# Patient Record
Sex: FEMALE | Race: WHITE | NOT HISPANIC OR LATINO | Employment: OTHER | URBAN - METROPOLITAN AREA
[De-identification: names, ages, dates, MRNs, and addresses within clinical notes are randomized per-mention and may not be internally consistent; named-entity substitution may affect disease eponyms.]

---

## 2017-01-23 ENCOUNTER — HOSPITAL ENCOUNTER (OUTPATIENT)
Dept: LAB | Facility: MEDICAL CENTER | Age: 77
End: 2017-01-23
Attending: FAMILY MEDICINE
Payer: MEDICARE

## 2017-01-23 DIAGNOSIS — E03.4 HYPOTHYROIDISM DUE TO ACQUIRED ATROPHY OF THYROID: ICD-10-CM

## 2017-01-23 DIAGNOSIS — E78.2 MIXED HYPERLIPIDEMIA: ICD-10-CM

## 2017-01-23 DIAGNOSIS — R79.89 LOW SERUM VITAMIN D: ICD-10-CM

## 2017-01-23 LAB
25(OH)D3 SERPL-MCNC: 33 NG/ML (ref 30–100)
ALBUMIN SERPL BCP-MCNC: 4.2 G/DL (ref 3.2–4.9)
ALBUMIN/GLOB SERPL: 1.2 G/DL
ALP SERPL-CCNC: 64 U/L (ref 30–99)
ALT SERPL-CCNC: 21 U/L (ref 2–50)
ANION GAP SERPL CALC-SCNC: 6 MMOL/L (ref 0–11.9)
AST SERPL-CCNC: 26 U/L (ref 12–45)
BILIRUB SERPL-MCNC: 0.7 MG/DL (ref 0.1–1.5)
BUN SERPL-MCNC: 20 MG/DL (ref 8–22)
CALCIUM SERPL-MCNC: 9.3 MG/DL (ref 8.5–10.5)
CHLORIDE SERPL-SCNC: 106 MMOL/L (ref 96–112)
CHOLEST SERPL-MCNC: 203 MG/DL (ref 100–199)
CO2 SERPL-SCNC: 29 MMOL/L (ref 20–33)
CREAT SERPL-MCNC: 1.12 MG/DL (ref 0.5–1.4)
GLOBULIN SER CALC-MCNC: 3.4 G/DL (ref 1.9–3.5)
GLUCOSE SERPL-MCNC: 98 MG/DL (ref 65–99)
HDLC SERPL-MCNC: 38 MG/DL
LDLC SERPL CALC-MCNC: 115 MG/DL
POTASSIUM SERPL-SCNC: 4.5 MMOL/L (ref 3.6–5.5)
PROT SERPL-MCNC: 7.6 G/DL (ref 6–8.2)
SODIUM SERPL-SCNC: 141 MMOL/L (ref 135–145)
T4 FREE SERPL-MCNC: 0.77 NG/DL (ref 0.53–1.43)
TRIGL SERPL-MCNC: 250 MG/DL (ref 0–149)
TSH SERPL DL<=0.005 MIU/L-ACNC: 3.38 UIU/ML (ref 0.3–3.7)

## 2017-01-23 PROCEDURE — 36415 COLL VENOUS BLD VENIPUNCTURE: CPT

## 2017-01-23 PROCEDURE — 84439 ASSAY OF FREE THYROXINE: CPT

## 2017-01-23 PROCEDURE — 80061 LIPID PANEL: CPT

## 2017-01-23 PROCEDURE — 80053 COMPREHEN METABOLIC PANEL: CPT

## 2017-01-23 PROCEDURE — 84443 ASSAY THYROID STIM HORMONE: CPT

## 2017-01-23 PROCEDURE — 82306 VITAMIN D 25 HYDROXY: CPT

## 2017-01-30 ENCOUNTER — OFFICE VISIT (OUTPATIENT)
Dept: MEDICAL GROUP | Facility: PHYSICIAN GROUP | Age: 77
End: 2017-01-30
Payer: MEDICARE

## 2017-01-30 VITALS
HEIGHT: 62 IN | OXYGEN SATURATION: 95 % | WEIGHT: 177 LBS | SYSTOLIC BLOOD PRESSURE: 146 MMHG | DIASTOLIC BLOOD PRESSURE: 52 MMHG | HEART RATE: 85 BPM | BODY MASS INDEX: 32.57 KG/M2 | TEMPERATURE: 96.8 F

## 2017-01-30 DIAGNOSIS — N18.30 CKD (CHRONIC KIDNEY DISEASE) STAGE 3, GFR 30-59 ML/MIN (HCC): ICD-10-CM

## 2017-01-30 DIAGNOSIS — E78.2 MIXED HYPERLIPIDEMIA: ICD-10-CM

## 2017-01-30 DIAGNOSIS — M15.9 PRIMARY OSTEOARTHRITIS INVOLVING MULTIPLE JOINTS: ICD-10-CM

## 2017-01-30 DIAGNOSIS — E66.9 OBESITY (BMI 30-39.9): ICD-10-CM

## 2017-01-30 PROCEDURE — G8432 DEP SCR NOT DOC, RNG: HCPCS | Performed by: FAMILY MEDICINE

## 2017-01-30 PROCEDURE — 4040F PNEUMOC VAC/ADMIN/RCVD: CPT | Performed by: FAMILY MEDICINE

## 2017-01-30 PROCEDURE — G8419 CALC BMI OUT NRM PARAM NOF/U: HCPCS | Performed by: FAMILY MEDICINE

## 2017-01-30 PROCEDURE — 1036F TOBACCO NON-USER: CPT | Performed by: FAMILY MEDICINE

## 2017-01-30 PROCEDURE — 99214 OFFICE O/P EST MOD 30 MIN: CPT | Performed by: FAMILY MEDICINE

## 2017-01-30 PROCEDURE — 1101F PT FALLS ASSESS-DOCD LE1/YR: CPT | Performed by: FAMILY MEDICINE

## 2017-01-30 PROCEDURE — G8484 FLU IMMUNIZE NO ADMIN: HCPCS | Performed by: FAMILY MEDICINE

## 2017-01-30 RX ORDER — CELECOXIB 100 MG/1
100 CAPSULE ORAL 2 TIMES DAILY
Qty: 180 CAP | Refills: 1 | Status: SHIPPED | OUTPATIENT
Start: 2017-01-30 | End: 2017-09-27 | Stop reason: SDUPTHER

## 2017-01-30 RX ORDER — SIMVASTATIN 20 MG
20 TABLET ORAL EVERY EVENING
Qty: 90 TAB | Refills: 3 | Status: SHIPPED | OUTPATIENT
Start: 2017-01-30 | End: 2017-10-31 | Stop reason: SDUPTHER

## 2017-01-30 ASSESSMENT — PATIENT HEALTH QUESTIONNAIRE - PHQ9: CLINICAL INTERPRETATION OF PHQ2 SCORE: 0

## 2017-01-30 NOTE — ASSESSMENT & PLAN NOTE
Ongoing issue. As stated above due to the cold weather patient is not getting any regular daily exercise and has not been able to eat as healthy due to the holidays. She reports that she is trying to get back on track and will be getting a new dog soon so she will be getting out for regular exercise.

## 2017-01-30 NOTE — ASSESSMENT & PLAN NOTE
Ongoing issue. Patient reports that the majority of her pain is localized in her hands bilaterally. She states that previously she had been taking diclofenac but had stopped that medication due to the decrease in her renal function. She states that she has been trying to take over-the-counter Tylenol arthritis but this has not been beneficial. She reports achiness in both of her hands; a mild amount of joint swelling without redness. She states that it makes it difficult to  small items due to the pain. She denies any numbness or tingling

## 2017-01-30 NOTE — PROGRESS NOTES
Subjective:   Emilie Gonzalez is a 76 y.o. female here today for CKD; elevated TG; obesity; joint pain    CKD (chronic kidney disease) stage 3, GFR 30-59 ml/min  Ongoing issue. Chart review shows that recent blood work reveals a decrease in the GFR down to 47. Previously it had been at 50. Currently patient is not on any nephrotoxic medications.    Hyperlipidemia  Ongoing issue. Patient reports 100% compliance with medication; she is currently on simvastatin 20 mg daily. Patient denies any issues with muscle cramps or weakness. Chart review shows that her total cholesterol is just above normal at 203 but her triglycerides are slightly elevated at 250. She reports that she has done fairly well at eating healthy but with the holidays has not been as consistent. She also has not been getting any regular exercise due to the cold weather.    Obesity (BMI 30-39.9)  Ongoing issue. As stated above due to the cold weather patient is not getting any regular daily exercise and has not been able to eat as healthy due to the holidays. She reports that she is trying to get back on track and will be getting a new dog soon so she will be getting out for regular exercise.    Osteoarthritis  Ongoing issue. Patient reports that the majority of her pain is localized in her hands bilaterally. She states that previously she had been taking diclofenac but had stopped that medication due to the decrease in her renal function. She states that she has been trying to take over-the-counter Tylenol arthritis but this has not been beneficial. She reports achiness in both of her hands; a mild amount of joint swelling without redness. She states that it makes it difficult to  small items due to the pain. She denies any numbness or tingling         Current medicines (including changes today)  Current Outpatient Prescriptions   Medication Sig Dispense Refill   • celecoxib (CELEBREX) 100 MG Cap Take 1 Cap by mouth 2 times a day. 180 Cap 1   •  "simvastatin (ZOCOR) 20 MG Tab Take 1 Tab by mouth every evening. 90 Tab 3   • Omega-3 Fatty Acids (OMEGA 3 PO) Take  by mouth.     • Multiple Vitamins-Minerals (MULTI COMPLETE PO) Take  by mouth.     • Calcium Carbonate-Vitamin D (CALCIUM + D PO) Take  by mouth.     • Bioflavonoid Products (VITAMIN C PLUS) 1000 MG TABS Take  by mouth.       No current facility-administered medications for this visit.     She  has a past medical history of Moderate aortic insufficiency (9/26/2014); Hyperlipidemia (9/26/2014); Osteoporosis (9/26/2014); Osteoarthritis (9/26/2014); History of shingles (9/26/2014); Prolapsed bladder (9/26/2014); Macular degeneration of right eye (9/26/2014); Hearing loss sensory, bilateral (3/10/2015); Plantar fasciitis, bilateral (3/10/2015); Edema (3/10/2015); Arthritis of foot, right (6/9/2015); Benign essential tremor (6/9/2015); Hypothyroidism due to acquired atrophy of thyroid (9/1/2015); and Parkinson's disease (CMS-HCC).    ROS   No chest pain, no shortness of breath, no abdominal pain  +joint pain     Objective:     Blood pressure 146/52, pulse 85, temperature 36 °C (96.8 °F), height 1.575 m (5' 2\"), weight 80.287 kg (177 lb), SpO2 95 %. Body mass index is 32.37 kg/(m^2).   Physical Exam:  Alert, oriented in no acute distress.  Eye contact is good, speech goal directed, affect calm  HEENT: conjunctiva non-injected, sclera non-icteric.  Pinna normal. Oral mucous membranes pink and moist with no lesions.  Neck No adenopathy or masses in the neck or supraclavicular regions.  Lungs: clear to auscultation bilaterally with good excursion.  CV: regular rate and rhythm.; mild systolic ejection murmur  Abdomen: soft, nontender, No CVAT  Ext: no edema, color normal, vascularity normal, temperature normal  MSK: noted trace swelling of the 3rd digit on the left hand and 2nd digit on the right hand; no erythema; no TTP; decreased  strength secondary to pain      Assessment and Plan:   The following " treatment plan was discussed     1. Mixed hyperlipidemia      Stable. Continue current medications and continue to encourage healthy lifestyle. Monitor. Medication refilled   2. Primary osteoarthritis involving multiple joints      Uncontrolled. Prescription for Celebrex 100 mg one tab by mouth twice a day. Monitor for tolerance and effect   3. CKD (chronic kidney disease) stage 3, GFR 30-59 ml/min      Stable. Continue to monitor closely.   4. Obesity (BMI 30-39.9)  Patient identified as having weight management issue.  Appropriate orders and counseling given.    Uncontrolled.cont to encourage healthy eating and exercise. Monitor       Followup: Return in about 9 months (around 10/30/2017) for lipids/arthritis, Short.

## 2017-01-30 NOTE — ASSESSMENT & PLAN NOTE
Ongoing issue. Patient reports 100% compliance with medication; she is currently on simvastatin 20 mg daily. Patient denies any issues with muscle cramps or weakness. Chart review shows that her total cholesterol is just above normal at 203 but her triglycerides are slightly elevated at 250. She reports that she has done fairly well at eating healthy but with the holidays has not been as consistent. She also has not been getting any regular exercise due to the cold weather.

## 2017-01-30 NOTE — ASSESSMENT & PLAN NOTE
Ongoing issue. Chart review shows that recent blood work reveals a decrease in the GFR down to 47. Previously it had been at 50. Currently patient is not on any nephrotoxic medications.

## 2017-01-30 NOTE — MR AVS SNAPSHOT
"        Emilie Gonzalez   2017 8:00 AM   Office Visit   MRN: [id number]    Department:  [facility]   Dept Phone:  [phone]    Description:  Female : 1940   Provider:  Ansley Baugh D.O.           Reason for Visit     Follow-Up           Allergies as of 2017     Allergen Noted Reactions    Codeine 2014       halucinations      You were diagnosed with     Mixed hyperlipidemia   [272.2.ICD-9-CM]       Primary osteoarthritis involving multiple joints   [1657705]       CKD (chronic kidney disease) stage 3, GFR 30-59 ml/min   [243786]       Obesity (BMI 30-39.9)   [808801]         Vital Signs     Blood Pressure Pulse Temperature Height Weight Body Mass Index    146/52 mmHg 85 36 °C (96.8 °F) 1.575 m (5' 2\") 80.287 kg (177 lb) 32.37 kg/m2    Oxygen Saturation Smoking Status                95% Never Smoker           Basic Information     Date Of Birth Sex Race Ethnicity Preferred Language    1940 Female White Non- English      Your appointments     2017  1:00 PM   Follow Up Visit with Magan Colvin M.D.   [facility] (--)    [address], [address]  [place] [address] [phone]           You will be receiving a confirmation call a few days before your appointment from our automated call confirmation system.            2017  2:45 PM   FOLLOW UP with Hans Olivera M.D.   [facility] (--)    [address], [facility] [address]  [place] [address] [phone]            Oct 30, 2017  9:20 AM   Established Patient with [facility]    [address] [facility] [address] [phone]           You will be receiving a confirmation call a few days before your appointment from our automated call confirmation system.              Problem List              ICD-10-CM Priority Class Noted - Resolved    Hyperlipidemia E78.5   2014 - Present   " Osteoarthritis M19.90   9/26/2014 - Present    History of shingles Z86.19   9/26/2014 - Present    Prolapsed bladder [id number]   9/26/2014 - Present    Macular degeneration of right eye H35.30   9/26/2014 - Present    Hearing loss sensory, bilateral H90.3   3/10/2015 - Present    Plantar fasciitis, bilateral M72.2   3/10/2015 - Present    Edema R60.9   3/10/2015 - Present    Arthritis of foot, right M19.90   6/9/2015 - Present    Benign essential tremor G25.0   6/9/2015 - Present    Hypothyroidism due to acquired atrophy of thyroid E03.4   9/1/2015 - Present    Parkinson disease (CMS-[facility]) G20   2/5/2016 - Present    Cognitive change R41.89   2/5/2016 - Present    Aortic regurgitation I35.1   3/14/2016 - Present    Diastolic dysfunction I51.9   3/31/2016 - Present    Bilateral hand pain M79.641, M79.642   7/25/2016 - Present    Low serum vitamin D R79.89   9/1/2016 - Present    CKD (chronic kidney disease) stage 3, GFR 30-59 ml/min N18.3   1/30/2017 - Present    Obesity (BMI 30-39.9) E66.9   1/30/2017 - Present      Health Maintenance        Date Due Completion Dates    IMM DTaP/Tdap/Td Vaccine (1 - Tdap) 10/8/1958 ---    PAP SMEAR 10/8/1960 ---    IMM ZOSTER VACCINE 10/8/1999 ---    IMM INFLUENZA (1) 9/1/2016 11/4/2015, 11/12/2014    IMM PNEUMOCOCCAL 65+ (ADULT) LOW/MEDIUM RISK SERIES (2 of 2 - PPSV23) 11/4/2016 11/4/2015    MAMMOGRAM 3/23/2017 3/23/2015, 3/20/2015, 4/10/2013 (Done)    Override on 4/10/2013: Done    BONE DENSITY 3/20/2020 3/20/2015, 4/16/2014 (Done)    Override on 4/16/2014: Done            Current Immunizations     13-VALENT PCV PREVNAR 11/4/2015    Influenza Vaccine Adult HD 11/4/2015, 11/12/2014      Below and/or attached are the medications your provider expects you to take. Review all of your home medications and newly ordered medications with your provider and/or pharmacist. Follow medication instructions as directed by your provider and/or pharmacist. Please keep your medication list with you  and share with your provider. Update the information when medications are discontinued, doses are changed, or new medications (including over-the-counter products) are added; and carry medication information at all times in the event of emergency situations     Allergies:  CODEINE - (reactions not documented)               Medications  Valid as of: January 30, 2017 -  8:16 AM    Generic Name Brand Name Tablet Size Instructions for use    Bioflavonoid Products (Tab) VITAMIN C PLUS 1000 MG Take  by mouth.        Calcium Citrate-Vitamin D   Take  by mouth.        Celecoxib (Cap) CELEBREX 100 MG Take 1 Cap by mouth 2 times a day.        Multiple Vitamins-Minerals   Take  by mouth.        Omega-3 Fatty Acids   Take  by mouth.        Simvastatin (Tab) ZOCOR 20 MG Take 1 Tab by mouth every evening.        .                 Medicines prescribed today were sent to:     [facility] [address] [place] [address] [place], [place] - [address] [address] [address]    [address] [place] [address]    Phone: [phone] Fax: [phone]    Open 24 Hours?: No      Medication refill instructions:       If your prescription bottle indicates you have medication refills left, it is not necessary to call your provider’s office. Please contact your pharmacy and they will refill your medication.    If your prescription bottle indicates you do not have any refills left, you may request refills at any time through one of the following ways: The online [employer] system (except Urgent Care), by calling your provider’s office, or by asking your pharmacy to contact your provider’s office with a refill request. Medication refills are processed only during regular business hours and may not be available until the next business day. Your provider may request additional information or to have a follow-up visit with you prior to refilling your medication.   *Please Note: Medication refills are assigned a new Rx number when refilled electronically.  Your pharmacy may indicate that no refills were authorized even though a new prescription for the same medication is available at the pharmacy. Please request the medicine by name with the pharmacy before contacting your provider for a refill.           MyChart Status: Patient Declined

## 2017-04-12 ENCOUNTER — OFFICE VISIT (OUTPATIENT)
Dept: CARDIOLOGY | Facility: MEDICAL CENTER | Age: 77
End: 2017-04-12
Payer: MEDICARE

## 2017-04-12 ENCOUNTER — OFFICE VISIT (OUTPATIENT)
Dept: NEUROLOGY | Facility: MEDICAL CENTER | Age: 77
End: 2017-04-12
Payer: MEDICARE

## 2017-04-12 VITALS
HEIGHT: 63 IN | SYSTOLIC BLOOD PRESSURE: 124 MMHG | DIASTOLIC BLOOD PRESSURE: 70 MMHG | WEIGHT: 173 LBS | HEART RATE: 70 BPM | BODY MASS INDEX: 30.65 KG/M2

## 2017-04-12 VITALS
HEIGHT: 62 IN | RESPIRATION RATE: 16 BRPM | DIASTOLIC BLOOD PRESSURE: 56 MMHG | WEIGHT: 173.4 LBS | SYSTOLIC BLOOD PRESSURE: 138 MMHG | OXYGEN SATURATION: 97 % | HEART RATE: 96 BPM | BODY MASS INDEX: 31.91 KG/M2 | TEMPERATURE: 98 F

## 2017-04-12 DIAGNOSIS — E78.5 DYSLIPIDEMIA: ICD-10-CM

## 2017-04-12 DIAGNOSIS — R60.9 EDEMA, UNSPECIFIED TYPE: ICD-10-CM

## 2017-04-12 DIAGNOSIS — I35.1 AORTIC VALVE INSUFFICIENCY, UNSPECIFIED ETIOLOGY: ICD-10-CM

## 2017-04-12 DIAGNOSIS — R94.31 ABNORMAL EKG: ICD-10-CM

## 2017-04-12 DIAGNOSIS — G20.A1 PARKINSON DISEASE: ICD-10-CM

## 2017-04-12 PROCEDURE — G8432 DEP SCR NOT DOC, RNG: HCPCS | Performed by: INTERNAL MEDICINE

## 2017-04-12 PROCEDURE — 4040F PNEUMOC VAC/ADMIN/RCVD: CPT | Performed by: PSYCHIATRY & NEUROLOGY

## 2017-04-12 PROCEDURE — 1101F PT FALLS ASSESS-DOCD LE1/YR: CPT | Performed by: PSYCHIATRY & NEUROLOGY

## 2017-04-12 PROCEDURE — G8419 CALC BMI OUT NRM PARAM NOF/U: HCPCS | Performed by: INTERNAL MEDICINE

## 2017-04-12 PROCEDURE — 1101F PT FALLS ASSESS-DOCD LE1/YR: CPT | Performed by: INTERNAL MEDICINE

## 2017-04-12 PROCEDURE — 99214 OFFICE O/P EST MOD 30 MIN: CPT | Performed by: PSYCHIATRY & NEUROLOGY

## 2017-04-12 PROCEDURE — 99214 OFFICE O/P EST MOD 30 MIN: CPT | Performed by: INTERNAL MEDICINE

## 2017-04-12 PROCEDURE — G8432 DEP SCR NOT DOC, RNG: HCPCS | Performed by: PSYCHIATRY & NEUROLOGY

## 2017-04-12 PROCEDURE — 4040F PNEUMOC VAC/ADMIN/RCVD: CPT | Performed by: INTERNAL MEDICINE

## 2017-04-12 PROCEDURE — 1036F TOBACCO NON-USER: CPT | Performed by: PSYCHIATRY & NEUROLOGY

## 2017-04-12 PROCEDURE — 1036F TOBACCO NON-USER: CPT | Performed by: INTERNAL MEDICINE

## 2017-04-12 PROCEDURE — G8419 CALC BMI OUT NRM PARAM NOF/U: HCPCS | Performed by: PSYCHIATRY & NEUROLOGY

## 2017-04-12 ASSESSMENT — ENCOUNTER SYMPTOMS
SHORTNESS OF BREATH: 0
MYALGIAS: 0
PALPITATIONS: 0
ORTHOPNEA: 0
DIZZINESS: 0

## 2017-04-12 NOTE — PATIENT INSTRUCTIONS
Resting tremor-- L>R ( patient is anxious and mental activity is needed to be able to observe this resting tremor)  Bradykinesia-- both hands  Rigitity-- both hands  Postural reflex  Recall 3/3    IMPRESSION:    1. Parkinson Disease Stage I- Stage II  2. Hx of hearing impairment and cognitive decline  3. Spells of shaking - minutes every 2 weeks or so    PLAN/RECOMMENDATIONS:    ________________________________________________________________________    Advise exercise muscle and brain  Explained to Emilie--- she has early parkinson disease  As time goes by, the tremor could progress  At this time, I would continue observation till Emilie would like to take medicine for better mobility  Please call us if interested in starting anti-parkinson medicine      I will see Emilie in 6 months    ________________________________________________________________________    Fish Oil -- Omega 3 [id number]# daily  Vit D-3 2000 unit daily  ________________________________________________________________________      ________________________________________________________________________

## 2017-04-12 NOTE — PROGRESS NOTES
"Subjective:   Emilie Gonzalez is a 76 y.o. female who presents today for followup aortic insufficiency, hyperlipidemia, abnormal EKG, edema.    Last seen on 10/10/2016.    Since 10/10/2016 appointment no cardiac symptoms.  No CHF symptoms.   No palpitations or chest pain.  Moved from an apartment to a [employer].  Able to work out in the garden without any symptoms or problems.    Past medical history  Diagnosed with stage II Parkinson's disease. Followed by Dr. Colvin, neurologist    On 9/1/2015 TSH was slightly elevated.  Levothyroxine started by PCP.  Of note, has always slept on 2 pillows for years.  Has always woken up 2-3 times a night.  No change in his sleep habits.    Previous appointment  The patient states that her children states that she gets out of breath while walking.  The patient has noted some mild exertional shortness of breath.  Concerned about recent worsening aortic insufficiency.  No angina pectoris.  No CHF symptoms.  Has had right lower extremity edema currently related to arthritis.  Takes anti-inflammatories.  Had been started on Aldactone/HCTZ by PCP but has not been taking it.  Also in 11/2014 diagnosed with \"asthma\". Had mild abnormal PFTs in PCPs office.  Prescribed a bronchodilator but has not used it.    Past medical history  The patient had previously lived in [place], [place].  5 years ago she was discovered to have a \"leaky heart valve.  The patient had been followed by Dr. Bird, cardiologist [place] for the past 4 years.  The patient is moved to [facility] and is establishing ongoing cardiology care.    The patient feels that she is \"healthy\".  She lives alone.  She runs all of her activity of daily living including shopping and necessary errands.  She has no exercise limitations.   She denies chest pain shortness of breath, palpitations or lightheadedness.    History of hyperlipidemia on simvastatin.  No history of hypertension, diabetes mellitus and she is not smoke " "cigarettes.    Family history  Father  of a heart attack at age 69.  Sister alive at age 90. Had a heart attack at age 75.    Social history.  .  Does not drink alcohol except on rare occasions with dinner.    Other medical problems.  2013 laparoscopic cholecystectomy Shanika Shaw.  Appendectomy age 13.  \"Chronic bronchitis\" but no problems times one year.      Past Medical History   Diagnosis Date   • Moderate aortic insufficiency 2014   • Hyperlipidemia 2014   • Osteoporosis 2014   • Osteoarthritis 2014   • History of shingles 2014   • Prolapsed bladder 2014   • Macular degeneration of right eye 2014   • Hearing loss sensory, bilateral 3/10/2015   • Plantar fasciitis, bilateral 3/10/2015   • Edema 3/10/2015   • Arthritis of foot, right 2015   • Benign essential tremor 2015   • Hypothyroidism due to acquired atrophy of thyroid 2015   • Parkinson's disease (CMS-[facility])      Past Surgical History   Procedure Laterality Date   • Cholecystectomy  2013   • Appendectomy       Family History   Problem Relation Age of Onset   • Hypertension Father    • Hypertension Sister    • Arthritis Mother      History   Smoking status   • Never Smoker    Smokeless tobacco   • Never Used     Allergies   Allergen Reactions   • Codeine      halucinations     Outpatient Encounter Prescriptions as of 2017   Medication Sig Dispense Refill   • celecoxib (CELEBREX) 100 MG Cap Take 1 Cap by mouth 2 times a day. 180 Cap 1   • simvastatin (ZOCOR) 20 MG Tab Take 1 Tab by mouth every evening. 90 Tab 3   • Omega-3 Fatty Acids (OMEGA 3 PO) Take  by mouth.     • Multiple Vitamins-Minerals (MULTI COMPLETE PO) Take  by mouth.     • Calcium Carbonate-Vitamin D (CALCIUM + D PO) Take  by mouth.     • Bioflavonoid Products (VITAMIN C PLUS) 1000 MG TABS Take  by mouth.       No facility-administered encounter medications on file as of 2017.     Review of Systems   Respiratory: Negative " "for shortness of breath.    Cardiovascular: Negative for chest pain, palpitations, orthopnea and leg swelling.   Musculoskeletal: Negative for myalgias.   Neurological: Negative for dizziness.        Objective:   /70 mmHg  Pulse 70  Ht 1.588 m (5' 2.52\")  Wt 78.472 kg (173 lb)  BMI 31.12 kg/m2    Physical Exam   Constitutional: She is oriented to person, place, and time. She appears well-nourished. No distress.   HENT:   Head: Normocephalic.   Neck: No JVD present. Carotid bruit is not present.   Normal jugular venous pressure.   Cardiovascular: Normal rate, regular rhythm, S1 normal and S2 normal.  Exam reveals no gallop and no friction rub.    Murmur heard.   Systolic murmur is present with a grade of 2/6   Pulses:       Carotid pulses are 2+ on the right side, and 2+ on the left side.       Radial pulses are 2+ on the right side, and 2+ on the left side.        Femoral pulses are 1+ on the right side, and 1+ on the left side.       Posterior tibial pulses are 1+ on the right side, and 1+ on the left side.   No femoral bruits.   Pulmonary/Chest: Effort normal and breath sounds normal. She has no wheezes. She has no rhonchi. She has no rales.   Abdominal: Soft. Bowel sounds are normal. She exhibits no abdominal bruit, no pulsatile midline mass and no mass. There is no hepatosplenomegaly. There is no tenderness.   Protuberant.   Musculoskeletal: She exhibits no edema.   Neurological: She is alert and oriented to person, place, and time. Gait normal.   Skin: Skin is warm and dry. No cyanosis. Nails show no clubbing.   Psychiatric: She has a normal mood and affect. Her behavior is normal.    06/07/2010 ECHOCARDIOGRAM  EF 60%.  Mild to moderate aortic regurgitation.    05/05/2011 ECHOCARDIOGRAM  EF 55-60%.  Moderate aortic insufficiency.    05/08/2012 ECHOCARDIOGRAM  EF 70%.  Moderate aortic insufficiency.  Pulmonary pressure 26 mmHg.    03/25/2015 ECHOCARDIOGRAM  Normal left ventricular size, thickness, " systolic function, and   diastolic function.  Left ventricular ejection fraction is 65% to 70%.  Aortic sclerosis without stenosis.  Moderately severe aortic insufficiency.  Right heart pressures are consistent with mild pulmonary hypertension.    10/24/2016 ECHOCARDIOGRAM  Normal left ventricular size, wall thickness, and systolic function.  Left ventricular ejection fraction is visually estimated to be 60%.  Severe eccentric aortic insufficiency.  Mildly thickened mitral valve leaflets with normal leaflet excursion.  Unable to estimate pulmonary artery pressure due to an inadequate   tricuspid regurgitant jet.    03/19/2015 EKG: Normal sinus rhythm, rate 73. Nonspecific ST-T wave abnormalities. No prior tracing for comparison. Reviewed by myself.    Assessment:     1. Aortic valve insufficiency, unspecified etiology  ECHOCARDIOGRAM-COMP W/ CONT   2. Dyslipidemia     3. Edema, unspecified type     4. Abnormal EKG         Medical Decision Making:  Today's Assessment / Status / Plan:      Aortic regurgitation. Severe. Asymptomatic.    Abnormal EKG.      Hyperlipidemia. On simvastatin.    Hypothyroidism. Diagnosed 9/1/2015. On supplements. Per PCP.    Recommendations  Continue current therapy.  Echocardiogram.  Follow-up 4 months.

## 2017-04-12 NOTE — MR AVS SNAPSHOT
"        Emilie Gonzalez   2017 3:00 PM   Office Visit   MRN: [id number]    Department:  [facility]   Dept Phone:  [phone]    Description:  Female : 1940   Provider:  Hans Olivera M.D.           Reason for Visit     Follow-Up           Allergies as of 2017     Allergen Noted Reactions    Codeine 2014       halucinations      You were diagnosed with     Aortic valve insufficiency, unspecified etiology   [8862753]       Dyslipidemia   [036430]       Edema, unspecified type   [1109522]       Abnormal EKG   [392707]         Vital Signs     Blood Pressure Pulse Height Weight Body Mass Index Smoking Status    124/70 mmHg 70 1.588 m (5' 2.52\") 78.472 kg (173 lb) 31.12 kg/m2 Never Smoker       Basic Information     Date Of Birth Sex Race Ethnicity Preferred Language    1940 Female White Non- English      Your appointments     Oct 11, 2017  1:00 PM   Follow Up Visit with Magan Colvin M.D.   [facility] (--)    [address], [address]  [facility] [id number] [phone]           You will be receiving a confirmation call a few days before your appointment from our automated call confirmation system.            Oct 30, 2017  9:20 AM   Established Patient with Ansley Baugh D.O.   [facility]    [address] [address] [id number] [phone]           You will be receiving a confirmation call a few days before your appointment from our automated call confirmation system.              Problem List              ICD-10-CM Priority Class Noted - Resolved    Osteoarthritis M19.90   2014 - Present    History of shingles Z86.19   2014 - Present    Prolapsed bladder [id number]   2014 - Present    Macular degeneration of right eye H35.30   2014 - Present    Hearing loss sensory, bilateral H90.3   3/10/2015 - Present    Plantar fasciitis, bilateral M72.2   3/10/2015 - Present    Edema R60.9   3/10/2015 - Present   " Arthritis of foot, right M19.90   6/9/2015 - Present    Benign essential tremor G25.0   6/9/2015 - Present    Hypothyroidism due to acquired atrophy of thyroid E03.4   9/1/2015 - Present    Parkinson disease (CMS-HCC) G20   2/5/2016 - Present    Cognitive change R41.89   2/5/2016 - Present    Aortic regurgitation I35.1   3/14/2016 - Present    Diastolic dysfunction I51.9   3/31/2016 - Present    Bilateral hand pain M79.641, M79.642   7/25/2016 - Present    Low serum vitamin D R79.89   9/1/2016 - Present    CKD (chronic kidney disease) stage 3, GFR 30-59 ml/min N18.3   1/30/2017 - Present    Obesity (BMI 30-39.9) E66.9   1/30/2017 - Present    Dyslipidemia E78.5   4/12/2017 - Present    Abnormal EKG R94.31   4/12/2017 - Present      Health Maintenance        Date Due Completion Dates    IMM DTaP/Tdap/Td Vaccine (1 - Tdap) 10/8/1958 ---    PAP SMEAR 10/8/1960 ---    IMM ZOSTER VACCINE 10/8/1999 ---    IMM PNEUMOCOCCAL 65+ (ADULT) LOW/MEDIUM RISK SERIES (2 of 2 - PPSV23) 11/4/2016 11/4/2015    MAMMOGRAM 3/23/2017 3/23/2015, 3/20/2015, 4/10/2013 (Done)    Override on 4/10/2013: Done    BONE DENSITY 3/20/2020 3/20/2015, 4/16/2014 (Done)    Override on 4/16/2014: Done            Current Immunizations     13-VALENT PCV PREVNAR 11/4/2015    Influenza Vaccine Adult HD 11/4/2015, 11/12/2014      Below and/or attached are the medications your provider expects you to take. Review all of your home medications and newly ordered medications with your provider and/or pharmacist. Follow medication instructions as directed by your provider and/or pharmacist. Please keep your medication list with you and share with your provider. Update the information when medications are discontinued, doses are changed, or new medications (including over-the-counter products) are added; and carry medication information at all times in the event of emergency situations     Allergies:  CODEINE - (reactions not documented)               Medications   Valid as of: April 12, 2017 -  3:04 PM    Generic Name Brand Name Tablet Size Instructions for use    Bioflavonoid Products (Tab) VITAMIN C PLUS 1000 MG Take  by mouth.        Calcium Citrate-Vitamin D   Take  by mouth.        Celecoxib (Cap) CELEBREX 100 MG Take 1 Cap by mouth 2 times a day.        Multiple Vitamins-Minerals   Take  by mouth.        Omega-3 Fatty Acids   Take  by mouth.        Simvastatin (Tab) ZOCOR 20 MG Take 1 Tab by mouth every evening.        .                 Medicines prescribed today were sent to:     [facility] [address] [place] [address] [place], [place] - [email or web address] [facility] [address]    [address] [place] [address]    Phone: [phone] Fax: [phone]    Open 24 Hours?: No      Medication refill instructions:       If your prescription bottle indicates you have medication refills left, it is not necessary to call your provider’s office. Please contact your pharmacy and they will refill your medication.    If your prescription bottle indicates you do not have any refills left, you may request refills at any time through one of the following ways: The online [employer] system (except Urgent Care), by calling your provider’s office, or by asking your pharmacy to contact your provider’s office with a refill request. Medication refills are processed only during regular business hours and may not be available until the next business day. Your provider may request additional information or to have a follow-up visit with you prior to refilling your medication.   *Please Note: Medication refills are assigned a new Rx number when refilled electronically. Your pharmacy may indicate that no refills were authorized even though a new prescription for the same medication is available at the pharmacy. Please request the medicine by name with the pharmacy before contacting your provider for a refill.        Your To Do List     Future Labs/Procedures Complete By Expires    ECHOCARDIOGRAM-COMP W/  CONT  As directed 4/13/2018         MyChart Status: Patient Declined

## 2017-04-12 NOTE — MR AVS SNAPSHOT
"Emilie Gonzalez   2017 1:00 PM   Office Visit   MRN: [id number]    Department:  [facility]   Dept Phone:  [phone]    Description:  Female : 1940   Provider:  Magan Colvin M.D.           Reason for Visit     Follow-Up Parkinson's      Allergies as of 2017     Allergen Noted Reactions    Codeine 2014       halucinations      You were diagnosed with     Parkinson disease (CMS-HCC)   [766357]         Vital Signs     Blood Pressure Pulse Temperature Respirations Height Weight    138/56 mmHg 96 36.7 °C (98 °F) 16 1.575 m (5' 2.01\") 78.654 kg (173 lb 6.4 oz)    Body Mass Index Oxygen Saturation Smoking Status             31.71 kg/m2 97% Never Smoker          Basic Information     Date Of Birth Sex Race Ethnicity Preferred Language    1940 Female White Non- English      Your appointments     2017  3:00 PM   FOLLOW UP with Hans Olivera M.D.   [facility] (--)    [address], [address]  [facility] [id number] [phone]            Oct 30, 2017  9:20 AM   [facility]    [address] [facility] [id number] [phone]           You will be receiving a confirmation call a few days before your appointment from our automated call confirmation system.              Problem List              ICD-10-CM Priority Class Noted - Resolved    Hyperlipidemia E78.5   2014 - Present    Osteoarthritis M19.90   2014 - Present    History of shingles Z86.19   2014 - Present    Prolapsed bladder [id number]   2014 - Present    Macular degeneration of right eye H35.30   2014 - Present    Hearing loss sensory, bilateral H90.3   3/10/2015 - Present    Plantar fasciitis, bilateral M72.2   3/10/2015 - Present    Edema R60.9   3/10/2015 - Present    Arthritis of foot, right M19.90   2015 - Present    Benign essential tremor G25.0   " 6/9/2015 - Present    Hypothyroidism due to acquired atrophy of thyroid E03.4   9/1/2015 - Present    Parkinson disease (CMS-[facility]) G20   2/5/2016 - Present    Cognitive change R41.89   2/5/2016 - Present    Aortic regurgitation I35.1   3/14/2016 - Present    Diastolic dysfunction I51.9   3/31/2016 - Present    Bilateral hand pain M79.641, M79.642   7/25/2016 - Present    Low serum vitamin D R79.89   9/1/2016 - Present    CKD (chronic kidney disease) stage 3, GFR 30-59 ml/min N18.3   1/30/2017 - Present    Obesity (BMI 30-39.9) E66.9   1/30/2017 - Present      Health Maintenance        Date Due Completion Dates    IMM DTaP/Tdap/Td Vaccine (1 - Tdap) 10/8/1958 ---    PAP SMEAR 10/8/1960 ---    IMM ZOSTER VACCINE 10/8/1999 ---    IMM PNEUMOCOCCAL 65+ (ADULT) LOW/MEDIUM RISK SERIES (2 of 2 - PPSV23) 11/4/2016 11/4/2015    MAMMOGRAM 3/23/2017 3/23/2015, 3/20/2015, 4/10/2013 (Done)    Override on 4/10/2013: Done    BONE DENSITY 3/20/2020 3/20/2015, 4/16/2014 (Done)    Override on 4/16/2014: Done            Current Immunizations     13-VALENT PCV PREVNAR 11/4/2015    Influenza Vaccine Adult HD 11/4/2015, 11/12/2014      Below and/or attached are the medications your provider expects you to take. Review all of your home medications and newly ordered medications with your provider and/or pharmacist. Follow medication instructions as directed by your provider and/or pharmacist. Please keep your medication list with you and share with your provider. Update the information when medications are discontinued, doses are changed, or new medications (including over-the-counter products) are added; and carry medication information at all times in the event of emergency situations     Allergies:  CODEINE - (reactions not documented)               Medications  Valid as of: April 12, 2017 -  1:35 PM    Generic Name Brand Name Tablet Size Instructions for use    Bioflavonoid Products (Tab) VITAMIN C PLUS 1000 MG Take  by mouth.         Calcium Citrate-Vitamin D   Take  by mouth.        Celecoxib (Cap) CELEBREX 100 MG Take 1 Cap by mouth 2 times a day.        Multiple Vitamins-Minerals   Take  by mouth.        Omega-3 Fatty Acids   Take  by mouth.        Simvastatin (Tab) ZOCOR 20 MG Take 1 Tab by mouth every evening.        .                 Medicines prescribed today were sent to:     [facility] [address] [place] [address] [place], [place] - [address] [facility] [address] [place] [address]    Phone: [phone] Fax: [phone]    Open 24 Hours?: No      Medication refill instructions:       If your prescription bottle indicates you have medication refills left, it is not necessary to call your provider’s office. Please contact your pharmacy and they will refill your medication.    If your prescription bottle indicates you do not have any refills left, you may request refills at any time through one of the following ways: The online [employer] system (except Urgent Care), by calling your provider’s office, or by asking your pharmacy to contact your provider’s office with a refill request. Medication refills are processed only during regular business hours and may not be available until the next business day. Your provider may request additional information or to have a follow-up visit with you prior to refilling your medication.   *Please Note: Medication refills are assigned a new Rx number when refilled electronically. Your pharmacy may indicate that no refills were authorized even though a new prescription for the same medication is available at the pharmacy. Please request the medicine by name with the pharmacy before contacting your provider for a refill.        Instructions        Resting tremor-- L>R ( patient is anxious and mental activity is needed to be able to observe this resting tremor)  Bradykinesia-- both hands  Rigitity-- both hands  Postural reflex  Recall 3/3    IMPRESSION:    1. Parkinson Disease Stage I-  Stage II  2. Hx of hearing impairment and cognitive decline  3. Spells of shaking - minutes every 2 weeks or so    PLAN/RECOMMENDATIONS:    ________________________________________________________________________    Advise exercise muscle and brain  Explained to Emilie--- she has early parkinson disease  As time goes by, the tremor could progress  At this time, I would continue observation till Emilie would like to take medicine for better mobility  Please call us if interested in starting anti-parkinson medicine      I will see Emilie in 6 months    ________________________________________________________________________    Fish Oil -- Omega 3 [id number]# daily  Vit D-3 2000 unit daily  ________________________________________________________________________      ________________________________________________________________________              MyChart Status: Patient Declined

## 2017-04-12 NOTE — PROGRESS NOTES
NEUROLOGY NOTE    Referring Physician  Sherry Davidson      CHIEF COMPLAINT:  Tremor resting -- for years, worsened last year   Chief Complaint   Patient presents with   • Follow-Up     Parkinson's       PRESENT ILLNESS:   Tremor resting? -- for years, worsened in the last year    One in a while, she would develop shaking lasting for 5 minutes   One of her older sister had bad tremor and she was told by her kids that she is shaking    That is the reason that she came here for diagnosis    The left hand tremor would only become shaking when she is not paying attention    PAST MEDICAL HISTORY:  Past Medical History   Diagnosis Date   • Moderate aortic insufficiency 9/26/2014   • Hyperlipidemia 9/26/2014   • Osteoporosis 9/26/2014   • Osteoarthritis 9/26/2014   • History of shingles 9/26/2014   • Prolapsed bladder 9/26/2014   • Macular degeneration of right eye 9/26/2014   • Hearing loss sensory, bilateral 3/10/2015   • Plantar fasciitis, bilateral 3/10/2015   • Edema 3/10/2015   • Arthritis of foot, right 6/9/2015   • Benign essential tremor 6/9/2015   • Hypothyroidism due to acquired atrophy of thyroid 9/1/2015   • Parkinson's disease (CMS-[facility])        PAST SURGICAL HISTORY:  Past Surgical History   Procedure Laterality Date   • Cholecystectomy  9/2013   • Appendectomy  1956       FAMILY HISTORY:  One older system-- has tremor  Family History   Problem Relation Age of Onset   • Hypertension Father    • Hypertension Sister    • Arthritis Mother        SOCIAL HISTORY:  Social History     Social History   • Marital Status:      Spouse Name: N/A   • Number of Children: N/A   • Years of Education: N/A     Occupational History   • Not on file.     Social History Main Topics   • Smoking status: Never Smoker    • Smokeless tobacco: Never Used   • Alcohol Use: 0.0 oz/week     0 Standard drinks or equivalent per week      Comment: rare   • Drug Use: No   • Sexual Activity:     Partners: Male     Other Topics Concern  "  • Not on file     Social History Narrative     ALLERGIES:  Allergies   Allergen Reactions   • Codeine      halucinations     TOBHX  History   Smoking status   • Never Smoker    Smokeless tobacco   • Never Used     ALCHX  History   Alcohol Use   • 0.0 oz/week   • 0 Standard drinks or equivalent per week     Comment: rare     DRUGHX  History   Drug Use No           MEDICATIONS:  Current Outpatient Prescriptions   Medication   • celecoxib (CELEBREX) 100 MG Cap   • simvastatin (ZOCOR) 20 MG Tab   • Omega-3 Fatty Acids (OMEGA 3 PO)   • Multiple Vitamins-Minerals (MULTI COMPLETE PO)   • Calcium Carbonate-Vitamin D (CALCIUM + D PO)   • Bioflavonoid Products (VITAMIN C PLUS) 1000 MG TABS     No current facility-administered medications for this visit.       REVIEW OF SYSTEM:    Constitutional: Denies fevers, Denies weight changes   Eyes: Denies changes in vision, no eye pain   Ears/Nose/Throat/Mouth: Denies nasal congestion or sore throat   Cardiovascular: Denies chest pain or palpitations   Respiratory: Denies SOB.   Gastrointestinal/Hepatic: Denies abdominal pain, nausea, vomiting, diarrhea, constipation or GI bleeding   Genitourinary: Denies bladder dysfunction, dysuria or frequency   Musculoskeletal/Rheum: Denies joint pain and swelling   Skin/Breast: Denies rash, denies breast lumps or discharge   Neurological: wild dreams, tremor  Psychiatric: anxiety, insomnia  Endocrine: denies hx of diabetes or thyroid dysfunction   Heme/Oncology/Lymph Nodes: Denies enlarged lymph nodes, denies brusing or known bleeding disorder   Allergic/Immunologic: Denies hx of allergies         PHYSICAL AND NEUROLOGICAL EXMAINATIONS:  VITAL SIGNS: /56 mmHg  Pulse 96  Temp(Src) 36.7 °C (98 °F)  Resp 16  Ht 1.575 m (5' 2.01\")  Wt 78.654 kg (173 lb 6.4 oz)  BMI 31.71 kg/m2  SpO2 97%  CURRENT WEIGHT:   BMI: Body mass index is 31.71 kg/(m^2).  PREVIOUS WEIGHTS:  Wt Readings from Last 25 Encounters:   04/12/17 78.654 kg (173 lb 6.4 " oz)   01/30/17 80.287 kg (177 lb)   10/12/16 80.105 kg (176 lb 9.6 oz)   10/10/16 79.833 kg (176 lb)   09/01/16 80.74 kg (178 lb)   08/05/16 79.833 kg (176 lb)   07/25/16 81.194 kg (179 lb)   03/14/16 82.101 kg (181 lb)   02/05/16 80.74 kg (178 lb)   11/04/15 80.74 kg (178 lb)   09/14/15 78.472 kg (173 lb)   09/09/15 81.647 kg (180 lb)   06/09/15 78.926 kg (174 lb)   05/20/15 78.472 kg (173 lb)   03/19/15 78.472 kg (173 lb)   03/10/15 79.833 kg (176 lb)   11/12/14 79.833 kg (176 lb)   09/26/14 78.019 kg (172 lb)       General appearance of patient: WDWN(+) NAD(+)    EYES  o Fundus : Papilledem(-) Exudates(-) Hemorrhage(-)  Nervous System  Orientation to time, place and person(+)  Memory normal(+) recall 1/3  Language: aphasia(-)  Knowledge: past(+) Current(+)  Attention(+)  Cranial Nerves  • Nerve 2: intact  • Nerve 3,4,6: intact  • Nerve 5 : intact  • Nerve 7: intact  • Nerve 8: hearing impairment  • Nerve 9 & 10: intact  • Nerve 11: intact  • Nerve 12: intact  Muscle Power and muscle tone: symmetric, normal in upper and lower  Sensory System: Pin sensation intact(+)  Reflexes: symmetric throughout  Cerebellar Function FNP normal   Gait : Steady(+)   Heart and Vascular  Peripheral Vasucular system : Edema (-) Swelling(-)  RHB, Breathing sound clear  abdomen bowel sound normoactive  Extremities freely moveable  Joints no contracture       Resting tremor-- L>R ( patient is anxious and mental activity is needed to be able to observe this resting tremor)  Bradykinesia-- both hands  Rigitity-- both hands  Postural reflex  Recall 3/3    IMPRESSION:    1. Parkinson Disease Stage I- Stage II  2. Hx of hearing impairment and cognitive decline  3. Spells of shaking - minutes every 2 weeks or so    PLAN/RECOMMENDATIONS:    ________________________________________________________________________    Advise exercise muscle and brain  Explained to Emilie--- she has early parkinson disease  As time goes by, the tremor could  progress  At this time, I would continue observation till Emilie would like to take medicine for better mobility  Please call us if interested in starting anti-parkinson medicine      I will see Emilie in 6 months    ________________________________________________________________________    Fish Oil -- Omega 3 [id number]# daily  Vit D-3 2000 unit daily  ________________________________________________________________________      ________________________________________________________________________

## 2017-05-08 ENCOUNTER — HOSPITAL ENCOUNTER (OUTPATIENT)
Dept: CARDIOLOGY | Facility: MEDICAL CENTER | Age: 77
End: 2017-05-08
Attending: INTERNAL MEDICINE
Payer: MEDICARE

## 2017-05-08 DIAGNOSIS — I35.1 AORTIC VALVE REGURGITATION: ICD-10-CM

## 2017-05-08 PROCEDURE — 93306 TTE W/DOPPLER COMPLETE: CPT

## 2017-05-08 PROCEDURE — 93306 TTE W/DOPPLER COMPLETE: CPT | Mod: 26 | Performed by: INTERNAL MEDICINE

## 2017-05-09 LAB
LV EJECT FRACT  99904: 65
LV EJECT FRACT MOD 2C 99903: 55.81
LV EJECT FRACT MOD 4C 99902: 61.81
LV EJECT FRACT MOD BP 99901: 58.38

## 2017-05-10 ENCOUNTER — TELEPHONE (OUTPATIENT)
Dept: CARDIOLOGY | Facility: MEDICAL CENTER | Age: 77
End: 2017-05-10

## 2017-05-10 NOTE — TELEPHONE ENCOUNTER
MALLORY/Nola   Patient calling for echo results. She can be reached at [phone]            L/m for pt informing of results as dictated by MALLORY. Pt to call back if any questions.     Pt calling to see if SW is wanted to go forward with heart surgery. To SW to review and advise based on Echo results. Thank you.

## 2017-05-11 NOTE — TELEPHONE ENCOUNTER
Tell the patient that the echocardiogram shows that the aortic valve is mildly narrowed and moderately leaks and that her heart muscle is functioning normally. No surgery.  Keep follow-up appointment

## 2017-07-11 RX ORDER — SIMVASTATIN 20 MG
TABLET ORAL
Qty: 90 TAB | Refills: 2 | Status: SHIPPED | OUTPATIENT
Start: 2017-07-11 | End: 2017-10-18

## 2017-07-11 NOTE — TELEPHONE ENCOUNTER
Requested Prescriptions     Signed Prescriptions Disp Refills   • simvastatin (ZOCOR) 20 MG Tab 90 Tab 2     Sig: TAKE 1 TAB BY MOUTH EVERY EVENING.     Authorizing Provider: CAR DENNY     Ordering User: HILDA TOSCANO A.P.R.N.

## 2017-09-27 RX ORDER — CELECOXIB 100 MG/1
CAPSULE ORAL
Qty: 180 CAP | Refills: 3 | Status: SHIPPED | OUTPATIENT
Start: 2017-09-27 | End: 2017-10-31

## 2017-09-29 ENCOUNTER — PATIENT OUTREACH (OUTPATIENT)
Dept: HEALTH INFORMATION MANAGEMENT | Facility: OTHER | Age: 77
End: 2017-09-29

## 2017-09-29 ENCOUNTER — TELEPHONE (OUTPATIENT)
Dept: HEALTH INFORMATION MANAGEMENT | Facility: OTHER | Age: 77
End: 2017-09-29

## 2017-09-29 NOTE — PROGRESS NOTES
Attempt #:1    WebIZ Checked & Epic Updated: Yes  · WebIZ Recommendations: FLU, PNEUMOVAX (PPSV23), TDAP and ZOSTAVAX (Shingles)  · Is patient due for Tdap? YES. Patient was not notified of copay.  · Is patient due for Shingles? YES. Patient was not notified of copay.  [facility] Verified: yes  Verify PCP: yes    Communication Preference Obtained: yes     Review Care Team: yes    Annual Wellness Visit Scheduling  1. Scheduling Status:Scheduled        Care Gap Scheduling (Attempt to Schedule EACH Overdue Care Gap!)     Health Maintenance Due   Topic Date Due   • IMM DTaP/Tdap/Td Vaccine (1 - Tdap) 10/08/1958   • IMM ZOSTER VACCINE  10/08/1999   • IMM PNEUMOCOCCAL 65+ (ADULT) LOW/MEDIUM RISK SERIES (2 of 2 - PPSV23) 11/04/2016   • MAMMOGRAM  Pt will discuss with pcp   • IMM INFLUENZA (1) 09/01/2017        Scheduled patient for Annual Wellness Visit and Immunizations: FLU, PNEUMOVAX (PPSV23), TDAP and ZOSTAVAX (Shingles)       [facility] [employer] Activation: declined  [facility] Satish: no  Virtual Visits: no  Opt In to Text Messages: no

## 2017-10-11 ENCOUNTER — APPOINTMENT (OUTPATIENT)
Dept: NEUROLOGY | Facility: MEDICAL CENTER | Age: 77
End: 2017-10-11
Payer: MEDICARE

## 2017-10-16 ENCOUNTER — OFFICE VISIT (OUTPATIENT)
Dept: CARDIOLOGY | Facility: MEDICAL CENTER | Age: 77
End: 2017-10-16
Payer: MEDICARE

## 2017-10-16 VITALS
HEART RATE: 68 BPM | HEIGHT: 62 IN | DIASTOLIC BLOOD PRESSURE: 60 MMHG | BODY MASS INDEX: 32.02 KG/M2 | SYSTOLIC BLOOD PRESSURE: 138 MMHG | RESPIRATION RATE: 14 BRPM | WEIGHT: 174 LBS | OXYGEN SATURATION: 95 %

## 2017-10-16 DIAGNOSIS — I35.1 AORTIC VALVE INSUFFICIENCY, ETIOLOGY OF CARDIAC VALVE DISEASE UNSPECIFIED: ICD-10-CM

## 2017-10-16 DIAGNOSIS — E78.5 DYSLIPIDEMIA: ICD-10-CM

## 2017-10-16 DIAGNOSIS — R94.31 ABNORMAL EKG: ICD-10-CM

## 2017-10-16 DIAGNOSIS — R60.9 EDEMA, UNSPECIFIED TYPE: ICD-10-CM

## 2017-10-16 PROCEDURE — 99214 OFFICE O/P EST MOD 30 MIN: CPT | Performed by: INTERNAL MEDICINE

## 2017-10-16 RX ORDER — VIT C/E/ZN/COPPR/LUTEIN/ZEAXAN 60 MG-6 MG
1 CAPSULE ORAL DAILY
COMMUNITY

## 2017-10-16 ASSESSMENT — ENCOUNTER SYMPTOMS
SHORTNESS OF BREATH: 0
ORTHOPNEA: 0
DIZZINESS: 0
PALPITATIONS: 0
MYALGIAS: 0

## 2017-10-16 NOTE — PROGRESS NOTES
"Subjective:   Emilei Gonzalez is a 77 y.o. female who presents today for followup aortic insufficiency, hyperlipidemia, abnormal EKG, edema.    Last seen on 4/12/2017.    Since 4/12/2017 appointment the patient's had no cardiac symptoms.  She had a recent upper respiratory infection with a nonproductive cough after going to her grandson's wedding in [place], [place]    Since 10/10/2016 appointment no cardiac symptoms.  No CHF symptoms.   No palpitations or chest pain.  Moved from an apartment to a [employer].  Able to work out in the garden without any symptoms or problems.    Past medical history  Diagnosed with stage II Parkinson's disease. Followed by Dr. Colvin, neurologist    On 9/1/2015 TSH was slightly elevated.  Levothyroxine started by PCP.  Of note, has always slept on 2 pillows for years.  Has always woken up 2-3 times a night.  No change in his sleep habits.    Previous appointment  The patient states that her children states that she gets out of breath while walking.  The patient has noted some mild exertional shortness of breath.  Concerned about recent worsening aortic insufficiency.  No angina pectoris.  No CHF symptoms.  Has had right lower extremity edema currently related to arthritis.  Takes anti-inflammatories.  Had been started on Aldactone/HCTZ by PCP but has not been taking it.  Also in 11/2014 diagnosed with \"asthma\". Had mild abnormal PFTs in PCPs office.  Prescribed a bronchodilator but has not used it.    Past medical history  The patient had previously lived in [place], [place].  5 years ago she was discovered to have a \"leaky heart valve.  The patient had been followed by Dr. Bird, cardiologist [place] for the past 4 years.  The patient is moved to [facility] and is establishing ongoing cardiology care.    The patient feels that she is \"healthy\".  She lives alone.  She runs all of her activity of daily living including shopping and necessary errands.  She has no exercise " "limitations.   She denies chest pain shortness of breath, palpitations or lightheadedness.    History of hyperlipidemia on simvastatin.  No history of hypertension, diabetes mellitus and she is not smoke cigarettes.    Family history  Father  of a heart attack at age 69.  Sister alive at age 90. Had a heart attack at age 75.    Social history.  .  Does not drink alcohol except on rare occasions with dinner.    Other medical problems.  2013 laparoscopic cholecystectomy Shanika Shaw.  Appendectomy age 13.  \"Chronic bronchitis\" but no problems times one year.      Past Medical History:   Diagnosis Date   • Arthritis of foot, right 2015   • Benign essential tremor 2015   • Edema 3/10/2015   • Hearing loss sensory, bilateral 3/10/2015   • History of shingles 2014   • Hyperlipidemia 2014   • Hypothyroidism due to acquired atrophy of thyroid 2015   • Macular degeneration of right eye 2014   • Moderate aortic insufficiency 2014   • Osteoarthritis 2014   • Osteoporosis 2014   • Parkinson's disease (CMS-HCC)    • Plantar fasciitis, bilateral 3/10/2015   • Prolapsed bladder 2014     Past Surgical History:   Procedure Laterality Date   • APPENDECTOMY     • CHOLECYSTECTOMY  2013     Family History   Problem Relation Age of Onset   • Hypertension Father    • Hypertension Sister    • Arthritis Mother      History   Smoking Status   • Never Smoker   Smokeless Tobacco   • Never Used     Allergies   Allergen Reactions   • Codeine      halucinations     Outpatient Encounter Prescriptions as of 10/16/2017   Medication Sig Dispense Refill   • Multiple Vitamins-Minerals (OCUVITE-LUTEIN) Cap Take 1 Each by mouth every day.     • celecoxib (CELEBREX) 100 MG Cap TAKE 1 CAPSULE BY MOUTH TWICE DAILY 180 Cap 3   • simvastatin (ZOCOR) 20 MG Tab Take 1 Tab by mouth every evening. 90 Tab 3   • Omega-3 Fatty Acids (OMEGA 3 PO) Take 1 Each by mouth every day.     • Multiple " "Vitamins-Minerals (MULTI COMPLETE PO) Take 1 Tab by mouth every day.     • Calcium Carbonate-Vitamin D (CALCIUM + D PO) Take 1 Tab by mouth every day.     • Bioflavonoid Products (VITAMIN C PLUS) 1000 MG TABS Take 1 Tab by mouth every day.     • simvastatin (ZOCOR) 20 MG Tab TAKE 1 TAB BY MOUTH EVERY EVENING. (Patient not taking: Reported on 10/16/2017) 90 Tab 2     No facility-administered encounter medications on file as of 10/16/2017.      Review of Systems   Respiratory: Negative for shortness of breath.    Cardiovascular: Negative for chest pain, palpitations, orthopnea and leg swelling.   Musculoskeletal: Negative for myalgias.   Neurological: Negative for dizziness.        Objective:   /60   Pulse 68   Resp 14   Ht 1.575 m (5' 2\")   Wt 78.9 kg (174 lb)   SpO2 95%   BMI 31.83 kg/m²     Physical Exam   Constitutional: She is oriented to person, place, and time. She appears well-nourished. No distress.   HENT:   Head: Normocephalic.   Neck: No JVD present. Carotid bruit is not present.   Normal jugular venous pressure.   Cardiovascular: Normal rate, regular rhythm, S1 normal and S2 normal.  Exam reveals no gallop and no friction rub.    Murmur heard.   Systolic murmur is present with a grade of 2/6   Pulses:       Carotid pulses are 2+ on the right side, and 2+ on the left side.       Radial pulses are 2+ on the right side, and 2+ on the left side.        Femoral pulses are 1+ on the right side, and 1+ on the left side.       Posterior tibial pulses are 1+ on the right side, and 1+ on the left side.   No femoral bruits.   Pulmonary/Chest: Effort normal and breath sounds normal. She has no wheezes. She has no rhonchi. She has no rales.   Abdominal: Soft. Bowel sounds are normal. She exhibits no abdominal bruit, no pulsatile midline mass and no mass. There is no hepatosplenomegaly. There is no tenderness.   Protuberant.   Musculoskeletal: She exhibits no edema.   Neurological: She is alert and oriented " to person, place, and time. Gait normal.   Skin: Skin is warm and dry. No cyanosis. Nails show no clubbing.   Psychiatric: She has a normal mood and affect. Her behavior is normal.    06/07/2010 ECHOCARDIOGRAM  EF 60%.  Mild to moderate aortic regurgitation.    05/05/2011 ECHOCARDIOGRAM  EF 55-60%.  Moderate aortic insufficiency.    05/08/2012 ECHOCARDIOGRAM  EF 70%.  Moderate aortic insufficiency.  Pulmonary pressure 26 mmHg.    03/25/2015 ECHOCARDIOGRAM  Normal left ventricular size, thickness, systolic function, and   diastolic function.  Left ventricular ejection fraction is 65% to 70%.  Aortic sclerosis without stenosis.  Moderately severe aortic insufficiency.  Right heart pressures are consistent with mild pulmonary hypertension.    10/24/2016 ECHOCARDIOGRAM  Normal left ventricular size, wall thickness, and systolic function.  Left ventricular ejection fraction is visually estimated to be 60%.  Severe eccentric aortic insufficiency.  Mildly thickened mitral valve leaflets with normal leaflet excursion.  Unable to estimate pulmonary artery pressure due to an inadequate   tricuspid regurgitant jet.    05/08/2017 ECHOCARDIOGRAM  Normal left ventricular systolic function.  Left ventricular ejection fraction is visually estimated to be 65%.  Mild concentric left ventricular hypertrophy.  Mild aortic stenosis.  Moderate aortic insufficiency.  Mild mitral regurgitation.  Unable to estimate pulmonary artery pressure due to an inadequate   tricuspid regurgitant jet.    03/19/2015 EKG: Normal sinus rhythm, rate 73. Nonspecific ST-T wave abnormalities. No prior tracing for comparison. Reviewed by myself.    Assessment:     1. Aortic valve insufficiency, etiology of cardiac valve disease unspecified     2. Abnormal EKG     3. Dyslipidemia     4. Edema, unspecified type         Medical Decision Making:  Today's Assessment / Status / Plan:      Aortic regurgitation.Moderate.    Abnormal EKG.      Hyperlipidemia. On  simvastatin.    Edema.    Hypothyroidism. Diagnosed 9/1/2015. On supplements. Per PCP.    Recommendations  Continue current therapy.  Follow-up 6 months with an echocardiogram at that time.

## 2017-10-18 ENCOUNTER — OFFICE VISIT (OUTPATIENT)
Dept: MEDICAL GROUP | Facility: PHYSICIAN GROUP | Age: 77
End: 2017-10-18
Payer: MEDICARE

## 2017-10-18 VITALS
DIASTOLIC BLOOD PRESSURE: 48 MMHG | TEMPERATURE: 97.9 F | HEART RATE: 70 BPM | BODY MASS INDEX: 31.28 KG/M2 | WEIGHT: 170 LBS | OXYGEN SATURATION: 95 % | SYSTOLIC BLOOD PRESSURE: 118 MMHG | HEIGHT: 62 IN

## 2017-10-18 DIAGNOSIS — G20.A1 PARKINSON DISEASE: ICD-10-CM

## 2017-10-18 DIAGNOSIS — M79.641 BILATERAL HAND PAIN: ICD-10-CM

## 2017-10-18 DIAGNOSIS — M15.9 PRIMARY OSTEOARTHRITIS INVOLVING MULTIPLE JOINTS: ICD-10-CM

## 2017-10-18 DIAGNOSIS — N18.30 CKD (CHRONIC KIDNEY DISEASE) STAGE 3, GFR 30-59 ML/MIN (HCC): ICD-10-CM

## 2017-10-18 DIAGNOSIS — I51.89 DIASTOLIC DYSFUNCTION: ICD-10-CM

## 2017-10-18 DIAGNOSIS — E66.9 OBESITY (BMI 30-39.9): ICD-10-CM

## 2017-10-18 DIAGNOSIS — R79.89 LOW SERUM VITAMIN D: ICD-10-CM

## 2017-10-18 DIAGNOSIS — G25.0 BENIGN ESSENTIAL TREMOR: ICD-10-CM

## 2017-10-18 DIAGNOSIS — Z23 NEED FOR VACCINATION: ICD-10-CM

## 2017-10-18 DIAGNOSIS — I35.1 AORTIC VALVE INSUFFICIENCY, ETIOLOGY OF CARDIAC VALVE DISEASE UNSPECIFIED: ICD-10-CM

## 2017-10-18 DIAGNOSIS — Z00.00 MEDICARE ANNUAL WELLNESS VISIT, SUBSEQUENT: ICD-10-CM

## 2017-10-18 DIAGNOSIS — N81.10 PROLAPSE OF FEMALE BLADDER, ACQUIRED: ICD-10-CM

## 2017-10-18 DIAGNOSIS — E78.5 DYSLIPIDEMIA: ICD-10-CM

## 2017-10-18 DIAGNOSIS — H90.3 HEARING LOSS SENSORY, BILATERAL: ICD-10-CM

## 2017-10-18 DIAGNOSIS — E03.4 HYPOTHYROIDISM DUE TO ACQUIRED ATROPHY OF THYROID: ICD-10-CM

## 2017-10-18 DIAGNOSIS — M79.642 BILATERAL HAND PAIN: ICD-10-CM

## 2017-10-18 DIAGNOSIS — M19.071 ARTHRITIS OF FOOT, RIGHT: ICD-10-CM

## 2017-10-18 DIAGNOSIS — H35.30 MACULAR DEGENERATION OF RIGHT EYE: ICD-10-CM

## 2017-10-18 PROBLEM — R94.31 ABNORMAL EKG: Status: RESOLVED | Noted: 2017-04-12 | Resolved: 2017-10-18

## 2017-10-18 PROCEDURE — 90732 PPSV23 VACC 2 YRS+ SUBQ/IM: CPT | Performed by: FAMILY MEDICINE

## 2017-10-18 PROCEDURE — G0008 ADMIN INFLUENZA VIRUS VAC: HCPCS | Performed by: FAMILY MEDICINE

## 2017-10-18 PROCEDURE — G0009 ADMIN PNEUMOCOCCAL VACCINE: HCPCS | Performed by: FAMILY MEDICINE

## 2017-10-18 PROCEDURE — G0439 PPPS, SUBSEQ VISIT: HCPCS | Mod: 25 | Performed by: FAMILY MEDICINE

## 2017-10-18 PROCEDURE — 90662 IIV NO PRSV INCREASED AG IM: CPT | Performed by: FAMILY MEDICINE

## 2017-10-18 ASSESSMENT — PATIENT HEALTH QUESTIONNAIRE - PHQ9: CLINICAL INTERPRETATION OF PHQ2 SCORE: 0

## 2017-10-18 ASSESSMENT — PAIN SCALES - GENERAL: PAINLEVEL: NO PAIN

## 2017-10-18 NOTE — PROGRESS NOTES
Chief Complaint   Patient presents with   • Annual Wellness Visit         HPI:  Emilie Gonzalez is a 77 y.o. here for Medicare Annual Wellness Visit     Patient Active Problem List    Diagnosis Date Noted   • Dyslipidemia 04/12/2017   • CKD (chronic kidney disease) stage 3, GFR 30-59 ml/min 01/30/2017   • Obesity (BMI 30-39.9) 01/30/2017   • Low serum vitamin D 09/01/2016   • Bilateral hand pain 07/25/2016   • Diastolic dysfunction 03/31/2016   • Aortic regurgitation 03/14/2016   • Parkinson disease (CMS-[facility]) 02/05/2016   • Hypothyroidism due to acquired atrophy of thyroid 09/01/2015   • Arthritis of foot, right 06/09/2015   • Benign essential tremor 06/09/2015   • Hearing loss sensory, bilateral 03/10/2015   • Osteoarthritis 09/26/2014   • Prolapse of female bladder, acquired 09/26/2014   • Macular degeneration of right eye 09/26/2014       Current Outpatient Prescriptions   Medication Sig Dispense Refill   • Multiple Vitamins-Minerals (OCUVITE-LUTEIN) Cap Take 1 Each by mouth every day.     • celecoxib (CELEBREX) 100 MG Cap TAKE 1 CAPSULE BY MOUTH TWICE DAILY 180 Cap 3   • simvastatin (ZOCOR) 20 MG Tab Take 1 Tab by mouth every evening. 90 Tab 3   • Omega-3 Fatty Acids (OMEGA 3 PO) Take 1 Each by mouth every day.     • Multiple Vitamins-Minerals (MULTI COMPLETE PO) Take 1 Tab by mouth every day.     • Calcium Carbonate-Vitamin D (CALCIUM + D PO) Take 1 Tab by mouth every day.     • Bioflavonoid Products (VITAMIN C PLUS) 1000 MG TABS Take 1 Tab by mouth every day.       No current facility-administered medications for this visit.             Current supplements as per medication list.       Allergies: Codeine    Current social contact/activities: [employer] play, Family     She  reports that she has never smoked. She has never used smokeless tobacco. She reports that she drinks alcohol. She reports that she does not use drugs.  Counseling given: Not Answered        DPA/Advanced Directive:  Patient does not have an  Advanced Directive.  A packet and workshop information was given on Advanced Directives.      ROS:    Gait: Uses no assistive device   Ostomy: no   Other tubes: no   Amputations: no   Chronic oxygen use: no   Last eye exam:2 month   : Denies incontinence.       Screening:    Depression Screening    Little interest or pleasure in doing things?  0 - not at all  Feeling down, depressed , or hopeless? 0 - not at all  Patient Health Questionnaire Score: 0     If depressive symptoms identified deferred to follow up visit unless specifically addressed in assessment and plan.    Interpretation of PHQ-9 Total Score   Score Severity   1-4 No Depression   5-9 Mild Depression   10-14 Moderate Depression   15-19 Moderately Severe Depression   20-27 Severe Depression    Screening for Cognitive Impairment    Three Minute Recall (apple, watch, ashkan)  3/3    Draw clock face with all 12 numbers set to the hand to show 10 minutes past 11 o'clock  1    Cognitive concerns identified deferred for follow up unless specifically addressed in assessment and plan.    Fall Risk Assessment    Has the patient had two or more falls in the last year or any fall with injury in the last year?  No    Safety Assessment    Throw rugs on floor.  Yes  Handrails on all stairs.  Yes  Good lighting in all hallways.  Yes  Difficulty hearing.  Yes  Patient counseled about all safety risks that were identified.    Functional Assessment ADLs    Are there any barriers preventing you from cooking for yourself or meeting nutritional needs?  No.    Are there any barriers preventing you from driving safely or obtaining transportation?  No.    Are there any barriers preventing you from using a telephone or calling for help?  No.    Are there any barriers preventing you from shopping?  No.    Are there any barriers preventing you from taking care of your own finances?  No.    Are there any barriers preventing you from managing your medications?  No.    Are  "currently engaging any exercise or physical activity?  Yes.       Health Maintenance Summary                Annual Wellness Visit Overdue 1940     IMM DTaP/Tdap/Td Vaccine Overdue 10/8/1958     IMM ZOSTER VACCINE Overdue 10/8/1999     IMM PNEUMOCOCCAL 65+ (ADULT) LOW/MEDIUM RISK SERIES Overdue 11/4/2016      Done 11/4/2015 Imm Admin: Pneumococcal Conjugate Vaccine (Prevnar/PCV-13)    IMM INFLUENZA Overdue 9/1/2017      Done 11/4/2015 Imm Admin: Influenza Vaccine Adult HD     Patient has more history with this topic...    BONE DENSITY Next Due 3/20/2020      Done 3/20/2015 DS-BONE DENSITY STUDY (DEXA)     Patient has more history with this topic...          Patient Care Team:  Ansley Baugh D.O. as PCP - General (Family Medicine)  Magan Colvin M.D. (Neurology)  Hans Olivera M.D. as Consulting Physician (Cardiology)      Social History   Substance Use Topics   • Smoking status: Never Smoker   • Smokeless tobacco: Never Used   • Alcohol use 0.0 oz/week      Comment: rare     Family History   Problem Relation Age of Onset   • Hypertension Father    • Hypertension Sister    • Arthritis Mother      She  has a past medical history of Arthritis of foot, right (6/9/2015); Benign essential tremor (6/9/2015); Edema (3/10/2015); Hearing loss sensory, bilateral (3/10/2015); History of shingles (9/26/2014); Hyperlipidemia (9/26/2014); Hypothyroidism due to acquired atrophy of thyroid (9/1/2015); Macular degeneration of right eye (9/26/2014); Moderate aortic insufficiency (9/26/2014); Osteoarthritis (9/26/2014); Osteoporosis (9/26/2014); Parkinson's disease (CMS-HCC); Plantar fasciitis, bilateral (3/10/2015); and Prolapsed bladder (9/26/2014).   Past Surgical History:   Procedure Laterality Date   • CHOLECYSTECTOMY  9/2013   • APPENDECTOMY  1956   • CATARACT PHACO WITH IOL Bilateral        Exam:     Blood pressure 118/48, pulse 70, temperature 36.6 °C (97.9 °F), height 1.575 m (5' 2\"), weight 77.1 kg (170 lb), " SpO2 95 %. Body mass index is 31.09 kg/m².    Hearing excellent.    Dentition good  Alert, oriented in no acute distress.  Eye contact is good, speech goal directed, affect calm      Assessment and Plan. The following treatment and monitoring plan is recommended:    1. Medicare annual wellness visit, subsequent      Chart reviewed and updated with the patient   2. Need for vaccination  INFLUENZA VACCINE, HIGH DOSE (65+ ONLY)    Pneumococal Polysaccharide Vaccine 23-Valent =>3yo SQ/IM    Age appropriate immunization (pneumonia and flu) provided; patient tolerated procedure well.   3. Aortic valve insufficiency, etiology of cardiac valve disease unspecified      Stable. Monitor   4. Arthritis of foot, right      Stable. Monitor   5. Benign essential tremor      Stable. Monitor   6. Bilateral hand pain      Stable. Monitor   7. CKD (chronic kidney disease) stage 3, GFR 30-59 ml/min      Stable. Monitor   8. Diastolic dysfunction      Stable. Monitor   9. Dyslipidemia  COMP METABOLIC PANEL    LIPID PROFILE    Stable. Monitor   10. Hearing loss sensory, bilateral      Stable. Monitor   11. Hypothyroidism due to acquired atrophy of thyroid  TSH    FREE THYROXINE    Stable. Monitor   12. Low serum vitamin D  VITAMIN D,25 HYDROXY    Stable. Monitor   13. Macular degeneration of right eye      Stable. Monitor   14. Obesity (BMI 30-39.9)      Stable. Monitor   15. Primary osteoarthritis involving multiple joints      Stable. Monitor   16. Parkinson disease (CMS-[facility])      Stable. Monitor   17. Prolapse of female bladder, acquired      Stable. Monitor         Services suggested: No services needed at this time  Health Care Screening: Age-appropriate preventive services Medicare covers discussed today and ordered if indicated.  Referrals offered: Community-based lifestyle interventions to reduce health risks and promote self-management and wellness, fall prevention, nutrition, physical activity, tobacco-use cessation, weight  loss, and mental health services as per orders if indicated.    Discussion today about general wellness and lifestyle habits:    · Prevent falls and reduce trip hazards; Cautioned about securing or removing rugs.  · Have a working fire alarm and carbon monoxide detector;   · Engage in regular physical activity and social activities       Follow-up: Return if symptoms worsen or fail to improve.

## 2017-10-28 ENCOUNTER — HOSPITAL ENCOUNTER (OUTPATIENT)
Dept: LAB | Facility: MEDICAL CENTER | Age: 77
End: 2017-10-28
Attending: FAMILY MEDICINE
Payer: MEDICARE

## 2017-10-28 DIAGNOSIS — R79.89 LOW SERUM VITAMIN D: ICD-10-CM

## 2017-10-28 DIAGNOSIS — E03.4 HYPOTHYROIDISM DUE TO ACQUIRED ATROPHY OF THYROID: ICD-10-CM

## 2017-10-28 DIAGNOSIS — E78.5 DYSLIPIDEMIA: ICD-10-CM

## 2017-10-28 LAB
25(OH)D3 SERPL-MCNC: 41 NG/ML (ref 30–100)
ALBUMIN SERPL BCP-MCNC: 4 G/DL (ref 3.2–4.9)
ALBUMIN/GLOB SERPL: 1.1 G/DL
ALP SERPL-CCNC: 55 U/L (ref 30–99)
ALT SERPL-CCNC: 17 U/L (ref 2–50)
ANION GAP SERPL CALC-SCNC: 6 MMOL/L (ref 0–11.9)
AST SERPL-CCNC: 25 U/L (ref 12–45)
BILIRUB SERPL-MCNC: 0.9 MG/DL (ref 0.1–1.5)
BUN SERPL-MCNC: 16 MG/DL (ref 8–22)
CALCIUM SERPL-MCNC: 9.5 MG/DL (ref 8.5–10.5)
CHLORIDE SERPL-SCNC: 103 MMOL/L (ref 96–112)
CHOLEST SERPL-MCNC: 160 MG/DL (ref 100–199)
CO2 SERPL-SCNC: 29 MMOL/L (ref 20–33)
CREAT SERPL-MCNC: 1 MG/DL (ref 0.5–1.4)
GFR SERPL CREATININE-BSD FRML MDRD: 54 ML/MIN/1.73 M 2
GLOBULIN SER CALC-MCNC: 3.5 G/DL (ref 1.9–3.5)
GLUCOSE SERPL-MCNC: 98 MG/DL (ref 65–99)
HDLC SERPL-MCNC: 42 MG/DL
LDLC SERPL CALC-MCNC: 90 MG/DL
POTASSIUM SERPL-SCNC: 4.4 MMOL/L (ref 3.6–5.5)
PROT SERPL-MCNC: 7.5 G/DL (ref 6–8.2)
SODIUM SERPL-SCNC: 138 MMOL/L (ref 135–145)
T4 FREE SERPL-MCNC: 0.84 NG/DL (ref 0.53–1.43)
TRIGL SERPL-MCNC: 139 MG/DL (ref 0–149)
TSH SERPL DL<=0.005 MIU/L-ACNC: 2.27 UIU/ML (ref 0.3–3.7)

## 2017-10-28 PROCEDURE — 84439 ASSAY OF FREE THYROXINE: CPT

## 2017-10-28 PROCEDURE — 84443 ASSAY THYROID STIM HORMONE: CPT

## 2017-10-28 PROCEDURE — 36415 COLL VENOUS BLD VENIPUNCTURE: CPT

## 2017-10-28 PROCEDURE — 80061 LIPID PANEL: CPT

## 2017-10-28 PROCEDURE — 82306 VITAMIN D 25 HYDROXY: CPT

## 2017-10-28 PROCEDURE — 80053 COMPREHEN METABOLIC PANEL: CPT

## 2017-10-31 ENCOUNTER — OFFICE VISIT (OUTPATIENT)
Dept: MEDICAL GROUP | Facility: PHYSICIAN GROUP | Age: 77
End: 2017-10-31
Payer: MEDICARE

## 2017-10-31 VITALS
HEIGHT: 62 IN | TEMPERATURE: 98 F | BODY MASS INDEX: 31.28 KG/M2 | HEART RATE: 78 BPM | DIASTOLIC BLOOD PRESSURE: 64 MMHG | SYSTOLIC BLOOD PRESSURE: 118 MMHG | OXYGEN SATURATION: 95 % | WEIGHT: 170 LBS

## 2017-10-31 DIAGNOSIS — N18.30 CKD (CHRONIC KIDNEY DISEASE) STAGE 3, GFR 30-59 ML/MIN (HCC): ICD-10-CM

## 2017-10-31 DIAGNOSIS — R79.89 LOW SERUM VITAMIN D: ICD-10-CM

## 2017-10-31 DIAGNOSIS — M15.9 PRIMARY OSTEOARTHRITIS INVOLVING MULTIPLE JOINTS: ICD-10-CM

## 2017-10-31 DIAGNOSIS — E03.4 HYPOTHYROIDISM DUE TO ACQUIRED ATROPHY OF THYROID: ICD-10-CM

## 2017-10-31 DIAGNOSIS — E78.5 DYSLIPIDEMIA: ICD-10-CM

## 2017-10-31 PROCEDURE — 99214 OFFICE O/P EST MOD 30 MIN: CPT | Performed by: FAMILY MEDICINE

## 2017-10-31 RX ORDER — SIMVASTATIN 20 MG
20 TABLET ORAL EVERY EVENING
Qty: 90 TAB | Refills: 3 | Status: SHIPPED | OUTPATIENT
Start: 2017-10-31 | End: 2018-05-01

## 2017-10-31 RX ORDER — CELECOXIB 100 MG/1
CAPSULE ORAL
Qty: 180 CAP | Refills: 3 | Status: SHIPPED | OUTPATIENT
Start: 2017-10-31 | End: 2018-03-23 | Stop reason: SDUPTHER

## 2017-10-31 NOTE — ASSESSMENT & PLAN NOTE
Ongoing issue. Patient continues to not have any issues with temperature intolerance, skin changes, hair changes. Recent lab work completed within the last week shows a normal TSH and T4. She currently does not require any medication for this problem.

## 2017-10-31 NOTE — ASSESSMENT & PLAN NOTE
Ongoing issue. Last completed within the last 2 weeks shows an improvement of her GFR from 47 up to 50 for now.

## 2017-10-31 NOTE — ASSESSMENT & PLAN NOTE
Ongoing issue. Patient continues to take her supplemental vitamin D. Recent lab work shows that her vitamin D level has improved to 41 now.

## 2017-10-31 NOTE — ASSESSMENT & PLAN NOTE
Ongoing issue. Patient reports she continues to use Celebrex 100 mg twice daily for joint pain. She states that during the winter she has more joint pain and usual but reports that the medication helps to stabilize her. She reports that it is achy pain; comes and goes.

## 2017-10-31 NOTE — PROGRESS NOTES
Subjective:   Emilie Gonzalez is a 77 y.o. female here today forArthritis, low vitamin D, elevated cholesterol, lab review    Osteoarthritis  Ongoing issue. Patient reports she continues to use Celebrex 100 mg twice daily for joint pain. She states that during the winter she has more joint pain and usual but reports that the medication helps to stabilize her. She reports that it is achy pain; comes and goes.    Low serum vitamin D  Ongoing issue. Patient continues to take her supplemental vitamin D. Recent lab work shows that her vitamin D level has improved to 41 now.    Hypothyroidism due to acquired atrophy of thyroid  Ongoing issue. Patient continues to not have any issues with temperature intolerance, skin changes, hair changes. Recent lab work completed within the last week shows a normal TSH and T4. She currently does not require any medication for this problem.    Dyslipidemia  Ongoing issue. Patient reports 100% compliance with simvastatin 20 mg daily; she denies any muscle cramps or weakness. Chart review of labs completed within the last 2 weeks shows a normal lipid panel.    CKD (chronic kidney disease) stage 3, GFR 30-59 ml/min  Ongoing issue. Last completed within the last 2 weeks shows an improvement of her GFR from 47 up to 50 for now.         Current medicines (including changes today)  Current Outpatient Prescriptions   Medication Sig Dispense Refill   • celecoxib (CELEBREX) 100 MG Cap TAKE 1 CAPSULE BY MOUTH TWICE DAILY 180 Cap 3   • simvastatin (ZOCOR) 20 MG Tab Take 1 Tab by mouth every evening. 90 Tab 3   • Multiple Vitamins-Minerals (OCUVITE-LUTEIN) Cap Take 1 Each by mouth every day.     • Omega-3 Fatty Acids (OMEGA 3 PO) Take 1 Each by mouth every day.     • Multiple Vitamins-Minerals (MULTI COMPLETE PO) Take 1 Tab by mouth every day.     • Calcium Carbonate-Vitamin D (CALCIUM + D PO) Take 1 Tab by mouth every day.     • Bioflavonoid Products (VITAMIN C PLUS) 1000 MG TABS Take 1 Tab by  "mouth every day.       No current facility-administered medications for this visit.      She  has a past medical history of Arthritis of foot, right (6/9/2015); Benign essential tremor (6/9/2015); Edema (3/10/2015); Hearing loss sensory, bilateral (3/10/2015); History of shingles (9/26/2014); Hyperlipidemia (9/26/2014); Hypothyroidism due to acquired atrophy of thyroid (9/1/2015); Macular degeneration of right eye (9/26/2014); Moderate aortic insufficiency (9/26/2014); Osteoarthritis (9/26/2014); Osteoporosis (9/26/2014); Parkinson's disease (CMS-HCC); Plantar fasciitis, bilateral (3/10/2015); and Prolapsed bladder (9/26/2014).    ROS   No chest pain, no shortness of breath, no abdominal pain       Objective:     Blood pressure 118/64, pulse 78, temperature 36.7 °C (98 °F), height 1.575 m (5' 2\"), weight 77.1 kg (170 lb), SpO2 95 %. Body mass index is 31.09 kg/m².   Physical Exam:  Alert, oriented in no acute distress.  Eye contact is good, speech goal directed, affect calm  HEENT: conjunctiva non-injected, sclera non-icteric.  Pinna normal. Oral mucous membranes pink and moist with no lesions.  Neck No adenopathy or masses in the neck or supraclavicular regions.  Lungs: clear to auscultation bilaterally with good excursion.  CV: regular rate and rhythm.  Abdomen: soft, nontender, No CVAT  Ext: no edema, color normal, vascularity normal, temperature normal        Assessment and Plan:   The following treatment plan was discussed     1. Low serum vitamin D      Improved. Continue current supplementation; monitor   2. Hypothyroidism due to acquired atrophy of thyroid      Stable. Monitor   3. CKD (chronic kidney disease) stage 3, GFR 30-59 ml/min      Improved. Monitor   4. Dyslipidemia      Improved. Continue current medications; protection renewed ;monitor   5. Primary osteoarthritis involving multiple joints      Stable. Continue Celebrex 100 mg twice a day; perception provided; monitor       Followup: Return in " about 6 months (around 4/30/2018) for medication review, Short.

## 2017-10-31 NOTE — ASSESSMENT & PLAN NOTE
Ongoing issue. Patient reports 100% compliance with simvastatin 20 mg daily; she denies any muscle cramps or weakness. Chart review of labs completed within the last 2 weeks shows a normal lipid panel.

## 2017-11-07 ENCOUNTER — HOSPITAL ENCOUNTER (OUTPATIENT)
Dept: RADIOLOGY | Facility: REHABILITATION | Age: 77
End: 2017-11-07
Attending: PHYSICAL MEDICINE & REHABILITATION
Payer: MEDICARE

## 2017-11-07 ENCOUNTER — HOSPITAL ENCOUNTER (OUTPATIENT)
Dept: PAIN MANAGEMENT | Facility: REHABILITATION | Age: 77
End: 2017-11-07
Attending: PHYSICAL MEDICINE & REHABILITATION
Payer: MEDICARE

## 2017-11-07 VITALS
OXYGEN SATURATION: 97 % | RESPIRATION RATE: 17 BRPM | DIASTOLIC BLOOD PRESSURE: 41 MMHG | BODY MASS INDEX: 30 KG/M2 | WEIGHT: 169.31 LBS | TEMPERATURE: 97.8 F | HEART RATE: 59 BPM | SYSTOLIC BLOOD PRESSURE: 143 MMHG | HEIGHT: 63 IN

## 2017-11-07 PROCEDURE — 64483 NJX AA&/STRD TFRM EPI L/S 1: CPT

## 2017-11-07 PROCEDURE — 700101 HCHG RX REV CODE 250

## 2017-11-07 PROCEDURE — 700117 HCHG RX CONTRAST REV CODE 255

## 2017-11-07 PROCEDURE — 700111 HCHG RX REV CODE 636 W/ 250 OVERRIDE (IP)

## 2017-11-07 RX ORDER — METHYLPREDNISOLONE ACETATE 80 MG/ML
INJECTION, SUSPENSION INTRA-ARTICULAR; INTRALESIONAL; INTRAMUSCULAR; SOFT TISSUE
Status: COMPLETED
Start: 2017-11-07 | End: 2017-11-07

## 2017-11-07 RX ORDER — METHYLPREDNISOLONE ACETATE 40 MG/ML
INJECTION, SUSPENSION INTRA-ARTICULAR; INTRALESIONAL; INTRAMUSCULAR; SOFT TISSUE
Status: COMPLETED
Start: 2017-11-07 | End: 2017-11-07

## 2017-11-07 RX ORDER — LIDOCAINE HYDROCHLORIDE 10 MG/ML
INJECTION, SOLUTION EPIDURAL; INFILTRATION; INTRACAUDAL; PERINEURAL
Status: COMPLETED
Start: 2017-11-07 | End: 2017-11-07

## 2017-11-07 RX ORDER — LIDOCAINE HYDROCHLORIDE 20 MG/ML
INJECTION, SOLUTION EPIDURAL; INFILTRATION; INTRACAUDAL; PERINEURAL
Status: COMPLETED
Start: 2017-11-07 | End: 2017-11-07

## 2017-11-07 RX ADMIN — IOHEXOL 3 ML: 240 INJECTION, SOLUTION INTRATHECAL; INTRAVASCULAR; INTRAVENOUS; ORAL at 13:00

## 2017-11-07 RX ADMIN — LIDOCAINE HYDROCHLORIDE 3 ML: 20 INJECTION, SOLUTION EPIDURAL; INFILTRATION; INTRACAUDAL; PERINEURAL at 13:00

## 2017-11-07 RX ADMIN — METHYLPREDNISOLONE ACETATE 40 MG: 40 INJECTION, SUSPENSION INTRA-ARTICULAR; INTRALESIONAL; INTRAMUSCULAR; SOFT TISSUE at 13:15

## 2017-11-07 ASSESSMENT — PAIN SCALES - GENERAL
PAINLEVEL_OUTOF10: 4
PAINLEVEL_OUTOF10: 2

## 2017-11-07 NOTE — PROGRESS NOTES
Medication reconciliation reviewed with patient denied taking any blood thinners  and any anti- inflammatories medication. . Discharge instruction given to patient and verbalized understanding. Patient  has ride home ( [place] daughter - in-law /  ). Dr. Melgar notified.

## 2017-11-07 NOTE — PROCEDURES
DATE OF SERVICE:  11/07/2017    PREOPERATIVE DIAGNOSIS:  Lumbar degenerative disk disease and radiculopathy.    POSTOPERATIVE DIAGNOSIS:  Lumbar degenerative disk disease and radiculopathy.    OPERATIONS PERFORMED:  1.  Right L5-S1 transforaminal epidural steroid injection.  2.  Epidurogram.  3.  Interpretation of epidurogram.    SURGEON:  Benedicto Melgar Jr., MD    ANESTHESIA:  None.    ESTIMATED BLOOD LOSS:  None.    IV FLUIDS:  None.    COMPLICATIONS:  None.    Dear Dr. Mccall,    Thank you for the referral of this patient.    SUBJECTIVE:  The patient reports constant, achy low back pain and intermittent   pain that radiates down into her right lower extremity.    OBJECTIVE:  VITAL SIGNS:  Pulse is 72, respirations 16, and blood pressure 134/86.  GENERAL:  The patient is alert and oriented x3, in no acute distress.  CARDIOVASCULAR:  Exam shows no ankle edema bilaterally.  SKIN:  On skin evaluation, she has normal temperature and good capillary   refill.  MUSCULOSKELETAL:  On musculoskeletal exam, she has tenderness to palpation   over her lumbar paraspinal muscles.    INDICATIONS:  This is a 77-year-old female patient with chronic low back pain   and right lower extremity radicular pain who is here today for a right L5-S1   transforaminal epidural steroid injection.  She was given and explained risks,   benefits, and alternatives to the procedure including bleeding, infection,   nerve damage, increased pain, paralysis or weakness that may be permanent,   coma, stroke and even death.  The patient understood all risks and consented   to the procedure today.  She has been n.p.o. and has a ride home.    PROCEDURE IN DETAIL:  After discussion of the risks and benefits, the patient   consented to the procedure and was taken to the fluoroscopy suite.  She was   placed in the prone position on the fluoroscopy table and monitors were   applied.  The lumbosacral area was cleaned and prepped in the usual sterile   fashion with  "chlorhexidine and a sterile drape.  Using AP fluoroscopy, the L5   vertebral level was brought into view and optimized and a right oblique,   \"Scottie dog\" view was obtained.  The skin overlying the target at the neck of   the Scottie dog was anesthetized with 1% lidocaine using a 27-gauge needle.    Next, using a 5-inch, 22-gauge spinal needle, it was directed to the neck of   the Scottie dog without difficulty or problems.  Multiple fluoroscopic views   were obtained to confirm needle placement.  Contrast dye was then instilled.    After negative aspiration of blood or CSF, a mixture containing 40 mg of   Depo-Medrol and 2 mL of 2% lidocaine was injected in a fractionated fashion   without any persistent paresthesia or pain.  The needle was flushed and   withdrawn.  The patient's back was cleaned and a Band-Aid was placed over the   injection site.  She was taken to postop area where she was monitored for   30-40 minutes without complications and with stable vital signs.  She was   given appropriate discharge instructions and discharged home with a   responsible adult.    EPIDUROGRAM:  Contrast showed an excellent neurogram of the right L5 nerve   root with both anterior and posterior spread into the epidural space.  No   filling defects were seen.  There was no evidence of vascular pattern.       ____________________________________     MD JEREMY Fuentes Jr. / YULIANA    DD:  11/07/2017 13:28:13  DT:  11/07/2017 15:30:11    D#:  [id number]  Job#:  [id number]  "

## 2018-01-31 ENCOUNTER — PATIENT OUTREACH (OUTPATIENT)
Dept: HEALTH INFORMATION MANAGEMENT | Facility: OTHER | Age: 78
End: 2018-01-31

## 2018-01-31 NOTE — PROGRESS NOTES
1. Attempt #: 1    2. HealthConnect Verified: yes    3. Verify PCP: yes    4. Care Team Updated:       •   DME Company (gait device, O2, CPAP, etc.): YES       •   Other Specialists (eye doctor, derm, GYN, cardiology, endo, etc): YES    5.  Reviewed/Updated the following with patient:       •   Communication Preference Obtained? YES       •   Preferred Pharmacy? YES       •   Preferred Lab? YES       •   Family History (document living status of immediate family members and if + hx of cancer, diabetes, hypertension, hyperlipidemia, heart attack, stroke) YES. Was Abstract Encounter opened and chart updated? YES    6. AppMyDay Activation: declined    7. AppMyDay Satish: no    8. Annual Wellness Visit Scheduling  Scheduling Status:Scheduled      9. Care Gap Scheduling (Attempt to Schedule EACH Overdue Care Gap!)     Health Maintenance Due   Topic Date Due   • IMM DTaP/Tdap/Td Vaccine (1 - Tdap) 10/08/1959   • IMM ZOSTER VACCINE  10/08/1999        Scheduled patient for Annual Wellness Visit    10. Patient was advised: “This is a free wellness visit. The provider will screen for medical conditions to help you stay healthy. If you have other concerns to address you may be asked to discuss these at a separate visit or there may be an additional fee.”     11. Patient was informed to arrive 15 min prior to their scheduled appointment and bring in their medication bottles.

## 2018-03-20 RX ORDER — SIMVASTATIN 20 MG
TABLET ORAL
Qty: 90 TAB | Refills: 2 | Status: SHIPPED | OUTPATIENT
Start: 2018-03-20 | End: 2018-10-17 | Stop reason: SDUPTHER

## 2018-03-22 ENCOUNTER — TELEPHONE (OUTPATIENT)
Dept: MEDICAL GROUP | Facility: PHYSICIAN GROUP | Age: 78
End: 2018-03-22

## 2018-03-22 NOTE — TELEPHONE ENCOUNTER
Future Appointments       Provider Department Center    3/23/2018 3:00 PM Ansley Baugh D.O.; [facility]  [facility]    4/3/2018 9:40 AM Magan Colvin M.D. [facility] Neurology     4/16/2018 10:40 AM Hans Olivera M.D. [facility]     5/1/2018 1:00 PM Ansley Baugh D.O. [facility] ANNUAL WELLNESS VISIT PRE-VISIT PLANNING WITH OUTREACH    1.  Immunizations were updated in Epic using WebIZ?:Yes       •  WebIZ Recommendations: TDAP and ZOSTAVAX (Shingles)       •  Is patient due for Tdap? YES. Patient was notified of copay/out of pocket cost.       •  Is patient due for Shingles?YES. Patient was notified of copay/out of pocket cost.    2.  MDX printed for Provider? YES     NO CARE PATHS  NO OPEN ORDERS    Health Maintenance Due   Topic Date Due   • IMM DTaP/Tdap/Td Vaccine (1 - Tdap) 10/08/1959   • IMM ZOSTER VACCINE  10/08/1999

## 2018-03-23 ENCOUNTER — OFFICE VISIT (OUTPATIENT)
Dept: MEDICAL GROUP | Facility: PHYSICIAN GROUP | Age: 78
End: 2018-03-23
Payer: MEDICARE

## 2018-03-23 VITALS
HEART RATE: 68 BPM | DIASTOLIC BLOOD PRESSURE: 60 MMHG | WEIGHT: 172 LBS | BODY MASS INDEX: 31.65 KG/M2 | TEMPERATURE: 97.4 F | SYSTOLIC BLOOD PRESSURE: 116 MMHG | OXYGEN SATURATION: 97 % | HEIGHT: 62 IN

## 2018-03-23 DIAGNOSIS — I51.89 DIASTOLIC DYSFUNCTION: ICD-10-CM

## 2018-03-23 DIAGNOSIS — H35.30 MACULAR DEGENERATION OF RIGHT EYE: ICD-10-CM

## 2018-03-23 DIAGNOSIS — M79.641 BILATERAL HAND PAIN: ICD-10-CM

## 2018-03-23 DIAGNOSIS — E66.9 OBESITY (BMI 30-39.9): ICD-10-CM

## 2018-03-23 DIAGNOSIS — E78.5 DYSLIPIDEMIA: ICD-10-CM

## 2018-03-23 DIAGNOSIS — M19.071 ARTHRITIS OF FOOT, RIGHT: ICD-10-CM

## 2018-03-23 DIAGNOSIS — H90.3 HEARING LOSS SENSORY, BILATERAL: ICD-10-CM

## 2018-03-23 DIAGNOSIS — N18.30 CKD (CHRONIC KIDNEY DISEASE) STAGE 3, GFR 30-59 ML/MIN (HCC): ICD-10-CM

## 2018-03-23 DIAGNOSIS — E03.4 HYPOTHYROIDISM DUE TO ACQUIRED ATROPHY OF THYROID: ICD-10-CM

## 2018-03-23 DIAGNOSIS — R79.89 LOW SERUM VITAMIN D: ICD-10-CM

## 2018-03-23 DIAGNOSIS — G25.0 BENIGN ESSENTIAL TREMOR: ICD-10-CM

## 2018-03-23 DIAGNOSIS — M15.9 PRIMARY OSTEOARTHRITIS INVOLVING MULTIPLE JOINTS: ICD-10-CM

## 2018-03-23 DIAGNOSIS — Z00.00 MEDICARE ANNUAL WELLNESS VISIT, SUBSEQUENT: ICD-10-CM

## 2018-03-23 DIAGNOSIS — G20.A1 PARKINSON DISEASE: ICD-10-CM

## 2018-03-23 DIAGNOSIS — N81.10 PROLAPSE OF FEMALE BLADDER, ACQUIRED: ICD-10-CM

## 2018-03-23 DIAGNOSIS — M79.642 BILATERAL HAND PAIN: ICD-10-CM

## 2018-03-23 DIAGNOSIS — I35.1 AORTIC VALVE INSUFFICIENCY, ETIOLOGY OF CARDIAC VALVE DISEASE UNSPECIFIED: ICD-10-CM

## 2018-03-23 PROCEDURE — G0439 PPPS, SUBSEQ VISIT: HCPCS | Performed by: FAMILY MEDICINE

## 2018-03-23 RX ORDER — CELECOXIB 100 MG/1
CAPSULE ORAL
Qty: 180 CAP | Refills: 3 | Status: SHIPPED | OUTPATIENT
Start: 2018-03-23 | End: 2019-02-21 | Stop reason: SDUPTHER

## 2018-03-23 ASSESSMENT — ACTIVITIES OF DAILY LIVING (ADL): BATHING_REQUIRES_ASSISTANCE: 0

## 2018-03-23 ASSESSMENT — PATIENT HEALTH QUESTIONNAIRE - PHQ9: CLINICAL INTERPRETATION OF PHQ2 SCORE: 0

## 2018-03-23 NOTE — PROGRESS NOTES
Chief Complaint   Patient presents with   • Annual Exam         HPI:  Emilie is a 77 y.o. here for Medicare Annual Wellness Visit        Patient Active Problem List    Diagnosis Date Noted   • Dyslipidemia 04/12/2017   • CKD (chronic kidney disease) stage 3, GFR 30-59 ml/min 01/30/2017   • Obesity (BMI 30-39.9) 01/30/2017   • Low serum vitamin D 09/01/2016   • Bilateral hand pain 07/25/2016   • Diastolic dysfunction 03/31/2016   • Aortic regurgitation 03/14/2016   • Parkinson disease (CMS-[facility]) 02/05/2016   • Hypothyroidism due to acquired atrophy of thyroid 09/01/2015   • Arthritis of foot, right 06/09/2015   • Benign essential tremor 06/09/2015   • Hearing loss sensory, bilateral 03/10/2015   • Osteoarthritis 09/26/2014   • Prolapse of female bladder, acquired 09/26/2014   • Macular degeneration of right eye 09/26/2014       Current Outpatient Prescriptions   Medication Sig Dispense Refill   • celecoxib (CELEBREX) 100 MG Cap TAKE 1 CAPSULE BY MOUTH TWICE DAILY 180 Cap 3   • simvastatin (ZOCOR) 20 MG Tab TAKE 1 TABLET BY MOUTH EVERY EVENING 90 Tab 2   • Calcium Carbonate-Vit D-Min (CALCIUM 1200 PO) Take  by mouth.     • simvastatin (ZOCOR) 20 MG Tab Take 1 Tab by mouth every evening. 90 Tab 3   • Multiple Vitamins-Minerals (OCUVITE-LUTEIN) Cap Take 1 Each by mouth every day.     • Omega-3 Fatty Acids (OMEGA 3 PO) Take 1 Each by mouth every day.     • Multiple Vitamins-Minerals (MULTI COMPLETE PO) Take 1 Tab by mouth every day.     • Calcium Carbonate-Vitamin D (CALCIUM + D PO) Take 1 Tab by mouth every day.     • Bioflavonoid Products (VITAMIN C PLUS) 1000 MG TABS Take 1 Tab by mouth every day.       No current facility-administered medications for this visit.         Patient is taking medications as noted in medication list.  Current supplements as per medication list.     Allergies: Codeine    Current social contact/activities: Domin[employer] with the girls    Patient's perception of their health: good    Is patient  current with immunizations? No need Tdap/ pt declined    She  reports that she has never smoked. She has never used smokeless tobacco. She reports that she drinks alcohol. She reports that she does not use drugs.  Counseling given: Not Answered        DPA/Advanced directive: Patient has Advanced Directive, but it is not on file. Instructed to bring in a copy to scan into their chart.    ROS:    Gait: Uses no assistive device   Ostomy: no   Other tubes: no   Amputations: no   Chronic oxygen use no   Last eye exam 2 months    Wears hearing aids: yes   : Denies any urinary leakage during the last 6 months      Screening:        Depression Screening    Little interest or pleasure in doing things?  0 - not at all  Feeling down, depressed, or hopeless? 0 - not at all  Patient Health Questionnaire Score: 0    If depressive symptoms identified deferred to follow up visit unless specifically addressed in assessment and plan.    Interpretation of PHQ-9 Total Score   Score Severity   1-4 No Depression   5-9 Mild Depression   10-14 Moderate Depression   15-19 Moderately Severe Depression   20-27 Severe Depression    Screening for Cognitive Impairment    Three Minute Recall (apple, watch, ashkan)  3 /3    Draw clock face with all 12 numbers set to the hand to show 10 minutes past 11 o'clock  1    If cognitive concerns identified, deferred for follow up unless specifically addressed in assessment and plan.    Fall Risk Assessment    Has the patient had two or more falls in the last year or any fall with injury in the last year?  No  If fall risk identified, deferred for follow up unless specifically addressed in assessment and plan.    Safety Assessment    Throw rugs on floor.  No  Handrails on all stairs.  Yes  Good lighting in all hallways.  Yes  Difficulty hearing.  Yes  Patient counseled about all safety risks that were identified.    Functional Assessment ADLs    Are there any barriers preventing you from cooking for  yourself or meeting nutritional needs?  No.    Are there any barriers preventing you from driving safely or obtaining transportation?  No.    Are there any barriers preventing you from using a telephone or calling for help?  No.    Are there any barriers preventing you from shopping?  No.    Are there any barriers preventing you from taking care of your own finances?  No.    Are there any barriers preventing you from managing your medications?  No.    Are there any barriers preventing you from showering, bathing or dressing yourself?  No.    Are you currently engaging any exercise or physical activity?  Yes.       Health Maintenance Summary                IMM DTaP/Tdap/Td Vaccine Overdue 10/8/1958     IMM ZOSTER VACCINE Overdue 10/8/1999     Annual Wellness Visit Next Due 10/19/2018      Done 10/18/2017 Visit Dx: Medicare annual wellness visit, subsequent    BONE DENSITY Next Due 3/20/2020      Done 3/20/2015 DS-BONE DENSITY STUDY (DEXA)     Patient has more history with this topic...          Patient Care Team:  Ansley Baugh D.O. as PCP - General (Family Medicine)  Magan Colvin M.D. (Neurology)  Hans Olivera M.D. as Consulting Physician (Cardiology)    Social History   Substance Use Topics   • Smoking status: Never Smoker   • Smokeless tobacco: Never Used   • Alcohol use 0.0 oz/week      Comment: rare     Family History   Problem Relation Age of Onset   • Hypertension Father    • Hypertension Sister    • Arthritis Mother      She  has a past medical history of Arthritis of foot, right (6/9/2015); Benign essential tremor (6/9/2015); Edema (3/10/2015); Hearing loss sensory, bilateral (3/10/2015); History of shingles (9/26/2014); Hyperlipidemia (9/26/2014); Hypothyroidism due to acquired atrophy of thyroid (9/1/2015); Macular degeneration of right eye (9/26/2014); Moderate aortic insufficiency (9/26/2014); Osteoarthritis (9/26/2014); Osteoporosis (9/26/2014); Parkinson's disease (CMS-HCC); Plantar  "fasciitis, bilateral (3/10/2015); and Prolapsed bladder (9/26/2014).   Past Surgical History:   Procedure Laterality Date   • CHOLECYSTECTOMY  9/2013   • APPENDECTOMY  1956   • CATARACT PHACO WITH IOL Bilateral            Exam:     Blood pressure 116/60, pulse 68, temperature 36.3 °C (97.4 °F), height 1.575 m (5' 2\"), weight 78 kg (172 lb), SpO2 97 %. Body mass index is 31.46 kg/m².    Hearing good.    Dentition good  Alert, oriented in no acute distress.  Eye contact is good, speech goal directed, affect calm      Assessment and Plan. The following treatment and monitoring plan is recommended:    1. Medicare annual wellness visit, subsequent      Chart reviewed and updated with the patient   2. Primary osteoarthritis involving multiple joints      Stable. Continue current medications; refill provided; monitor   3. Prolapse of female bladder, acquired      Stable. Monitor   4. Macular degeneration of right eye      Stable. Monitor   5. Hearing loss sensory, bilateral      Stable. Monitor   6. Arthritis of foot, right      Stable. Monitor   7. Benign essential tremor      Stable. Monitor   8. Hypothyroidism due to acquired atrophy of thyroid      Stable. Monitor   9. Parkinson disease (CMS-[facility])      Stable. Monitor   10. Aortic valve insufficiency, etiology of cardiac valve disease unspecified      Stable. Monitor   11. Diastolic dysfunction      Stable. Monitor   12. Bilateral hand pain      Stable. Monitor   13. Low serum vitamin D      Stable. Monitor   14. CKD (chronic kidney disease) stage 3, GFR 30-59 ml/min      Stable. Monitor   15. Obesity (BMI 30-39.9)      Stable. Monitor   16. Dyslipidemia  COMP METABOLIC PANEL    LIPID PROFILE    Stable. Continue current medication for now; recheck labs prior to next appointment         Services suggested: No services needed at this time  Health Care Screening: Age-appropriate preventive services recommended by USPTF and ACIP covered by Medicare were discussed today. " Services ordered if indicated and agreed upon by the patient.  Referrals offered: PT/OT/Nutrition counseling/Behavioral Health/Smoking cessation as per orders if indicated.    Discussion today about general wellness and lifestyle habits:    · Prevent falls and reduce trip hazards; Cautioned about securing or removing rugs.  · Have a working fire alarm and carbon monoxide detector;   · Engage in regular physical activity and social activities       Follow-up: Return if symptoms worsen or fail to improve.

## 2018-04-03 ENCOUNTER — OFFICE VISIT (OUTPATIENT)
Dept: NEUROLOGY | Facility: MEDICAL CENTER | Age: 78
End: 2018-04-03
Payer: MEDICARE

## 2018-04-03 VITALS
TEMPERATURE: 98.5 F | HEART RATE: 70 BPM | SYSTOLIC BLOOD PRESSURE: 108 MMHG | WEIGHT: 171.52 LBS | RESPIRATION RATE: 16 BRPM | DIASTOLIC BLOOD PRESSURE: 58 MMHG | BODY MASS INDEX: 31.56 KG/M2 | HEIGHT: 62 IN | OXYGEN SATURATION: 94 %

## 2018-04-03 DIAGNOSIS — G20.A1 PARKINSON DISEASE: ICD-10-CM

## 2018-04-03 PROCEDURE — 99214 OFFICE O/P EST MOD 30 MIN: CPT | Performed by: PSYCHIATRY & NEUROLOGY

## 2018-04-03 NOTE — PATIENT INSTRUCTIONS
Resting tremor-- L>R ( patient is anxious and mental activity is needed to be able to observe this resting tremor)  Bradykinesia-- both hands  Rigitity-- both hands  Postural reflex  Recall 3/3    IMPRESSION:    1. Probable Parkinson Disease Stage I- Stage II  2. Hx of hearing impairment and cognitive decline  3. Spells of shaking - minutes every 2 weeks or so    PLAN/RECOMMENDATIONS:    ________________________________________________________________________    Advise exercise muscle and brain  Explained to Emilie--- she has early parkinson disease  As time goes by, the tremor could progress  At this time, I would continue observation till Emilie would like to take medicine for better mobility  Please call us if interested in starting anti-parkinson medicine      I will see Emilie in 6 months    ________________________________________________________________________    Fish Oil -- Omega 3 [id number]# daily  Vit D-3 2000 unit daily  ________________________________________________________________________

## 2018-04-03 NOTE — PROGRESS NOTES
NEUROLOGY NOTE    Referring Physician  Sherry Davidson      CHIEF COMPLAINT:  Tremor resting -- for years, worsened since 2016  Chief Complaint   Patient presents with   • Follow-Up     Parkinson's       PRESENT ILLNESS:     Got a flu 2017, and missed one follow up  Tremor resting? -- for years, worsened in the last year    One in a while, she would develop shaking lasting for 5 minutes   One of her older sister had bad tremor and she was told by her kids that she is shaking    That is the reason that she came here for diagnosis    The left hand tremor would only become shaking when she is not paying attention    PAST MEDICAL HISTORY:  Past Medical History:   Diagnosis Date   • Arthritis of foot, right 6/9/2015   • Benign essential tremor 6/9/2015   • Edema 3/10/2015   • Hearing loss sensory, bilateral 3/10/2015   • History of shingles 9/26/2014   • Hyperlipidemia 9/26/2014   • Hypothyroidism due to acquired atrophy of thyroid 9/1/2015   • Macular degeneration of right eye 9/26/2014   • Moderate aortic insufficiency 9/26/2014   • Osteoarthritis 9/26/2014   • Osteoporosis 9/26/2014   • Parkinson's disease (CMS-[facility])    • Plantar fasciitis, bilateral 3/10/2015   • Prolapsed bladder 9/26/2014       PAST SURGICAL HISTORY:  Past Surgical History:   Procedure Laterality Date   • APPENDECTOMY  1956   • CATARACT PHACO WITH IOL Bilateral    • CHOLECYSTECTOMY  9/2013       FAMILY HISTORY:  One older system-- has tremor  Family History   Problem Relation Age of Onset   • Hypertension Father    • Hypertension Sister    • Arthritis Mother        SOCIAL HISTORY:  Social History     Social History   • Marital status:      Spouse name: N/A   • Number of children: N/A   • Years of education: N/A     Occupational History   • Not on file.     Social History Main Topics   • Smoking status: Never Smoker   • Smokeless tobacco: Never Used   • Alcohol use 0.0 oz/week      Comment: rare   • Drug use: No   • Sexual activity: Not  "Currently     Partners: Male     Other Topics Concern   • Not on file     Social History Narrative   • No narrative on file     ALLERGIES:  Allergies   Allergen Reactions   • Codeine      halucinations     TOBHX  History   Smoking Status   • Never Smoker   Smokeless Tobacco   • Never Used     ALCHX  History   Alcohol Use   • 0.0 oz/week     Comment: rare     DRUGHX  History   Drug Use No           MEDICATIONS:  Current Outpatient Prescriptions   Medication   • vitamin D (CHOLECALCIFEROL) 1000 UNIT Tab   • celecoxib (CELEBREX) 100 MG Cap   • Calcium Carbonate-Vit D-Min (CALCIUM 1200 PO)   • simvastatin (ZOCOR) 20 MG Tab   • Multiple Vitamins-Minerals (OCUVITE-LUTEIN) Cap   • Omega-3 Fatty Acids (OMEGA 3 PO)   • Multiple Vitamins-Minerals (MULTI COMPLETE PO)   • Bioflavonoid Products (VITAMIN C PLUS) 1000 MG TABS   • simvastatin (ZOCOR) 20 MG Tab   • Calcium Carbonate-Vitamin D (CALCIUM + D PO)     No current facility-administered medications for this visit.        REVIEW OF SYSTEM:    Constitutional: Denies fevers, Denies weight changes   Eyes: Denies changes in vision, no eye pain   Ears/Nose/Throat/Mouth: Denies nasal congestion or sore throat   Cardiovascular: Denies chest pain or palpitations   Respiratory: Denies SOB.   Gastrointestinal/Hepatic: Denies abdominal pain, nausea, vomiting, diarrhea, constipation or GI bleeding   Genitourinary: Denies bladder dysfunction, dysuria or frequency   Musculoskeletal/Rheum: Denies joint pain and swelling   Skin/Breast: Denies rash, denies breast lumps or discharge   Neurological: wild dreams, tremor  Psychiatric: anxiety, insomnia  Endocrine: denies hx of diabetes or thyroid dysfunction   Heme/Oncology/Lymph Nodes: Denies enlarged lymph nodes, denies brusing or known bleeding disorder   Allergic/Immunologic: Denies hx of allergies         PHYSICAL AND NEUROLOGICAL EXMAINATIONS:  VITAL SIGNS: /58   Pulse 70   Temp 36.9 °C (98.5 °F)   Resp 16   Ht 1.575 m (5' 2\")   " Wt 77.8 kg (171 lb 8.3 oz)   SpO2 94%   BMI 31.37 kg/m²   CURRENT WEIGHT:   BMI: Body mass index is 31.37 kg/m².  PREVIOUS WEIGHTS:  Wt Readings from Last 25 Encounters:   04/03/18 77.8 kg (171 lb 8.3 oz)   03/23/18 78 kg (172 lb)   11/07/17 76.8 kg (169 lb 5 oz)   10/31/17 77.1 kg (170 lb)   10/18/17 77.1 kg (170 lb)   10/16/17 78.9 kg (174 lb)   04/12/17 78.5 kg (173 lb)   04/12/17 78.7 kg (173 lb 6.4 oz)   01/30/17 80.3 kg (177 lb)   10/12/16 80.1 kg (176 lb 9.6 oz)   10/10/16 79.8 kg (176 lb)   09/01/16 80.7 kg (178 lb)   08/05/16 79.8 kg (176 lb)   07/25/16 81.2 kg (179 lb)   03/14/16 82.1 kg (181 lb)   02/05/16 80.7 kg (178 lb)   11/04/15 80.7 kg (178 lb)   09/14/15 78.5 kg (173 lb)   09/09/15 81.6 kg (180 lb)   03/19/15 78.5 kg (173 lb)   03/10/15 79.8 kg (176 lb)   11/12/14 79.8 kg (176 lb)   09/26/14 78 kg (172 lb)       General appearance of patient: WDWN(+) NAD(+)    EYES  o Fundus : Papilledem(-) Exudates(-) Hemorrhage(-)  Nervous System  Orientation to time, place and person(+)  Memory normal(+) recall 1/3  Language: aphasia(-)  Knowledge: past(+) Current(+)  Attention(+)  Cranial Nerves  • Nerve 2: intact  • Nerve 3,4,6: intact  • Nerve 5 : intact  • Nerve 7: intact  • Nerve 8: hearing impairment  • Nerve 9 & 10: intact  • Nerve 11: intact  • Nerve 12: intact  Muscle Power and muscle tone: symmetric, normal in upper and lower  Sensory System: Pin sensation intact(+)  Reflexes: symmetric throughout  Cerebellar Function FNP normal   Gait : Steady(+)   Heart and Vascular  Peripheral Vasucular system : Edema (-) Swelling(-)  RHB, Breathing sound clear  abdomen bowel sound normoactive  Extremities freely moveable  Joints no contracture       Resting tremor-- L>R ( patient is anxious and mental activity is needed to be able to observe this resting tremor)  Bradykinesia-- both hands  Rigitity-- both hands  Postural reflex  Recall 3/3    IMPRESSION:    1. Probable Parkinson Disease Stage I- Stage II  2. Hx  of hearing impairment and cognitive decline  3. Spells of shaking - minutes every 2 weeks or so    PLAN/RECOMMENDATIONS:    ________________________________________________________________________    Advise exercise muscle and brain  Explained to Emilie--- she has early parkinson disease  As time goes by, the tremor could progress  At this time, I would continue observation till Emilie would like to take medicine for better mobility  Please call us if interested in starting anti-parkinson medicine      I will see Emilie in 6 months    ________________________________________________________________________    Fish Oil -- Omega 3 [id number]# daily  Vit D-3 2000 unit daily  ________________________________________________________________________      ________________________________________________________________________

## 2018-04-16 ENCOUNTER — OFFICE VISIT (OUTPATIENT)
Dept: CARDIOLOGY | Facility: MEDICAL CENTER | Age: 78
End: 2018-04-16
Payer: MEDICARE

## 2018-04-16 VITALS
SYSTOLIC BLOOD PRESSURE: 138 MMHG | WEIGHT: 172 LBS | HEIGHT: 62 IN | DIASTOLIC BLOOD PRESSURE: 72 MMHG | OXYGEN SATURATION: 92 % | BODY MASS INDEX: 31.65 KG/M2 | HEART RATE: 73 BPM

## 2018-04-16 DIAGNOSIS — R60.0 BILATERAL LEG EDEMA: ICD-10-CM

## 2018-04-16 DIAGNOSIS — E78.5 DYSLIPIDEMIA: ICD-10-CM

## 2018-04-16 DIAGNOSIS — R94.31 ABNORMAL EKG: ICD-10-CM

## 2018-04-16 DIAGNOSIS — I35.1 AORTIC VALVE INSUFFICIENCY, ETIOLOGY OF CARDIAC VALVE DISEASE UNSPECIFIED: ICD-10-CM

## 2018-04-16 PROCEDURE — 99214 OFFICE O/P EST MOD 30 MIN: CPT | Performed by: INTERNAL MEDICINE

## 2018-04-16 ASSESSMENT — ENCOUNTER SYMPTOMS
LOSS OF CONSCIOUSNESS: 0
WHEEZING: 0
DIZZINESS: 0
COUGH: 0
MYALGIAS: 0
PALPITATIONS: 0

## 2018-04-16 NOTE — PROGRESS NOTES
"Chief Complaint   Patient presents with   • Aortic regurgitation        Subjective:   Emilie Gonzalez is a 77 y.o. female who presents today for followup aortic insufficiency, hyperlipidemia, abnormal EKG, edema.     Last seen on 10/16/2017.    Since 10/16/2017 the patient has had no cardiac symptoms.  3 times a week she is walking twice [place] walking faster the 2nd time around after which she feels good.  No heart failure symptoms.  Under stress with her daughter being emergently transferred from [place] to [facility] last night for intractable seizures.     Since 4/12/2017 appointment the patient's had no cardiac symptoms.  She had a recent upper respiratory infection with a nonproductive cough after going to her grandson's wedding in [place], [place]     Since 10/10/2016 appointment no cardiac symptoms.  No CHF symptoms.   No palpitations or chest pain.  Moved [place] an apartment [place] [employer].  Able to work out in the garden without any symptoms or problems.     Past medical history  Diagnosed with stage II Parkinson's disease. Followed by Dr. Colvin, neurologist     On 9/1/2015 TSH was slightly elevated.  Levothyroxine started by PCP.  Of note, has always slept on 2 pillows for years.  Has always woken up 2-3 times a night.  No change in his sleep habits.     Previous appointment  The patient states that her children states that she gets out of breath while walking.  The patient has noted some mild exertional shortness of breath.  Concerned about recent worsening aortic insufficiency.  No angina pectoris.  No CHF symptoms.  Has had right lower extremity edema currently related to arthritis.  Takes anti-inflammatories.  Had been started on Aldactone/HCTZ by PCP but has not been taking it.  Also in 11/2014 diagnosed with \"asthma\". Had mild abnormal PFTs in PCPs office.  Prescribed a bronchodilator but has not used it.     Past medical history  The patient had previously lived in [facility] [place].  5 years ago she was discovered to have a \"leaky heart valve.  The patient had been followed by Dr. Bird, cardiologist [place] for the past 4 years.  The patient is moved to [facility] and is establishing ongoing cardiology care.     The patient feels that she is \"healthy\".  She lives alone.  She runs all of her activity of daily living including shopping and necessary errands.  She has no exercise limitations.   She denies chest pain shortness of breath, palpitations or lightheadedness.     History of hyperlipidemia on simvastatin.  No history of hypertension, diabetes mellitus and she is not smoke cigarettes.     Family history  Father  of a heart attack at age 69.  Sister alive at age 90. Had a heart attack at age 75.     Social history.  .  Does not drink alcohol except on rare occasions with dinner.     Other medical problems.  2013 laparoscopic cholecystectomy Shanika Shaw.  Appendectomy age 13.  \"Chronic bronchitis\" but no problems times one year  Past Medical History:   Diagnosis Date   • Arthritis of foot, right 2015   • Benign essential tremor 2015   • Edema 3/10/2015   • Hearing loss sensory, bilateral 3/10/2015   • History of shingles 2014   • Hyperlipidemia 2014   • Hypothyroidism due to acquired atrophy of thyroid 2015   • Macular degeneration of right eye 2014   • Moderate aortic insufficiency 2014   • Osteoarthritis 2014   • Osteoporosis 2014   • Parkinson's disease (CMS-[facility])    • Plantar fasciitis, bilateral 3/10/2015   • Prolapsed bladder 2014     Past Surgical History:   Procedure Laterality Date   • CHOLECYSTECTOMY  2013   • APPENDECTOMY     • CATARACT PHACO WITH IOL Bilateral      Family History   Problem Relation Age of Onset   • Hypertension Father    • Hypertension Sister    • Arthritis Mother      Social History     Social History   • Marital status:      Spouse name: N/A   • Number of children: N/A   • Years " "of education: N/A     Occupational History   • Not on file.     Social History Main Topics   • Smoking status: Never Smoker   • Smokeless tobacco: Never Used   • Alcohol use 0.0 oz/week      Comment: rare   • Drug use: No   • Sexual activity: Not Currently     Partners: Male     Other Topics Concern   • Not on file     Social History Narrative   • No narrative on file     Allergies   Allergen Reactions   • Codeine      halucinations     Outpatient Encounter Prescriptions as of 4/16/2018   Medication Sig Dispense Refill   • vitamin D (CHOLECALCIFEROL) 1000 UNIT Tab Take 1,000 Units by mouth every day.     • celecoxib (CELEBREX) 100 MG Cap TAKE 1 CAPSULE BY MOUTH TWICE DAILY 180 Cap 3   • Calcium Carbonate-Vit D-Min (CALCIUM 1200 PO) Take  by mouth.     • simvastatin (ZOCOR) 20 MG Tab Take 1 Tab by mouth every evening. 90 Tab 3   • Multiple Vitamins-Minerals (OCUVITE-LUTEIN) Cap Take 1 Each by mouth every day.     • Omega-3 Fatty Acids (OMEGA 3 PO) Take 1 Each by mouth every day.     • Multiple Vitamins-Minerals (MULTI COMPLETE PO) Take 1 Tab by mouth every day.     • Bioflavonoid Products (VITAMIN C PLUS) 1000 MG TABS Take 1 Tab by mouth every day.     • simvastatin (ZOCOR) 20 MG Tab TAKE 1 TABLET BY MOUTH EVERY EVENING 90 Tab 2   • Calcium Carbonate-Vitamin D (CALCIUM + D PO) Take 1 Tab by mouth every day.       No facility-administered encounter medications on file as of 4/16/2018.      Review of Systems   Respiratory: Negative for cough and wheezing.    Cardiovascular: Negative for chest pain and palpitations.   Musculoskeletal: Negative for myalgias.   Neurological: Negative for dizziness and loss of consciousness.        Objective:   /72   Pulse 73   Ht 1.575 m (5' 2\")   Wt 78 kg (172 lb)   SpO2 92%   BMI 31.46 kg/m²     Physical Exam   Constitutional: She is oriented to person, place, and time. She appears well-developed and well-nourished. No distress.   Neck: No JVD present.   Cardiovascular: " Normal rate, regular rhythm and intact distal pulses.  Exam reveals no gallop and no friction rub.    Murmur heard.   Diastolic murmur is present with a grade of 2/6   Pulmonary/Chest: Effort normal and breath sounds normal. No respiratory distress. She has no wheezes. She has no rales.   Musculoskeletal: She exhibits edema.   Trace edema   Neurological: She is alert and oriented to person, place, and time.   Skin: Skin is warm and dry.   Psychiatric: She has a normal mood and affect. Her behavior is normal.     06/07/2010 ECHOCARDIOGRAM  EF 60%.  Mild to moderate aortic regurgitation.     05/05/2011 ECHOCARDIOGRAM  EF 55-60%.  Moderate aortic insufficiency.     05/08/2012 ECHOCARDIOGRAM  EF 70%.  Moderate aortic insufficiency.  Pulmonary pressure 26 mmHg.     03/25/2015 ECHOCARDIOGRAM  Normal left ventricular size, thickness, systolic function, and   diastolic function.  Left ventricular ejection fraction is 65% to 70%.  Aortic sclerosis without stenosis.  Moderately severe aortic insufficiency.  Right heart pressures are consistent with mild pulmonary hypertension.     10/24/2016 ECHOCARDIOGRAM  Normal left ventricular size, wall thickness, and systolic function.  Left ventricular ejection fraction is visually estimated to be 60%.  Severe eccentric aortic insufficiency.  Mildly thickened mitral valve leaflets with normal leaflet excursion.  Unable to estimate pulmonary artery pressure due to an inadequate   tricuspid regurgitant jet.     05/08/2017 ECHOCARDIOGRAM  Normal left ventricular systolic function.  Left ventricular ejection fraction is visually estimated to be 65%.  Mild concentric left ventricular hypertrophy.  Mild aortic stenosis.  Moderate aortic insufficiency.  Mild mitral regurgitation.  Unable to estimate pulmonary artery pressure due to an inadequate   tricuspid regurgitant jet.     03/19/2015 EKG: Normal sinus rhythm, rate 73. Nonspecific ST-T wave abnormalities. No prior tracing for comparison.  Reviewed by myself.    Assessment:     1. Aortic valve insufficiency, etiology of cardiac valve disease unspecified     2. Dyslipidemia     3. Abnormal EKG     4. Bilateral leg edema       Medical Decision Making:  Today's Assessment / Status / Plan:      Aortic regurgitation. Moderate. Asymptomatic.     Abnormal EKG.       Hyperlipidemia. On simvastatin.     Edema. Minimal.     Hypothyroidism. Diagnosed 9/1/2015. On supplements. Per PCP.     Recommendations and Discussion  1. The patient's current cardiac condition is stable.  2. Follow-up echocardiogram.  3. Follow-up 4 months. L

## 2018-04-24 ENCOUNTER — HOSPITAL ENCOUNTER (OUTPATIENT)
Dept: LAB | Facility: MEDICAL CENTER | Age: 78
End: 2018-04-24
Attending: FAMILY MEDICINE
Payer: MEDICARE

## 2018-04-24 DIAGNOSIS — E78.5 DYSLIPIDEMIA: ICD-10-CM

## 2018-04-24 LAB
ALBUMIN SERPL BCP-MCNC: 4.2 G/DL (ref 3.2–4.9)
ALBUMIN/GLOB SERPL: 1.3 G/DL
ALP SERPL-CCNC: 46 U/L (ref 30–99)
ALT SERPL-CCNC: 16 U/L (ref 2–50)
ANION GAP SERPL CALC-SCNC: 6 MMOL/L (ref 0–11.9)
AST SERPL-CCNC: 25 U/L (ref 12–45)
BILIRUB SERPL-MCNC: 0.8 MG/DL (ref 0.1–1.5)
BUN SERPL-MCNC: 18 MG/DL (ref 8–22)
CALCIUM SERPL-MCNC: 9.5 MG/DL (ref 8.5–10.5)
CHLORIDE SERPL-SCNC: 102 MMOL/L (ref 96–112)
CHOLEST SERPL-MCNC: 166 MG/DL (ref 100–199)
CO2 SERPL-SCNC: 31 MMOL/L (ref 20–33)
CREAT SERPL-MCNC: 1.08 MG/DL (ref 0.5–1.4)
GLOBULIN SER CALC-MCNC: 3.2 G/DL (ref 1.9–3.5)
GLUCOSE SERPL-MCNC: 100 MG/DL (ref 65–99)
HDLC SERPL-MCNC: 45 MG/DL
LDLC SERPL CALC-MCNC: 83 MG/DL
POTASSIUM SERPL-SCNC: 4.1 MMOL/L (ref 3.6–5.5)
PROT SERPL-MCNC: 7.4 G/DL (ref 6–8.2)
SODIUM SERPL-SCNC: 139 MMOL/L (ref 135–145)
TRIGL SERPL-MCNC: 188 MG/DL (ref 0–149)

## 2018-04-24 PROCEDURE — 80053 COMPREHEN METABOLIC PANEL: CPT

## 2018-04-24 PROCEDURE — 80061 LIPID PANEL: CPT

## 2018-04-24 PROCEDURE — 36415 COLL VENOUS BLD VENIPUNCTURE: CPT

## 2018-05-01 ENCOUNTER — OFFICE VISIT (OUTPATIENT)
Dept: MEDICAL GROUP | Facility: PHYSICIAN GROUP | Age: 78
End: 2018-05-01
Payer: MEDICARE

## 2018-05-01 VITALS
WEIGHT: 173 LBS | SYSTOLIC BLOOD PRESSURE: 126 MMHG | TEMPERATURE: 97.7 F | OXYGEN SATURATION: 98 % | DIASTOLIC BLOOD PRESSURE: 62 MMHG | HEART RATE: 64 BPM | RESPIRATION RATE: 16 BRPM | BODY MASS INDEX: 31.83 KG/M2 | HEIGHT: 62 IN

## 2018-05-01 DIAGNOSIS — N18.30 CKD (CHRONIC KIDNEY DISEASE) STAGE 3, GFR 30-59 ML/MIN (HCC): ICD-10-CM

## 2018-05-01 DIAGNOSIS — R79.89 LOW SERUM VITAMIN D: ICD-10-CM

## 2018-05-01 DIAGNOSIS — L85.3 DRY SKIN: ICD-10-CM

## 2018-05-01 DIAGNOSIS — M15.9 PRIMARY OSTEOARTHRITIS INVOLVING MULTIPLE JOINTS: ICD-10-CM

## 2018-05-01 PROCEDURE — 99214 OFFICE O/P EST MOD 30 MIN: CPT | Performed by: FAMILY MEDICINE

## 2018-05-01 NOTE — ASSESSMENT & PLAN NOTE
New problem. Patient is reporting patches of dry skin over her face. She states that she has tried a couple things over-the-counter but nothing is beneficial. She states that these are dry patches, they have not opened, blistered, not itchy

## 2018-05-01 NOTE — PROGRESS NOTES
Subjective:   Emilie Gonzalez is a 77 y.o. female here today for arthritis, low vitamin D, chronic kidney disease, dry skin    Osteoarthritis  Ongoing issue; patient currently takes Celebrex 100 mg daily to help with pain management; she states that this does help to control her pain and denies that osteoarthritis is preventing her from completing any activities of daily living.    Low serum vitamin D  Ongoing issue; patient continues to take supplemental vitamin D 2000 international units twice daily; recent labs again reviewed in detail with the patient today which shows that her vitamin D level has improved to 41    CKD (chronic kidney disease) stage 3, GFR 30-59 ml/min  Ongoing issue; recent labs obtained and reviewed in detail with the patient today which shows that her GFR is at 49. This is within an acceptable range for her age.    Dry skin  New problem. Patient is reporting patches of dry skin over her face. She states that she has tried a couple things over-the-counter but nothing is beneficial. She states that these are dry patches, they have not opened, blistered, not itchy         Current medicines (including changes today)  Current Outpatient Prescriptions   Medication Sig Dispense Refill   • vitamin D (CHOLECALCIFEROL) 1000 UNIT Tab Take 1,000 Units by mouth every day.     • celecoxib (CELEBREX) 100 MG Cap TAKE 1 CAPSULE BY MOUTH TWICE DAILY 180 Cap 3   • simvastatin (ZOCOR) 20 MG Tab TAKE 1 TABLET BY MOUTH EVERY EVENING 90 Tab 2   • Calcium Carbonate-Vit D-Min (CALCIUM 1200 PO) Take  by mouth.     • Multiple Vitamins-Minerals (OCUVITE-LUTEIN) Cap Take 1 Each by mouth every day.     • Omega-3 Fatty Acids (OMEGA 3 PO) Take 1 Each by mouth every day.     • Multiple Vitamins-Minerals (MULTI COMPLETE PO) Take 1 Tab by mouth every day.     • Calcium Carbonate-Vitamin D (CALCIUM + D PO) Take 1 Tab by mouth every day.     • Bioflavonoid Products (VITAMIN C PLUS) 1000 MG TABS Take 1 Tab by mouth every day.   "     No current facility-administered medications for this visit.      She  has a past medical history of Arthritis of foot, right (6/9/2015); Benign essential tremor (6/9/2015); Edema (3/10/2015); Hearing loss sensory, bilateral (3/10/2015); History of shingles (9/26/2014); Hyperlipidemia (9/26/2014); Hypothyroidism due to acquired atrophy of thyroid (9/1/2015); Macular degeneration of right eye (9/26/2014); Moderate aortic insufficiency (9/26/2014); Osteoarthritis (9/26/2014); Osteoporosis (9/26/2014); Parkinson's disease (HCC); Plantar fasciitis, bilateral (3/10/2015); and Prolapsed bladder (9/26/2014).    ROS   No chest pain, no shortness of breath, no abdominal pain  + Dry skin patches on the face     Objective:     Blood pressure 126/62, pulse 64, temperature 36.5 °C (97.7 °F), resp. rate 16, height 1.575 m (5' 2\"), weight 78.5 kg (173 lb), SpO2 98 %. Body mass index is 31.64 kg/m².   Physical Exam:  Alert, oriented in no acute distress.  Eye contact is good, speech goal directed, affect calm  HEENT: conjunctiva non-injected, sclera non-icteric.  Pinna normal.Oral mucous membranes pink and moist with no lesions.  Neck No adenopathy or masses in the neck or supraclavicular regions.  Lungs: clear to auscultation bilaterally with good excursion.  CV: regular rate and rhythm. Mild systolic ejection murmurs  Abdomen: soft, nontender, No CVAT  Ext: no edema, color normal, vascularity normal, temperature normal  Scan: Face-small dry patches on each cheek approximately 2 x 2 centimeters in diameter, not erythematous, no swelling, no pain, scaling noted      Assessment and Plan:   The following treatment plan was discussed     1. Dry skin      Uncontrolled; recommend warm water compress and Vaseline or cocoa butter; monitor   2. CKD (chronic kidney disease) stage 3, GFR 30-59 ml/min      Stable. Continue to monitor closely   3. Low serum vitamin D      Improved. Continue current supplementation; monitor   4. Primary " osteoarthritis involving multiple joints      Stable. Continue current medications; monitor       Followup: Return in about 6 months (around 11/1/2018) for medication review, Short.

## 2018-05-01 NOTE — ASSESSMENT & PLAN NOTE
Ongoing issue; recent labs obtained and reviewed in detail with the patient today which shows that her GFR is at 49. This is within an acceptable range for her age.

## 2018-05-01 NOTE — ASSESSMENT & PLAN NOTE
Ongoing issue; patient continues to take supplemental vitamin D 2000 international units twice daily; recent labs again reviewed in detail with the patient today which shows that her vitamin D level has improved to 41

## 2018-05-01 NOTE — ASSESSMENT & PLAN NOTE
Ongoing issue; patient currently takes Celebrex 100 mg daily to help with pain management; she states that this does help to control her pain and denies that osteoarthritis is preventing her from completing any activities of daily living.

## 2018-05-30 ENCOUNTER — HOSPITAL ENCOUNTER (OUTPATIENT)
Dept: RADIOLOGY | Facility: MEDICAL CENTER | Age: 78
End: 2018-05-30
Attending: PHYSICIAN ASSISTANT
Payer: MEDICARE

## 2018-05-30 ENCOUNTER — OFFICE VISIT (OUTPATIENT)
Dept: URGENT CARE | Facility: PHYSICIAN GROUP | Age: 78
End: 2018-05-30
Payer: MEDICARE

## 2018-05-30 VITALS
TEMPERATURE: 97.7 F | BODY MASS INDEX: 28.35 KG/M2 | OXYGEN SATURATION: 98 % | DIASTOLIC BLOOD PRESSURE: 64 MMHG | SYSTOLIC BLOOD PRESSURE: 122 MMHG | RESPIRATION RATE: 15 BRPM | WEIGHT: 160 LBS | HEIGHT: 63 IN | HEART RATE: 71 BPM

## 2018-05-30 DIAGNOSIS — M79.671 RIGHT FOOT PAIN: ICD-10-CM

## 2018-05-30 DIAGNOSIS — S92.354A CLOSED NONDISPLACED FRACTURE OF FIFTH METATARSAL BONE OF RIGHT FOOT, INITIAL ENCOUNTER: ICD-10-CM

## 2018-05-30 PROCEDURE — 99214 OFFICE O/P EST MOD 30 MIN: CPT | Performed by: PHYSICIAN ASSISTANT

## 2018-05-30 PROCEDURE — 73630 X-RAY EXAM OF FOOT: CPT | Mod: RT

## 2018-05-30 ASSESSMENT — ENCOUNTER SYMPTOMS
FEVER: 0
TINGLING: 0
SENSORY CHANGE: 0
CHILLS: 0
FOCAL WEAKNESS: 0
VOMITING: 0
SHORTNESS OF BREATH: 0
NAUSEA: 0

## 2018-05-30 ASSESSMENT — PAIN SCALES - GENERAL: PAINLEVEL: 8=MODERATE-SEVERE PAIN

## 2018-05-30 NOTE — PROGRESS NOTES
Subjective:     Emilie Gonzalez is a 77 y.o. female who presents for Foot Pain (Stepped on a raw hide dog bone today. right foot)         Patient describes injury this morning around 8 AM. She states she was going downstairs carrying a suitcase and wearing thin flip-flops. She did not see a dog bone on the floor and stepped on it with the outer aspect of her right foot. She complains of pain to the bony prominence on the outer aspect of right foot. She denies any history of broken bones. She denies history of pain or problems to right foot. She does mention past medical history of plantar fasciitis affecting left foot. She is tried no treatments for this thus far.      Past Medical History:   Diagnosis Date   • Arthritis of foot, right 6/9/2015   • Benign essential tremor 6/9/2015   • Edema 3/10/2015   • Hearing loss sensory, bilateral 3/10/2015   • History of shingles 9/26/2014   • Hyperlipidemia 9/26/2014   • Hypothyroidism due to acquired atrophy of thyroid 9/1/2015   • Macular degeneration of right eye 9/26/2014   • Moderate aortic insufficiency 9/26/2014   • Osteoarthritis 9/26/2014   • Osteoporosis 9/26/2014   • Parkinson's disease (HCC)    • Plantar fasciitis, bilateral 3/10/2015   • Prolapsed bladder 9/26/2014     Past Surgical History:   Procedure Laterality Date   • CHOLECYSTECTOMY  9/2013   • APPENDECTOMY  1956   • CATARACT PHACO WITH IOL Bilateral      Social History     Social History   • Marital status:      Spouse name: N/A   • Number of children: N/A   • Years of education: N/A     Occupational History   • Not on file.     Social History Main Topics   • Smoking status: Never Smoker   • Smokeless tobacco: Never Used   • Alcohol use 0.0 oz/week      Comment: rare   • Drug use: No   • Sexual activity: Not Currently     Partners: Male     Other Topics Concern   • Not on file     Social History Narrative   • No narrative on file      Family History   Problem Relation Age of Onset   •  "Hypertension Father    • Hypertension Sister    • Arthritis Mother     Review of Systems   Constitutional: Negative for chills and fever.   Respiratory: Negative for shortness of breath.    Gastrointestinal: Negative for nausea and vomiting.   Musculoskeletal: Positive for joint pain (pos for right foot pain).   Skin: Negative for rash.        Denies breakage in skin to foot   Neurological: Negative for tingling, sensory change and focal weakness.     Allergies   Allergen Reactions   • Codeine      halucinations      Objective:   /64   Pulse 71   Temp 36.5 °C (97.7 °F)   Resp 15   Ht 1.588 m (5' 2.5\")   Wt 72.6 kg (160 lb)   SpO2 98%   BMI 28.80 kg/m²   Physical Exam   Constitutional: She is oriented to person, place, and time. She appears well-developed and well-nourished. No distress.   HENT:   Head: Normocephalic and atraumatic.   Right Ear: External ear normal.   Left Ear: External ear normal.   Nose: Nose normal.   Eyes: Conjunctivae and lids are normal. Right eye exhibits no discharge. Left eye exhibits no discharge. No scleral icterus.   Neck: Neck supple.   Pulmonary/Chest: Effort normal. No respiratory distress.   Musculoskeletal: Normal range of motion.        Feet:    ttp over 5th MT, no effusion, erythema or ecchymosis, skin intact throughout. Full active range of motion. Ankle. Normal sensation to light touch in all distributions, +2 dorsalis pedis pulse palpated. Normal strength with foot plantar and dorsiflexion, antalgic gait   Neurological: She is alert and oriented to person, place, and time. She is not disoriented.   Skin: Skin is warm and dry. She is not diaphoretic. No erythema. No pallor.   Psychiatric: Her speech is normal and behavior is normal.   Nursing note and vitals reviewed.  dx foot - Impression       1.  There is a nondisplaced transverse fracture at the base of the right 5th metatarsal.   Reading Provider Reading Date   Jess Juan M.D. May 30, 2018   Signing " Provider Signing Date Signing Time   Jess Juan M.D. May 30, 2018  2:28 PM         Assessment/Plan:   Assessment    1. Closed nondisplaced fracture of fifth metatarsal bone of right foot, initial encounter  - DX-FOOT-COMPLETE 3+ RIGHT; Future  - REFERRAL TO SPORTS MEDICINE  Recommend conservative care, rest, ice, elevation  Return to clinic with lack of resolution or progression of symptoms.  OTC tylenol, ice, elevation f/u w/ sports med    Differential diagnosis, natural history, supportive care, and indications for immediate follow-up discussed.

## 2018-06-07 ENCOUNTER — OFFICE VISIT (OUTPATIENT)
Dept: MEDICAL GROUP | Facility: CLINIC | Age: 78
End: 2018-06-07
Payer: MEDICARE

## 2018-06-07 VITALS
WEIGHT: 160 LBS | HEART RATE: 84 BPM | HEIGHT: 63 IN | OXYGEN SATURATION: 96 % | SYSTOLIC BLOOD PRESSURE: 122 MMHG | DIASTOLIC BLOOD PRESSURE: 68 MMHG | TEMPERATURE: 98.1 F | RESPIRATION RATE: 18 BRPM | BODY MASS INDEX: 28.35 KG/M2

## 2018-06-07 DIAGNOSIS — S99.192A FRACTURE OF BASE OF FIFTH METATARSAL BONE OF LEFT FOOT AT METAPHYSEAL-DIAPHYSEAL JUNCTION, CLOSED, INITIAL ENCOUNTER: ICD-10-CM

## 2018-06-07 PROCEDURE — 99024 POSTOP FOLLOW-UP VISIT: CPT | Performed by: FAMILY MEDICINE

## 2018-06-07 ASSESSMENT — ENCOUNTER SYMPTOMS
SHORTNESS OF BREATH: 0
NAUSEA: 0
CHILLS: 0
HEADACHES: 0
VOMITING: 0
DIZZINESS: 0
FEVER: 0

## 2018-06-07 NOTE — PROGRESS NOTES
Subjective:      Emilie Gonzalez is a 77 y.o. female who presents with Foot Problem (Referral from UC/ R foot injury )      Referred by Ranjit Flores PA-C for evaluation of RIGHT foot pain    HPI   RIGHT foot pain  Date of injury Wednesday, May 30, 2018  Mechanism of injury, stepped on a large dog bone sustaining an inversion injury to the RIGHT foot  No pop or snap at the time of injury  Sudden sharp pain at the RIGHT foot  Pain is focalized to the lateral foot  No radiation  POSITIVE swelling which has come down some  Seen at urgent care, had x-rays and placed in a cam walker boot    Review of Systems   Constitutional: Negative for chills and fever.   Respiratory: Negative for shortness of breath.    Cardiovascular: Negative for chest pain.   Gastrointestinal: Negative for nausea and vomiting.   Neurological: Negative for dizziness and headaches.     PMH:  has a past medical history of Arthritis of foot, right (6/9/2015); Benign essential tremor (6/9/2015); Edema (3/10/2015); Hearing loss sensory, bilateral (3/10/2015); History of shingles (9/26/2014); Hyperlipidemia (9/26/2014); Macular degeneration of right eye (9/26/2014); Moderate aortic insufficiency (9/26/2014); Osteoarthritis (9/26/2014); Osteoporosis (9/26/2014); Parkinson's disease (HCC); Plantar fasciitis, bilateral (3/10/2015); and Prolapsed bladder (9/26/2014).  MEDS:   Current Outpatient Prescriptions:   •  vitamin D (CHOLECALCIFEROL) 1000 UNIT Tab, Take 1,000 Units by mouth every day., Disp: , Rfl:   •  celecoxib (CELEBREX) 100 MG Cap, TAKE 1 CAPSULE BY MOUTH TWICE DAILY, Disp: 180 Cap, Rfl: 3  •  simvastatin (ZOCOR) 20 MG Tab, TAKE 1 TABLET BY MOUTH EVERY EVENING, Disp: 90 Tab, Rfl: 2  •  Calcium Carbonate-Vit D-Min (CALCIUM 1200 PO), Take  by mouth., Disp: , Rfl:   •  Multiple Vitamins-Minerals (OCUVITE-LUTEIN) Cap, Take 1 Each by mouth every day., Disp: , Rfl:   •  Omega-3 Fatty Acids (OMEGA 3 PO), Take 1 Each by mouth every day., Disp: ,  "Rfl:   •  Multiple Vitamins-Minerals (MULTI COMPLETE PO), Take 1 Tab by mouth every day., Disp: , Rfl:   •  Calcium Carbonate-Vitamin D (CALCIUM + D PO), Take 1 Tab by mouth every day., Disp: , Rfl:   •  Bioflavonoid Products (VITAMIN C PLUS) 1000 MG TABS, Take 1 Tab by mouth every day., Disp: , Rfl:   ALLERGIES:   Allergies   Allergen Reactions   • Codeine      halucinations     SURGHX:   Past Surgical History:   Procedure Laterality Date   • CHOLECYSTECTOMY  9/2013   • APPENDECTOMY  1956   • CATARACT PHACO WITH IOL Bilateral      SOCHX:  reports that she has never smoked. She has never used smokeless tobacco. She reports that she drinks alcohol. She reports that she does not use drugs.  FH: Family history was reviewed, no pertinent findings to report       Objective:     /68   Pulse 84   Temp 36.7 °C (98.1 °F)   Resp 18   Ht 1.588 m (5' 2.5\")   Wt 72.6 kg (160 lb)   SpO2 96%   BMI 28.80 kg/m²       Physical Exam      RIGHT ANKLE:  There is NO swelling noted at the ankle  Range of motion intact with dorsiflexion and plantarflexion, inversion and eversion  There is NO tenderness of the ATFL, CF or PTF ligament  There is NO tenderness of the lateral malleolus or medial malleolus  Anterior drawer testing is NEGATIVE  Talar tilt testing is NEGATIVE  The foot and ankle is otherwise neurovascularly intact    RIGHT FOOT:  There is POSITIVE swelling noted at the foot  There is POSITIVE tenderness at the base of the fifth metatarsal, without tenderness of the cuboid, or tarsal navicular  There is NO pain with metatarsal squeeze test    LEFT ANKLE:  There is NO swelling noted at the ankle  Range of motion intact with dorsiflexion and plantarflexion, inversion and eversion  There is NO tenderness of the ATFL, CF or PTF ligament  There is NO tenderness of the lateral malleolus or medial malleolus  Anterior drawer testing is NEGATIVE  Talar tilt testing is NEGATIVE  The foot and ankle is otherwise neurovascularly " intact    LEFT FOOT:  There is NO swelling noted at the foot  There is NO tenderness at the base of the fifth metatarsal, cuboid, or tarsal navicular  There is NO pain with metatarsal squeeze test    NEUTRAL stance  Able to ambulate with ANTALGIC gait       Assessment/Plan:     1. Fracture of base of fifth metatarsal bone of left foot at metaphyseal-diaphyseal junction, closed, initial encounter       Zone 1-2 (non-displaced)    Continue fracture stabilization and cam walker boot  The plan is to continue cam walker boot for a total of 6 weeks (removal on or after July 12, 2018)  She has already been in the boot for one week    No Follow-up on file.              5/30/2018 2:13 PM    HISTORY/REASON FOR EXAM:  Right lateral foot pain after stepping on a dog bone this morning.      TECHNIQUE/EXAM DESCRIPTION AND NUMBER OF VIEWS:  3 views of the RIGHT foot.    COMPARISON:  None    FINDINGS:    There is no focal soft tissue swelling.    There is a transverse lucency at the base of the right 5th metatarsal consistent with a nondisplaced fracture.    The alignment is maintained.    There is mild to moderate degenerative change throughout the IP joints. There is moderate degenerative change with mild hallux valgus deformity of the right 1st MTP joint.   Impression       1.  There is a nondisplaced transverse fracture at the base of the right 5th metatarsal.     Thank you Ranjit Flores PA-C for allowing me to participate in caring for your patient.

## 2018-06-14 ENCOUNTER — OFFICE VISIT (OUTPATIENT)
Dept: MEDICAL GROUP | Facility: CLINIC | Age: 78
End: 2018-06-14
Payer: MEDICARE

## 2018-06-14 VITALS
TEMPERATURE: 98.1 F | DIASTOLIC BLOOD PRESSURE: 72 MMHG | WEIGHT: 160 LBS | RESPIRATION RATE: 16 BRPM | OXYGEN SATURATION: 96 % | BODY MASS INDEX: 28.35 KG/M2 | HEIGHT: 63 IN | HEART RATE: 76 BPM | SYSTOLIC BLOOD PRESSURE: 124 MMHG

## 2018-06-14 DIAGNOSIS — S99.192A FRACTURE OF BASE OF FIFTH METATARSAL BONE OF LEFT FOOT AT METAPHYSEAL-DIAPHYSEAL JUNCTION, CLOSED, INITIAL ENCOUNTER: ICD-10-CM

## 2018-06-14 PROCEDURE — 99024 POSTOP FOLLOW-UP VISIT: CPT | Performed by: FAMILY MEDICINE

## 2018-06-14 ASSESSMENT — ENCOUNTER SYMPTOMS
NAUSEA: 0
VOMITING: 0
SHORTNESS OF BREATH: 0
HEADACHES: 0
CHILLS: 0
DIZZINESS: 0
FEVER: 0

## 2018-06-14 NOTE — PROGRESS NOTES
Subjective:      Emilie Gonzalez is a 77 y.o. female who presents with Foot Problem (F/V L foot injury )      Referred by Ranjit Flores PA-C for evaluation of RIGHT foot pain    Foot Problem   Pertinent negatives include no chest pain, chills, fever, headaches, nausea or vomiting.      RIGHT foot pain  Date of injury Wednesday, May 30, 2018  Mechanism of injury, stepped on a large dog bone sustaining an inversion injury to the RIGHT foot  No pop or snap at the time of injury  Sudden sharp pain at the RIGHT foot  Pain is focalized to the lateral foot  No radiation  POSITIVE swelling which has come down some  Seen at urgent care, had x-rays and placed in a cam walker boot    Review of Systems   Constitutional: Negative for chills and fever.   Respiratory: Negative for shortness of breath.    Cardiovascular: Negative for chest pain.   Gastrointestinal: Negative for nausea and vomiting.   Neurological: Negative for dizziness and headaches.     PMH:  has a past medical history of Arthritis of foot, right (6/9/2015); Benign essential tremor (6/9/2015); Edema (3/10/2015); Hearing loss sensory, bilateral (3/10/2015); History of shingles (9/26/2014); Hyperlipidemia (9/26/2014); Macular degeneration of right eye (9/26/2014); Moderate aortic insufficiency (9/26/2014); Osteoarthritis (9/26/2014); Osteoporosis (9/26/2014); Parkinson's disease (HCC); Plantar fasciitis, bilateral (3/10/2015); and Prolapsed bladder (9/26/2014).  MEDS:   Current Outpatient Prescriptions:   •  vitamin D (CHOLECALCIFEROL) 1000 UNIT Tab, Take 1,000 Units by mouth every day., Disp: , Rfl:   •  celecoxib (CELEBREX) 100 MG Cap, TAKE 1 CAPSULE BY MOUTH TWICE DAILY, Disp: 180 Cap, Rfl: 3  •  simvastatin (ZOCOR) 20 MG Tab, TAKE 1 TABLET BY MOUTH EVERY EVENING, Disp: 90 Tab, Rfl: 2  •  Calcium Carbonate-Vit D-Min (CALCIUM 1200 PO), Take  by mouth., Disp: , Rfl:   •  Multiple Vitamins-Minerals (OCUVITE-LUTEIN) Cap, Take 1 Each by mouth every day., Disp:  ", Rfl:   •  Omega-3 Fatty Acids (OMEGA 3 PO), Take 1 Each by mouth every day., Disp: , Rfl:   •  Multiple Vitamins-Minerals (MULTI COMPLETE PO), Take 1 Tab by mouth every day., Disp: , Rfl:   •  Calcium Carbonate-Vitamin D (CALCIUM + D PO), Take 1 Tab by mouth every day., Disp: , Rfl:   •  Bioflavonoid Products (VITAMIN C PLUS) 1000 MG TABS, Take 1 Tab by mouth every day., Disp: , Rfl:   ALLERGIES:   Allergies   Allergen Reactions   • Codeine      halucinations     SURGHX:   Past Surgical History:   Procedure Laterality Date   • CHOLECYSTECTOMY  9/2013   • APPENDECTOMY  1956   • CATARACT PHACO WITH IOL Bilateral      SOCHX:  reports that she has never smoked. She has never used smokeless tobacco. She reports that she drinks alcohol. She reports that she does not use drugs.  FH: Family history was reviewed, no pertinent findings to report       Objective:     /72   Pulse 76   Temp 36.7 °C (98.1 °F)   Resp 16   Ht 1.588 m (5' 2.5\")   Wt 72.6 kg (160 lb)   SpO2 96%   BMI 28.80 kg/m²      Physical Exam    RIGHT FOOT:  There is MILD to MODERATE swelling noted at the foot  There is POSITIVE tenderness at the base of the fifth metatarsal    NEUTRAL stance  Able to ambulate with NORMAL gait in cam walker       Assessment/Plan:     1. Fracture of base of fifth metatarsal bone of left foot at metaphyseal-diaphyseal junction, closed, initial encounter        Some fracture tenderness  Zone 1-2 (non-displaced)  Patient has advised to be more compliant with boot    Continue fracture stabilization and cam walker boot  The plan is to continue cam walker boot for a total of 6 weeks (removal on or after July 12, 2018)  She has already been in the boot for one week    Return in about 1 week (around 6/21/2018).  to check  On fracture tenderness            5/30/2018 2:13 PM    HISTORY/REASON FOR EXAM:  Right lateral foot pain after stepping on a dog bone this morning.      TECHNIQUE/EXAM DESCRIPTION AND NUMBER OF VIEWS:  3 " views of the RIGHT foot.    COMPARISON:  None    FINDINGS:    There is no focal soft tissue swelling.    There is a transverse lucency at the base of the right 5th metatarsal consistent with a nondisplaced fracture.    The alignment is maintained.    There is mild to moderate degenerative change throughout the IP joints. There is moderate degenerative change with mild hallux valgus deformity of the right 1st MTP joint.   Impression       1.  There is a nondisplaced transverse fracture at the base of the right 5th metatarsal.     Thank you Ranjit Flores PA-C for allowing me to participate in caring for your patient.

## 2018-06-21 ENCOUNTER — OFFICE VISIT (OUTPATIENT)
Dept: MEDICAL GROUP | Facility: CLINIC | Age: 78
End: 2018-06-21
Payer: MEDICARE

## 2018-06-21 VITALS
RESPIRATION RATE: 16 BRPM | WEIGHT: 160 LBS | BODY MASS INDEX: 28.35 KG/M2 | SYSTOLIC BLOOD PRESSURE: 118 MMHG | DIASTOLIC BLOOD PRESSURE: 76 MMHG | OXYGEN SATURATION: 96 % | HEART RATE: 76 BPM | HEIGHT: 63 IN | TEMPERATURE: 97.8 F

## 2018-06-21 DIAGNOSIS — S99.192D: ICD-10-CM

## 2018-06-21 PROCEDURE — 99024 POSTOP FOLLOW-UP VISIT: CPT | Performed by: FAMILY MEDICINE

## 2018-06-21 ASSESSMENT — ENCOUNTER SYMPTOMS
DIZZINESS: 0
VOMITING: 0
CHILLS: 0
HEADACHES: 0
NAUSEA: 0
FEVER: 0
SHORTNESS OF BREATH: 0

## 2018-06-21 NOTE — PROGRESS NOTES
Subjective:      Emilie Gonzalez is a 77 y.o. female who presents with Foot Problem (F/V L foot injury )      Referred by Ranjit Flores PA-C for evaluation of RIGHT foot pain    Foot Problem   Pertinent negatives include no chest pain, chills, fever, headaches, nausea or vomiting.      RIGHT footBase of the fifth metatarsal fracture  Date of injury Wednesday, May 30, 2018  Mechanism of injury, stepped on a large dog bone sustaining an inversion injury to the RIGHT foot  Minimal to no pain in the Cam Walker boot    Review of Systems   Constitutional: Negative for chills and fever.   Respiratory: Negative for shortness of breath.    Cardiovascular: Negative for chest pain.   Gastrointestinal: Negative for nausea and vomiting.   Neurological: Negative for dizziness and headaches.     PMH:  has a past medical history of Arthritis of foot, right (6/9/2015); Benign essential tremor (6/9/2015); Edema (3/10/2015); Hearing loss sensory, bilateral (3/10/2015); History of shingles (9/26/2014); Hyperlipidemia (9/26/2014); Macular degeneration of right eye (9/26/2014); Moderate aortic insufficiency (9/26/2014); Osteoarthritis (9/26/2014); Osteoporosis (9/26/2014); Parkinson's disease (HCC); Plantar fasciitis, bilateral (3/10/2015); and Prolapsed bladder (9/26/2014).  MEDS:   Current Outpatient Prescriptions:   •  vitamin D (CHOLECALCIFEROL) 1000 UNIT Tab, Take 1,000 Units by mouth every day., Disp: , Rfl:   •  celecoxib (CELEBREX) 100 MG Cap, TAKE 1 CAPSULE BY MOUTH TWICE DAILY, Disp: 180 Cap, Rfl: 3  •  simvastatin (ZOCOR) 20 MG Tab, TAKE 1 TABLET BY MOUTH EVERY EVENING, Disp: 90 Tab, Rfl: 2  •  Calcium Carbonate-Vit D-Min (CALCIUM 1200 PO), Take  by mouth., Disp: , Rfl:   •  Multiple Vitamins-Minerals (OCUVITE-LUTEIN) Cap, Take 1 Each by mouth every day., Disp: , Rfl:   •  Omega-3 Fatty Acids (OMEGA 3 PO), Take 1 Each by mouth every day., Disp: , Rfl:   •  Multiple Vitamins-Minerals (MULTI COMPLETE PO), Take 1 Tab by  "mouth every day., Disp: , Rfl:   •  Calcium Carbonate-Vitamin D (CALCIUM + D PO), Take 1 Tab by mouth every day., Disp: , Rfl:   •  Bioflavonoid Products (VITAMIN C PLUS) 1000 MG TABS, Take 1 Tab by mouth every day., Disp: , Rfl:   ALLERGIES:   Allergies   Allergen Reactions   • Codeine      halucinations     SURGHX:   Past Surgical History:   Procedure Laterality Date   • CHOLECYSTECTOMY  9/2013   • APPENDECTOMY  1956   • CATARACT PHACO WITH IOL Bilateral      SOCHX:  reports that she has never smoked. She has never used smokeless tobacco. She reports that she drinks alcohol. She reports that she does not use drugs.  FH: Family history was reviewed, no pertinent findings to report       Objective:     /76   Pulse 76   Temp 36.6 °C (97.8 °F)   Resp 16   Ht 1.588 m (5' 2.5\")   Wt 72.6 kg (160 lb)   SpO2 96%   BMI 28.80 kg/m²      Physical Exam    RIGHT FOOT:  There is MINIMAL swelling noted at the foot  There is MINIMAL tenderness at the base of the fifth metatarsal    NEUTRAL stance  Able to ambulate with NORMAL gait in cam walker       Assessment/Plan:     1. Fracture of base of fifth metatarsal bone of left foot at metaphyseal-diaphyseal junction with routine healing, subsequent encounter        MINIMAL fracture tenderness  Zone 1-2 (non-displaced)  IMPROVED    Continue fracture stabilization and cam walker boot  The plan is to continue cam walker boot for a total of 6 weeks (removal on or after July 12, 2018)  She has already been in the boot for one week    Return in about 2 weeks (around 7/5/2018).  to check  On fracture tenderness            5/30/2018 2:13 PM    HISTORY/REASON FOR EXAM:  Right lateral foot pain after stepping on a dog bone this morning.      TECHNIQUE/EXAM DESCRIPTION AND NUMBER OF VIEWS:  3 views of the RIGHT foot.    COMPARISON:  None    FINDINGS:    There is no focal soft tissue swelling.    There is a transverse lucency at the base of the right 5th metatarsal consistent with a " nondisplaced fracture.    The alignment is maintained.    There is mild to moderate degenerative change throughout the IP joints. There is moderate degenerative change with mild hallux valgus deformity of the right 1st MTP joint.   Impression       1.  There is a nondisplaced transverse fracture at the base of the right 5th metatarsal.     Thank you Ranjit Flores PA-C for allowing me to participate in caring for your patient.

## 2018-07-05 ENCOUNTER — OFFICE VISIT (OUTPATIENT)
Dept: MEDICAL GROUP | Facility: CLINIC | Age: 78
End: 2018-07-05
Payer: MEDICARE

## 2018-07-05 VITALS
OXYGEN SATURATION: 95 % | TEMPERATURE: 98.3 F | DIASTOLIC BLOOD PRESSURE: 80 MMHG | SYSTOLIC BLOOD PRESSURE: 122 MMHG | RESPIRATION RATE: 16 BRPM | BODY MASS INDEX: 28.35 KG/M2 | HEART RATE: 72 BPM | WEIGHT: 160 LBS | HEIGHT: 63 IN

## 2018-07-05 DIAGNOSIS — S99.192D: ICD-10-CM

## 2018-07-05 PROCEDURE — 99024 POSTOP FOLLOW-UP VISIT: CPT | Performed by: FAMILY MEDICINE

## 2018-07-05 NOTE — PROGRESS NOTES
"Subjective:   Emilie Gonzalez is a 77 y.o. female who presents for follow-up RIGHT fifth metatarsal fracture     RIGHT footBase of the fifth metatarsal fracture  Date of injury Wednesday, May 30, 2018  Mechanism of injury, stepped on a large dog bone sustaining an inversion injury to the RIGHT foot  Minimal to no pain in the Cam Walker boot     Objective:    /80   Pulse 72   Temp 36.8 °C (98.3 °F)   Resp 16   Ht 1.588 m (5' 2.5\")   Wt 72.6 kg (160 lb)   SpO2 95%   BMI 28.80 kg/m²     Physical Exam    RIGHT FOOT:  There is NO swelling noted at the foot  There is NO tenderness at the base of the fifth metatarsal    NEUTRAL stance  Able to ambulate with NORMAL gait in cam walker       1. Fracture of base of fifth metatarsal bone of left foot at metaphyseal-diaphyseal junction with routine healing, subsequent encounter          NO fracture tenderness  Zone 1-2 (non-displaced)  IMPROVED    Continue fracture stabilization and cam walker boot  continue cam walker boot for a total of 6 weeks (removal on or after July 12, 2018)    Return in about 2 weeks to verify that she has transitioned out of the boot okay, she will have been out of the boot for approximately 1 week at that point                5/30/2018 2:13 PM    HISTORY/REASON FOR EXAM:  Right lateral foot pain after stepping on a dog bone this morning.      TECHNIQUE/EXAM DESCRIPTION AND NUMBER OF VIEWS:  3 views of the RIGHT foot.    COMPARISON:  None    FINDINGS:    There is no focal soft tissue swelling.    There is a transverse lucency at the base of the right 5th metatarsal consistent with a nondisplaced fracture.    The alignment is maintained.    There is mild to moderate degenerative change throughout the IP joints. There is moderate degenerative change with mild hallux valgus deformity of the right 1st MTP joint.    Impression       1.  There is a nondisplaced transverse fracture at the base of the right 5th metatarsal.    Thank you Ranjit " SHYANNE Flores PA-C for allowing me to participate in caring for your patient.

## 2018-07-19 ENCOUNTER — OFFICE VISIT (OUTPATIENT)
Dept: MEDICAL GROUP | Facility: CLINIC | Age: 78
End: 2018-07-19

## 2018-07-19 VITALS
BODY MASS INDEX: 28.35 KG/M2 | WEIGHT: 160 LBS | HEIGHT: 63 IN | DIASTOLIC BLOOD PRESSURE: 78 MMHG | RESPIRATION RATE: 16 BRPM | HEART RATE: 76 BPM | OXYGEN SATURATION: 96 % | TEMPERATURE: 97.8 F | SYSTOLIC BLOOD PRESSURE: 122 MMHG

## 2018-07-19 DIAGNOSIS — S99.192D: ICD-10-CM

## 2018-07-19 NOTE — PROGRESS NOTES
"Subjective:   Emilie Gonzalez is a 77 y.o. female who presents for follow-up RIGHT fifth metatarsal fracture     RIGHT footBase of the fifth metatarsal fracture  Date of injury Wednesday, May 30, 2018  Mechanism of injury, stepped on a large dog bone sustaining an inversion injury to the RIGHT foot  No pain now out of Cam Walker boot     Objective:    /78   Pulse 76   Temp 36.6 °C (97.8 °F)   Resp 16   Ht 1.588 m (5' 2.5\")   Wt 72.6 kg (160 lb)   SpO2 96%   BMI 28.80 kg/m²     Physical Exam    RIGHT FOOT:  There is NO swelling noted at the foot  There is NO tenderness at the base of the fifth metatarsal    NEUTRAL stance  Able to ambulate with NORMAL gait in cam walker       1. Fracture of base of fifth metatarsal bone of left foot at metaphyseal-diaphyseal junction with routine healing, subsequent encounter          NO fracture tenderness  Zone 1-2 (non-displaced)  IMPROVED    Out of cam walker boot since (removal  on or after July 12, 2018)  She was in boot for 6 weeks    f/u prn                 5/30/2018 2:13 PM    HISTORY/REASON FOR EXAM:  Right lateral foot pain after stepping on a dog bone this morning.      TECHNIQUE/EXAM DESCRIPTION AND NUMBER OF VIEWS:  3 views of the RIGHT foot.    COMPARISON:  None    FINDINGS:    There is no focal soft tissue swelling.    There is a transverse lucency at the base of the right 5th metatarsal consistent with a nondisplaced fracture.    The alignment is maintained.    There is mild to moderate degenerative change throughout the IP joints. There is moderate degenerative change with mild hallux valgus deformity of the right 1st MTP joint.    Impression     1.  There is a nondisplaced transverse fracture at the base of the right 5th metatarsal.    Thank you Ranjit Flores PA-C for allowing me to participate in caring for your patient.  "

## 2018-08-13 ENCOUNTER — OFFICE VISIT (OUTPATIENT)
Dept: CARDIOLOGY | Facility: MEDICAL CENTER | Age: 78
End: 2018-08-13
Payer: MEDICARE

## 2018-08-13 VITALS
DIASTOLIC BLOOD PRESSURE: 66 MMHG | HEIGHT: 63 IN | BODY MASS INDEX: 30.83 KG/M2 | OXYGEN SATURATION: 95 % | SYSTOLIC BLOOD PRESSURE: 138 MMHG | WEIGHT: 174 LBS | HEART RATE: 66 BPM | RESPIRATION RATE: 14 BRPM

## 2018-08-13 DIAGNOSIS — R60.0 BILATERAL LEG EDEMA: ICD-10-CM

## 2018-08-13 DIAGNOSIS — E78.5 DYSLIPIDEMIA: ICD-10-CM

## 2018-08-13 DIAGNOSIS — R94.31 ABNORMAL EKG: ICD-10-CM

## 2018-08-13 DIAGNOSIS — I35.1 AORTIC VALVE INSUFFICIENCY, ETIOLOGY OF CARDIAC VALVE DISEASE UNSPECIFIED: ICD-10-CM

## 2018-08-13 PROCEDURE — 99214 OFFICE O/P EST MOD 30 MIN: CPT | Performed by: INTERNAL MEDICINE

## 2018-08-13 ASSESSMENT — ENCOUNTER SYMPTOMS
DIZZINESS: 0
PALPITATIONS: 0
SHORTNESS OF BREATH: 0
COUGH: 0
LOSS OF CONSCIOUSNESS: 0
MYALGIAS: 0

## 2018-08-13 NOTE — PROGRESS NOTES
Chief Complaint   Patient presents with   • Follow-Up     Aortic Valve Insufficiency, unspecified etiology.       Subjective:   Emilie Gonzalez is a 77 y.o. female who presents today for followup aortic insufficiency, hyperlipidemia, abnormal EKG, edema.     Last seen on 04/16/2018.    Since 4/16/2018 patient said no cardiac symptoms.  Compliant with medications.  Recently returned having driven back by herself from [place] which he does frequently to visit family.  Recently fractured right foot treated conservatively, resolved.    Since 10/16/2017 the patient has had no cardiac symptoms.  3 times a week she is walking twice around [place] walking faster the 2nd time around after which she feels good.  No heart failure symptoms.  Under stress with her daughter being emergently transferred from [place] to [facility] last night for intractable seizures.     Since 4/12/2017 appointment the patient's had no cardiac symptoms.  She had a recent upper respiratory infection with a nonproductive cough after going to her grandson's wedding in [place], [place]     Since 10/10/2016 appointment no cardiac symptoms.  No CHF symptoms.   No palpitations or chest pain.  Moved from an apartment to a [employer].  Able to work out in the garden without any symptoms or problems.     Past medical history  Diagnosed with stage II Parkinson's disease. Followed by Dr. Colvin, neurologist     On 9/1/2015 TSH was slightly elevated.  Levothyroxine started by PCP.  Of note, has always slept on 2 pillows for years.  Has always woken up 2-3 times a night.  No change in his sleep habits.     Previous appointment  The patient states that her children states that she gets out of breath while walking.  The patient has noted some mild exertional shortness of breath.  Concerned about recent worsening aortic insufficiency.  No angina pectoris.  No CHF symptoms.  Has had right lower extremity edema currently related to arthritis.  Takes  "anti-inflammatories.  Had been started on Aldactone/HCTZ by PCP but has not been taking it.  Also in 2014 diagnosed with \"asthma\". Had mild abnormal PFTs in PCPs office.  Prescribed a bronchodilator but has not used it.     Past medical history  The patient had previously lived in [place], [place].  5 years ago she was discovered to have a \"leaky heart valve.  The patient had been followed by Dr. Bird, cardiologist [place] for the past 4 years.  The patient is moved to [facility] and is establishing ongoing cardiology care.     The patient feels that she is \"healthy\".  She lives alone.  She runs all of her activity of daily living including shopping and necessary errands.  She has no exercise limitations.   She denies chest pain shortness of breath, palpitations or lightheadedness.     History of hyperlipidemia on simvastatin.  No history of hypertension, diabetes mellitus and she is not smoke cigarettes.     Family history  Father  of a heart attack at age 69.  Sister alive at age 90. Had a heart attack at age 75.     Social history.  .  Does not drink alcohol except on rare occasions with dinner.     Other medical problems.  2013 laparoscopic cholecystectomy [place], [place].  Appendectomy age 13.  \"Chronic bronchitis\" but no problems times one year    Past Medical History:   Diagnosis Date   • Arthritis of foot, right 2015   • Benign essential tremor 2015   • Edema 3/10/2015   • Hearing loss sensory, bilateral 3/10/2015   • History of shingles 2014   • Hyperlipidemia 2014   • Macular degeneration of right eye 2014   • Moderate aortic insufficiency 2014   • Osteoarthritis 2014   • Osteoporosis 2014   • Parkinson's disease (HCC)    • Plantar fasciitis, bilateral 3/10/2015   • Prolapsed bladder 2014     Past Surgical History:   Procedure Laterality Date   • CHOLECYSTECTOMY  2013   • APPENDECTOMY     • CATARACT PHACO WITH IOL Bilateral      Family History " "  Problem Relation Age of Onset   • Hypertension Father    • Hypertension Sister    • Arthritis Mother      Social History     Social History   • Marital status:      Spouse name: N/A   • Number of children: N/A   • Years of education: N/A     Occupational History   • Not on file.     Social History Main Topics   • Smoking status: Never Smoker   • Smokeless tobacco: Never Used   • Alcohol use 0.0 oz/week      Comment: rare   • Drug use: No   • Sexual activity: Not Currently     Partners: Male     Other Topics Concern   • Not on file     Social History Narrative   • No narrative on file     Allergies   Allergen Reactions   • Codeine      halucinations     Outpatient Encounter Prescriptions as of 8/13/2018   Medication Sig Dispense Refill   • vitamin D (CHOLECALCIFEROL) 1000 UNIT Tab Take 1,000 Units by mouth every day.     • celecoxib (CELEBREX) 100 MG Cap TAKE 1 CAPSULE BY MOUTH TWICE DAILY 180 Cap 3   • Calcium Carbonate-Vit D-Min (CALCIUM 1200 PO) Take 1 Each by mouth every day at 6 PM.     • Multiple Vitamins-Minerals (OCUVITE-LUTEIN) Cap Take 1 Each by mouth every day.     • Omega-3 Fatty Acids (OMEGA 3 PO) Take 1 Each by mouth every day.     • Multiple Vitamins-Minerals (MULTI COMPLETE PO) Take 1 Tab by mouth every day.     • Bioflavonoid Products (VITAMIN C PLUS) 1000 MG TABS Take 1 Tab by mouth every day.     • simvastatin (ZOCOR) 20 MG Tab TAKE 1 TABLET BY MOUTH EVERY EVENING 90 Tab 2   • Calcium Carbonate-Vitamin D (CALCIUM + D PO) Take 1 Tab by mouth every day.       No facility-administered encounter medications on file as of 8/13/2018.      Review of Systems   Respiratory: Negative for cough and shortness of breath.    Cardiovascular: Negative for chest pain and palpitations.   Musculoskeletal: Negative for myalgias.   Neurological: Negative for dizziness and loss of consciousness.        Objective:   /66   Pulse 66   Resp 14   Ht 1.588 m (5' 2.5\")   Wt 78.9 kg (174 lb)   SpO2 95%   " BMI 31.32 kg/m²     Physical Exam   Constitutional: She is oriented to person, place, and time. She appears well-developed and well-nourished.   Neck: No JVD present.   Cardiovascular: Normal rate, regular rhythm and intact distal pulses.    Murmur heard.  Pulmonary/Chest: Effort normal and breath sounds normal. No respiratory distress. She has no wheezes. She has no rales.   Musculoskeletal: She exhibits edema.   Minimal.  Right foot worse than left related to recent fractured foot   Neurological: She is alert and oriented to person, place, and time.   Skin: Skin is warm and dry.   Psychiatric: She has a normal mood and affect. Her behavior is normal.        06/07/2010 ECHOCARDIOGRAM  EF 60%.  Mild to moderate aortic regurgitation.     05/05/2011 ECHOCARDIOGRAM  EF 55-60%.  Moderate aortic insufficiency.     05/08/2012 ECHOCARDIOGRAM  EF 70%.  Moderate aortic insufficiency.  Pulmonary pressure 26 mmHg.     03/25/2015 ECHOCARDIOGRAM  Normal left ventricular size, thickness, systolic function, and   diastolic function.  Left ventricular ejection fraction is 65% to 70%.  Aortic sclerosis without stenosis.  Moderately severe aortic insufficiency.  Right heart pressures are consistent with mild pulmonary hypertension.     10/24/2016 ECHOCARDIOGRAM  Normal left ventricular size, wall thickness, and systolic function.  Left ventricular ejection fraction is visually estimated to be 60%.  Severe eccentric aortic insufficiency.  Mildly thickened mitral valve leaflets with normal leaflet excursion.  Unable to estimate pulmonary artery pressure due to an inadequate   tricuspid regurgitant jet.     05/08/2017 ECHOCARDIOGRAM  Normal left ventricular systolic function.  Left ventricular ejection fraction is visually estimated to be 65%.  Mild concentric left ventricular hypertrophy.  Mild aortic stenosis.  Moderate aortic insufficiency.  Mild mitral regurgitation.  Unable to estimate pulmonary artery pressure due to an inadequate    tricuspid regurgitant jet.     03/19/2015 EKG: Normal sinus rhythm, rate 73. Nonspecific ST-T wave abnormalities. No prior tracing for comparison. Reviewed by myself.    Assessment:     1. Aortic valve insufficiency, etiology of cardiac valve disease unspecified  ECHOCARDIOGRAM COMP W/O CONT   2. Dyslipidemia     3. Abnormal EKG     4. Bilateral leg edema         Medical Decision Making:  Today's Assessment / Status / Plan:   Aortic regurgitation. Moderate. Asymptomatic.     Abnormal EKG.       Hyperlipidemia. On simvastatin.     Edema. Minimal.     Hypothyroidism. Diagnosed 9/1/2015. On supplements. Per PCP.     Recommendations and Discussion  1. The patient's current cardiac condition is stable.  2. Follow-up echocardiogram.  3. Follow-up 6 months.

## 2018-09-04 ENCOUNTER — HOSPITAL ENCOUNTER (OUTPATIENT)
Dept: CARDIOLOGY | Facility: MEDICAL CENTER | Age: 78
End: 2018-09-04
Attending: INTERNAL MEDICINE
Payer: MEDICARE

## 2018-09-04 DIAGNOSIS — I35.1 AORTIC VALVE INSUFFICIENCY, ETIOLOGY OF CARDIAC VALVE DISEASE UNSPECIFIED: ICD-10-CM

## 2018-09-04 LAB
LV EJECT FRACT  99904: 65
LV EJECT FRACT MOD 2C 99903: 65.91
LV EJECT FRACT MOD 4C 99902: 63.54
LV EJECT FRACT MOD BP 99901: 64.09

## 2018-09-04 PROCEDURE — 93306 TTE W/DOPPLER COMPLETE: CPT

## 2018-09-04 PROCEDURE — 93306 TTE W/DOPPLER COMPLETE: CPT | Mod: 26 | Performed by: INTERNAL MEDICINE

## 2018-09-05 ENCOUNTER — TELEPHONE (OUTPATIENT)
Dept: CARDIOLOGY | Facility: MEDICAL CENTER | Age: 78
End: 2018-09-05

## 2018-09-05 NOTE — TELEPHONE ENCOUNTER
Echo Results:  Per SW Amount of Aortic Regurgitation is unchanged, moderate.  Heart Function Looks good.   Attempted to contact patient, no answer. LVM for patient to call back.

## 2018-10-03 ENCOUNTER — OFFICE VISIT (OUTPATIENT)
Dept: NEUROLOGY | Facility: MEDICAL CENTER | Age: 78
End: 2018-10-03
Payer: MEDICARE

## 2018-10-03 VITALS
DIASTOLIC BLOOD PRESSURE: 60 MMHG | WEIGHT: 172.3 LBS | OXYGEN SATURATION: 98 % | HEIGHT: 63 IN | TEMPERATURE: 97.7 F | SYSTOLIC BLOOD PRESSURE: 132 MMHG | HEART RATE: 62 BPM | BODY MASS INDEX: 30.53 KG/M2 | RESPIRATION RATE: 16 BRPM

## 2018-10-03 DIAGNOSIS — G20.A1 PARKINSON DISEASE: ICD-10-CM

## 2018-10-03 PROCEDURE — 99214 OFFICE O/P EST MOD 30 MIN: CPT | Performed by: PSYCHIATRY & NEUROLOGY

## 2018-10-03 NOTE — PROGRESS NOTES
NEUROLOGY NOTE    Referring Physician  Sherry Davidson      CHIEF COMPLAINT:  Tremor resting -- for years, worsened since 2016  Chief Complaint   Patient presents with   • Follow-Up     Parkinson's       PRESENT ILLNESS:     Got a flu 2017, and missed one follow up  Tremor resting? -- for years, worsened in the last year    One in a while, she would develop shaking lasting for 5 minutes   One of her older sister had bad tremor and she was told by her kids that she is shaking    That is the reason that she came here for diagnosis    The left hand tremor would only become shaking when she is not paying attention    PAST MEDICAL HISTORY:  Past Medical History:   Diagnosis Date   • Arthritis of foot, right 6/9/2015   • Benign essential tremor 6/9/2015   • Edema 3/10/2015   • Hearing loss sensory, bilateral 3/10/2015   • History of shingles 9/26/2014   • Hyperlipidemia 9/26/2014   • Macular degeneration of right eye 9/26/2014   • Moderate aortic insufficiency 9/26/2014   • Osteoarthritis 9/26/2014   • Osteoporosis 9/26/2014   • Parkinson's disease (HCC)    • Plantar fasciitis, bilateral 3/10/2015   • Prolapsed bladder 9/26/2014       PAST SURGICAL HISTORY:  Past Surgical History:   Procedure Laterality Date   • CHOLECYSTECTOMY  9/2013   • APPENDECTOMY  1956   • CATARACT PHACO WITH IOL Bilateral        FAMILY HISTORY:  One older system-- has tremor  Family History   Problem Relation Age of Onset   • Hypertension Father    • Hypertension Sister    • Arthritis Mother        SOCIAL HISTORY:  Social History     Social History   • Marital status:      Spouse name: N/A   • Number of children: N/A   • Years of education: N/A     Occupational History   • Not on file.     Social History Main Topics   • Smoking status: Never Smoker   • Smokeless tobacco: Never Used   • Alcohol use 0.0 oz/week      Comment: rare   • Drug use: No   • Sexual activity: Not Currently     Partners: Male     Other Topics Concern   • Not on file  "    Social History Narrative   • No narrative on file     ALLERGIES:  Allergies   Allergen Reactions   • Codeine      halucinations     TOBHX  History   Smoking Status   • Never Smoker   Smokeless Tobacco   • Never Used     ALCHX  History   Alcohol Use   • 0.0 oz/week     Comment: rare     DRUGHX  History   Drug Use No           MEDICATIONS:  Current Outpatient Prescriptions   Medication   • vitamin D (CHOLECALCIFEROL) 1000 UNIT Tab   • celecoxib (CELEBREX) 100 MG Cap   • simvastatin (ZOCOR) 20 MG Tab   • Calcium Carbonate-Vit D-Min (CALCIUM 1200 PO)   • Multiple Vitamins-Minerals (OCUVITE-LUTEIN) Cap   • Omega-3 Fatty Acids (OMEGA 3 PO)   • Multiple Vitamins-Minerals (MULTI COMPLETE PO)   • Bioflavonoid Products (VITAMIN C PLUS) 1000 MG TABS   • Calcium Carbonate-Vitamin D (CALCIUM + D PO)     No current facility-administered medications for this visit.        REVIEW OF SYSTEM:    Constitutional: Denies fevers, Denies weight changes   Eyes: Denies changes in vision, no eye pain   Ears/Nose/Throat/Mouth: Denies nasal congestion or sore throat   Cardiovascular: Denies chest pain or palpitations   Respiratory: Denies SOB.   Gastrointestinal/Hepatic: Denies abdominal pain, nausea, vomiting, diarrhea, constipation or GI bleeding   Genitourinary: Denies bladder dysfunction, dysuria or frequency   Musculoskeletal/Rheum: Denies joint pain and swelling   Skin/Breast: Denies rash, denies breast lumps or discharge   Neurological: wild dreams, tremor  Psychiatric: anxiety, insomnia  Endocrine: denies hx of diabetes or thyroid dysfunction   Heme/Oncology/Lymph Nodes: Denies enlarged lymph nodes, denies brusing or known bleeding disorder   Allergic/Immunologic: Denies hx of allergies         PHYSICAL AND NEUROLOGICAL EXMAINATIONS:  VITAL SIGNS: /60 (BP Location: Right arm, Patient Position: Sitting, BP Cuff Size: Adult)   Pulse 62   Temp 36.5 °C (97.7 °F)   Resp 16   Ht 1.588 m (5' 2.5\")   Wt 78.2 kg (172 lb 4.8 oz)  "  SpO2 98%   BMI 31.01 kg/m²   CURRENT WEIGHT:   BMI: Body mass index is 31.01 kg/m².  PREVIOUS WEIGHTS:  Wt Readings from Last 25 Encounters:   10/03/18 78.2 kg (172 lb 4.8 oz)   08/13/18 78.9 kg (174 lb)   07/19/18 72.6 kg (160 lb)   07/05/18 72.6 kg (160 lb)   06/21/18 72.6 kg (160 lb)   06/14/18 72.6 kg (160 lb)   06/07/18 72.6 kg (160 lb)   05/30/18 72.6 kg (160 lb)   05/01/18 78.5 kg (173 lb)   04/16/18 78 kg (172 lb)   04/03/18 77.8 kg (171 lb 8.3 oz)   03/23/18 78 kg (172 lb)   11/07/17 76.8 kg (169 lb 5 oz)   10/31/17 77.1 kg (170 lb)   10/18/17 77.1 kg (170 lb)   10/16/17 78.9 kg (174 lb)   04/12/17 78.5 kg (173 lb)   04/12/17 78.7 kg (173 lb 6.4 oz)   01/30/17 80.3 kg (177 lb)   10/12/16 80.1 kg (176 lb 9.6 oz)   10/10/16 79.8 kg (176 lb)   09/01/16 80.7 kg (178 lb)   08/05/16 79.8 kg (176 lb)   07/25/16 81.2 kg (179 lb)   03/14/16 82.1 kg (181 lb)       General appearance of patient: WDWN(+) NAD(+)    EYES  o Fundus : Papilledem(-) Exudates(-) Hemorrhage(-)  Nervous System  Orientation to time, place and person(+)  Memory normal(-)  Language: aphasia(-)  Knowledge: past(+) Current(+)  Attention(+)  Cranial Nerves  • Nerve 2: intact  • Nerve 3,4,6: intact  • Nerve 5 : intact  • Nerve 7: intact  • Nerve 8: hearing impairment  • Nerve 9 & 10: intact  • Nerve 11: intact  • Nerve 12: intact  Muscle Power and muscle tone: symmetric, normal in upper and lower  Sensory System: Pin sensation intact(+)  Reflexes: symmetric throughout  Cerebellar Function FNP normal   Gait : Steady(+)   Heart and Vascular  Peripheral Vasucular system : Edema (-) Swelling(-)  RHB, Breathing sound clear  abdomen bowel sound normoactive  Extremities freely moveable  Joints no contracture     Seeing eye doctor- macular degeneration        Resting tremor-- L>R ( patient is anxious and mental activity is needed to be able to observe this resting tremor)  Bradykinesia-- both hands  Rigitity-- both hands  Postural reflex  Recall  3/3    IMPRESSION:    1. Probable Parkinson Disease Stage I- Stage II  2. Hx of hearing impairment and cognitive decline  3. Spells of shaking - in remission  4. One trip over the dog- 2018- crack the foot bone    PLAN/RECOMMENDATIONS:    ________________________________________________________________________    Advise exercise muscle and brain  Explained to Emilie--- she has early parkinson disease--- could some essential tremor also? Possible, but the patient's tremor is more resting and more consistent with parkinson tremor  As time goes by, the tremor could progress  At this time, I would continue observation till Emilie would like to take medicine for better mobility  Please call us if interested in starting anti-parkinson medicine      I will see Emilie in 6 months    ________________________________________________________________________    Fish Oil -- Omega 3 [id number]# daily  Vit D-3 4000 unit daily  ________________________________________________________________________      ________________________________________________________________________

## 2018-10-03 NOTE — PATIENT INSTRUCTIONS
IMPRESSION:    1. Probable Parkinson Disease Stage I- Stage II  2. Hx of hearing impairment and cognitive decline  3. Spells of shaking - in remission  4. One trip over the dog- 2018- crack the foot bone    PLAN/RECOMMENDATIONS:    ________________________________________________________________________    Advise exercise muscle and brain  Explained to Emilie--- she has early parkinson disease--- could some essential tremor also? Possible, but the patient's tremor is more resting and more consistent with parkinson tremor  As time goes by, the tremor could progress  At this time, I would continue observation till Emilie would like to take medicine for better mobility  Please call us if interested in starting anti-parkinson medicine      I will see Emilie in 6 months    ________________________________________________________________________    Fish Oil -- Omega 3 [id number]# daily  Vit D-3 4000 unit daily  ________________________________________________________________________

## 2018-10-11 ENCOUNTER — PATIENT OUTREACH (OUTPATIENT)
Dept: HEALTH INFORMATION MANAGEMENT | Facility: OTHER | Age: 78
End: 2018-10-11

## 2018-10-11 NOTE — PROGRESS NOTES
Outcome: Left Message    Please transfer to Patient Outreach Team at [phone] when patient returns call.    WebIZ Checked & Epic Updated:  yes  Influenza w/preserv.   Tdap   Zoster William (Shingrix)     [facility] Verified: yes  Effective Date: 1/1/2018     Attempt # 1

## 2018-10-17 ENCOUNTER — OFFICE VISIT (OUTPATIENT)
Dept: MEDICAL GROUP | Facility: PHYSICIAN GROUP | Age: 78
End: 2018-10-17
Payer: MEDICARE

## 2018-10-17 VITALS
SYSTOLIC BLOOD PRESSURE: 108 MMHG | BODY MASS INDEX: 30.83 KG/M2 | TEMPERATURE: 98 F | DIASTOLIC BLOOD PRESSURE: 64 MMHG | HEART RATE: 74 BPM | WEIGHT: 174 LBS | OXYGEN SATURATION: 94 % | HEIGHT: 63 IN

## 2018-10-17 DIAGNOSIS — R05.9 COUGH: ICD-10-CM

## 2018-10-17 DIAGNOSIS — E78.5 DYSLIPIDEMIA: ICD-10-CM

## 2018-10-17 DIAGNOSIS — H35.30 MACULAR DEGENERATION OF RIGHT EYE, UNSPECIFIED TYPE: ICD-10-CM

## 2018-10-17 PROBLEM — E66.9 OBESITY (BMI 30-39.9): Status: RESOLVED | Noted: 2017-01-30 | Resolved: 2018-10-17

## 2018-10-17 PROCEDURE — 99214 OFFICE O/P EST MOD 30 MIN: CPT | Performed by: FAMILY MEDICINE

## 2018-10-17 RX ORDER — SIMVASTATIN 20 MG
TABLET ORAL
Qty: 90 TAB | Refills: 2 | Status: SHIPPED | OUTPATIENT
Start: 2018-10-17 | End: 2019-03-25

## 2018-10-17 NOTE — ASSESSMENT & PLAN NOTE
Ongoing issues; patient takes simvastatin 20 mg daily; denies any muscle cramps or weakness.  Review of chart shows that her cholesterol has been normal with a mild elevation in her triglycerides.  Patient eats healthy and still gets regular exercise.

## 2018-10-17 NOTE — ASSESSMENT & PLAN NOTE
This problem is new to me.  Patient reports that she has a dry irritating cough that occurs off and on throughout the day.  She states that it comes and goes; has no association with time of day or activity.  She denies that it is productive; denies any other associated symptoms.    We have reviewed the most common causes including asthma, reflux, allergies.

## 2018-10-17 NOTE — ASSESSMENT & PLAN NOTE
Ongoing issues; patient continues follow with ophthalmology; she currently denies any significant decrease in her vision.

## 2018-10-17 NOTE — Clinical Note
Pt will est with you next month - she is trying OTC tums for cough; if no better could try allergy medication

## 2018-10-17 NOTE — PROGRESS NOTES
Subjective:   Emilie Gonzalez is a 78 y.o. female here today for macular degeneration, elevated lipids, cough    Macular degeneration of right eye  Ongoing issues; patient continues follow with ophthalmology; she currently denies any significant decrease in her vision.    Dyslipidemia  Ongoing issues; patient takes simvastatin 20 mg daily; denies any muscle cramps or weakness.  Review of chart shows that her cholesterol has been normal with a mild elevation in her triglycerides.  Patient eats healthy and still gets regular exercise.    Cough  This problem is new to me.  Patient reports that she has a dry irritating cough that occurs off and on throughout the day.  She states that it comes and goes; has no association with time of day or activity.  She denies that it is productive; denies any other associated symptoms.    We have reviewed the most common causes including asthma, reflux, allergies.         Current medicines (including changes today)  Current Outpatient Prescriptions   Medication Sig Dispense Refill   • simvastatin (ZOCOR) 20 MG Tab TAKE 1 TABLET BY MOUTH EVERY EVENING 90 Tab 2   • vitamin D (CHOLECALCIFEROL) 1000 UNIT Tab Take 1,000 Units by mouth every day.     • celecoxib (CELEBREX) 100 MG Cap TAKE 1 CAPSULE BY MOUTH TWICE DAILY 180 Cap 3   • Calcium Carbonate-Vit D-Min (CALCIUM 1200 PO) Take 1 Each by mouth every day at 6 PM.     • Multiple Vitamins-Minerals (OCUVITE-LUTEIN) Cap Take 1 Each by mouth every day.     • Omega-3 Fatty Acids (OMEGA 3 PO) Take 1 Each by mouth every day.     • Multiple Vitamins-Minerals (MULTI COMPLETE PO) Take 1 Tab by mouth every day.     • Calcium Carbonate-Vitamin D (CALCIUM + D PO) Take 1 Tab by mouth every day.     • Bioflavonoid Products (VITAMIN C PLUS) 1000 MG TABS Take 1 Tab by mouth every day.       No current facility-administered medications for this visit.      She  has a past medical history of Arthritis of foot, right (6/9/2015); Benign essential tremor  "(6/9/2015); Edema (3/10/2015); Hearing loss sensory, bilateral (3/10/2015); History of shingles (9/26/2014); Hyperlipidemia (9/26/2014); Macular degeneration of right eye (9/26/2014); Moderate aortic insufficiency (9/26/2014); Osteoarthritis (9/26/2014); Osteoporosis (9/26/2014); Parkinson's disease (HCC); Plantar fasciitis, bilateral (3/10/2015); and Prolapsed bladder (9/26/2014).    ROS   No chest pain, no shortness of breath, no abdominal pain  + Dry cough     Objective:     Blood pressure 108/64, pulse 74, temperature 36.7 °C (98 °F), temperature source Temporal, height 1.588 m (5' 2.5\"), weight 78.9 kg (174 lb), SpO2 94 %, not currently breastfeeding. Body mass index is 31.32 kg/m².   Physical Exam:  Alert, oriented in no acute distress.  Eye contact is good, speech goal directed, affect calm  HEENT: conjunctiva non-injected, sclera non-icteric.  Pinna normal. Oral mucous membranes pink and moist with no lesions.  Neck No adenopathy or masses in the neck or supraclavicular regions.  Lungs: clear to auscultation bilaterally with good excursion.  CV: regular rate and rhythm.  Mild systolic ejection murmur  Abdomen: soft, nontender, No CVAT  Ext: no edema, color normal, vascularity normal, temperature normal        Assessment and Plan:   The following treatment plan was discussed     1. Cough      Uncontrolled; unknown origin.  Unlikely asthma; have patient do trial over-the-counter Tums; if no improvement consider allergy medicine   2. Dyslipidemia      Stable.  Continue current medications; refill provided; monitor   3. Macular degeneration of right eye, unspecified type      Stable.  Continue follow-up with ophthalmology; monitor       Followup: Return if symptoms worsen or fail to improve.            "

## 2018-10-30 NOTE — PROGRESS NOTES
Outcome: Left Message    Please transfer to Patient Outreach Team at [phone] when patient returns call.    Attempt # 2

## 2018-11-07 NOTE — PROGRESS NOTES
Outcome: Left Message    Please transfer to Patient Outreach Team at [phone] when patient returns call.    Attempt # 3

## 2018-11-10 NOTE — PROGRESS NOTES
Outcome: Left Message    Please transfer to Patient Outreach Team at [phone] when patient returns call.    Attempt # 4

## 2018-11-20 ENCOUNTER — OFFICE VISIT (OUTPATIENT)
Dept: MEDICAL GROUP | Facility: PHYSICIAN GROUP | Age: 78
End: 2018-11-20
Payer: MEDICARE

## 2018-11-20 VITALS
RESPIRATION RATE: 12 BRPM | SYSTOLIC BLOOD PRESSURE: 132 MMHG | WEIGHT: 171 LBS | TEMPERATURE: 97.5 F | BODY MASS INDEX: 30.3 KG/M2 | DIASTOLIC BLOOD PRESSURE: 60 MMHG | HEIGHT: 63 IN | HEART RATE: 66 BPM | OXYGEN SATURATION: 98 %

## 2018-11-20 DIAGNOSIS — I35.1 AORTIC VALVE INSUFFICIENCY, ETIOLOGY OF CARDIAC VALVE DISEASE UNSPECIFIED: ICD-10-CM

## 2018-11-20 DIAGNOSIS — E78.5 DYSLIPIDEMIA: ICD-10-CM

## 2018-11-20 DIAGNOSIS — G20.A1 PARKINSON DISEASE: ICD-10-CM

## 2018-11-20 DIAGNOSIS — N18.30 CKD (CHRONIC KIDNEY DISEASE) STAGE 3, GFR 30-59 ML/MIN (HCC): ICD-10-CM

## 2018-11-20 DIAGNOSIS — Z23 NEED FOR VACCINATION: ICD-10-CM

## 2018-11-20 PROCEDURE — G0008 ADMIN INFLUENZA VIRUS VAC: HCPCS | Performed by: FAMILY MEDICINE

## 2018-11-20 PROCEDURE — 90662 IIV NO PRSV INCREASED AG IM: CPT | Performed by: FAMILY MEDICINE

## 2018-11-20 PROCEDURE — 99214 OFFICE O/P EST MOD 30 MIN: CPT | Mod: 25 | Performed by: FAMILY MEDICINE

## 2018-11-20 NOTE — PROGRESS NOTES
CC:  Diagnoses of Parkinson disease ([facility]), CKD (chronic kidney disease) stage 3, GFR 30-59 ml/min ([facility]), Need for vaccination, Dyslipidemia, and Aortic valve insufficiency, etiology of cardiac valve disease unspecified were pertinent to this visit.    HISTORY OF THE PRESENT ILLNESS: Patient is a 78 y.o. female. This pleasant patient is here today to establish care.  She was previously followed by Dr. Baugh    Health Maintenance: She will receive her flu vaccine today.      Parkinson disease ([facility])  This is a chronic problem which was diagnosed 2 years ago due to a tremor.  She is currently not on any medications and does follow-up with neurology, Dr Colvin, every 6 months.  She recently saw him on October 3.  Denies any tremors today.    Dyslipidemia  This is a chronic medical problem for which she is been taking Zocor 20 mg daily.  Her most recent cholesterol panel from April 2018 showed cholesterol 166, triglycerides 188, HDL 45, and LDL 83.  She does complain of some mild lower extremity edema but denies any lower extremity cramping.     CKD (chronic kidney disease) stage 3, GFR 30-59 ml/min  This is a chronic diagnosis for her.  She denies any hematuria or flank pain.  Her most recent labs from April 2018 do show a GFR of 49.     Aortic regurgitation  This is a chronic medical problem for which she is currently followed by cardiology, Dr. Olivera.  She most recently saw him on August 13 and does mention that she had an echocardiogram done recently.  She denies any chest pain or shortness of breath.     Allergies: Codeine    Current Outpatient Prescriptions Ordered in [facility]   Medication Sig Dispense Refill   • simvastatin (ZOCOR) 20 MG Tab TAKE 1 TABLET BY MOUTH EVERY EVENING 90 Tab 2   • vitamin D (CHOLECALCIFEROL) 1000 UNIT Tab Take 1,000 Units by mouth every day.     • celecoxib (CELEBREX) 100 MG Cap TAKE 1 CAPSULE BY MOUTH TWICE DAILY 180 Cap 3   • Calcium Carbonate-Vit D-Min (CALCIUM 1200 PO) Take 1 Each  by mouth every day at 6 PM.     • Multiple Vitamins-Minerals (OCUVITE-LUTEIN) Cap Take 1 Each by mouth every day.     • Omega-3 Fatty Acids (OMEGA 3 PO) Take 1 Each by mouth every day.     • Multiple Vitamins-Minerals (MULTI COMPLETE PO) Take 1 Tab by mouth every day.     • Bioflavonoid Products (VITAMIN C PLUS) 1000 MG TABS Take 1 Tab by mouth every day.     • Calcium Carbonate-Vitamin D (CALCIUM + D PO) Take 1 Tab by mouth every day.       No current [facility]-ordered facility-administered medications on file.        Past Medical History:   Diagnosis Date   • Arthritis of foot, right 6/9/2015   • Benign essential tremor 6/9/2015   • Edema 3/10/2015   • Hearing loss sensory, bilateral 3/10/2015   • History of shingles 9/26/2014   • Hyperlipidemia 9/26/2014   • Macular degeneration of right eye 9/26/2014   • Moderate aortic insufficiency 9/26/2014   • Osteoarthritis 9/26/2014   • Osteoporosis 9/26/2014   • Parkinson's disease (HCC)    • Plantar fasciitis, bilateral 3/10/2015   • Prolapsed bladder 9/26/2014       Past Surgical History:   Procedure Laterality Date   • CHOLECYSTECTOMY  9/2013   • APPENDECTOMY  1956   • CATARACT PHACO WITH IOL Bilateral        Social History   Substance Use Topics   • Smoking status: Never Smoker   • Smokeless tobacco: Never Used   • Alcohol use 0.0 oz/week      Comment: rare       Social History     Social History Narrative   • No narrative on file       Family History   Problem Relation Age of Onset   • Hypertension Father    • Hypertension Sister    • Arthritis Mother        ROS:     - Constitutional: Negative for fever, chills, and fatigue   - Eyes:  Negative blurry vision or eye pain    - ENT: Negative for ear pain, rhinorrhea, sinus congestion, sore throat    - Respiratory Negative for cough or shortness of breath    - Cardiovascular: Negative for chest pain, palpitations    - Gastrointestinal: Negative for heartburn, nausea, vomiting, abdominal pain,     - Genitourinary: Negative for  "dysuria    - Musculoskeletal: Negative for myalgias    - Skin: Negative for rash, itching    - Neurological: Negative for headaches, dizziness    - Endo:  Negative for increased thirst or polyuria    - Heme/Lymph: Does not bruise/bleed easily.     - Psychiatric/Behavioral: Negative for depression and anxiety   .      Exam: Blood pressure 132/60, pulse 66, temperature 36.4 °C (97.5 °F), resp. rate 12, height 1.588 m (5' 2.5\"), weight 77.6 kg (171 lb), SpO2 98 %, not currently breastfeeding. Body mass index is 30.78 kg/m².    General:  Normal appearing. No distress.  Eyes:  Eyes conjunctiva clear lids without ptosis, pupils equal and reactive to light accommodation,   ENMT:  Bilateral hearing aids ears normal shape and contour, canals are clear bilaterally, tympanic membranes are benign, nasal mucosa benign, oropharynx is without erythema, edema or exudates.   Neck:  Supple without JVD or bruit. Thyroid is not enlarged.  Pulmonary:  Clear to ausculation.  Normal effort. No rales, ronchi, or wheezing.  Cardiovascular: murmur, trace bilateral Lower extremity edema Regular rate and rhythm  Abdomen:  Soft, nontender, nondistended. Normal bowel sounds.  Neurologic:  No tremors  Lymph:  No cervical, supraclavicular  lymph nodes are palpable  Skin:  Warm and dry.  No obvious lesions.  Musculoskeletal:  Normal gait. No extremity cyanosis, clubbing, or edema.  Psych:   Normal mood and affect. Alert and oriented x3. Judgment and insight is normal.    Please note that this dictation was created using voice recognition software. I have made every reasonable attempt to correct obvious errors, but I expect that there are errors of grammar and possibly content that I did not discover before finalizing the note.      Assessment/Plan  1. Parkinson disease (HCC)  This is a chronic medical problem which is followed by neurology.  Currently stable and on no medications.    2. CKD (chronic kidney disease) stage 3, GFR 30-59 ml/min " (HCC)  This is a chronic medical problem which we will continue to monitor for progression.  - COMP METABOLIC PANEL; Future    3. Need for vaccination  She will receive her flu vaccine today  - INFLUENZA VACCINE, HIGH DOSE (65+ ONLY)    4. Dyslipidemia  This is a chronic medical problem which is well controlled with Zocor 20 mg.  We will check a fasting lipid profile  - Lipid Profile; Future  - TSH WITH REFLEX TO FT4; Future    5. Aortic valve insufficiency, etiology of cardiac valve disease unspecified  This is a chronic medical problem which is managed by cardiology.        Return in about 6 months (around 5/20/2019).

## 2018-11-20 NOTE — ASSESSMENT & PLAN NOTE
This is a chronic medical problem for which she is been taking Zocor 20 mg daily.  Her most recent cholesterol panel from April 2018 showed cholesterol 166, triglycerides 188, HDL 45, and LDL 83.  She does complain of some mild lower extremity edema but denies any lower extremity cramping.

## 2018-11-20 NOTE — ASSESSMENT & PLAN NOTE
This is a chronic medical problem for which she is currently followed by cardiology, Dr. Olivera.  She most recently saw him on August 13 and does mention that she had an echocardiogram done recently.  She denies any chest pain or shortness of breath.

## 2018-11-20 NOTE — ASSESSMENT & PLAN NOTE
This is a chronic problem which was diagnosed 2 years ago due to a tremor.  She is currently not on any medications and does follow-up with Dr Vinicius zamora, every 6 months.  She recently saw him on October 3.  Denies any tremors today.

## 2018-11-20 NOTE — ASSESSMENT & PLAN NOTE
This is a chronic diagnosis for her.  She denies any hematuria or flank pain.  Her most recent labs from April 2018 do show a GFR of 49.

## 2018-12-07 ENCOUNTER — HOSPITAL ENCOUNTER (OUTPATIENT)
Dept: LAB | Facility: MEDICAL CENTER | Age: 78
End: 2018-12-07
Attending: FAMILY MEDICINE
Payer: MEDICARE

## 2018-12-07 DIAGNOSIS — E78.5 DYSLIPIDEMIA: ICD-10-CM

## 2018-12-07 DIAGNOSIS — N18.30 CKD (CHRONIC KIDNEY DISEASE) STAGE 3, GFR 30-59 ML/MIN (HCC): ICD-10-CM

## 2018-12-07 LAB
ALBUMIN SERPL BCP-MCNC: 4.2 G/DL (ref 3.2–4.9)
ALBUMIN/GLOB SERPL: 1.4 G/DL
ALP SERPL-CCNC: 62 U/L (ref 30–99)
ALT SERPL-CCNC: 21 U/L (ref 2–50)
ANION GAP SERPL CALC-SCNC: 5 MMOL/L (ref 0–11.9)
AST SERPL-CCNC: 27 U/L (ref 12–45)
BILIRUB SERPL-MCNC: 1 MG/DL (ref 0.1–1.5)
BUN SERPL-MCNC: 17 MG/DL (ref 8–22)
CALCIUM SERPL-MCNC: 9.6 MG/DL (ref 8.5–10.5)
CHLORIDE SERPL-SCNC: 104 MMOL/L (ref 96–112)
CHOLEST SERPL-MCNC: 169 MG/DL (ref 100–199)
CO2 SERPL-SCNC: 31 MMOL/L (ref 20–33)
CREAT SERPL-MCNC: 1.08 MG/DL (ref 0.5–1.4)
GLOBULIN SER CALC-MCNC: 3 G/DL (ref 1.9–3.5)
GLUCOSE SERPL-MCNC: 97 MG/DL (ref 65–99)
HDLC SERPL-MCNC: 40 MG/DL
LDLC SERPL CALC-MCNC: 89 MG/DL
POTASSIUM SERPL-SCNC: 4.4 MMOL/L (ref 3.6–5.5)
PROT SERPL-MCNC: 7.2 G/DL (ref 6–8.2)
SODIUM SERPL-SCNC: 140 MMOL/L (ref 135–145)
TRIGL SERPL-MCNC: 202 MG/DL (ref 0–149)
TSH SERPL DL<=0.005 MIU/L-ACNC: 5.18 UIU/ML (ref 0.38–5.33)

## 2018-12-07 PROCEDURE — 36415 COLL VENOUS BLD VENIPUNCTURE: CPT

## 2018-12-07 PROCEDURE — 84443 ASSAY THYROID STIM HORMONE: CPT

## 2018-12-07 PROCEDURE — 80061 LIPID PANEL: CPT

## 2018-12-07 PROCEDURE — 80053 COMPREHEN METABOLIC PANEL: CPT

## 2019-02-21 ENCOUNTER — OFFICE VISIT (OUTPATIENT)
Dept: MEDICAL GROUP | Facility: PHYSICIAN GROUP | Age: 79
End: 2019-02-21
Payer: MEDICARE

## 2019-02-21 VITALS
RESPIRATION RATE: 12 BRPM | OXYGEN SATURATION: 97 % | WEIGHT: 170 LBS | BODY MASS INDEX: 30.12 KG/M2 | TEMPERATURE: 97.2 F | SYSTOLIC BLOOD PRESSURE: 126 MMHG | DIASTOLIC BLOOD PRESSURE: 56 MMHG | HEIGHT: 63 IN | HEART RATE: 70 BPM

## 2019-02-21 DIAGNOSIS — E78.5 DYSLIPIDEMIA: ICD-10-CM

## 2019-02-21 DIAGNOSIS — N18.30 CKD (CHRONIC KIDNEY DISEASE) STAGE 3, GFR 30-59 ML/MIN (HCC): ICD-10-CM

## 2019-02-21 DIAGNOSIS — M15.9 PRIMARY OSTEOARTHRITIS INVOLVING MULTIPLE JOINTS: ICD-10-CM

## 2019-02-21 DIAGNOSIS — L98.9 SKIN LESION OF CHEEK: ICD-10-CM

## 2019-02-21 DIAGNOSIS — G20.A1 PARKINSON DISEASE: ICD-10-CM

## 2019-02-21 PROCEDURE — 99214 OFFICE O/P EST MOD 30 MIN: CPT | Performed by: FAMILY MEDICINE

## 2019-02-21 PROCEDURE — 8041 PR SCP AHA: Performed by: FAMILY MEDICINE

## 2019-02-21 RX ORDER — CELECOXIB 100 MG/1
CAPSULE ORAL
Qty: 180 CAP | Refills: 3 | Status: SHIPPED | OUTPATIENT
Start: 2019-02-21 | End: 2019-04-15

## 2019-02-22 NOTE — PROGRESS NOTES
cc: Follow-up of Parkinson and chronic  kidney disease.    Subjective:     Emilie Gonzalez is a 78 y.o. female presenting for a 3-month follow-up and to discuss labs.  She continues to take care of her elderly 87-year-old sister.  She is currently recovering from an upper respiratory illness.      Annual Health Assessment Questions:    1.  Are you currently engaging in any exercise or physical activity? Yes    2.  How would you describe your mood or emotional well-being today? good    3.  Have you had any falls in the last year? No    4.  Have you noticed any problems with your balance or had difficulty walking? No    5.  In the last six months have you experienced any leakage of urine? No    6. DPA/Advanced Directive: Patient has Advanced Directive on file.       CKD (chronic kidney disease) stage 3, GFR 30-59 ml/min  This is a chronic medical condition which is currently stable.    She denies any dysuria or hematuria.  She had her labs checked in December 2018 and GFR was stable at 49.      Osteoarthritis  This is a chronic medical condition which is improved with Celebrex 100 mg twice a day.    She has been on this medication for the last 2 years and was started on it by her previous primary care, Dr. Baugh.    The arthritis in her hands is improved.      Dyslipidemia  This is a chronic medical problem for which she continues to take Zocor 20mg daily.  Her most recent fasting lipid profile from December 2018 was total cholesterol 169, triglycerides 202, HDL 40, and LDL 89.  She does have a follow-up with cardiology, Dr. Hodge in 2 weeks.    Skin lesion of cheek  This is a chronic medical problem which she has noticed a lesion on her left cheek for the last year.  It is scaly without any bleeding, tenderness or itching.  She has tried Vaseline without any improvement.      Parkinson disease ([facility])  This is a chronic medical problem for which she is currently not on any medications.    She continues to  follow-up with neurology, Dr. Colvin with the next follow-up appointment in April.  Denies any tremor.     Review of systems:    Patient Active Problem List    Diagnosis Date Noted   • Skin lesion of cheek 02/21/2019   • Cough 10/17/2018   • Dry skin 05/01/2018   • Bilateral leg edema 04/16/2018   • Dyslipidemia 04/12/2017   • Abnormal EKG 04/12/2017   • CKD (chronic kidney disease) stage 3, GFR 30-59 ml/min ([facility]) 01/30/2017   • Low serum vitamin D 09/01/2016   • Diastolic dysfunction 03/31/2016   • Aortic regurgitation 03/14/2016   • Parkinson disease ([facility]) 02/05/2016   • Hypothyroidism due to acquired atrophy of thyroid 09/01/2015   • Arthritis of foot, right 06/09/2015   • Benign essential tremor 06/09/2015   • Hearing loss sensory, bilateral 03/10/2015   • Osteoarthritis 09/26/2014   • Prolapse of female bladder, acquired 09/26/2014   • Macular degeneration of right eye 09/26/2014     Past Medical History:   Diagnosis Date   • Arthritis of foot, right 6/9/2015   • Benign essential tremor 6/9/2015   • Edema 3/10/2015   • Hearing loss sensory, bilateral 3/10/2015   • History of shingles 9/26/2014   • Hyperlipidemia 9/26/2014   • Macular degeneration of right eye 9/26/2014   • Moderate aortic insufficiency 9/26/2014   • Osteoarthritis 9/26/2014   • Osteoporosis 9/26/2014   • Parkinson's disease ([facility])    • Plantar fasciitis, bilateral 3/10/2015   • Prolapsed bladder 9/26/2014     Current Outpatient Prescriptions on File Prior to Visit   Medication Sig Dispense Refill   • simvastatin (ZOCOR) 20 MG Tab TAKE 1 TABLET BY MOUTH EVERY EVENING 90 Tab 2   • vitamin D (CHOLECALCIFEROL) 1000 UNIT Tab Take 1,000 Units by mouth every day.     • Calcium Carbonate-Vit D-Min (CALCIUM 1200 PO) Take 1 Each by mouth every day at 6 PM.     • Multiple Vitamins-Minerals (OCUVITE-LUTEIN) Cap Take 1 Each by mouth every day.     • Omega-3 Fatty Acids (OMEGA 3 PO) Take 1 Each by mouth every day.     • Multiple Vitamins-Minerals (MULTI  COMPLETE PO) Take 1 Tab by mouth every day.     • Calcium Carbonate-Vitamin D (CALCIUM + D PO) Take 1 Tab by mouth every day.     • Bioflavonoid Products (VITAMIN C PLUS) 1000 MG TABS Take 1 Tab by mouth every day.       No current facility-administered medications on file prior to visit.      Allergies   Allergen Reactions   • Codeine      halucinations     Family History   Problem Relation Age of Onset   • Hypertension Father    • Hypertension Sister    • Arthritis Mother      Social History   Substance Use Topics   • Smoking status: Never Smoker   • Smokeless tobacco: Never Used   • Alcohol use 0.0 oz/week      Comment: rare       ROS  Constitutional :  No fevers, chills, fatigue.  Eye :  No eye pain, no redness, no  dry eyes  ENT: congestion no sore throat, no tinnitus  Respiratory : No chronic cough, No shortness of breath,  Cardiovascular : No chest pain, No palpitations  Gastrointestinal : No abdominal pain, No nausea, vomiting, diarrhea, or constipation  Genitourinary:  no dysuria, no hematuria  Musculoskeletal/Extremities : no muscle weakness, no joint swelling,  Hematologic/Lymphatic :  No easy bruising, No night sweats, No swollen nodes  Skin/Integumentary :scaly erythematous lesion to her left cheek.   Neurologic : No headaches,  No tremors,  Psychiatric : No depression, No anxiety      Current Outpatient Prescriptions:   •  celecoxib (CELEBREX) 100 MG Cap, TAKE 1 CAPSULE BY MOUTH TWICE DAILY, Disp: 180 Cap, Rfl: 3  •  simvastatin (ZOCOR) 20 MG Tab, TAKE 1 TABLET BY MOUTH EVERY EVENING, Disp: 90 Tab, Rfl: 2  •  vitamin D (CHOLECALCIFEROL) 1000 UNIT Tab, Take 1,000 Units by mouth every day., Disp: , Rfl:   •  Calcium Carbonate-Vit D-Min (CALCIUM 1200 PO), Take 1 Each by mouth every day at 6 PM., Disp: , Rfl:   •  Multiple Vitamins-Minerals (OCUVITE-LUTEIN) Cap, Take 1 Each by mouth every day., Disp: , Rfl:   •  Omega-3 Fatty Acids (OMEGA 3 PO), Take 1 Each by mouth every day., Disp: , Rfl:   •  Multiple  "Vitamins-Minerals (MULTI COMPLETE PO), Take 1 Tab by mouth every day., Disp: , Rfl:   •  Calcium Carbonate-Vitamin D (CALCIUM + D PO), Take 1 Tab by mouth every day., Disp: , Rfl:   •  Bioflavonoid Products (VITAMIN C PLUS) 1000 MG TABS, Take 1 Tab by mouth every day., Disp: , Rfl:     Allergies, past medical history, past surgical history, family history, social history reviewed and updated    Objective:     Vitals: /56   Pulse 70   Temp 36.2 °C (97.2 °F)   Resp 12   Ht 1.588 m (5' 2.5\")   Wt 77.1 kg (170 lb)   SpO2 97%   BMI 30.60 kg/m²   General:  Alert, pleasant, NAD  Eyes:  normal inspection of conjunctivae and lids, PERRLA, no icterus  ENMT:  External ears and nose are normal.  Dentures.   Neck  supple,  No thyromegaly or masses palpated,  No cervical or supraclavicular lymphadenopathy.  Heart:  Regular rate and rhythm,  No LE edema  Respiratory:  Normal respiratory effort, Clear to auscultation bilaterally.  Abdomen:   soft, Non-distended, non tender,   Skin:  Warm, dry, no rashes, no jaundice  Musculoskeletal:  Normal gait, Normal digits and nails.  Neurological: No tremors,   Psych:   Affect/mood is normal, judgement is good, memory is intact, grooming is appropriate.    Assessment/Plan:     1. CKD (chronic kidney disease) stage 3, GFR 30-59 ml/min ([facility])  Chronic medical condition which is currently stable.  We will continue to monitor labs.    2. Primary osteoarthritis involving multiple joints  Chronic medical condition for which she continues to take Celebrex.  - celecoxib (CELEBREX) 100 MG Cap; TAKE 1 CAPSULE BY MOUTH TWICE DAILY  Dispense: 180 Cap; Refill: 3    3. Dyslipidemia  Chronic medical condition which is stable.  We will continue to monitor her triglycerides and continue with Zocor for now.    4. Skin lesion of cheek  Chronic lesion with no improvement.    - REFERRAL TO DERMATOLOGY    5. Parkinson disease ([facility])  Chronic medical condition.  Stable.  Currently on no " medications.    He will follow-up with me in 6 weeks for an annual wellness visit.  We will recheck thyroid function test at that time.      Return in about 6 weeks (around 4/4/2019) for AWV.

## 2019-03-06 ENCOUNTER — OFFICE VISIT (OUTPATIENT)
Dept: CARDIOLOGY | Facility: MEDICAL CENTER | Age: 79
End: 2019-03-06
Payer: MEDICARE

## 2019-03-06 VITALS
OXYGEN SATURATION: 93 % | HEIGHT: 62 IN | BODY MASS INDEX: 31.83 KG/M2 | HEART RATE: 80 BPM | DIASTOLIC BLOOD PRESSURE: 68 MMHG | SYSTOLIC BLOOD PRESSURE: 122 MMHG | WEIGHT: 173 LBS

## 2019-03-06 DIAGNOSIS — I35.1 AORTIC VALVE INSUFFICIENCY, ETIOLOGY OF CARDIAC VALVE DISEASE UNSPECIFIED: ICD-10-CM

## 2019-03-06 DIAGNOSIS — E78.5 DYSLIPIDEMIA: ICD-10-CM

## 2019-03-06 DIAGNOSIS — I35.0 AORTIC VALVE STENOSIS, ETIOLOGY OF CARDIAC VALVE DISEASE UNSPECIFIED: ICD-10-CM

## 2019-03-06 PROCEDURE — 99214 OFFICE O/P EST MOD 30 MIN: CPT | Performed by: INTERNAL MEDICINE

## 2019-03-06 ASSESSMENT — ENCOUNTER SYMPTOMS
LOSS OF CONSCIOUSNESS: 0
PALPITATIONS: 0
MYALGIAS: 0
DIZZINESS: 0
SHORTNESS OF BREATH: 0
COUGH: 0

## 2019-03-06 NOTE — LETTER
[facility]   [address] , [address]  [place] [address]  Phone: [phone]  Fax: [phone]              Emilie Gonzalez  1940    Encounter Date: 3/6/2019    Hans Olivera M.D.          PROGRESS NOTE:  Chief Complaint   Patient presents with   • Follow-Up     Aortic Valve insufficiency       Subjective:   Emilie Gonzalez is a 78 y.o. female who presents today for followup aortic stenosis, aortic insufficiency, hyperlipidemia, abnormal EKG, edema.     Last seen on 8/13/2018.    Since 8/13/2018 appointment the patient has had no cardiac symptoms.  No palpitations or chest pain.  Started on low-dose Celebrex 1 year ago with resolution of her arthritis symptoms.    Since 4/16/2018 patient said no cardiac symptoms.  Compliant with medications.  Recently returned having driven back by herself from [place] which he does frequently to visit family.  Recently fractured right foot treated conservatively, resolved.    Since 10/16/2017 the patient has had no cardiac symptoms.  3 times a week she is walking twice around [place] walking faster the 2nd time around after which she feels good.  No heart failure symptoms.  Under stress with her daughter being emergently transferred from [place] to [facility] last night for intractable seizures.     Since 4/12/2017 appointment the patient's had no cardiac symptoms.  She had a recent upper respiratory infection with a nonproductive cough after going to her grandson's wedding in [place], [place]     Since 10/10/2016 appointment no cardiac symptoms.  No CHF symptoms.   No palpitations or chest pain.  Moved from an apartment to a [employer] park.  Able to work out in the garden without any symptoms or problems.     Past medical history  Diagnosed with stage II Parkinson's disease. Followed by Dr. Colvin, neurologist     On 9/1/2015 TSH was slightly elevated.  Levothyroxine started by PCP.  Of note, has always  "slept on 2 pillows for years.  Has always woken up 2-3 times a night.  No change in his sleep habits.     Previous appointment  The patient states that her children states that she gets out of breath while walking.  The patient has noted some mild exertional shortness of breath.  Concerned about recent worsening aortic insufficiency.  No angina pectoris.  No CHF symptoms.  Has had right lower extremity edema currently related to arthritis.  Takes anti-inflammatories.  Had been started on Aldactone/HCTZ by PCP but has not been taking it.  Also in 2014 diagnosed with \"asthma\". Had mild abnormal PFTs in PCPs office.  Prescribed a bronchodilator but has not used it.     Past medical history  The patient had previously lived in [place], [place].  5 years ago she was discovered to have a \"leaky heart valve.  The patient had been followed by Dr. Bird, cardiologist [place] for the past 4 years.  The patient is moved to [facility] and is establishing ongoing cardiology care.     The patient feels that she is \"healthy\".  She lives alone.  She runs all of her activity of daily living including shopping and necessary errands.  She has no exercise limitations.   She denies chest pain shortness of breath, palpitations or lightheadedness.     History of hyperlipidemia on simvastatin.  No history of hypertension, diabetes mellitus and she is not smoke cigarettes.     Family history  Father  of a heart attack at age 69.  Sister alive at age 90. Had a heart attack at age 75.     Social history.  .  Does not drink alcohol except on rare occasions with dinner.     Other medical problems.  2013 laparoscopic cholecystectomy [place], [place].  Appendectomy age 13.  \"Chronic bronchitis\" but no problems times one year    Past Medical History:   Diagnosis Date   • Arthritis of foot, right 2015   • Benign essential tremor 2015   • Edema 3/10/2015   • Hearing loss sensory, bilateral 3/10/2015   • History of shingles 2014 "   • Hyperlipidemia 9/26/2014   • Macular degeneration of right eye 9/26/2014   • Moderate aortic insufficiency 9/26/2014   • Osteoarthritis 9/26/2014   • Osteoporosis 9/26/2014   • Parkinson's disease (HCC)    • Plantar fasciitis, bilateral 3/10/2015   • Prolapsed bladder 9/26/2014     Past Surgical History:   Procedure Laterality Date   • CHOLECYSTECTOMY  9/2013   • APPENDECTOMY  1956   • CATARACT PHACO WITH IOL Bilateral      Family History   Problem Relation Age of Onset   • Hypertension Father    • Hypertension Sister    • Arthritis Mother      Social History     Social History   • Marital status:      Spouse name: N/A   • Number of children: N/A   • Years of education: N/A     Occupational History   • Not on file.     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     • Calcium Carbonate-Vit D-Min (CALCIUM 1200 PO) Take 1 Each by mouth every day at 6 PM.     • Multiple Vitamins-Minerals (OCUVITE-LUTEIN) Cap Take 1 Each by mouth every day.     • Omega-3 Fatty Acids (OMEGA 3 PO) Take 1 Each by mouth every day.     • Multiple Vitamins-Minerals (MULTI COMPLETE PO) Take 1 Tab by mouth every day.     • Bioflavonoid Products (VITAMIN C PLUS) 1000 MG TABS Take 1 Tab by mouth every day.     • Calcium Carbonate-Vitamin D (CALCIUM + D  "PO) Take 1 Tab by mouth every day.       No facility-administered encounter medications on file as of 3/6/2019.      Review of Systems   Respiratory: Negative for cough and shortness of breath.    Cardiovascular: Negative for chest pain and palpitations.   Musculoskeletal: Positive for joint pain. Negative for myalgias.   Neurological: Negative for dizziness and loss of consciousness.        Objective:   /68 (BP Location: Left arm, Patient Position: Sitting, BP Cuff Size: Adult)   Pulse 80   Ht 1.58 m (5' 2.2\")   Wt 78.5 kg (173 lb)   SpO2 93%   BMI 31.44 kg/m²      Physical Exam   Constitutional: She is oriented to person, place, and time. She appears well-developed and well-nourished.   Neck: No JVD present.   Cardiovascular: Normal rate, regular rhythm and intact distal pulses.    Murmur heard.  Pulmonary/Chest: Effort normal and breath sounds normal. No respiratory distress. She has no wheezes. She has no rales.   Musculoskeletal: She exhibits no edema.       Neurological: She is alert and oriented to person, place, and time.   Skin: Skin is warm and dry.   Psychiatric: She has a normal mood and affect. Her behavior is normal.        06/07/2010 ECHOCARDIOGRAM  EF 60%.  Mild to moderate aortic regurgitation.     05/05/2011 ECHOCARDIOGRAM  EF 55-60%.  Moderate aortic insufficiency.     05/08/2012 ECHOCARDIOGRAM  EF 70%.  Moderate aortic insufficiency.  Pulmonary pressure 26 mmHg.     03/25/2015 ECHOCARDIOGRAM  Normal left ventricular size, thickness, systolic function, and   diastolic function.  Left ventricular ejection fraction is 65% to 70%.  Aortic sclerosis without stenosis.  Moderately severe aortic insufficiency.  Right heart pressures are consistent with mild pulmonary hypertension.     10/24/2016 ECHOCARDIOGRAM  Normal left ventricular size, wall thickness, and systolic function.  Left ventricular ejection fraction is visually estimated to be 60%.  Severe eccentric aortic insufficiency.  Mildly " thickened mitral valve leaflets with normal leaflet excursion.  Unable to estimate pulmonary artery pressure due to an inadequate   tricuspid regurgitant jet.     05/08/2017 ECHOCARDIOGRAM  Normal left ventricular systolic function.  Left ventricular ejection fraction is visually estimated to be 65%.  Mild concentric left ventricular hypertrophy.  Mild aortic stenosis.  Moderate aortic insufficiency.  Mild mitral regurgitation.  Unable to estimate pulmonary artery pressure due to an inadequate   tricuspid regurgitant jet.     9/4/2018 ECHOCARDIOGRAM  Normal left ventricular size, wall thickness, and systolic function.  Left ventricular ejection fraction is visually estimated to be 65%.    Mild aortic stenosis.  Moderate aortic insufficiency.  Right heart pressures are normal.    03/19/2015 EKG: Normal sinus rhythm, rate 73. Nonspecific ST-T wave abnormalities. No prior tracing for comparison. Reviewed by myself.    Assessment:     1. Aortic valve stenosis, etiology of cardiac valve disease unspecified     2. Aortic valve insufficiency, etiology of cardiac valve disease unspecified     3. Dyslipidemia         Medical Decision Making:  Today's Assessment / Status / Plan:     Assessment  Aortic stenosis, mild    Aortic regurgitation. Moderate. Asymptomatic.     Abnormal EKG.       Hyperlipidemia. On simvastatin.     Edema. Minimal.     Hypothyroidism. Diagnosed 9/1/2015. On supplements. Per PCP.     Osteoarthritis on Celebrex.    Recommendations and Discussion  1.  Continue current cardiac therapy.  2.  Reviewed issue regarding Celebrex which I think poses a low risk for coronary events in this patient.  3.  Follow-up 6 months.      Summer Higuera M.D.  [address] [facility] [address]  VIA In Basket

## 2019-03-06 NOTE — PROGRESS NOTES
Chief Complaint   Patient presents with   • Follow-Up     Aortic Valve insufficiency       Subjective:   Emilie Gonzalez is a 78 y.o. female who presents today for followup aortic stenosis, aortic insufficiency, hyperlipidemia, abnormal EKG, edema.     Last seen on 8/13/2018.    Since 8/13/2018 appointment the patient has had no cardiac symptoms.  No palpitations or chest pain.  Started on low-dose Celebrex 1 year ago with resolution of her arthritis symptoms.    Since 4/16/2018 patient said no cardiac symptoms.  Compliant with medications.  Recently returned having driven back by herself from [place] which he does frequently to visit family.  Recently fractured right foot treated conservatively, resolved.    Since 10/16/2017 the patient has had no cardiac symptoms.  3 times a week [place] the 2nd time around after which she feels good.  No heart failure symptoms.  Under stress with her daughter being emergently transferred from [place] to [facility] last night for intractable seizures.     Since 4/12/2017 appointment the patient's had no cardiac symptoms.  She had a recent upper respiratory infection with a nonproductive cough after going to her grandson's wedding in [place], [place]     Since 10/10/2016 appointment no cardiac symptoms.  No CHF symptoms.   No palpitations or chest pain.  Moved from an apartment to a [employer].  Able to work out in the garden without any symptoms or problems.     Past medical history  Diagnosed with stage II Parkinson's disease. Followed by Dr. Colvin, neurologist     On 9/1/2015 TSH was slightly elevated.  Levothyroxine started by PCP.  Of note, has always slept on 2 pillows for years.  Has always woken up 2-3 times a night.  No change in his sleep habits.     Previous appointment  The patient states that her children states that she gets out of breath while walking.  The patient has noted some mild exertional shortness of  "breath.  Concerned about recent worsening aortic insufficiency.  No angina pectoris.  No CHF symptoms.  Has had right lower extremity edema currently related to arthritis.  Takes anti-inflammatories.  Had been started on Aldactone/HCTZ by PCP but has not been taking it.  Also in 2014 diagnosed with \"asthma\". Had mild abnormal PFTs in PCPs office.  Prescribed a bronchodilator but has not used it.     Past medical history  The patient had previously lived in [place], [place].  5 years ago she was discovered to have a \"leaky heart valve.  The patient had been followed by Dr. Bird, cardiologist [place] for the past 4 years.  The patient is moved to [facility] and is establishing ongoing cardiology care.     The patient feels that she is \"healthy\".  She lives alone.  She runs all of her activity of daily living including shopping and necessary errands.  She has no exercise limitations.   She denies chest pain shortness of breath, palpitations or lightheadedness.     History of hyperlipidemia on simvastatin.  No history of hypertension, diabetes mellitus and she is not smoke cigarettes.     Family history  Father  of a heart attack at age 69.  Sister alive at age 90. Had a heart attack at age 75.     Social history.  .  Does not drink alcohol except on rare occasions with dinner.     Other medical problems.  2013 laparoscopic cholecystectomy [place], [place].  Appendectomy age 13.  \"Chronic bronchitis\" but no problems times one year    Past Medical History:   Diagnosis Date   • Arthritis of foot, right 2015   • Benign essential tremor 2015   • Edema 3/10/2015   • Hearing loss sensory, bilateral 3/10/2015   • History of shingles 2014   • Hyperlipidemia 2014   • Macular degeneration of right eye 2014   • Moderate aortic insufficiency 2014   • Osteoarthritis 2014   • Osteoporosis 2014   • Parkinson's disease (HCC)    • Plantar fasciitis, bilateral 3/10/2015   • Prolapsed " bladder 9/26/2014     Past Surgical History:   Procedure Laterality Date   • CHOLECYSTECTOMY  9/2013   • APPENDECTOMY  1956   • CATARACT PHACO WITH IOL Bilateral      Family History   Problem Relation Age of Onset   • Hypertension Father    • Hypertension Sister    • Arthritis Mother      Social History     Social History   • Marital status:      Spouse name: N/A   • Number of children: N/A   • Years of education: N/A     Occupational History   • Not on file.     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     • Calcium Carbonate-Vit D-Min (CALCIUM 1200 PO) Take 1 Each by mouth every day at 6 PM.     • Multiple Vitamins-Minerals (OCUVITE-LUTEIN) Cap Take 1 Each by mouth every day.     • Omega-3 Fatty Acids (OMEGA 3 PO) Take 1 Each by mouth every day.     • Multiple Vitamins-Minerals (MULTI COMPLETE PO) Take 1 Tab by mouth every day.     • Bioflavonoid Products (VITAMIN C PLUS) 1000 MG TABS Take 1 Tab by mouth every day.     • Calcium Carbonate-Vitamin D (CALCIUM + D PO) Take 1 Tab by mouth every day.       No facility-administered encounter medications on file as of 3/6/2019.      Review of Systems   Respiratory: Negative for cough and shortness of breath.    Cardiovascular: Negative for chest pain and palpitations.  "  Musculoskeletal: Positive for joint pain. Negative for myalgias.   Neurological: Negative for dizziness and loss of consciousness.        Objective:   /68 (BP Location: Left arm, Patient Position: Sitting, BP Cuff Size: Adult)   Pulse 80   Ht 1.58 m (5' 2.2\")   Wt 78.5 kg (173 lb)   SpO2 93%   BMI 31.44 kg/m²     Physical Exam   Constitutional: She is oriented to person, place, and time. She appears well-developed and well-nourished.   Neck: No JVD present.   Cardiovascular: Normal rate, regular rhythm and intact distal pulses.    Murmur heard.  Pulmonary/Chest: Effort normal and breath sounds normal. No respiratory distress. She has no wheezes. She has no rales.   Musculoskeletal: She exhibits no edema.       Neurological: She is alert and oriented to person, place, and time.   Skin: Skin is warm and dry.   Psychiatric: She has a normal mood and affect. Her behavior is normal.        06/07/2010 ECHOCARDIOGRAM  EF 60%.  Mild to moderate aortic regurgitation.     05/05/2011 ECHOCARDIOGRAM  EF 55-60%.  Moderate aortic insufficiency.     05/08/2012 ECHOCARDIOGRAM  EF 70%.  Moderate aortic insufficiency.  Pulmonary pressure 26 mmHg.     03/25/2015 ECHOCARDIOGRAM  Normal left ventricular size, thickness, systolic function, and   diastolic function.  Left ventricular ejection fraction is 65% to 70%.  Aortic sclerosis without stenosis.  Moderately severe aortic insufficiency.  Right heart pressures are consistent with mild pulmonary hypertension.     10/24/2016 ECHOCARDIOGRAM  Normal left ventricular size, wall thickness, and systolic function.  Left ventricular ejection fraction is visually estimated to be 60%.  Severe eccentric aortic insufficiency.  Mildly thickened mitral valve leaflets with normal leaflet excursion.  Unable to estimate pulmonary artery pressure due to an inadequate   tricuspid regurgitant jet.     05/08/2017 ECHOCARDIOGRAM  Normal left ventricular systolic function.  Left ventricular " ejection fraction is visually estimated to be 65%.  Mild concentric left ventricular hypertrophy.  Mild aortic stenosis.  Moderate aortic insufficiency.  Mild mitral regurgitation.  Unable to estimate pulmonary artery pressure due to an inadequate   tricuspid regurgitant jet.     9/4/2018 ECHOCARDIOGRAM  Normal left ventricular size, wall thickness, and systolic function.  Left ventricular ejection fraction is visually estimated to be 65%.    Mild aortic stenosis.  Moderate aortic insufficiency.  Right heart pressures are normal.    03/19/2015 EKG: Normal sinus rhythm, rate 73. Nonspecific ST-T wave abnormalities. No prior tracing for comparison. Reviewed by myself.    Assessment:     1. Aortic valve stenosis, etiology of cardiac valve disease unspecified     2. Aortic valve insufficiency, etiology of cardiac valve disease unspecified     3. Dyslipidemia         Medical Decision Making:  Today's Assessment / Status / Plan:     Assessment  Aortic stenosis, mild    Aortic regurgitation. Moderate. Asymptomatic.     Abnormal EKG.       Hyperlipidemia. On simvastatin.     Edema. Minimal.     Hypothyroidism. Diagnosed 9/1/2015. On supplements. Per PCP.     Osteoarthritis on Celebrex.    Recommendations and Discussion  1.  Continue current cardiac therapy.  2.  Reviewed issue regarding Celebrex which I think poses a low risk for coronary events in this patient.  3.  Follow-up 6 months.

## 2019-03-25 RX ORDER — SIMVASTATIN 20 MG
TABLET ORAL
Qty: 90 TAB | Refills: 1 | Status: SHIPPED | OUTPATIENT
Start: 2019-03-25 | End: 2020-02-27

## 2019-03-25 NOTE — TELEPHONE ENCOUNTER
Requested Prescriptions     Pending Prescriptions Disp Refills   • simvastatin (ZOCOR) 20 MG Tab [Pharmacy Med Name: SIMVASTATIN 20MG TABLETS] 90 Tab 1     Sig: TAKE 1 TABLET BY MOUTH EVERY EVENING   Summer Higuera M.D.

## 2019-04-03 ENCOUNTER — APPOINTMENT (RX ONLY)
Dept: URBAN - METROPOLITAN AREA CLINIC 4 | Facility: CLINIC | Age: 79
Setting detail: DERMATOLOGY
End: 2019-04-03

## 2019-04-03 ENCOUNTER — OFFICE VISIT (OUTPATIENT)
Dept: NEUROLOGY | Facility: MEDICAL CENTER | Age: 79
End: 2019-04-03
Payer: MEDICARE

## 2019-04-03 VITALS
SYSTOLIC BLOOD PRESSURE: 104 MMHG | OXYGEN SATURATION: 95 % | HEART RATE: 65 BPM | WEIGHT: 174 LBS | BODY MASS INDEX: 32.02 KG/M2 | HEIGHT: 62 IN | DIASTOLIC BLOOD PRESSURE: 56 MMHG | TEMPERATURE: 98.1 F

## 2019-04-03 DIAGNOSIS — L81.4 OTHER MELANIN HYPERPIGMENTATION: ICD-10-CM

## 2019-04-03 DIAGNOSIS — L57.0 ACTINIC KERATOSIS: ICD-10-CM

## 2019-04-03 DIAGNOSIS — L82.1 OTHER SEBORRHEIC KERATOSIS: ICD-10-CM

## 2019-04-03 DIAGNOSIS — G20.A1 PARKINSON DISEASE: ICD-10-CM

## 2019-04-03 DIAGNOSIS — L57.8 OTHER SKIN CHANGES DUE TO CHRONIC EXPOSURE TO NONIONIZING RADIATION: ICD-10-CM

## 2019-04-03 DIAGNOSIS — Z71.89 OTHER SPECIFIED COUNSELING: ICD-10-CM

## 2019-04-03 DIAGNOSIS — D22 MELANOCYTIC NEVI: ICD-10-CM

## 2019-04-03 DIAGNOSIS — L20.89 OTHER ATOPIC DERMATITIS: ICD-10-CM

## 2019-04-03 DIAGNOSIS — D18.0 HEMANGIOMA: ICD-10-CM

## 2019-04-03 DIAGNOSIS — G25.0 BENIGN ESSENTIAL TREMOR: ICD-10-CM

## 2019-04-03 PROBLEM — D18.01 HEMANGIOMA OF SKIN AND SUBCUTANEOUS TISSUE: Status: ACTIVE | Noted: 2019-04-03

## 2019-04-03 PROBLEM — L20.84 INTRINSIC (ALLERGIC) ECZEMA: Status: ACTIVE | Noted: 2019-04-03

## 2019-04-03 PROBLEM — M12.9 ARTHROPATHY, UNSPECIFIED: Status: ACTIVE | Noted: 2019-04-03

## 2019-04-03 PROBLEM — D22.5 MELANOCYTIC NEVI OF TRUNK: Status: ACTIVE | Noted: 2019-04-03

## 2019-04-03 PROBLEM — E78.5 HYPERLIPIDEMIA, UNSPECIFIED: Status: ACTIVE | Noted: 2019-04-03

## 2019-04-03 PROCEDURE — 17000 DESTRUCT PREMALG LESION: CPT

## 2019-04-03 PROCEDURE — ? PRESCRIPTION

## 2019-04-03 PROCEDURE — ? COUNSELING

## 2019-04-03 PROCEDURE — 17003 DESTRUCT PREMALG LES 2-14: CPT

## 2019-04-03 PROCEDURE — 99214 OFFICE O/P EST MOD 30 MIN: CPT | Performed by: PSYCHIATRY & NEUROLOGY

## 2019-04-03 PROCEDURE — ? LIQUID NITROGEN

## 2019-04-03 PROCEDURE — 99203 OFFICE O/P NEW LOW 30 MIN: CPT | Mod: 25

## 2019-04-03 PROCEDURE — ? MEDICATION COUNSELING

## 2019-04-03 PROCEDURE — ? ADDITIONAL NOTES

## 2019-04-03 RX ORDER — TRIAMCINOLONE ACETONIDE 0.1 %
OINTMENT (GRAM) TOPICAL
Qty: 1 | Refills: 1 | Status: ERX | COMMUNITY
Start: 2019-04-03

## 2019-04-03 RX ADMIN — Medication 1: at 00:00

## 2019-04-03 ASSESSMENT — LOCATION DETAILED DESCRIPTION DERM
LOCATION DETAILED: RIGHT CENTRAL MALAR CHEEK
LOCATION DETAILED: RIGHT MEDIAL MALAR CHEEK
LOCATION DETAILED: RIGHT SUPERIOR MEDIAL MIDBACK
LOCATION DETAILED: INFERIOR LUMBAR SPINE
LOCATION DETAILED: RIGHT PROXIMAL DORSAL FOREARM
LOCATION DETAILED: STERNAL NOTCH
LOCATION DETAILED: RIGHT CENTRAL EYEBROW
LOCATION DETAILED: LEFT CENTRAL ZYGOMA
LOCATION DETAILED: INFERIOR THORACIC SPINE
LOCATION DETAILED: LEFT INFERIOR CENTRAL MALAR CHEEK
LOCATION DETAILED: LEFT CENTRAL MALAR CHEEK
LOCATION DETAILED: LEFT DISTAL DORSAL FOREARM
LOCATION DETAILED: LEFT SUPERIOR MEDIAL BUCCAL CHEEK
LOCATION DETAILED: RIGHT INFERIOR CENTRAL MALAR CHEEK
LOCATION DETAILED: LEFT SUPERIOR LATERAL MALAR CHEEK
LOCATION DETAILED: LEFT SUPERIOR MEDIAL LOWER BACK
LOCATION DETAILED: RIGHT INFERIOR MEDIAL MALAR CHEEK
LOCATION DETAILED: RIGHT INFERIOR LATERAL NECK
LOCATION DETAILED: RIGHT SUPERIOR MEDIAL BUCCAL CHEEK
LOCATION DETAILED: LEFT PROXIMAL DORSAL FOREARM
LOCATION DETAILED: LEFT LATERAL MALAR CHEEK

## 2019-04-03 ASSESSMENT — LOCATION ZONE DERM
LOCATION ZONE: TRUNK
LOCATION ZONE: ARM
LOCATION ZONE: FACE
LOCATION ZONE: NECK

## 2019-04-03 ASSESSMENT — LOCATION SIMPLE DESCRIPTION DERM
LOCATION SIMPLE: LEFT LOWER BACK
LOCATION SIMPLE: RIGHT EYEBROW
LOCATION SIMPLE: LEFT CHEEK
LOCATION SIMPLE: UPPER BACK
LOCATION SIMPLE: RIGHT CHEEK
LOCATION SIMPLE: RIGHT ANTERIOR NECK
LOCATION SIMPLE: RIGHT LOWER BACK
LOCATION SIMPLE: LEFT FOREARM
LOCATION SIMPLE: RIGHT FOREARM
LOCATION SIMPLE: LEFT ZYGOMA
LOCATION SIMPLE: LOWER BACK
LOCATION SIMPLE: CHEST

## 2019-04-03 ASSESSMENT — PATIENT HEALTH QUESTIONNAIRE - PHQ9: CLINICAL INTERPRETATION OF PHQ2 SCORE: 0

## 2019-04-03 NOTE — PROCEDURE: MEDICATION COUNSELING
Nsaids Counseling: NSAID Counseling: I discussed with the patient that NSAIDs should be taken with food. Prolonged use of NSAIDs can result in the development of stomach ulcers.  Patient advised to stop taking NSAIDs if abdominal pain occurs.  The patient verbalized understanding of the proper use and possible adverse effects of NSAIDs.  All of the patient's questions and concerns were addressed.
Minocycline Pregnancy And Lactation Text: This medication is Pregnancy Category D and not consider safe during pregnancy. It is also excreted in breast milk.
Minoxidil Pregnancy And Lactation Text: This medication has not been assigned a Pregnancy Risk Category but animal studies failed to show danger with the topical medication. It is unknown if the medication is excreted in breast milk.
Cimzia Counseling:  I discussed with the patient the risks of Cimzia including but not limited to immunosuppression, allergic reactions and infections.  The patient understands that monitoring is required including a PPD at baseline and must alert us or the primary physician if symptoms of infection or other concerning signs are noted.
Cimetidine Counseling:  I discussed with the patient the risks of Cimetidine including but not limited to gynecomastia, headache, diarrhea, nausea, drowsiness, arrhythmias, pancreatitis, skin rashes, psychosis, bone marrow suppression and kidney toxicity.
Prednisone Pregnancy And Lactation Text: This medication is Pregnancy Category C and it isn't know if it is safe during pregnancy. This medication is excreted in breast milk.
Erivedge Counseling- I discussed with the patient the risks of Erivedge including but not limited to nausea, vomiting, diarrhea, constipation, weight loss, changes in the sense of taste, decreased appetite, muscle spasms, and hair loss.  The patient verbalized understanding of the proper use and possible adverse effects of Erivedge.  All of the patient's questions and concerns were addressed.
Rituxan Counseling:  I discussed with the patient the risks of Rituxan infusions. Side effects can include infusion reactions, severe drug rashes including mucocutaneous reactions, reactivation of latent hepatitis and other infections and rarely progressive multifocal leukoencephalopathy.  All of the patient's questions and concerns were addressed.
Birth Control Pills Pregnancy And Lactation Text: This medication should be avoided if pregnant and for the first 30 days post-partum.
Fluconazole Pregnancy And Lactation Text: This medication is Pregnancy Category C and it isn't know if it is safe during pregnancy. It is also excreted in breast milk.
Include Pregnancy/Lactation Warning?: No
Cellcept Counseling:  I discussed with the patient the risks of mycophenolate mofetil including but not limited to infection/immunosuppression, GI upset, hypokalemia, hypercholesterolemia, bone marrow suppression, lymphoproliferative disorders, malignancy, GI ulceration/bleed/perforation, colitis, interstitial lung disease, kidney failure, progressive multifocal leukoencephalopathy, and birth defects.  The patient understands that monitoring is required including a baseline creatinine and regular CBC testing. In addition, patient must alert us immediately if symptoms of infection or other concerning signs are noted.
Drysol Pregnancy And Lactation Text: This medication is considered safe during pregnancy and breast feeding.
Niacinamide Pregnancy And Lactation Text: These medications are considered safe during pregnancy.
Zyclara Pregnancy And Lactation Text: This medication is Pregnancy Category C. It is unknown if this medication is excreted in breast milk.
Taltz Pregnancy And Lactation Text: The risk during pregnancy and breastfeeding is uncertain with this medication.
Tazorac Counseling:  Patient advised that medication is irritating and drying.  Patient may need to apply sparingly and wash off after an hour before eventually leaving it on overnight.  The patient verbalized understanding of the proper use and possible adverse effects of tazorac.  All of the patient's questions and concerns were addressed.
Cephalexin Pregnancy And Lactation Text: This medication is Pregnancy Category B and considered safe during pregnancy.  It is also excreted in breast milk but can be used safely for shorter doses.
Gabapentin Counseling: I discussed with the patient the risks of gabapentin including but not limited to dizziness, somnolence, fatigue and ataxia.
Clindamycin Pregnancy And Lactation Text: This medication can be used in pregnancy if certain situations. Clindamycin is also present in breast milk.
Acitretin Counseling:  I discussed with the patient the risks of acitretin including but not limited to hair loss, dry lips/skin/eyes, liver damage, hyperlipidemia, depression/suicidal ideation, photosensitivity.  Serious rare side effects can include but are not limited to pancreatitis, pseudotumor cerebri, bony changes, clot formation/stroke/heart attack.  Patient understands that alcohol is contraindicated since it can result in liver toxicity and significantly prolong the elimination of the drug by many years.
Nsaids Pregnancy And Lactation Text: These medications are considered safe up to 30 weeks gestation. It is excreted in breast milk.
Picato Counseling:  I discussed with the patient the risks of Picato including but not limited to erythema, scaling, itching, weeping, crusting, and pain.
Arava Counseling:  Patient counseled regarding adverse effects of Arava including but not limited to nausea, vomiting, abnormalities in liver function tests. Patients may develop mouth sores, rash, diarrhea, and abnormalities in blood counts. The patient understands that monitoring is required including LFTs and blood counts.  There is a rare possibility of scarring of the liver and lung problems that can occur when taking methotrexate. Persistent nausea, loss of appetite, pale stools, dark urine, cough, and shortness of breath should be reported immediately. Patient advised to discontinue Arava treatment and consult with a physician prior to attempting conception. The patient will have to undergo a treatment to eliminate Arava from the body prior to conception.
Cellcept Pregnancy And Lactation Text: This medication is Pregnancy Category D and isn't considered safe during pregnancy. It is unknown if this medication is excreted in breast milk.
Elidel Counseling: Patient may experience a mild burning sensation during topical application. Elidel is not approved in children less than 2 years of age. There have been case reports of hematologic and skin malignancies in patients using topical calcineurin inhibitors although causality is questionable.
Quinolones Counseling:  I discussed with the patient the risks of fluoroquinolones including but not limited to GI upset, allergic reaction, drug rash, diarrhea, dizziness, photosensitivity, yeast infections, liver function test abnormalities, tendonitis/tendon rupture.
Spironolactone Counseling: Patient advised regarding risks of diarrhea, abdominal pain, hyperkalemia, birth defects (for female patients), liver toxicity and renal toxicity. The patient may need blood work to monitor liver and kidney function and potassium levels while on therapy. The patient verbalized understanding of the proper use and possible adverse effects of spironolactone.  All of the patient's questions and concerns were addressed.
Cimetidine Pregnancy And Lactation Text: This medication is Pregnancy Category B and is considered safe during pregnancy. It is also excreted in breast milk and breast feeding isn't recommended.
Tazorac Pregnancy And Lactation Text: This medication is not safe during pregnancy. It is unknown if this medication is excreted in breast milk.
Cimzia Pregnancy And Lactation Text: This medication crosses the placenta but can be considered safe in certain situations. Cimzia may be excreted in breast milk.
Clindamycin Counseling: I counseled the patient regarding use of clindamycin as an antibiotic for prophylactic and/or therapeutic purposes. Clindamycin is active against numerous classes of bacteria, including skin bacteria. Side effects may include nausea, diarrhea, gastrointestinal upset, rash, hives, yeast infections, and in rare cases, colitis.
Tremfya Counseling: I discussed with the patient the risks of guselkumab including but not limited to immunosuppression, serious infections, worsening of inflammatory bowel disease and drug reactions.  The patient understands that monitoring is required including a PPD at baseline and must alert us or the primary physician if symptoms of infection or other concerning signs are noted.
Griseofulvin Counseling:  I discussed with the patient the risks of griseofulvin including but not limited to photosensitivity, cytopenia, liver damage, nausea/vomiting and severe allergy.  The patient understands that this medication is best absorbed when taken with a fatty meal (e.g., ice cream or french fries).
Erivedge Pregnancy And Lactation Text: This medication is Pregnancy Category X and is absolutely contraindicated during pregnancy. It is unknown if it is excreted in breast milk.
Rituxan Pregnancy And Lactation Text: This medication is Pregnancy Category C and it isn't know if it is safe during pregnancy. It is unknown if this medication is excreted in breast milk but similar antibodies are known to be excreted.
Acitretin Pregnancy And Lactation Text: This medication is Pregnancy Category X and should not be given to women who are pregnant or may become pregnant in the future. This medication is excreted in breast milk.
SSKI Counseling:  I discussed with the patient the risks of SSKI including but not limited to thyroid abnormalities, metallic taste, GI upset, fever, headache, acne, arthralgias, paraesthesias, lymphadenopathy, easy bleeding, arrhythmias, and allergic reaction.
Itraconazole Counseling:  I discussed with the patient the risks of itraconazole including but not limited to liver damage, nausea/vomiting, neuropathy, and severe allergy.  The patient understands that this medication is best absorbed when taken with acidic beverages such as non-diet cola or ginger ale.  The patient understands that monitoring is required including baseline LFTs and repeat LFTs at intervals.  The patient understands that they are to contact us or the primary physician if concerning signs are noted.
Cyclophosphamide Pregnancy And Lactation Text: This medication is Pregnancy Category D and it isn't considered safe during pregnancy. This medication is excreted in breast milk.
Eucrisa Counseling: Patient may experience a mild burning sensation during topical application. Eucrisa is not approved in children less than 2 years of age.
Odomzo Counseling- I discussed with the patient the risks of Odomzo including but not limited to nausea, vomiting, diarrhea, constipation, weight loss, changes in the sense of taste, decreased appetite, muscle spasms, and hair loss.  The patient verbalized understanding of the proper use and possible adverse effects of Odomzo.  All of the patient's questions and concerns were addressed.
Xeljanz Counseling: I discussed with the patient the risks of Xeljanz therapy including increased risk of infection, liver issues, headache, diarrhea, or cold symptoms. Live vaccines should be avoided. They were instructed to call if they have any problems.
Doxycycline Counseling:  Patient counseled regarding possible photosensitivity and increased risk for sunburn.  Patient instructed to avoid sunlight, if possible.  When exposed to sunlight, patients should wear protective clothing, sunglasses, and sunscreen.  The patient was instructed to call the office immediately if the following severe adverse effects occur:  hearing changes, easy bruising/bleeding, severe headache, or vision changes.  The patient verbalized understanding of the proper use and possible adverse effects of doxycycline.  All of the patient's questions and concerns were addressed.
Doxepin Counseling:  Patient advised that the medication is sedating and not to drive a car after taking this medication. Patient informed of potential adverse effects including but not limited to dry mouth, urinary retention, and blurry vision.  The patient verbalized understanding of the proper use and possible adverse effects of doxepin.  All of the patient's questions and concerns were addressed.
Cosentyx Counseling:  I discussed with the patient the risks of Cosentyx including but not limited to worsening of Crohn's disease, immunosuppression, allergic reactions and infections.  The patient understands that monitoring is required including a PPD at baseline and must alert us or the primary physician if symptoms of infection or other concerning signs are noted.
Benzoyl Peroxide Counseling: Patient counseled that medicine may cause skin irritation and bleach clothing.  In the event of skin irritation, the patient was advised to reduce the amount of the drug applied or use it less frequently.   The patient verbalized understanding of the proper use and possible adverse effects of benzoyl peroxide.  All of the patient's questions and concerns were addressed.
Siliq Counseling:  I discussed with the patient the risks of Siliq including but not limited to new or worsening depression, suicidal thoughts and behavior, immunosuppression, malignancy, posterior leukoencephalopathy syndrome, and serious infections.  The patient understands that monitoring is required including a PPD at baseline and must alert us or the primary physician if symptoms of infection or other concerning signs are noted. There is also a special program designed to monitor depression which is required with Siliq.
Griseofulvin Pregnancy And Lactation Text: This medication is Pregnancy Category X and is known to cause serious birth defects. It is unknown if this medication is excreted in breast milk but breast feeding should be avoided.
Topical Clindamycin Counseling: Patient counseled that this medication may cause skin irritation or allergic reactions.  In the event of skin irritation, the patient was advised to reduce the amount of the drug applied or use it less frequently.   The patient verbalized understanding of the proper use and possible adverse effects of clindamycin.  All of the patient's questions and concerns were addressed.
Cyclophosphamide Counseling:  I discussed with the patient the risks of cyclophosphamide including but not limited to hair loss, hormonal abnormalities, decreased fertility, abdominal pain, diarrhea, nausea and vomiting, bone marrow suppression and infection. The patient understands that monitoring is required while taking this medication.
Spironolactone Pregnancy And Lactation Text: This medication can cause feminization of the male fetus and should be avoided during pregnancy. The active metabolite is also found in breast milk.
Rifampin Pregnancy And Lactation Text: This medication is Pregnancy Category C and it isn't know if it is safe during pregnancy. It is also excreted in breast milk and should not be used if you are breast feeding.
Cyclosporine Counseling:  I discussed with the patient the risks of cyclosporine including but not limited to hypertension, gingival hyperplasia,myelosuppression, immunosuppression, liver damage, kidney damage, neurotoxicity, lymphoma, and serious infections. The patient understands that monitoring is required including baseline blood pressure, CBC, CMP, lipid panel and uric acid, and then 1-2 times monthly CMP and blood pressure.
Sski Pregnancy And Lactation Text: This medication is Pregnancy Category D and isn't considered safe during pregnancy. It is excreted in breast milk.
Carac Counseling:  I discussed with the patient the risks of Carac including but not limited to erythema, scaling, itching, weeping, crusting, and pain.
Topical Sulfur Applications Counseling: Topical Sulfur Counseling: Patient counseled that this medication may cause skin irritation or allergic reactions.  In the event of skin irritation, the patient was advised to reduce the amount of the drug applied or use it less frequently.   The patient verbalized understanding of the proper use and possible adverse effects of topical sulfur application.  All of the patient's questions and concerns were addressed.
Hydroxyzine Counseling: Patient advised that the medication is sedating and not to drive a car after taking this medication.  Patient informed of potential adverse effects including but not limited to dry mouth, urinary retention, and blurry vision.  The patient verbalized understanding of the proper use and possible adverse effects of hydroxyzine.  All of the patient's questions and concerns were addressed.
Dupixent Counseling: I discussed with the patient the risks of dupilumab including but not limited to eye infection and irritation, cold sores, injection site reactions, worsening of asthma, allergic reactions and increased risk of parasitic infection.  Live vaccines should be avoided while taking dupilumab. Dupilumab will also interact with certain medications such as warfarin and cyclosporine. The patient understands that monitoring is required and they must alert us or the primary physician if symptoms of infection or other concerning signs are noted.
Doxycycline Pregnancy And Lactation Text: This medication is Pregnancy Category D and not consider safe during pregnancy. It is also excreted in breast milk but is considered safe for shorter treatment courses.
Xelpaulz Pregnancy And Lactation Text: This medication is Pregnancy Category D and is not considered safe during pregnancy.  The risk during breast feeding is also uncertain.
Bexarotene Counseling:  I discussed with the patient the risks of bexarotene including but not limited to hair loss, dry lips/skin/eyes, liver abnormalities, hyperlipidemia, pancreatitis, depression/suicidal ideation, photosensitivity, drug rash/allergic reactions, hypothyroidism, anemia, leukopenia, infection, cataracts, and teratogenicity.  Patient understands that they will need regular blood tests to check lipid profile, liver function tests, white blood cell count, thyroid function tests and pregnancy test if applicable.
Clofazimine Counseling:  I discussed with the patient the risks of clofazimine including but not limited to skin and eye pigmentation, liver damage, nausea/vomiting, gastrointestinal bleeding and allergy.
Rifampin Counseling: I discussed with the patient the risks of rifampin including but not limited to liver damage, kidney damage, red-orange body fluids, nausea/vomiting and severe allergy.
Benzoyl Peroxide Pregnancy And Lactation Text: This medication is Pregnancy Category C. It is unknown if benzoyl peroxide is excreted in breast milk.
Topical Clindamycin Pregnancy And Lactation Text: This medication is Pregnancy Category B and is considered safe during pregnancy. It is unknown if it is excreted in breast milk.
Cosentyx Pregnancy And Lactation Text: This medication is Pregnancy Category B and is considered safe during pregnancy. It is unknown if this medication is excreted in breast milk.
Gabapentin Pregnancy And Lactation Text: This medication is Pregnancy Category C and isn't considered safe during pregnancy. It is excreted in breast milk.
Protopic Counseling: Patient may experience a mild burning sensation during topical application. Protopic is not approved in children less than 2 years of age. There have been case reports of hematologic and skin malignancies in patients using topical calcineurin inhibitors although causality is questionable.
Doxepin Pregnancy And Lactation Text: This medication is Pregnancy Category C and it isn't known if it is safe during pregnancy. It is also excreted in breast milk and breast feeding isn't recommended.
Hydroxyzine Pregnancy And Lactation Text: This medication is not safe during pregnancy and should not be taken. It is also excreted in breast milk and breast feeding isn't recommended.
Dupixent Pregnancy And Lactation Text: This medication likely crosses the placenta but the risk for the fetus is uncertain. This medication is excreted in breast milk.
Carac Pregnancy And Lactation Text: This medication is Pregnancy Category X and contraindicated in pregnancy and in women who may become pregnant. It is unknown if this medication is excreted in breast milk.
Azithromycin Pregnancy And Lactation Text: This medication is considered safe during pregnancy and is also secreted in breast milk.
Solaraze Counseling:  I discussed with the patient the risks of Solaraze including but not limited to erythema, scaling, itching, weeping, crusting, and pain.
Glycopyrrolate Pregnancy And Lactation Text: This medication is Pregnancy Category B and is considered safe during pregnancy. It is unknown if it is excreted breast milk.
Hydroquinone Counseling:  Patient advised that medication may result in skin irritation, lightening (hypopigmentation), dryness, and burning.  In the event of skin irritation, the patient was advised to reduce the amount of the drug applied or use it less frequently.  Rarely, spots that are treated with hydroquinone can become darker (pseudoochronosis).  Should this occur, patient instructed to stop medication and call the office. The patient verbalized understanding of the proper use and possible adverse effects of hydroquinone.  All of the patient's questions and concerns were addressed.
Ketoconazole Counseling:   Patient counseled regarding improving absorption with orange juice.  Adverse effects include but are not limited to breast enlargement, headache, diarrhea, nausea, upset stomach, liver function test abnormalities, taste disturbance, and stomach pain.  There is a rare possibility of liver failure that can occur when taking ketoconazole. The patient understands that monitoring of LFTs may be required, especially at baseline. The patient verbalized understanding of the proper use and possible adverse effects of ketoconazole.  All of the patient's questions and concerns were addressed.
Topical Sulfur Applications Pregnancy And Lactation Text: This medication is Pregnancy Category C and has an unknown safety profile during pregnancy. It is unknown if this topical medication is excreted in breast milk.
Thalidomide Counseling: I discussed with the patient the risks of thalidomide including but not limited to birth defects, anxiety, weakness, chest pain, dizziness, cough and severe allergy.
Xolair Counseling:  Patient informed of potential adverse effects including but not limited to fever, muscle aches, rash and allergic reactions.  The patient verbalized understanding of the proper use and possible adverse effects of Xolair.  All of the patient's questions and concerns were addressed.
Erythromycin Counseling:  I discussed with the patient the risks of erythromycin including but not limited to GI upset, allergic reaction, drug rash, diarrhea, increase in liver enzymes, and yeast infections.
Bexarotene Pregnancy And Lactation Text: This medication is Pregnancy Category X and should not be given to women who are pregnant or may become pregnant. This medication should not be used if you are breast feeding.
Otezla Counseling: The side effects of Otezla were discussed with the patient, including but not limited to worsening or new depression, weight loss, diarrhea, nausea, upper respiratory tract infection, and headache. Patient instructed to call the office should any adverse effect occur.  The patient verbalized understanding of the proper use and possible adverse effects of Otezla.  All the patient's questions and concerns were addressed.
Ivermectin Counseling:  Patient instructed to take medication on an empty stomach with a full glass of water.  Patient informed of potential adverse effects including but not limited to nausea, diarrhea, dizziness, itching, and swelling of the extremities or lymph nodes.  The patient verbalized understanding of the proper use and possible adverse effects of ivermectin.  All of the patient's questions and concerns were addressed.
Simponi Counseling:  I discussed with the patient the risks of golimumab including but not limited to myelosuppression, immunosuppression, autoimmune hepatitis, demyelinating diseases, lymphoma, and serious infections.  The patient understands that monitoring is required including a PPD at baseline and must alert us or the primary physician if symptoms of infection or other concerning signs are noted.
Protopic Pregnancy And Lactation Text: This medication is Pregnancy Category C. It is unknown if this medication is excreted in breast milk when applied topically.
Azithromycin Counseling:  I discussed with the patient the risks of azithromycin including but not limited to GI upset, allergic reaction, drug rash, diarrhea, and yeast infections.
Glycopyrrolate Counseling:  I discussed with the patient the risks of glycopyrrolate including but not limited to skin rash, drowsiness, dry mouth, difficulty urinating, and blurred vision.
Bactrim Counseling:  I discussed with the patient the risks of sulfa antibiotics including but not limited to GI upset, allergic reaction, drug rash, diarrhea, dizziness, photosensitivity, and yeast infections.  Rarely, more serious reactions can occur including but not limited to aplastic anemia, agranulocytosis, methemoglobinemia, blood dyscrasias, liver or kidney failure, lung infiltrates or desquamative/blistering drug rashes.
Methotrexate Counseling:  Patient counseled regarding adverse effects of methotrexate including but not limited to nausea, vomiting, abnormalities in liver function tests. Patients may develop mouth sores, rash, diarrhea, and abnormalities in blood counts. The patient understands that monitoring is required including LFT's and blood counts.  There is a rare possibility of scarring of the liver and lung problems that can occur when taking methotrexate. Persistent nausea, loss of appetite, pale stools, dark urine, cough, and shortness of breath should be reported immediately. Patient advised to discontinue methotrexate treatment at least three months before attempting to become pregnant.  I discussed the need for folate supplements while taking methotrexate.  These supplements can decrease side effects during methotrexate treatment. The patient verbalized understanding of the proper use and possible adverse effects of methotrexate.  All of the patient's questions and concerns were addressed.
Oxybutynin Counseling:  I discussed with the patient the risks of oxybutynin including but not limited to skin rash, drowsiness, dry mouth, difficulty urinating, and blurred vision.
5-Fu Counseling: 5-Fluorouracil Counseling:  I discussed with the patient the risks of 5-fluorouracil including but not limited to erythema, scaling, itching, weeping, crusting, and pain.
Hydroxychloroquine Counseling:  I discussed with the patient that a baseline ophthalmologic exam is needed at the start of therapy and every year thereafter while on therapy. A CBC may also be warranted for monitoring.  The side effects of this medication were discussed with the patient, including but not limited to agranulocytosis, aplastic anemia, seizures, rashes, retinopathy, and liver toxicity. Patient instructed to call the office should any adverse effect occur.  The patient verbalized understanding of the proper use and possible adverse effects of Plaquenil.  All the patient's questions and concerns were addressed.
Wartpeel Counseling:  I discussed with the patient the risks of Wartpeel including but not limited to erythema, scaling, itching, weeping, crusting, and pain.
Enbrel Counseling:  I discussed with the patient the risks of etanercept including but not limited to myelosuppression, immunosuppression, autoimmune hepatitis, demyelinating diseases, lymphoma, and infections.  The patient understands that monitoring is required including a PPD at baseline and must alert us or the primary physician if symptoms of infection or other concerning signs are noted.
Stelara Counseling:  I discussed with the patient the risks of ustekinumab including but not limited to immunosuppression, malignancy, posterior leukoencephalopathy syndrome, and serious infections.  The patient understands that monitoring is required including a PPD at baseline and must alert us or the primary physician if symptoms of infection or other concerning signs are noted.
Solaraze Pregnancy And Lactation Text: This medication is Pregnancy Category B and is considered safe. There is some data to suggest avoiding during the third trimester. It is unknown if this medication is excreted in breast milk.
Ivermectin Pregnancy And Lactation Text: This medication is Pregnancy Category C and it isn't known if it is safe during pregnancy. It is also excreted in breast milk.
Isotretinoin Counseling: Patient should get monthly blood tests, not donate blood, not drive at night if vision affected, not share medication, and not undergo elective surgery for 6 months after tx completed. Side effects reviewed, pt to contact office should one occur.
Ketoconazole Pregnancy And Lactation Text: This medication is Pregnancy Category C and it isn't know if it is safe during pregnancy. It is also excreted in breast milk and breast feeding isn't recommended.
Ilumya Counseling: I discussed with the patient the risks of tildrakizumab including but not limited to immunosuppression, malignancy, posterior leukoencephalopathy syndrome, and serious infections.  The patient understands that monitoring is required including a PPD at baseline and must alert us or the primary physician if symptoms of infection or other concerning signs are noted.
Otezla Pregnancy And Lactation Text: This medication is Pregnancy Category C and it isn't known if it is safe during pregnancy. It is unknown if it is excreted in breast milk.
Tetracycline Counseling: Patient counseled regarding possible photosensitivity and increased risk for sunburn.  Patient instructed to avoid sunlight, if possible.  When exposed to sunlight, patients should wear protective clothing, sunglasses, and sunscreen.  The patient was instructed to call the office immediately if the following severe adverse effects occur:  hearing changes, easy bruising/bleeding, severe headache, or vision changes.  The patient verbalized understanding of the proper use and possible adverse effects of tetracycline.  All of the patient's questions and concerns were addressed. Patient understands to avoid pregnancy while on therapy due to potential birth defects.
Colchicine Counseling:  Patient counseled regarding adverse effects including but not limited to stomach upset (nausea, vomiting, stomach pain, or diarrhea).  Patient instructed to limit alcohol consumption while taking this medication.  Colchicine may reduce blood counts especially with prolonged use.  The patient understands that monitoring of kidney function and blood counts may be required, especially at baseline. The patient verbalized understanding of the proper use and possible adverse effects of colchicine.  All of the patient's questions and concerns were addressed.
Erythromycin Pregnancy And Lactation Text: This medication is Pregnancy Category B and is considered safe during pregnancy. It is also excreted in breast milk.
Xolair Pregnancy And Lactation Text: This medication is Pregnancy Category B and is considered safe during pregnancy. This medication is excreted in breast milk.
Azathioprine Counseling:  I discussed with the patient the risks of azathioprine including but not limited to myelosuppression, immunosuppression, hepatotoxicity, lymphoma, and infections.  The patient understands that monitoring is required including baseline LFTs, Creatinine, possible TPMP genotyping and weekly CBCs for the first month and then every 2 weeks thereafter.  The patient verbalized understanding of the proper use and possible adverse effects of azathioprine.  All of the patient's questions and concerns were addressed.
Metronidazole Pregnancy And Lactation Text: This medication is Pregnancy Category B and considered safe during pregnancy.  It is also excreted in breast milk.
High Dose Vitamin A Counseling: Side effects reviewed, pt to contact office should one occur.
Valtrex Counseling: I discussed with the patient the risks of valacyclovir including but not limited to kidney damage, nausea, vomiting and severe allergy.  The patient understands that if the infection seems to be worsening or is not improving, they are to call.
Topical Retinoid counseling:  Patient advised to apply a pea-sized amount only at bedtime and wait 30 minutes after washing their face before applying.  If too drying, patient may add a non-comedogenic moisturizer. The patient verbalized understanding of the proper use and possible adverse effects of retinoids.  All of the patient's questions and concerns were addressed.
Bactrim Pregnancy And Lactation Text: This medication is Pregnancy Category D and is known to cause fetal risk.  It is also excreted in breast milk.
Hydroxychloroquine Pregnancy And Lactation Text: This medication has been shown to cause fetal harm but it isn't assigned a Pregnancy Risk Category. There are small amounts excreted in breast milk.
Dapsone Counseling: I discussed with the patient the risks of dapsone including but not limited to hemolytic anemia, agranulocytosis, rashes, methemoglobinemia, kidney failure, peripheral neuropathy, headaches, GI upset, and liver toxicity.  Patients who start dapsone require monitoring including baseline LFTs and weekly CBCs for the first month, then every month thereafter.  The patient verbalized understanding of the proper use and possible adverse effects of dapsone.  All of the patient's questions and concerns were addressed.
Infliximab Counseling:  I discussed with the patient the risks of infliximab including but not limited to myelosuppression, immunosuppression, autoimmune hepatitis, demyelinating diseases, lymphoma, and serious infections.  The patient understands that monitoring is required including a PPD at baseline and must alert us or the primary physician if symptoms of infection or other concerning signs are noted.
Methotrexate Pregnancy And Lactation Text: This medication is Pregnancy Category X and is known to cause fetal harm. This medication is excreted in breast milk.
Detail Level: Zone
Imiquimod Counseling:  I discussed with the patient the risks of imiquimod including but not limited to erythema, scaling, itching, weeping, crusting, and pain.  Patient understands that the inflammatory response to imiquimod is variable from person to person and was educated regarded proper titration schedule.  If flu-like symptoms develop, patient knows to discontinue the medication and contact us.
Albendazole Counseling:  I discussed with the patient the risks of albendazole including but not limited to cytopenia, kidney damage, nausea/vomiting and severe allergy.  The patient understands that this medication is being used in an off-label manner.
Metronidazole Counseling:  I discussed with the patient the risks of metronidazole including but not limited to seizures, nausea/vomiting, a metallic taste in the mouth, nausea/vomiting and severe allergy.
Terbinafine Counseling: Patient counseling regarding adverse effects of terbinafine including but not limited to headache, diarrhea, rash, upset stomach, liver function test abnormalities, itching, taste/smell disturbance, nausea, abdominal pain, and flatulence.  There is a rare possibility of liver failure that can occur when taking terbinafine.  The patient understands that a baseline LFT and kidney function test may be required. The patient verbalized understanding of the proper use and possible adverse effects of terbinafine.  All of the patient's questions and concerns were addressed.
Isotretinoin Pregnancy And Lactation Text: This medication is Pregnancy Category X and is considered extremely dangerous during pregnancy. It is unknown if it is excreted in breast milk.
High Dose Vitamin A Pregnancy And Lactation Text: High dose vitamin A therapy is contraindicated during pregnancy and breast feeding.
Minoxidil Counseling: Minoxidil is a topical medication which can increase blood flow where it is applied. It is uncertain how this medication increases hair growth. Side effects are uncommon and include stinging and allergic reactions.
Fluconazole Counseling:  Patient counseled regarding adverse effects of fluconazole including but not limited to headache, diarrhea, nausea, upset stomach, liver function test abnormalities, taste disturbance, and stomach pain.  There is a rare possibility of liver failure that can occur when taking fluconazole.  The patient understands that monitoring of LFTs and kidney function test may be required, especially at baseline. The patient verbalized understanding of the proper use and possible adverse effects of fluconazole.  All of the patient's questions and concerns were addressed.
Prednisone Counseling:  I discussed with the patient the risks of prolonged use of prednisone including but not limited to weight gain, insomnia, osteoporosis, mood changes, diabetes, susceptibility to infection, glaucoma and high blood pressure.  In cases where prednisone use is prolonged, patients should be monitored with blood pressure checks, serum glucose levels and an eye exam.  Additionally, the patient may need to be placed on GI prophylaxis, PCP prophylaxis, and calcium and vitamin D supplementation and/or a bisphosphonate.  The patient verbalized understanding of the proper use and the possible adverse effects of prednisone.  All of the patient's questions and concerns were addressed.
Minocycline Counseling: Patient advised regarding possible photosensitivity and discoloration of the teeth, skin, lips, tongue and gums.  Patient instructed to avoid sunlight, if possible.  When exposed to sunlight, patients should wear protective clothing, sunglasses, and sunscreen.  The patient was instructed to call the office immediately if the following severe adverse effects occur:  hearing changes, easy bruising/bleeding, severe headache, or vision changes.  The patient verbalized understanding of the proper use and possible adverse effects of minocycline.  All of the patient's questions and concerns were addressed.
Drysol Counseling:  I discussed with the patient the risks of drysol/aluminum chloride including but not limited to skin rash, itching, irritation, burning.
Birth Control Pills Counseling: Birth Control Pill Counseling: I discussed with the patient the potential side effects of OCPs including but not limited to increased risk of stroke, heart attack, thrombophlebitis, deep venous thrombosis, hepatic adenomas, breast changes, GI upset, headaches, and depression.  The patient verbalized understanding of the proper use and possible adverse effects of OCPs. All of the patient's questions and concerns were addressed.
Humira Counseling:  I discussed with the patient the risks of adalimumab including but not limited to myelosuppression, immunosuppression, autoimmune hepatitis, demyelinating diseases, lymphoma, and serious infections.  The patient understands that monitoring is required including a PPD at baseline and must alert us or the primary physician if symptoms of infection or other concerning signs are noted.
Valtrex Pregnancy And Lactation Text: this medication is Pregnancy Category B and is considered safe during pregnancy. This medication is not directly found in breast milk but it's metabolite acyclovir is present.
Dapsone Pregnancy And Lactation Text: This medication is Pregnancy Category C and is not considered safe during pregnancy or breast feeding.
Cephalexin Counseling: I counseled the patient regarding use of cephalexin as an antibiotic for prophylactic and/or therapeutic purposes. Cephalexin (commonly prescribed under brand name Keflex) is a cephalosporin antibiotic which is active against numerous classes of bacteria, including most skin bacteria. Side effects may include nausea, diarrhea, gastrointestinal upset, rash, hives, yeast infections, and in rare cases, hepatitis, kidney disease, seizures, fever, confusion, neurologic symptoms, and others. Patients with severe allergies to penicillin medications are cautioned that there is about a 10% incidence of cross-reactivity with cephalosporins. When possible, patients with penicillin allergies should use alternatives to cephalosporins for antibiotic therapy.
Taltz Counseling: I discussed with the patient the risks of ixekizumab including but not limited to immunosuppression, serious infections, worsening of inflammatory bowel disease and drug reactions.  The patient understands that monitoring is required including a PPD at baseline and must alert us or the primary physician if symptoms of infection or other concerning signs are noted.
Niacinamide Counseling: I recommended taking niacin or niacinamide, also know as vitamin B3, twice daily. Recent evidence suggests that taking vitamin B3 (500 mg twice daily) can reduce the risk of actinic keratoses and non-melanoma skin cancers. Side effects of vitamin B3 include flushing and headache.
Zyclara Counseling:  I discussed with the patient the risks of imiquimod including but not limited to erythema, scaling, itching, weeping, crusting, and pain.  Patient understands that the inflammatory response to imiquimod is variable from person to person and was educated regarded proper titration schedule.  If flu-like symptoms develop, patient knows to discontinue the medication and contact us.

## 2019-04-03 NOTE — PATIENT INSTRUCTIONS
Recall 4/4    IMPRESSION:    1. 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 Hx of hearing impairment and cognitive decline  3. Spells of shaking - in remission  4. One trip over the dog- 2018- crack the foot bone    PLAN/RECOMMENDATIONS:    ________________________________________________________________________    Advise exercise muscle and brain  Explained to Emilie--- she might have early parkinson disease--- but also could have essential tremor also? but the patient's tremor is more resting ( resting > kinetic)        I will see Emilie in 12 months    ________________________________________________________________________    Fish Oil -- Omega 3 [id number]# daily  Vit D-3 4000 unit daily  ________________________________________________________________________      ________________________________________________________________________

## 2019-04-03 NOTE — PROCEDURE: ADDITIONAL NOTES
Detail Level: Simple
Additional Notes: Will send Rx for Triamcinolone Ointment today. \\nDiscussed Treatment and risks in detail with patient. \\nRecommend gentle cleansers and moisturizers daily, like Cetaphil or CeraVe. Moisturize within 3 minutes of showering.
Additional Notes: Includes spot of concern mentioned on intake on cheeks

## 2019-04-03 NOTE — PROGRESS NOTES
NEUROLOGY NOTE    Referring Physician  Sherry Davidson      CHIEF COMPLAINT:  Tremor resting -- for years, worsened since 2016  Chief Complaint   Patient presents with   • Follow-Up     Parkinson's disease       PRESENT ILLNESS:     Remained stable    Got a flu 2017, and missed one follow up  Tremor resting? -- for years, worsened in the last year    One in a while, she would develop shaking lasting for 5 minutes   One of her older sister had bad tremor and she was told by her kids that she is shaking    That is the reason that she came here for diagnosis    The left hand tremor would only become shaking when she is not paying attention    PAST MEDICAL HISTORY:  Past Medical History:   Diagnosis Date   • Arthritis of foot, right 6/9/2015   • Benign essential tremor 6/9/2015   • Edema 3/10/2015   • Hearing loss sensory, bilateral 3/10/2015   • History of shingles 9/26/2014   • Hyperlipidemia 9/26/2014   • Macular degeneration of right eye 9/26/2014   • Moderate aortic insufficiency 9/26/2014   • Osteoarthritis 9/26/2014   • Osteoporosis 9/26/2014   • Parkinson's disease (HCC)    • Plantar fasciitis, bilateral 3/10/2015   • Prolapsed bladder 9/26/2014       PAST SURGICAL HISTORY:  Past Surgical History:   Procedure Laterality Date   • CHOLECYSTECTOMY  9/2013   • APPENDECTOMY  1956   • CATARACT PHACO WITH IOL Bilateral        FAMILY HISTORY:  One older system-- has tremor  Family History   Problem Relation Age of Onset   • Hypertension Father    • Hypertension Sister    • Arthritis Mother        SOCIAL HISTORY:  Social History     Social History   • Marital status:      Spouse name: N/A   • Number of children: N/A   • Years of education: N/A     Occupational History   • Not on file.     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     Other Topics Concern   • Not on file     Social History Narrative   • No narrative on file     ALLERGIES:  Allergies   Allergen Reactions   • Codeine      halucinations     TOBHX  History   Smoking Status   • Never Smoker   Smokeless Tobacco   • Never Used     ALCHX  History   Alcohol Use   • 0.0 oz/week     Comment: rare     DRUGHX  History   Drug Use No           MEDICATIONS:  Current Outpatient Prescriptions   Medication   • simvastatin (ZOCOR) 20 MG Tab   • celecoxib (CELEBREX) 100 MG Cap   • vitamin D (CHOLECALCIFEROL) 1000 UNIT Tab   • Calcium Carbonate-Vit D-Min (CALCIUM 1200 PO)   • Multiple Vitamins-Minerals (OCUVITE-LUTEIN) Cap   • Omega-3 Fatty Acids (OMEGA 3 PO)   • Multiple Vitamins-Minerals (MULTI COMPLETE PO)   • Bioflavonoid Products (VITAMIN C PLUS) 1000 MG TABS   • Calcium Carbonate-Vitamin D (CALCIUM + D PO)     No current facility-administered medications for this visit.        REVIEW OF SYSTEM:    Constitutional: Denies fevers, Denies weight changes   Eyes: Denies changes in vision, no eye pain   Ears/Nose/Throat/Mouth: Denies nasal congestion or sore throat   Cardiovascular: Denies chest pain or palpitations   Respiratory: Denies SOB.   Gastrointestinal/Hepatic: Denies abdominal pain, nausea, vomiting, diarrhea, constipation or GI bleeding   Genitourinary: Denies bladder dysfunction, dysuria or frequency   Musculoskeletal/Rheum: Denies joint pain and swelling   Skin/Breast: Denies rash, denies breast lumps or discharge   Neurological: wild dreams, tremor  Psychiatric: anxiety, insomnia  Endocrine: denies hx of diabetes or thyroid dysfunction   Heme/Oncology/Lymph Nodes: Denies enlarged lymph nodes, denies brusing or known bleeding disorder   Allergic/Immunologic: Denies hx of allergies         PHYSICAL AND NEUROLOGICAL EXMAINATIONS:  VITAL SIGNS: /56   Pulse 65   Temp 36.7 °C (98.1 °F) (Temporal)   Ht 1.58 m (5' 2.2\")   Wt 78.9 kg (174 lb)   SpO2 95%   BMI " 31.62 kg/m²   CURRENT WEIGHT:   BMI: Body mass index is 31.62 kg/m².  PREVIOUS WEIGHTS:  Wt Readings from Last 25 Encounters:   04/03/19 78.9 kg (174 lb)   03/06/19 78.5 kg (173 lb)   02/21/19 77.1 kg (170 lb)   11/20/18 77.6 kg (171 lb)   10/17/18 78.9 kg (174 lb)   10/03/18 78.2 kg (172 lb 4.8 oz)   08/13/18 78.9 kg (174 lb)   07/19/18 72.6 kg (160 lb)   07/05/18 72.6 kg (160 lb)   06/21/18 72.6 kg (160 lb)   06/14/18 72.6 kg (160 lb)   06/07/18 72.6 kg (160 lb)   05/30/18 72.6 kg (160 lb)   05/01/18 78.5 kg (173 lb)   04/16/18 78 kg (172 lb)   04/03/18 77.8 kg (171 lb 8.3 oz)   03/23/18 78 kg (172 lb)   11/07/17 76.8 kg (169 lb 5 oz)   10/31/17 77.1 kg (170 lb)   10/18/17 77.1 kg (170 lb)   10/16/17 78.9 kg (174 lb)   04/12/17 78.5 kg (173 lb)   04/12/17 78.7 kg (173 lb 6.4 oz)   01/30/17 80.3 kg (177 lb)   10/12/16 80.1 kg (176 lb 9.6 oz)       General appearance of patient: WDWN(+) NAD(+)    EYES  o Fundus : Papilledem(-) Exudates(-) Hemorrhage(-)  Nervous System  Orientation to time, place and person(+)  Memory normal(-)  Language: aphasia(-)  Knowledge: past(+) Current(+)  Attention(+)  Cranial Nerves  • Nerve 2: intact  • Nerve 3,4,6: intact  • Nerve 5 : intact  • Nerve 7: intact  • Nerve 8: hearing impairment  • Nerve 9 & 10: intact  • Nerve 11: intact  • Nerve 12: intact  Muscle Power and muscle tone: symmetric, normal in upper and lower  Sensory System: Pin sensation intact(+)  Reflexes: symmetric throughout  Cerebellar Function FNP normal   Gait : Steady(+)   Heart and Vascular  Peripheral Vasucular system : Edema (-) Swelling(-)  RHB, Breathing sound clear  abdomen bowel sound normoactive  Extremities freely moveable  Joints no contracture     Seeing eye doctor- macular degeneration        Resting tremor-- L>R ( patient is anxious and mental activity is needed to be able to observe this resting tremor)  Bradykinesia-- both hands  Rigitity-- both hands  Postural reflex  Recall 4/4    IMPRESSION:    1.  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 Hx of hearing impairment and cognitive decline  3. Spells of shaking - in remission  4. One trip over the dog- 2018- crack the foot bone    PLAN/RECOMMENDATIONS:    ________________________________________________________________________    Advise exercise muscle and brain  Explained to Emilie--- she might have early parkinson disease--- but also could have essential tremor also? but the patient's tremor is more resting ( resting > kinetic)        I will see Emilie in 12 months    ________________________________________________________________________    Fish Oil -- Omega 3 [id number]# daily  Vit D-3 4000 unit daily  ________________________________________________________________________      ________________________________________________________________________

## 2019-04-03 NOTE — HPI: UPPER BODY SKIN CHECK
How Severe Are Your Spot(S)?: mild
What Is The Reason For Today's Visit?: Upper Body Skin Exam
Additional History: Patient grew up in [place]. History of sunburns. No tanning bed use.

## 2019-04-08 ENCOUNTER — OFFICE VISIT (OUTPATIENT)
Dept: MEDICAL GROUP | Facility: PHYSICIAN GROUP | Age: 79
End: 2019-04-08
Payer: MEDICARE

## 2019-04-08 VITALS
BODY MASS INDEX: 30.91 KG/M2 | OXYGEN SATURATION: 98 % | HEIGHT: 62 IN | HEART RATE: 66 BPM | DIASTOLIC BLOOD PRESSURE: 68 MMHG | TEMPERATURE: 97.2 F | WEIGHT: 168 LBS | SYSTOLIC BLOOD PRESSURE: 120 MMHG

## 2019-04-08 DIAGNOSIS — H35.30 MACULAR DEGENERATION OF RIGHT EYE, UNSPECIFIED TYPE: ICD-10-CM

## 2019-04-08 DIAGNOSIS — I35.0 AORTIC VALVE STENOSIS, ETIOLOGY OF CARDIAC VALVE DISEASE UNSPECIFIED: ICD-10-CM

## 2019-04-08 DIAGNOSIS — I35.1 AORTIC VALVE INSUFFICIENCY, ETIOLOGY OF CARDIAC VALVE DISEASE UNSPECIFIED: ICD-10-CM

## 2019-04-08 DIAGNOSIS — E78.5 DYSLIPIDEMIA: ICD-10-CM

## 2019-04-08 DIAGNOSIS — L98.9 SKIN LESION OF CHEEK: ICD-10-CM

## 2019-04-08 DIAGNOSIS — E03.4 HYPOTHYROIDISM DUE TO ACQUIRED ATROPHY OF THYROID: ICD-10-CM

## 2019-04-08 DIAGNOSIS — R05.9 COUGH: ICD-10-CM

## 2019-04-08 DIAGNOSIS — N18.30 CKD (CHRONIC KIDNEY DISEASE) STAGE 3, GFR 30-59 ML/MIN (HCC): ICD-10-CM

## 2019-04-08 DIAGNOSIS — G20.A1 PARKINSON DISEASE: ICD-10-CM

## 2019-04-08 DIAGNOSIS — G25.0 BENIGN ESSENTIAL TREMOR: ICD-10-CM

## 2019-04-08 DIAGNOSIS — R94.31 ABNORMAL EKG: ICD-10-CM

## 2019-04-08 DIAGNOSIS — N81.10 PROLAPSE OF FEMALE BLADDER, ACQUIRED: ICD-10-CM

## 2019-04-08 DIAGNOSIS — H90.3 HEARING LOSS SENSORY, BILATERAL: ICD-10-CM

## 2019-04-08 DIAGNOSIS — R60.0 BILATERAL LEG EDEMA: ICD-10-CM

## 2019-04-08 DIAGNOSIS — L20.82 FLEXURAL ECZEMA: ICD-10-CM

## 2019-04-08 DIAGNOSIS — M15.9 PRIMARY OSTEOARTHRITIS INVOLVING MULTIPLE JOINTS: ICD-10-CM

## 2019-04-08 PROCEDURE — G0439 PPPS, SUBSEQ VISIT: HCPCS | Performed by: FAMILY MEDICINE

## 2019-04-08 RX ORDER — TRIAMCINOLONE ACETONIDE 1 MG/G
OINTMENT TOPICAL
Refills: 1 | COMMUNITY
Start: 2019-04-03 | End: 2020-01-21

## 2019-04-08 ASSESSMENT — PATIENT HEALTH QUESTIONNAIRE - PHQ9: CLINICAL INTERPRETATION OF PHQ2 SCORE: 0

## 2019-04-08 ASSESSMENT — ENCOUNTER SYMPTOMS: GENERAL WELL-BEING: GOOD

## 2019-04-08 ASSESSMENT — ACTIVITIES OF DAILY LIVING (ADL): BATHING_REQUIRES_ASSISTANCE: 0

## 2019-04-08 NOTE — PROGRESS NOTES
Chief Complaint   Patient presents with   • Annual Wellness Visit         HPI:  Emilie is a 78 y.o. here for Medicare Annual Wellness Visit    Patient Active Problem List    Diagnosis Date Noted   • Aortic stenosis 03/06/2019   • Skin lesion of cheek 02/21/2019   • Cough 10/17/2018   • Dry skin 05/01/2018   • Bilateral leg edema 04/16/2018   • Dyslipidemia 04/12/2017   • Abnormal EKG 04/12/2017   • CKD (chronic kidney disease) stage 3, GFR 30-59 ml/min ([facility]) 01/30/2017   • Low serum vitamin D 09/01/2016   • Diastolic dysfunction 03/31/2016   • Aortic regurgitation 03/14/2016   • Parkinson disease ([facility]) 02/05/2016   • Hypothyroidism due to acquired atrophy of thyroid 09/01/2015   • Arthritis of foot, right 06/09/2015   • Benign essential tremor 06/09/2015   • Hearing loss sensory, bilateral 03/10/2015   • Osteoarthritis 09/26/2014   • Prolapse of female bladder, acquired 09/26/2014   • Macular degeneration of right eye 09/26/2014       Current Outpatient Prescriptions   Medication Sig Dispense Refill   • simvastatin (ZOCOR) 20 MG Tab TAKE 1 TABLET BY MOUTH EVERY EVENING 90 Tab 1   • celecoxib (CELEBREX) 100 MG Cap TAKE 1 CAPSULE BY MOUTH TWICE DAILY 180 Cap 3   • vitamin D (CHOLECALCIFEROL) 1000 UNIT Tab Take 1,000 Units by mouth every day.     • Calcium Carbonate-Vit D-Min (CALCIUM 1200 PO) Take 1 Each by mouth every day at 6 PM.     • Multiple Vitamins-Minerals (OCUVITE-LUTEIN) Cap Take 1 Each by mouth every day.     • Omega-3 Fatty Acids (OMEGA 3 PO) Take 1 Each by mouth every day.     • Multiple Vitamins-Minerals (MULTI COMPLETE PO) Take 1 Tab by mouth every day.     • Bioflavonoid Products (VITAMIN C PLUS) 1000 MG TABS Take 1 Tab by mouth every day.     • Calcium Carbonate-Vitamin D (CALCIUM + D PO) Take 1 Tab by mouth every day.       No current facility-administered medications for this visit.         Patient is taking medications as noted in medication list.  Current supplements as per medication list.      Allergies: Codeine    Current social contact/activities: Caregiver for sisterwenceslao with friends     Is patient current with immunizations? No, due for TDAP and SHINGRIX (Shingles). Patient is interested in receiving NONE today.    She  reports that she has never smoked. She has never used smokeless tobacco. She reports that she drinks alcohol. She reports that she does not use drugs.  Counseling given: Not Answered        DPA/Advanced directive: Patient does not have an Advanced Directive.  A packet and workshop information was given on Advanced Directives.    ROS:    Gait: Uses no assistive device   Ostomy: No   Other tubes: No   Amputations: No   Chronic oxygen use No   Last eye exam 2019  Wears hearing aids: Yes   : Denies any urinary leakage during the last 6 months      Screening:    Depression Screening    Little interest or pleasure in doing things?  0 - not at all  Feeling down, depressed, or hopeless? 0 - not at all  Patient Health Questionnaire Score: 0    If depressive symptoms identified deferred to follow up visit unless specifically addressed in assessment and plan.    Interpretation of PHQ-9 Total Score   Score Severity   1-4 No Depression   5-9 Mild Depression   10-14 Moderate Depression   15-19 Moderately Severe Depression   20-27 Severe Depression    Screening for Cognitive Impairment    Three Minute Recall (village, kitchen, baby)  3/3 Village, kitchen, baby  Kyree clock face with all 12 numbers and set the hands to show 10 past 10.  Yes 3 /5 time 10:10  If cognitive concerns identified, deferred for follow up unless specifically addressed in assessment and plan.    Fall Risk Assessment    Has the patient had two or more falls in the last year or any fall with injury in the last year?  No  If fall risk identified, deferred for follow up unless specifically addressed in assessment and plan.    Safety Assessment    Throw rugs on floor.  No  Handrails on all stairs.  Yes  Good lighting in  all hallways.  Yes  Difficulty hearing.  No  Patient counseled about all safety risks that were identified.    Functional Assessment ADLs    Are there any barriers preventing you from cooking for yourself or meeting nutritional needs?  No.    Are there any barriers preventing you from driving safely or obtaining transportation?  No.    Are there any barriers preventing you from using a telephone or calling for help?  No.    Are there any barriers preventing you from shopping?  No.    Are there any barriers preventing you from taking care of your own finances?  No.    Are there any barriers preventing you from managing your medications?  No.    Are there any barriers preventing you from showering, bathing or dressing yourself?  No.    Are you currently engaging in any exercise or physical activity?  Yes.  Shopping, house chores  What is your perception of your health?  Good.    Health Maintenance Summary                IMM DTaP/Tdap/Td Vaccine Overdue 10/8/1958     IMM ZOSTER VACCINES Overdue 10/8/1989     Annual Wellness Visit Overdue 3/24/2019      Done 3/23/2018 Visit Dx: Medicare annual wellness visit, subsequent     Patient has more history with this topic...    BONE DENSITY Next Due 3/20/2020      Done 3/20/2015 DS-BONE DENSITY STUDY (DEXA)     Patient has more history with this topic...          Patient Care Team:  Summer Higuera M.D. as PCP - General (Family Medicine)  Magan Colvin M.D. (Neurology)  Hans Olivera M.D. as Consulting Physician (Cardiology)    Social History   Substance Use Topics   • Smoking status: Never Smoker   • Smokeless tobacco: Never Used   • Alcohol use 0.0 oz/week      Comment: rare     Family History   Problem Relation Age of Onset   • Hypertension Father    • Hypertension Sister    • Arthritis Mother      She  has a past medical history of Arthritis of foot, right (6/9/2015); Benign essential tremor (6/9/2015); Edema (3/10/2015); Hearing loss sensory, bilateral (3/10/2015);  "History of shingles (9/26/2014); Hyperlipidemia (9/26/2014); Macular degeneration of right eye (9/26/2014); Moderate aortic insufficiency (9/26/2014); Osteoarthritis (9/26/2014); Osteoporosis (9/26/2014); Parkinson's disease (HCC); Plantar fasciitis, bilateral (3/10/2015); and Prolapsed bladder (9/26/2014).   Past Surgical History:   Procedure Laterality Date   • CHOLECYSTECTOMY  9/2013   • APPENDECTOMY  1956   • CATARACT PHACO WITH IOL Bilateral            Exam:     /68 (BP Location: Left arm, Patient Position: Sitting)   Pulse 66   Temp 36.2 °C (97.2 °F)   Ht 1.58 m (5' 2.2\")   Wt 76.2 kg (168 lb)   SpO2 98%  Body mass index is 30.53 kg/m².    Hearing good.    Dentition upper and lower dentures  Alert, oriented in no acute distress.  Eye contact is good, speech goal directed, affect calm      Assessment and Plan. The following treatment and monitoring plan is recommended:  1.  Parkinson disease (HCC)  This is a chronic diagnosis for her.  She continues to follow-up with neurology and he is concerned that her tremor may be due to Parkinson.  Currently not on any medications.  Needs to follow-up with Dr. Colvin next April.    2.  Benign essential tremor  Chronic medical problem.  Stable.    3. Hypothyroidism due to acquired atrophy of thyroid  Chronic medical problem.  Currently not on any medications.  We will check labs.  - TSH WITH REFLEX TO FT4; Future    4. CKD (chronic kidney disease) stage 3, GFR 30-59 ml/min ([facility])  Chronic medical condition.  Stable.  We will continue to monitor labs.  - VITAMIN D,25 HYDROXY; Future  - Comp Metabolic Panel; Future    5 Primary osteoarthritis involving multiple joints  Chronic medical condition.  Stable with as needed Celebrex.    6. Aortic valve stenosis, etiology of cardiac valve disease unspecified  Chronic medical condition.  Saw 's back hammer in March and needs to follow-up with him again in September.      7. Macular degeneration of right eye, unspecified " type  Chronic diagnosis.  Continues to follow-up with ophthalmology every 3 months.    8. Dyslipidemia  Chronic medical diagnosis.  Stable with Zocor.    9. Flexural eczema  Chronic medical condition.  Just recently saw dermatology and was started on Kenalog ointment.    10. Cough  Chronic medical diagnosis.  Stable.  Continues to have a cough in the mornings but no shortness of breath.    11. Skin lesion of cheek  Chronic medical lesions.  Improving she just saw dermatology.    12. Bilateral leg edema  Chronic medical condition.  Improving.    13. Abnormal EKG  Chronic medical condition.  Continues to follow-up with cardiology, .    14 Hearing loss sensory, bilateral  Chronic medical condition.  Has hearing aids.    15. Prolapse of female bladder, acquired  Chronic medical condition.  Stable.    16. Aortic valve insufficiency, etiology of cardiac valve disease unspecified  Chronic medical condition.  Stable.    Follow-up with me in 3 months.    No diagnosis found.      Services suggested: No services needed at this time  Health Care Screening recommendations as per orders if indicated.  Referrals offered: PT/OT/Nutrition counseling/Behavioral Health/Smoking cessation as per orders if indicated.    Discussion today about general wellness and lifestyle habits:    · Prevent falls and reduce trip hazards; Cautioned about securing or removing rugs.  · Have a working fire alarm and carbon monoxide detector;   · Engage in regular physical activity and social activities       Follow-up: No Follow-up on file.

## 2019-04-15 RX ORDER — CELECOXIB 100 MG/1
CAPSULE ORAL
Qty: 180 CAP | Refills: 0 | Status: SHIPPED | OUTPATIENT
Start: 2019-04-15 | End: 2019-12-05

## 2019-04-15 NOTE — TELEPHONE ENCOUNTER
Requested Prescriptions     Pending Prescriptions Disp Refills   • celecoxib (CELEBREX) 100 MG Cap [Pharmacy Med Name: CELECOXIB 100MG CAPSULES] 180 Cap 0     Sig: TAKE 1 CAPSULE BY MOUTH TWICE DAILY   Summer Higuera M.D.

## 2019-06-24 ENCOUNTER — TELEPHONE (OUTPATIENT)
Dept: MEDICAL GROUP | Facility: PHYSICIAN GROUP | Age: 79
End: 2019-06-24

## 2019-06-24 NOTE — TELEPHONE ENCOUNTER
Future Appointments       Provider Department Center    7/9/2019 9:20 AM Summer Higuera M.D. [facility] [place] [facility]    9/10/2019 1:00 PM Hans Olivera M.D. [facility]         ESTABLISHED PATIENT PRE-VISIT PLANNING     Patient was NOT contacted to complete PVP.      1.  Reviewed notes from the last few office visits within the medical group: Yes 4/8/19    2.  If any orders were placed at last visit or intended to be done for this visit (i.e. 6 mos follow-up), do we have Results/Consult Notes?        •  Labs - Labs ordered, NOT completed. Patient advised to complete prior to next appointment.       •  Imaging - Imaging was not ordered at last office visit.       •  Referrals - No referrals were ordered at last office visit.    3. Is this appointment scheduled as a Hospital Follow-Up? No    4.  Immunizations were updated in [employer] using WebIZ?: Yes       •  Web [employer] Recommendations: TDAP, VARICELLA (Chicken Pox)  and SHINGRIX (Shingles)    5.  Patient is due for the following Health Maintenance Topics:   Health Maintenance Due   Topic Date Due   • IMM DTaP/Tdap/Td Vaccine (1 - Tdap) 10/08/1958   • IMM ZOSTER VACCINES (1 of 2) 10/08/1989           6. Orders for overdue Health Maintenance topics pended in Pre-Charting? NO    7.  AHA (MDX) form printed for Provider? No, already completed    8.  Patient was NOT informed to arrive 15 min prior to their scheduled appointment and bring in their medication bottles.

## 2019-07-02 ENCOUNTER — HOSPITAL ENCOUNTER (OUTPATIENT)
Dept: LAB | Facility: MEDICAL CENTER | Age: 79
End: 2019-07-02
Attending: FAMILY MEDICINE
Payer: MEDICARE

## 2019-07-02 DIAGNOSIS — E03.4 HYPOTHYROIDISM DUE TO ACQUIRED ATROPHY OF THYROID: ICD-10-CM

## 2019-07-02 DIAGNOSIS — N18.30 CKD (CHRONIC KIDNEY DISEASE) STAGE 3, GFR 30-59 ML/MIN (HCC): ICD-10-CM

## 2019-07-02 LAB
25(OH)D3 SERPL-MCNC: 31 NG/ML (ref 30–100)
ALBUMIN SERPL BCP-MCNC: 4.1 G/DL (ref 3.2–4.9)
ALBUMIN/GLOB SERPL: 1.3 G/DL
ALP SERPL-CCNC: 58 U/L (ref 30–99)
ALT SERPL-CCNC: 19 U/L (ref 2–50)
ANION GAP SERPL CALC-SCNC: 5 MMOL/L (ref 0–11.9)
AST SERPL-CCNC: 28 U/L (ref 12–45)
BILIRUB SERPL-MCNC: 0.9 MG/DL (ref 0.1–1.5)
BUN SERPL-MCNC: 18 MG/DL (ref 8–22)
CALCIUM SERPL-MCNC: 9.7 MG/DL (ref 8.5–10.5)
CHLORIDE SERPL-SCNC: 105 MMOL/L (ref 96–112)
CO2 SERPL-SCNC: 29 MMOL/L (ref 20–33)
CREAT SERPL-MCNC: 1.04 MG/DL (ref 0.5–1.4)
GLOBULIN SER CALC-MCNC: 3.2 G/DL (ref 1.9–3.5)
GLUCOSE SERPL-MCNC: 91 MG/DL (ref 65–99)
POTASSIUM SERPL-SCNC: 4.5 MMOL/L (ref 3.6–5.5)
PROT SERPL-MCNC: 7.3 G/DL (ref 6–8.2)
SODIUM SERPL-SCNC: 139 MMOL/L (ref 135–145)
TSH SERPL DL<=0.005 MIU/L-ACNC: 3.13 UIU/ML (ref 0.38–5.33)

## 2019-07-02 PROCEDURE — 80053 COMPREHEN METABOLIC PANEL: CPT

## 2019-07-02 PROCEDURE — 84443 ASSAY THYROID STIM HORMONE: CPT

## 2019-07-02 PROCEDURE — 82306 VITAMIN D 25 HYDROXY: CPT

## 2019-07-02 PROCEDURE — 36415 COLL VENOUS BLD VENIPUNCTURE: CPT

## 2019-07-15 ENCOUNTER — OFFICE VISIT (OUTPATIENT)
Dept: MEDICAL GROUP | Facility: PHYSICIAN GROUP | Age: 79
End: 2019-07-15
Payer: MEDICARE

## 2019-07-15 VITALS
SYSTOLIC BLOOD PRESSURE: 116 MMHG | HEART RATE: 68 BPM | DIASTOLIC BLOOD PRESSURE: 52 MMHG | WEIGHT: 170 LBS | TEMPERATURE: 97.9 F | BODY MASS INDEX: 31.28 KG/M2 | RESPIRATION RATE: 18 BRPM | HEIGHT: 62 IN | OXYGEN SATURATION: 96 %

## 2019-07-15 DIAGNOSIS — N18.30 CKD (CHRONIC KIDNEY DISEASE) STAGE 3, GFR 30-59 ML/MIN (HCC): ICD-10-CM

## 2019-07-15 DIAGNOSIS — E03.4 HYPOTHYROIDISM DUE TO ACQUIRED ATROPHY OF THYROID: ICD-10-CM

## 2019-07-15 DIAGNOSIS — M15.9 PRIMARY OSTEOARTHRITIS INVOLVING MULTIPLE JOINTS: ICD-10-CM

## 2019-07-15 PROCEDURE — 99214 OFFICE O/P EST MOD 30 MIN: CPT | Performed by: FAMILY MEDICINE

## 2019-07-15 NOTE — PROGRESS NOTES
cc:  Right toes pain    Subjective:     Emilie Gonzalez is a 78 y.o. female presenting for a 3 month follow up.       1. Primary osteoarthritis involving multiple joints  Chronic medical problem.  Complaining of pain to her second and third right toe for the last few months.  Mentions that she has been waking up with charley horses.  Continues to take Celebrex twice a day for arthritis.  Gets some relief with this medication.    2. Hypothyroidism due to acquired atrophy of thyroid  Chronic medical problem.  Currently not taking any medications.  Most recent TSH from July 2019 was 3.1.    3. CKD (chronic kidney disease) stage 3, GFR 30-59 ml/min ([facility])  Chronic medical diagnosis.  Denies any dysuria or hematuria.  Most recent labs from July 2019 showed GFR 51.  This is been stable over the last year.      Review of systems:  See above and negative for fever chills, abdominal pain, chest pain, or shortness of breath.  Allergies   Allergen Reactions   • Codeine      halucinations         Current Outpatient Prescriptions:   •  celecoxib (CELEBREX) 100 MG Cap, TAKE 1 CAPSULE BY MOUTH TWICE DAILY, Disp: 180 Cap, Rfl: 0  •  simvastatin (ZOCOR) 20 MG Tab, TAKE 1 TABLET BY MOUTH EVERY EVENING, Disp: 90 Tab, Rfl: 1  •  vitamin D (CHOLECALCIFEROL) 1000 UNIT Tab, Take 1,000 Units by mouth every day., Disp: , Rfl:   •  Calcium Carbonate-Vit D-Min (CALCIUM 1200 PO), Take 1 Each by mouth every day at 6 PM., Disp: , Rfl:   •  Multiple Vitamins-Minerals (OCUVITE-LUTEIN) Cap, Take 1 Each by mouth every day., Disp: , Rfl:   •  Omega-3 Fatty Acids (OMEGA 3 PO), Take 1 Each by mouth every day., Disp: , Rfl:   •  Multiple Vitamins-Minerals (MULTI COMPLETE PO), Take 1 Tab by mouth every day., Disp: , Rfl:   •  Bioflavonoid Products (VITAMIN C PLUS) 1000 MG TABS, Take 1 Tab by mouth every day., Disp: , Rfl:   •  triamcinolone acetonide (KENALOG) 0.1 % Ointment, SUMMER AA OF THE BODY ONCE OR BID FOR 7 TO 14 DAYS, Disp: , Rfl: 1    Allergies,  "past medical history, past surgical history, family history, social history reviewed and updated    Objective:     Vitals: /52 (BP Location: Left arm, Patient Position: Sitting)   Pulse 68   Temp 36.6 °C (97.9 °F) (Temporal)   Resp 18   Ht 1.575 m (5' 2\")   Wt 77.1 kg (170 lb)   SpO2 96%   BMI 31.09 kg/m²   General:  Alert, pleasant, NAD  Eyes:  normal inspection of conjunctivae and lids, EOMI,   ENMT:  External ears and nose are normal.    Neck  supple,   Heart:  Regular rate and rhythm, bilateral right greater than left  LE edema  Respiratory:  Normal respiratory effort, Clear to auscultation bilaterally.  Abdomen:   soft, Non-distended,   Skin:  Warm, dry, no rashes,   Musculoskeletal:  Right 2nd/3rd toe TTP, non erythematous. Slightly swollen, Normal gait, Normal digits and nails.  Neurological: No tremors,   Psych:   Affect/mood is normal, judgement is good, memory is intact, grooming is appropriate.    Assessment/Plan:     Emilie was seen today for follow-up, results, hypothyroidism and chronic kidney disease.    Diagnoses and all orders for this visit:    Primary osteoarthritis involving multiple joints  Chronic diagnosis.  X-rays from 2018 were reviewed with patient.  Degenerative changes.  Discussed with her that she can continue with the Celebrex and Tylenol as needed.    Hypothyroidism due to acquired atrophy of thyroid  Chronic.  Stable without any medications.    CKD (chronic kidney disease) stage 3, GFR 30-59 ml/min ([facility])  Chronic.  Stable.  Continue to monitor.        Return in about 6 months (around 1/15/2020).  "

## 2019-10-29 ENCOUNTER — OFFICE VISIT (OUTPATIENT)
Dept: CARDIOLOGY | Facility: MEDICAL CENTER | Age: 79
End: 2019-10-29
Payer: MEDICARE

## 2019-10-29 VITALS
DIASTOLIC BLOOD PRESSURE: 78 MMHG | HEIGHT: 62 IN | WEIGHT: 170 LBS | SYSTOLIC BLOOD PRESSURE: 122 MMHG | BODY MASS INDEX: 31.28 KG/M2 | OXYGEN SATURATION: 96 % | HEART RATE: 70 BPM

## 2019-10-29 DIAGNOSIS — I35.0 AORTIC VALVE STENOSIS, ETIOLOGY OF CARDIAC VALVE DISEASE UNSPECIFIED: ICD-10-CM

## 2019-10-29 DIAGNOSIS — I35.1 AORTIC VALVE INSUFFICIENCY, ETIOLOGY OF CARDIAC VALVE DISEASE UNSPECIFIED: ICD-10-CM

## 2019-10-29 DIAGNOSIS — E78.5 DYSLIPIDEMIA: ICD-10-CM

## 2019-10-29 PROCEDURE — 99214 OFFICE O/P EST MOD 30 MIN: CPT | Performed by: INTERNAL MEDICINE

## 2019-10-29 ASSESSMENT — ENCOUNTER SYMPTOMS
PALPITATIONS: 0
DIZZINESS: 0
MYALGIAS: 0
LOSS OF CONSCIOUSNESS: 0
SHORTNESS OF BREATH: 0
COUGH: 0

## 2019-10-29 NOTE — PROGRESS NOTES
Chief Complaint   Patient presents with   • Aortic Stenosis       Subjective:   Emilie Gonzalez is a 79 y.o. female who presents today for followup aortic stenosis, aortic insufficiency, hyperlipidemia, abnormal EKG, edema.     Last seen on 3/6/2019.    Since 3/6/2019 appointment patient said no cardiac problems or symptoms.  Recently celebrated her daughter's 60th birthday in [place], [place].  Danced all night without any difficulty.  Remains active around the house raking leaves without any cardiac symptoms.    Since 8/13/2018 appointment the patient has had no cardiac symptoms.  No palpitations or chest pain.  Started on low-dose Celebrex 1 year ago with resolution of her arthritis symptoms.    Since 4/16/2018 patient said no cardiac symptoms.  Compliant with medications.  Recently returned having driven back by herself from [place] which he does frequently to visit family.  Recently fractured right foot treated conservatively, resolved.    Since 10/16/2017 the patient has had no cardiac symptoms.  3 times a week she is walking twice around [place] walking faster the 2nd time around after which she feels good.  No heart failure symptoms.  Under stress with her daughter being emergently transferred from [place] to [facility] last night for intractable seizures.     Since 4/12/2017 appointment the patient's had no cardiac symptoms.  She had a recent upper respiratory infection with a nonproductive cough after going to her grandson's wedding in [place], [place]     Since 10/10/2016 appointment no cardiac symptoms.  No CHF symptoms.   No palpitations or chest pain.  Moved [place] an apartment [place] [employer] [place].  Able to work out in the garden without any symptoms or problems.     Past medical history  Diagnosed with stage II Parkinson's disease. Followed by Dr. Colvin, neurologist     On 9/1/2015 TSH was slightly elevated.  Levothyroxine started by PCP.  Of note, has always slept on 2 pillows for years.  Has  "always woken up 2-3 times a night.  No change in his sleep habits.     Previous appointment  The patient states that her children states that she gets out of breath while walking.  The patient has noted some mild exertional shortness of breath.  Concerned about recent worsening aortic insufficiency.  No angina pectoris.  No CHF symptoms.  Has had right lower extremity edema currently related to arthritis.  Takes anti-inflammatories.  Had been started on Aldactone/HCTZ by PCP but has not been taking it.  Also in 2014 diagnosed with \"asthma\". Had mild abnormal PFTs in PCPs office.  Prescribed a bronchodilator but has not used it.     Past medical history  The patient had previously lived in [place], [place].  5 years ago she was discovered to have a \"leaky heart valve.  The patient had been followed by Dr. Bird, cardiologist [place] for the past 4 years.  The patient is moved to [facility] and is establishing ongoing cardiology care.     The patient feels that she is \"healthy\".  She lives alone.  She runs all of her activity of daily living including shopping and necessary errands.  She has no exercise limitations.   She denies chest pain shortness of breath, palpitations or lightheadedness.     History of hyperlipidemia on simvastatin.  No history of hypertension, diabetes mellitus and she is not smoke cigarettes.     Family history  Father  of a heart attack at age 69.  Sister alive at age 90. Had a heart attack at age 75.     Social history.  .  Does not drink alcohol except on rare occasions with dinner.     Other medical problems.  2013 laparoscopic cholecystectomy [place], [place].  Appendectomy age 13.  \"Chronic bronchitis\" but no problems times one year    Past Medical History:   Diagnosis Date   • Arthritis of foot, right 2015   • Benign essential tremor 2015   • Edema 3/10/2015   • Hearing loss sensory, bilateral 3/10/2015   • History of shingles 2014   • Hyperlipidemia 2014   • " Macular degeneration of right eye 9/26/2014   • Moderate aortic insufficiency 9/26/2014   • Osteoarthritis 9/26/2014   • Osteoporosis 9/26/2014   • Parkinson's disease (HCC)    • Plantar fasciitis, bilateral 3/10/2015   • Prolapsed bladder 9/26/2014     Past Surgical History:   Procedure Laterality Date   • CHOLECYSTECTOMY  9/2013   • APPENDECTOMY  1956   • CATARACT PHACO WITH IOL Bilateral      Family History   Problem Relation Age of Onset   • Hypertension Father    • Hypertension Sister    • Arthritis Mother      Social History     Socioeconomic History   • Marital status:      Spouse name: Not on file   • Number of children: Not on file   • Years of education: Not on file   • Highest education level: Not on file   Occupational History   • Not on file   Social Needs   • Financial resource strain: Not on file   • Food insecurity:     Worry: Not on file     Inability: Not on file   • Transportation needs:     Medical: Not on file     Non-medical: Not on file   Tobacco Use   • Smoking status: Never Smoker   • Smokeless tobacco: Never Used   Substance and Sexual Activity   • Alcohol use: Yes     Alcohol/week: 0.0 oz     Comment: rare   • Drug use: No   • Sexual activity: Not Currently     Partners: Male     Comment: , bank, 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   Lifestyle   • Physical activity:     Days per week: Not on file     Minutes per session: Not on file   • Stress: Not on file   Relationships   • Social connections:     Talks on phone: Not on file     Gets together: Not on file     Attends [employer]od[employer] service: Not on file     Active member of club or organization: Not on file     Attends meetings of clubs or organizations: Not on file     Relationship status: Not on file   • Intimate partner violence:     Fear of current or ex partner: Not on file     Emotionally abused: Not on file     Physically abused: Not on file     Forced sexual activity: Not on file   Other Topics Concern   • Not on file   Social History Narrative   •  "Not on file     Allergies   Allergen Reactions   • Codeine      halucinations     Outpatient Encounter Medications as of 10/29/2019   Medication Sig Dispense Refill   • celecoxib (CELEBREX) 100 MG Cap TAKE 1 CAPSULE BY MOUTH TWICE DAILY 180 Cap 0   • triamcinolone acetonide (KENALOG) 0.1 % Ointment SUMMER AA OF THE BODY ONCE OR BID FOR 7 TO 14 DAYS  1   • simvastatin (ZOCOR) 20 MG Tab TAKE 1 TABLET BY MOUTH EVERY EVENING 90 Tab 1   • vitamin D (CHOLECALCIFEROL) 1000 UNIT Tab Take 1,000 Units by mouth every day.     • Calcium Carbonate-Vit D-Min (CALCIUM 1200 PO) Take 1 Each by mouth every day at 6 PM.     • Multiple Vitamins-Minerals (OCUVITE-LUTEIN) Cap Take 1 Each by mouth every day.     • Omega-3 Fatty Acids (OMEGA 3 PO) Take 1 Each by mouth every day.     • Multiple Vitamins-Minerals (MULTI COMPLETE PO) Take 1 Tab by mouth every day.     • Bioflavonoid Products (VITAMIN C PLUS) 1000 MG TABS Take 1 Tab by mouth every day.       No facility-administered encounter medications on file as of 10/29/2019.      Review of Systems   Respiratory: Negative for cough and shortness of breath.    Cardiovascular: Negative for chest pain and palpitations.   Musculoskeletal: Positive for joint pain. Negative for myalgias.   Neurological: Negative for dizziness and loss of consciousness.        Objective:   /78 (BP Location: Left arm, Patient Position: Sitting, BP Cuff Size: Adult)   Pulse 70   Ht 1.575 m (5' 2\")   Wt 77.1 kg (170 lb)   SpO2 96%   BMI 31.09 kg/m²     Physical Exam   Constitutional: She is oriented to person, place, and time. She appears well-developed and well-nourished.   Neck: No JVD present.   Cardiovascular: Normal rate, regular rhythm and intact distal pulses.   Murmur heard.  Pulmonary/Chest: Effort normal and breath sounds normal. No respiratory distress. She has no wheezes. She has no rales.   Abdominal:   Protuberant.   Musculoskeletal: She exhibits no edema.       Neurological: She is alert and " oriented to person, place, and time.   Skin: Skin is warm and dry.   Psychiatric: She has a normal mood and affect. Her behavior is normal.     06/07/2010 ECHOCARDIOGRAM  EF 60%.  Mild to moderate aortic regurgitation.     05/05/2011 ECHOCARDIOGRAM  EF 55-60%.  Moderate aortic insufficiency.     05/08/2012 ECHOCARDIOGRAM  EF 70%.  Moderate aortic insufficiency.  Pulmonary pressure 26 mmHg.     03/25/2015 ECHOCARDIOGRAM  Normal left ventricular size, thickness, systolic function, and   diastolic function.  Left ventricular ejection fraction is 65% to 70%.  Aortic sclerosis without stenosis.  Moderately severe aortic insufficiency.  Right heart pressures are consistent with mild pulmonary hypertension.     10/24/2016 ECHOCARDIOGRAM  Normal left ventricular size, wall thickness, and systolic function.  Left ventricular ejection fraction is visually estimated to be 60%.  Severe eccentric aortic insufficiency.  Mildly thickened mitral valve leaflets with normal leaflet excursion.  Unable to estimate pulmonary artery pressure due to an inadequate   tricuspid regurgitant jet.     05/08/2017 ECHOCARDIOGRAM  Normal left ventricular systolic function.  Left ventricular ejection fraction is visually estimated to be 65%.  Mild concentric left ventricular hypertrophy.  Mild aortic stenosis.  Moderate aortic insufficiency.  Mild mitral regurgitation.  Unable to estimate pulmonary artery pressure due to an inadequate   tricuspid regurgitant jet.     9/4/2018 ECHOCARDIOGRAM  Normal left ventricular size, wall thickness, and systolic function.  Left ventricular ejection fraction is visually estimated to be 65%.    Mild aortic stenosis.  Moderate aortic insufficiency.  Right heart pressures are normal.    03/19/2015 EKG: Normal sinus rhythm, rate 73. Nonspecific ST-T wave abnormalities. No prior tracing for comparison. Reviewed by myself.    Assessment:     1. Aortic valve stenosis, etiology of cardiac valve disease unspecified      2. Aortic valve insufficiency, etiology of cardiac valve disease unspecified     3. Dyslipidemia       Medical Decision Making:  Today's Assessment / Status / Plan:     Assessment  Aortic stenosis, mild    Aortic regurgitation. Moderate. Asymptomatic.     Abnormal EKG.       Hyperlipidemia. On simvastatin.     Edema. Minimal.     Hypothyroidism. Diagnosed 9/1/2015. On supplements. Per PCP.     Osteoarthritis on Celebrex.    Recommendations and Discussion  1.  Cardiac status is stable therefore continue current cardiac therapy.  2.  Reviewed most recent lipid panel.  3.  Follow-up 6 months.

## 2019-11-05 ENCOUNTER — APPOINTMENT (RX ONLY)
Dept: URBAN - METROPOLITAN AREA CLINIC 4 | Facility: CLINIC | Age: 79
Setting detail: DERMATOLOGY
End: 2019-11-05

## 2019-11-05 DIAGNOSIS — L57.0 ACTINIC KERATOSIS: ICD-10-CM

## 2019-11-05 DIAGNOSIS — L81.4 OTHER MELANIN HYPERPIGMENTATION: ICD-10-CM

## 2019-11-05 DIAGNOSIS — Z71.89 OTHER SPECIFIED COUNSELING: ICD-10-CM

## 2019-11-05 DIAGNOSIS — D22 MELANOCYTIC NEVI: ICD-10-CM

## 2019-11-05 DIAGNOSIS — L82.1 OTHER SEBORRHEIC KERATOSIS: ICD-10-CM

## 2019-11-05 DIAGNOSIS — D18.0 HEMANGIOMA: ICD-10-CM

## 2019-11-05 DIAGNOSIS — L57.8 OTHER SKIN CHANGES DUE TO CHRONIC EXPOSURE TO NONIONIZING RADIATION: ICD-10-CM

## 2019-11-05 PROBLEM — D22.5 MELANOCYTIC NEVI OF TRUNK: Status: ACTIVE | Noted: 2019-11-05

## 2019-11-05 PROBLEM — D18.01 HEMANGIOMA OF SKIN AND SUBCUTANEOUS TISSUE: Status: ACTIVE | Noted: 2019-11-05

## 2019-11-05 PROBLEM — D48.5 NEOPLASM OF UNCERTAIN BEHAVIOR OF SKIN: Status: ACTIVE | Noted: 2019-11-05

## 2019-11-05 PROCEDURE — 99213 OFFICE O/P EST LOW 20 MIN: CPT | Mod: 25

## 2019-11-05 PROCEDURE — ? ADDITIONAL NOTES

## 2019-11-05 PROCEDURE — 17003 DESTRUCT PREMALG LES 2-14: CPT

## 2019-11-05 PROCEDURE — 17000 DESTRUCT PREMALG LESION: CPT | Mod: 59

## 2019-11-05 PROCEDURE — ? BIOPSY BY SHAVE METHOD

## 2019-11-05 PROCEDURE — ? COUNSELING

## 2019-11-05 PROCEDURE — ? LIQUID NITROGEN

## 2019-11-05 PROCEDURE — 11102 TANGNTL BX SKIN SINGLE LES: CPT

## 2019-11-05 ASSESSMENT — LOCATION SIMPLE DESCRIPTION DERM
LOCATION SIMPLE: RIGHT LOWER BACK
LOCATION SIMPLE: CHEST
LOCATION SIMPLE: RIGHT ANTERIOR NECK
LOCATION SIMPLE: LEFT LOWER BACK
LOCATION SIMPLE: RIGHT FOREARM
LOCATION SIMPLE: LEFT FOREARM
LOCATION SIMPLE: RIGHT CHEEK
LOCATION SIMPLE: RIGHT HAND
LOCATION SIMPLE: UPPER BACK
LOCATION SIMPLE: LEFT HAND
LOCATION SIMPLE: LEFT CHEEK

## 2019-11-05 ASSESSMENT — LOCATION DETAILED DESCRIPTION DERM
LOCATION DETAILED: STERNAL NOTCH
LOCATION DETAILED: LEFT INFERIOR CENTRAL MALAR CHEEK
LOCATION DETAILED: LEFT RADIAL DORSAL HAND
LOCATION DETAILED: RIGHT INFERIOR LATERAL NECK
LOCATION DETAILED: RIGHT SUPERIOR MEDIAL MIDBACK
LOCATION DETAILED: RIGHT RADIAL DORSAL HAND
LOCATION DETAILED: RIGHT ULNAR DORSAL HAND
LOCATION DETAILED: INFERIOR THORACIC SPINE
LOCATION DETAILED: RIGHT INFERIOR CENTRAL MALAR CHEEK
LOCATION DETAILED: LEFT SUPERIOR MEDIAL BUCCAL CHEEK
LOCATION DETAILED: LEFT SUPERIOR MEDIAL LOWER BACK
LOCATION DETAILED: LEFT DISTAL DORSAL FOREARM
LOCATION DETAILED: RIGHT SUPERIOR MEDIAL BUCCAL CHEEK
LOCATION DETAILED: RIGHT PROXIMAL DORSAL FOREARM
LOCATION DETAILED: LEFT PROXIMAL DORSAL FOREARM

## 2019-11-05 ASSESSMENT — LOCATION ZONE DERM
LOCATION ZONE: HAND
LOCATION ZONE: FACE
LOCATION ZONE: TRUNK
LOCATION ZONE: NECK
LOCATION ZONE: ARM

## 2019-11-05 NOTE — PROCEDURE: LIQUID NITROGEN
Consent: The patient's consent was obtained including but not limited to risks of crusting, scabbing, blistering, scarring, darker or lighter pigmentary change, recurrence, incomplete removal and infection.
Duration Of Freeze Thaw-Cycle (Seconds): 0
Render Post-Care Instructions In Note?: no
Detail Level: Detailed
Post-Care Instructions: I reviewed with the patient in detail post-care instructions. Patient is to wear sunprotection, and avoid picking at any of the treated lesions. Pt may apply Vaseline  or Aquaphor to crusted or scabbing areas.

## 2019-11-05 NOTE — PROCEDURE: BIOPSY BY SHAVE METHOD
Detail Level: Detailed
Render Post-Care Instructions In Note?: no
Curettage Text: The wound bed was treated with curettage after the biopsy was performed.
Silver Nitrate Text: The wound bed was treated with silver nitrate after the biopsy was performed.
Hemostasis: Aluminum Chloride and Electrocautery
Size Of Lesion In Cm: 1.5
Lab: [id number]
Billing Type: Third-Party Bill
Anesthesia Type: 1% lidocaine with epinephrine
X Size Of Lesion In Cm: 0
Depth Of Biopsy: dermis
Cryotherapy Text: The wound bed was treated with cryotherapy after the biopsy was performed.
Post-Care Instructions: I reviewed with the patient in detail post-care instructions. Patient is to keep the biopsy site dry overnight, and then apply bacitracin twice daily until healed. Patient may apply hydrogen peroxide soaks to remove any crusting.
Wound Care: Aquaphor
Biopsy Type: H and E
Lab Facility: 
Electrodesiccation Text: The wound bed was treated with electrodesiccation after the biopsy was performed.
Notification Instructions: Patient will be notified of biopsy results. However, patient instructed to call the office if not contacted within 2 weeks.
Electrodesiccation And Curettage Text: The wound bed was treated with electrodesiccation and curettage after the biopsy was performed.
Consent: Written consent was obtained and risks were reviewed including but not limited to scarring, infection, bleeding, scabbing, incomplete removal, nerve damage and allergy to anesthesia.
Type Of Destruction Used: Curettage
Biopsy Method: 15 blade
Dressing: Band-Aid
Was A Bandage Applied: Yes

## 2019-11-19 ENCOUNTER — APPOINTMENT (RX ONLY)
Dept: URBAN - METROPOLITAN AREA CLINIC 4 | Facility: CLINIC | Age: 79
Setting detail: DERMATOLOGY
End: 2019-11-19

## 2019-11-19 DIAGNOSIS — L57.0 ACTINIC KERATOSIS: ICD-10-CM

## 2019-11-19 PROBLEM — C44.519 BASAL CELL CARCINOMA OF SKIN OF OTHER PART OF TRUNK: Status: ACTIVE | Noted: 2019-11-19

## 2019-11-19 PROCEDURE — ? EXCISION

## 2019-11-19 PROCEDURE — 13102 CMPLX RPR TRUNK ADDL 5CM/<: CPT | Mod: 59

## 2019-11-19 PROCEDURE — 11603 EXC TR-EXT MAL+MARG 2.1-3 CM: CPT

## 2019-11-19 PROCEDURE — 17000 DESTRUCT PREMALG LESION: CPT | Mod: 59

## 2019-11-19 PROCEDURE — ? LIQUID NITROGEN

## 2019-11-19 PROCEDURE — 13101 CMPLX RPR TRUNK 2.6-7.5 CM: CPT | Mod: 59

## 2019-11-19 PROCEDURE — 17003 DESTRUCT PREMALG LES 2-14: CPT

## 2019-11-19 ASSESSMENT — LOCATION DETAILED DESCRIPTION DERM: LOCATION DETAILED: LEFT INFERIOR CENTRAL MALAR CHEEK

## 2019-11-19 ASSESSMENT — LOCATION SIMPLE DESCRIPTION DERM: LOCATION SIMPLE: LEFT CHEEK

## 2019-11-19 ASSESSMENT — LOCATION ZONE DERM: LOCATION ZONE: FACE

## 2019-11-19 NOTE — PROCEDURE: EXCISION
Partial Purse String (Simple) Text: Given the location of the defect and the characteristics of the surrounding skin a simple purse string closure was deemed most appropriate.  Undermining was performed circumferentially around the surgical defect.  A purse string suture was then placed and tightened. Wound tension of the circular defect prevented complete closure of the wound.
A-T Advancement Flap Text: The defect edges were debeveled with a #15 scalpel blade.  Given the location of the defect, shape of the defect and the proximity to free margins an A-T advancement flap was deemed most appropriate.  Using a sterile surgical marker, an appropriate advancement flap was drawn incorporating the defect and placing the expected incisions within the relaxed skin tension lines where possible.    The area thus outlined was incised deep to adipose tissue with a #15 scalpel blade.  The skin margins were undermined to an appropriate distance in all directions utilizing iris scissors.
Complex Repair And Split-Thickness Skin Graft Text: The defect edges were debeveled with a #15 scalpel blade.  The primary defect was closed partially with a complex linear closure.  Given the location of the defect, shape of the defect and the proximity to free margins a split thickness skin graft was deemed most appropriate to repair the remaining defect.  The graft was trimmed to fit the size of the remaining defect.  The graft was then placed in the primary defect, oriented appropriately, and sutured into place.
H Plasty Text: Given the location of the defect, shape of the defect and the proximity to free margins a H-plasty was deemed most appropriate for repair.  Using a sterile surgical marker, the appropriate advancement arms of the H-plasty were drawn incorporating the defect and placing the expected incisions within the relaxed skin tension lines where possible. The area thus outlined was incised deep to adipose tissue with a #15 scalpel blade. The skin margins were undermined to an appropriate distance in all directions utilizing iris scissors.  The opposing advancement arms were then advanced into place in opposite direction and anchored with interrupted buried subcutaneous sutures.
Intermediate / Complex Repair - Final Wound Length In Cm: 7.7
Show Date Of Previous Biopsy Variable: Yes
Xenograft Text: The defect edges were debeveled with a #15 scalpel blade.  Given the location of the defect, shape of the defect and the proximity to free margins a xenograft was deemed most appropriate.  The graft was then trimmed to fit the size of the defect.  The graft was then placed in the primary defect and oriented appropriately.
Anesthesia Volume In Cc: 12
Did You Provide Opioid Counseling: No
Hemostasis: Electrocautery
Surgeon (Optional): Zohra
Helical Rim Advancement Flap Text: The defect edges were debeveled with a #15 blade scalpel.  Given the location of the defect and the proximity to free margins (helical rim) a double helical rim advancement flap was deemed most appropriate.  Using a sterile surgical marker, the appropriate advancement flaps were drawn incorporating the defect and placing the expected incisions between the helical rim and antihelix where possible.  The area thus outlined was incised through and through with a #15 scalpel blade.  With a skin hook and iris scissors, the flaps were gently and sharply undermined and freed up.
M-Plasty Intermediate Repair Preamble Text (Leave Blank If You Do Not Want): Undermining was performed with blunt dissection.
Crescentic Advancement Flap Text: The defect edges were debeveled with a #15 scalpel blade.  Given the location of the defect and the proximity to free margins a crescentic advancement flap was deemed most appropriate.  Using a sterile surgical marker, the appropriate advancement flap was drawn incorporating the defect and placing the expected incisions within the relaxed skin tension lines where possible.    The area thus outlined was incised deep to adipose tissue with a #15 scalpel blade.  The skin margins were undermined to an appropriate distance in all directions utilizing iris scissors.
Complex Repair And Ftsg Text: The defect edges were debeveled with a #15 scalpel blade.  The primary defect was closed partially with a complex linear closure.  Given the location of the defect, shape of the defect and the proximity to free margins a full thickness skin graft was deemed most appropriate to repair the remaining defect.  The graft was trimmed to fit the size of the remaining defect.  The graft was then placed in the primary defect, oriented appropriately, and sutured into place.
W Plasty Text: The lesion was extirpated to the level of the fat with a #15 scalpel blade.  Given the location of the defect, shape of the defect and the proximity to free margins a W-plasty was deemed most appropriate for repair.  Using a sterile surgical marker, the appropriate transposition arms of the W-plasty were drawn incorporating the defect and placing the expected incisions within the relaxed skin tension lines where possible.    The area thus outlined was incised deep to adipose tissue with a #15 scalpel blade.  The skin margins were undermined to an appropriate distance in all directions utilizing iris scissors.  The opposing transposition arms were then transposed into place in opposite direction and anchored with interrupted buried subcutaneous sutures.
Purse String (Intermediate) Text: Given the location of the defect and the characteristics of the surrounding skin a purse string intermediate closure was deemed most appropriate.  Undermining was performed circumfirentially around the surgical defect.  A purse string suture was then placed and tightened.
Bilateral Helical Rim Advancement Flap Text: The defect edges were debeveled with a #15 blade scalpel.  Given the location of the defect and the proximity to free margins (helical rim) a bilateral helical rim advancement flap was deemed most appropriate.  Using a sterile surgical marker, the appropriate advancement flaps were drawn incorporating the defect and placing the expected incisions between the helical rim and antihelix where possible.  The area thus outlined was incised through and through with a #15 scalpel blade.  With a skin hook and iris scissors, the flaps were gently and sharply undermined and freed up.
Chonodrocutaneous Helical Advancement Flap Text: The defect edges were debeveled with a #15 scalpel blade.  Given the location of the defect and the proximity to free margins a chondrocutaneous helical advancement flap was deemed most appropriate.  Using a sterile surgical marker, the appropriate advancement flap was drawn incorporating the defect and placing the expected incisions within the relaxed skin tension lines where possible.    The area thus outlined was incised deep to adipose tissue with a #15 scalpel blade.  The skin margins were undermined to an appropriate distance in all directions utilizing iris scissors.
Repair Type: Complex
Z Plasty Text: The lesion was extirpated to the level of the fat with a #15 scalpel blade.  Given the location of the defect, shape of the defect and the proximity to free margins a Z-plasty was deemed most appropriate for repair.  Using a sterile surgical marker, the appropriate transposition arms of the Z-plasty were drawn incorporating the defect and placing the expected incisions within the relaxed skin tension lines where possible.    The area thus outlined was incised deep to adipose tissue with a #15 scalpel blade.  The skin margins were undermined to an appropriate distance in all directions utilizing iris scissors.  The opposing transposition arms were then transposed into place in opposite direction and anchored with interrupted buried subcutaneous sutures.
Complex Repair And Dorsal Nasal Flap Text: The defect edges were debeveled with a #15 scalpel blade.  The primary defect was closed partially with a complex linear closure.  Given the location of the remaining defect, shape of the defect and the proximity to free margins a dorsal nasal flap was deemed most appropriate for complete closure of the defect.  Using a sterile surgical marker, an appropriate flap was drawn incorporating the defect and placing the expected incisions within the relaxed skin tension lines where possible.    The area thus outlined was incised deep to adipose tissue with a #15 scalpel blade.  The skin margins were undermined to an appropriate distance in all directions utilizing iris scissors.
O-Z Flap Text: The defect edges were debeveled with a #15 scalpel blade.  Given the location of the defect, shape of the defect and the proximity to free margins an O-Z flap was deemed most appropriate.  Using a sterile surgical marker, an appropriate transposition flap was drawn incorporating the defect and placing the expected incisions within the relaxed skin tension lines where possible. The area thus outlined was incised deep to adipose tissue with a #15 scalpel blade.  The skin margins were undermined to an appropriate distance in all directions utilizing iris scissors.
Purse String (Simple) Text: Given the location of the defect and the characteristics of the surrounding skin a purse string simple closure was deemed most appropriate.  Undermining was performed circumferentially around the surgical defect.  A purse string suture was then placed and tightened.
Ear Star Wedge Flap Text: The defect edges were debeveled with a #15 blade scalpel.  Given the location of the defect and the proximity to free margins (helical rim) an ear star wedge flap was deemed most appropriate.  Using a sterile surgical marker, the appropriate flap was drawn incorporating the defect and placing the expected incisions between the helical rim and antihelix where possible.  The area thus outlined was incised through and through with a #15 scalpel blade.
Epidermal Closure: running locked
Anesthesia Type: 1% lidocaine with epinephrine
Burow's Advancement Flap Text: The defect edges were debeveled with a #15 scalpel blade.  Given the location of the defect and the proximity to free margins a Burow's advancement flap was deemed most appropriate.  Using a sterile surgical marker, the appropriate advancement flap was drawn incorporating the defect and placing the expected incisions within the relaxed skin tension lines where possible.    The area thus outlined was incised deep to adipose tissue with a #15 scalpel blade.  The skin margins were undermined to an appropriate distance in all directions utilizing iris scissors.
Complex Repair And Z Plasty Text: The defect edges were debeveled with a #15 scalpel blade.  The primary defect was closed partially with a complex linear closure.  Given the location of the remaining defect, shape of the defect and the proximity to free margins a Z plasty was deemed most appropriate for complete closure of the defect.  Using a sterile surgical marker, an appropriate advancement flap was drawn incorporating the defect and placing the expected incisions within the relaxed skin tension lines where possible.    The area thus outlined was incised deep to adipose tissue with a #15 scalpel blade.  The skin margins were undermined to an appropriate distance in all directions utilizing iris scissors.
Cheek Interpolation Flap Text: A decision was made to reconstruct the defect utilizing an interpolation axial flap and a staged reconstruction.  A telfa template was made of the defect.  This telfa template was then used to outline the Cheek Interpolation flap.  The donor area for the pedicle flap was then injected with anesthesia.  The flap was excised through the skin and subcutaneous tissue down to the layer of the underlying musculature.  The interpolation flap was carefully excised within this deep plane to maintain its blood supply.  The edges of the donor site were undermined.   The donor site was closed in a primary fashion.  The pedicle was then rotated into position and sutured.  Once the tube was sutured into place, adequate blood supply was confirmed with blanching and refill.  The pedicle was then wrapped with xeroform gauze and dressed appropriately with a telfa and gauze bandage to ensure continued blood supply and protect the attached pedicle.
Double O-Z Flap Text: The defect edges were debeveled with a #15 scalpel blade.  Given the location of the defect, shape of the defect and the proximity to free margins a Double O-Z flap was deemed most appropriate.  Using a sterile surgical marker, an appropriate transposition flap was drawn incorporating the defect and placing the expected incisions within the relaxed skin tension lines where possible. The area thus outlined was incised deep to adipose tissue with a #15 scalpel blade.  The skin margins were undermined to an appropriate distance in all directions utilizing iris scissors.
Partial Purse String (Intermediate) Text: Given the location of the defect and the characteristics of the surrounding skin an intermediate purse string closure was deemed most appropriate.  Undermining was performed circumferentially around the surgical defect.  A purse string suture was then placed and tightened. Wound tension of the circular defect prevented complete closure of the wound.
Additional Anesthesia Volume In Cc: 6
Double M-Plasty Complex Repair Preamble Text (Leave Blank If You Do Not Want): Extensive wide undermining was performed.
Banner Transposition Flap Text: The defect edges were debeveled with a #15 scalpel blade.  Given the location of the defect and the proximity to free margins a Banner transposition flap was deemed most appropriate.  Using a sterile surgical marker, an appropriate flap drawn around the defect. The area thus outlined was incised deep to adipose tissue with a #15 scalpel blade.  The skin margins were undermined to an appropriate distance in all directions utilizing iris scissors.
Billing Type: Third-Party Bill
Advancement Flap (Double) Text: The defect edges were debeveled with a #15 scalpel blade.  Given the location of the defect and the proximity to free margins a double advancement flap was deemed most appropriate.  Using a sterile surgical marker, the appropriate advancement flaps were drawn incorporating the defect and placing the expected incisions within the relaxed skin tension lines where possible.    The area thus outlined was incised deep to adipose tissue with a #15 scalpel blade.  The skin margins were undermined to an appropriate distance in all directions utilizing iris scissors.
Complex Repair And W Plasty Text: The defect edges were debeveled with a #15 scalpel blade.  The primary defect was closed partially with a complex linear closure.  Given the location of the remaining defect, shape of the defect and the proximity to free margins a W plasty was deemed most appropriate for complete closure of the defect.  Using a sterile surgical marker, an appropriate advancement flap was drawn incorporating the defect and placing the expected incisions within the relaxed skin tension lines where possible.    The area thus outlined was incised deep to adipose tissue with a #15 scalpel blade.  The skin margins were undermined to an appropriate distance in all directions utilizing iris scissors.
Cheek-To-Nose Interpolation Flap Text: A decision was made to reconstruct the defect utilizing an interpolation axial flap and a staged reconstruction.  A telfa template was made of the defect.  This telfa template was then used to outline the Cheek-To-Nose Interpolation flap.  The donor area for the pedicle flap was then injected with anesthesia.  The flap was excised through the skin and subcutaneous tissue down to the layer of the underlying musculature.  The interpolation flap was carefully excised within this deep plane to maintain its blood supply.  The edges of the donor site were undermined.   The donor site was closed in a primary fashion.  The pedicle was then rotated into position and sutured.  Once the tube was sutured into place, adequate blood supply was confirmed with blanching and refill.  The pedicle was then wrapped with xeroform gauze and dressed appropriately with a telfa and gauze bandage to ensure continued blood supply and protect the attached pedicle.
V-Y Flap Text: The defect edges were debeveled with a #15 scalpel blade.  Given the location of the defect, shape of the defect and the proximity to free margins a V-Y flap was deemed most appropriate.  Using a sterile surgical marker, an appropriate advancement flap was drawn incorporating the defect and placing the expected incisions within the relaxed skin tension lines where possible.    The area thus outlined was incised deep to adipose tissue with a #15 scalpel blade.  The skin margins were undermined to an appropriate distance in all directions utilizing iris scissors.
Skin Substitute Units (Will Override Primary Defect Units If Greater Than 0): 0
Bilobed Flap Text: The defect edges were debeveled with a #15 scalpel blade.  Given the location of the defect and the proximity to free margins a bilobe flap was deemed most appropriate.  Using a sterile surgical marker, an appropriate bilobe flap drawn around the defect.    The area thus outlined was incised deep to adipose tissue with a #15 scalpel blade.  The skin margins were undermined to an appropriate distance in all directions utilizing iris scissors.
Repair Anesthesia Method: local infiltration
Advancement Flap (Single) Text: The defect edges were debeveled with a #15 scalpel blade.  Given the location of the defect and the proximity to free margins a single advancement flap was deemed most appropriate.  Using a sterile surgical marker, an appropriate advancement flap was drawn incorporating the defect and placing the expected incisions within the relaxed skin tension lines where possible.    The area thus outlined was incised deep to adipose tissue with a #15 scalpel blade.  The skin margins were undermined to an appropriate distance in all directions utilizing iris scissors.
Interpolation Flap Text: A decision was made to reconstruct the defect utilizing an interpolation axial flap and a staged reconstruction.  A telfa template was made of the defect.  This telfa template was then used to outline the interpolation flap.  The donor area for the pedicle flap was then injected with anesthesia.  The flap was excised through the skin and subcutaneous tissue down to the layer of the underlying musculature.  The interpolation flap was carefully excised within this deep plane to maintain its blood supply.  The edges of the donor site were undermined.   The donor site was closed in a primary fashion.  The pedicle was then rotated into position and sutured.  Once the tube was sutured into place, adequate blood supply was confirmed with blanching and refill.  The pedicle was then wrapped with xeroform gauze and dressed appropriately with a telfa and gauze bandage to ensure continued blood supply and protect the attached pedicle.
Complex Repair And Double M Plasty Text: The defect edges were debeveled with a #15 scalpel blade.  The primary defect was closed partially with a complex linear closure.  Given the location of the remaining defect, shape of the defect and the proximity to free margins a double M plasty was deemed most appropriate for complete closure of the defect.  Using a sterile surgical marker, an appropriate advancement flap was drawn incorporating the defect and placing the expected incisions within the relaxed skin tension lines where possible.    The area thus outlined was incised deep to adipose tissue with a #15 scalpel blade.  The skin margins were undermined to an appropriate distance in all directions utilizing iris scissors.
Advancement-Rotation Flap Text: The defect edges were debeveled with a #15 scalpel blade.  Given the location of the defect, shape of the defect and the proximity to free margins an advancement-rotation flap was deemed most appropriate.  Using a sterile surgical marker, an appropriate flap was drawn incorporating the defect and placing the expected incisions within the relaxed skin tension lines where possible. The area thus outlined was incised deep to adipose tissue with a #15 scalpel blade.  The skin margins were undermined to an appropriate distance in all directions utilizing iris scissors.
Bilobed Transposition Flap Text: The defect edges were debeveled with a #15 scalpel blade.  Given the location of the defect and the proximity to free margins a bilobed transposition flap was deemed most appropriate.  Using a sterile surgical marker, an appropriate bilobe flap drawn around the defect.    The area thus outlined was incised deep to adipose tissue with a #15 scalpel blade.  The skin margins were undermined to an appropriate distance in all directions utilizing iris scissors.
Complex Repair And M Plasty Text: The defect edges were debeveled with a #15 scalpel blade.  The primary defect was closed partially with a complex linear closure.  Given the location of the remaining defect, shape of the defect and the proximity to free margins an M plasty was deemed most appropriate for complete closure of the defect.  Using a sterile surgical marker, an appropriate advancement flap was drawn incorporating the defect and placing the expected incisions within the relaxed skin tension lines where possible.    The area thus outlined was incised deep to adipose tissue with a #15 scalpel blade.  The skin margins were undermined to an appropriate distance in all directions utilizing iris scissors.
Melolabial Interpolation Flap Text: A decision was made to reconstruct the defect utilizing an interpolation axial flap and a staged reconstruction.  A telfa template was made of the defect.  This telfa template was then used to outline the melolabial interpolation flap.  The donor area for the pedicle flap was then injected with anesthesia.  The flap was excised through the skin and subcutaneous tissue down to the layer of the underlying musculature.  The pedicle flap was carefully excised within this deep plane to maintain its blood supply.  The edges of the donor site were undermined.   The donor site was closed in a primary fashion.  The pedicle was then rotated into position and sutured.  Once the tube was sutured into place, adequate blood supply was confirmed with blanching and refill.  The pedicle was then wrapped with xeroform gauze and dressed appropriately with a telfa and gauze bandage to ensure continued blood supply and protect the attached pedicle.
Mercedes Flap Text: The defect edges were debeveled with a #15 scalpel blade.  Given the location of the defect, shape of the defect and the proximity to free margins a Mercedes flap was deemed most appropriate.  Using a sterile surgical marker, an appropriate advancement flap was drawn incorporating the defect and placing the expected incisions within the relaxed skin tension lines where possible. The area thus outlined was incised deep to adipose tissue with a #15 scalpel blade.  The skin margins were undermined to an appropriate distance in all directions utilizing iris scissors.
Home Suture Removal Text: Patient was provided a home suture removal kit and will remove their sutures at home.  If they have any questions or difficulties they will call the office.
Trilobed Flap Text: The defect edges were debeveled with a #15 scalpel blade.  Given the location of the defect and the proximity to free margins a trilobed flap was deemed most appropriate.  Using a sterile surgical marker, an appropriate trilobed flap drawn around the defect.    The area thus outlined was incised deep to adipose tissue with a #15 scalpel blade.  The skin margins were undermined to an appropriate distance in all directions utilizing iris scissors.
Complex Repair And V-Y Plasty Text: The defect edges were debeveled with a #15 scalpel blade.  The primary defect was closed partially with a complex linear closure.  Given the location of the remaining defect, shape of the defect and the proximity to free margins a V-Y plasty was deemed most appropriate for complete closure of the defect.  Using a sterile surgical marker, an appropriate advancement flap was drawn incorporating the defect and placing the expected incisions within the relaxed skin tension lines where possible.    The area thus outlined was incised deep to adipose tissue with a #15 scalpel blade.  The skin margins were undermined to an appropriate distance in all directions utilizing iris scissors.
Excision Method: Elliptical
Mastoid Interpolation Flap Text: A decision was made to reconstruct the defect utilizing an interpolation axial flap and a staged reconstruction.  A telfa template was made of the defect.  This telfa template was then used to outline the mastoid interpolation flap.  The donor area for the pedicle flap was then injected with anesthesia.  The flap was excised through the skin and subcutaneous tissue down to the layer of the underlying musculature.  The pedicle flap was carefully excised within this deep plane to maintain its blood supply.  The edges of the donor site were undermined.   The donor site was closed in a primary fashion.  The pedicle was then rotated into position and sutured.  Once the tube was sutured into place, adequate blood supply was confirmed with blanching and refill.  The pedicle was then wrapped with xeroform gauze and dressed appropriately with a telfa and gauze bandage to ensure continued blood supply and protect the attached pedicle.
Post-Care Instructions: I reviewed with the patient in detail post-care instructions:\\n1. Apply bacitracin over the steri-strips.  \\n2. Cut non-stick pad (Telfa) to cover the steri-strips\\n3. Apply tape (hypafix) over the non-stick pad\\n4. Change once per day for 5 days\\n5. Shower with bandage on, change bandage after shower\\n\\nPatient is not to engage in any heavy lifting, exercise, hot tub, or swimming for the next 14 days. Should the patient develop any fevers, chills, bleeding, severe pain patient will contact the office immediately.
Modified Advancement Flap Text: The defect edges were debeveled with a #15 scalpel blade.  Given the location of the defect, shape of the defect and the proximity to free margins a modified advancement flap was deemed most appropriate.  Using a sterile surgical marker, an appropriate advancement flap was drawn incorporating the defect and placing the expected incisions within the relaxed skin tension lines where possible.    The area thus outlined was incised deep to adipose tissue with a #15 scalpel blade.  The skin margins were undermined to an appropriate distance in all directions utilizing iris scissors.
Dorsal Nasal Flap Text: The defect edges were debeveled with a #15 scalpel blade.  Given the location of the defect and the proximity to free margins a dorsal nasal flap was deemed most appropriate.  Using a sterile surgical marker, an appropriate dorsal nasal flap was drawn around the defect.    The area thus outlined was incised deep to adipose tissue with a #15 scalpel blade.  The skin margins were undermined to an appropriate distance in all directions utilizing iris scissors.
Epidermal Closure Graft Donor Site (Optional): simple interrupted
Posterior Auricular Interpolation Flap Text: A decision was made to reconstruct the defect utilizing an interpolation axial flap and a staged reconstruction.  A telfa template was made of the defect.  This telfa template was then used to outline the posterior auricular interpolation flap.  The donor area for the pedicle flap was then injected with anesthesia.  The flap was excised through the skin and subcutaneous tissue down to the layer of the underlying musculature.  The pedicle flap was carefully excised within this deep plane to maintain its blood supply.  The edges of the donor site were undermined.   The donor site was closed in a primary fashion.  The pedicle was then rotated into position and sutured.  Once the tube was sutured into place, adequate blood supply was confirmed with blanching and refill.  The pedicle was then wrapped with xeroform gauze and dressed appropriately with a telfa and gauze bandage to ensure continued blood supply and protect the attached pedicle.
Size Of Margin In Cm: 0.2
Complex Repair And Transposition Flap Text: The defect edges were debeveled with a #15 scalpel blade.  The primary defect was closed partially with a complex linear closure.  Given the location of the remaining defect, shape of the defect and the proximity to free margins a transposition flap was deemed most appropriate for complete closure of the defect.  Using a sterile surgical marker, an appropriate advancement flap was drawn incorporating the defect and placing the expected incisions within the relaxed skin tension lines where possible.    The area thus outlined was incised deep to adipose tissue with a #15 scalpel blade.  The skin margins were undermined to an appropriate distance in all directions utilizing iris scissors.
Mucosal Advancement Flap Text: Given the location of the defect, shape of the defect and the proximity to free margins a mucosal advancement flap was deemed most appropriate. Incisions were made with a 15 blade scalpel in the appropriate fashion along the cutaneous vermilion border and the mucosal lip. The remaining actinically damaged mucosal tissue was excised.  The mucosal advancement flap was then elevated to the gingival sulcus with care taken to preserve the neurovascular structures and advanced into the primary defect. Care was taken to ensure that precise realignment of the vermilion border was achieved.
Island Pedicle Flap Text: The defect edges were debeveled with a #15 scalpel blade.  Given the location of the defect, shape of the defect and the proximity to free margins an island pedicle advancement flap was deemed most appropriate.  Using a sterile surgical marker, an appropriate advancement flap was drawn incorporating the defect, outlining the appropriate donor tissue and placing the expected incisions within the relaxed skin tension lines where possible.    The area thus outlined was incised deep to adipose tissue with a #15 scalpel blade.  The skin margins were undermined to an appropriate distance in all directions around the primary defect and laterally outward around the island pedicle utilizing iris scissors.  There was minimal undermining beneath the pedicle flap.
Epidermal Sutures: 5-0 Caprosyn
No Repair - Repaired With Adjacent Surgical Defect Text (Leave Blank If You Do Not Want): After the excision the defect was repaired concurrently with another surgical defect which was in close approximation.
Detail Level: Detailed
Paramedian Forehead Flap Text: A decision was made to reconstruct the defect utilizing an interpolation axial flap and a staged reconstruction.  A telfa template was made of the defect.  This telfa template was then used to outline the paramedian forehead pedicle flap.  The donor area for the pedicle flap was then injected with anesthesia.  The flap was excised through the skin and subcutaneous tissue down to the layer of the underlying musculature.  The pedicle flap was carefully excised within this deep plane to maintain its blood supply.  The edges of the donor site were undermined.   The donor site was closed in a primary fashion.  The pedicle was then rotated into position and sutured.  Once the tube was sutured into place, adequate blood supply was confirmed with blanching and refill.  The pedicle was then wrapped with xeroform gauze and dressed appropriately with a telfa and gauze bandage to ensure continued blood supply and protect the attached pedicle.
Complex Repair And Rhombic Flap Text: The defect edges were debeveled with a #15 scalpel blade.  The primary defect was closed partially with a complex linear closure.  Given the location of the remaining defect, shape of the defect and the proximity to free margins a rhombic flap was deemed most appropriate for complete closure of the defect.  Using a sterile surgical marker, an appropriate advancement flap was drawn incorporating the defect and placing the expected incisions within the relaxed skin tension lines where possible.    The area thus outlined was incised deep to adipose tissue with a #15 scalpel blade.  The skin margins were undermined to an appropriate distance in all directions utilizing iris scissors.
Hatchet Flap Text: The defect edges were debeveled with a #15 scalpel blade.  Given the location of the defect, shape of the defect and the proximity to free margins a hatchet flap was deemed most appropriate.  Using a sterile surgical marker, an appropriate hatchet flap was drawn incorporating the defect and placing the expected incisions within the relaxed skin tension lines where possible.    The area thus outlined was incised deep to adipose tissue with a #15 scalpel blade.  The skin margins were undermined to an appropriate distance in all directions utilizing iris scissors.
Pre-Excision Curettage Text (Leave Blank If You Do Not Want): Prior to drawing the surgical margin the visible lesion was removed with electrodesiccation and curettage to clearly define the lesion size.
Island Pedicle Flap With Canthal Suspension Text: The defect edges were debeveled with a #15 scalpel blade.  Given the location of the defect, shape of the defect and the proximity to free margins an island pedicle advancement flap was deemed most appropriate.  Using a sterile surgical marker, an appropriate advancement flap was drawn incorporating the defect, outlining the appropriate donor tissue and placing the expected incisions within the relaxed skin tension lines where possible. The area thus outlined was incised deep to adipose tissue with a #15 scalpel blade.  The skin margins were undermined to an appropriate distance in all directions around the primary defect and laterally outward around the island pedicle utilizing iris scissors.  There was minimal undermining beneath the pedicle flap. A suspension suture was placed in the canthal tendon to prevent tension and prevent ectropion.
Where Do You Want The Question To Include Opioid Counseling Located?: Case Summary Tab
Repair Performed By Another Provider Text (Leave Blank If You Do Not Want): After the tissue was excised the defect was repaired by another provider.
Complex Repair And Rotation Flap Text: The defect edges were debeveled with a #15 scalpel blade.  The primary defect was closed partially with a complex linear closure.  Given the location of the remaining defect, shape of the defect and the proximity to free margins a rotation flap was deemed most appropriate for complete closure of the defect.  Using a sterile surgical marker, an appropriate advancement flap was drawn incorporating the defect and placing the expected incisions within the relaxed skin tension lines where possible.    The area thus outlined was incised deep to adipose tissue with a #15 scalpel blade.  The skin margins were undermined to an appropriate distance in all directions utilizing iris scissors.
Lip Wedge Excision Repair Text: Given the location of the defect and the proximity to free margins a full thickness wedge repair was deemed most appropriate.  Using a sterile surgical marker, the appropriate repair was drawn incorporating the defect and placing the expected incisions perpendicular to the vermilion border.  The vermilion border was also meticulously outlined to ensure appropriate reapproximation during the repair.  The area thus outlined was incised through and through with a #15 scalpel blade.  The muscularis and dermis were reaproximated with deep sutures following hemostasis. Care was taken to realign the vermilion border before proceeding with the superficial closure.  Once the vermilion was realigned the superfical and mucosal closure was finished.
Consent was obtained from the patient. The risks and benefits to therapy were discussed in detail. Specifically, the risks of infection, scarring, bleeding, prolonged wound healing, incomplete removal, allergy to anesthesia, nerve injury and recurrence were addressed. Prior to the procedure, the treatment site was clearly identified and confirmed by the patient. All components of Universal Protocol/PAUSE Rule completed.
Rotation Flap Text: The defect edges were debeveled with a #15 scalpel blade.  Given the location of the defect, shape of the defect and the proximity to free margins a rotation flap was deemed most appropriate.  Using a sterile surgical marker, an appropriate rotation flap was drawn incorporating the defect and placing the expected incisions within the relaxed skin tension lines where possible.    The area thus outlined was incised deep to adipose tissue with a #15 scalpel blade.  The skin margins were undermined to an appropriate distance in all directions utilizing iris scissors.
Alar Island Pedicle Flap Text: The defect edges were debeveled with a #15 scalpel blade.  Given the location of the defect, shape of the defect and the proximity to the alar rim an island pedicle advancement flap was deemed most appropriate.  Using a sterile surgical marker, an appropriate advancement flap was drawn incorporating the defect, outlining the appropriate donor tissue and placing the expected incisions within the nasal ala running parallel to the alar rim. The area thus outlined was incised with a #15 scalpel blade.  The skin margins were undermined minimally to an appropriate distance in all directions around the primary defect and laterally outward around the island pedicle utilizing iris scissors.  There was minimal undermining beneath the pedicle flap.
Positioning (Leave Blank If You Do Not Want): The patient was placed in a comfortable position exposing the surgical site.
Size Of Lesion In Cm: 2.1
Dermal Closure: buried vertical mattress
Ftsg Text: The defect edges were debeveled with a #15 scalpel blade.  Given the location of the defect, shape of the defect and the proximity to free margins a full thickness skin graft was deemed most appropriate.  Using a sterile surgical marker, the primary defect shape was transferred to the donor site. The area thus outlined was incised deep to adipose tissue with a #15 scalpel blade.  The harvested graft was then trimmed of adipose tissue until only dermis and epidermis was left.  The skin margins of the secondary defect were undermined to an appropriate distance in all directions utilizing iris scissors.  The secondary defect was closed with interrupted buried subcutaneous sutures.  The skin edges were then re-apposed with running  sutures.  The skin graft was then placed in the primary defect and oriented appropriately.
Complex Repair And Melolabial Flap Text: The defect edges were debeveled with a #15 scalpel blade.  The primary defect was closed partially with a complex linear closure.  Given the location of the remaining defect, shape of the defect and the proximity to free margins a melolabial flap was deemed most appropriate for complete closure of the defect.  Using a sterile surgical marker, an appropriate advancement flap was drawn incorporating the defect and placing the expected incisions within the relaxed skin tension lines where possible.    The area thus outlined was incised deep to adipose tissue with a #15 scalpel blade.  The skin margins were undermined to an appropriate distance in all directions utilizing iris scissors.
Spiral Flap Text: The defect edges were debeveled with a #15 scalpel blade.  Given the location of the defect, shape of the defect and the proximity to free margins a spiral flap was deemed most appropriate.  Using a sterile surgical marker, an appropriate rotation flap was drawn incorporating the defect and placing the expected incisions within the relaxed skin tension lines where possible. The area thus outlined was incised deep to adipose tissue with a #15 scalpel blade.  The skin margins were undermined to an appropriate distance in all directions utilizing iris scissors.
Double Island Pedicle Flap Text: The defect edges were debeveled with a #15 scalpel blade.  Given the location of the defect, shape of the defect and the proximity to free margins a double island pedicle advancement flap was deemed most appropriate.  Using a sterile surgical marker, an appropriate advancement flap was drawn incorporating the defect, outlining the appropriate donor tissue and placing the expected incisions within the relaxed skin tension lines where possible.    The area thus outlined was incised deep to adipose tissue with a #15 scalpel blade.  The skin margins were undermined to an appropriate distance in all directions around the primary defect and laterally outward around the island pedicle utilizing iris scissors.  There was minimal undermining beneath the pedicle flap.
Perilesional Excision Additional Text (Leave Blank If You Do Not Want): The margin was drawn around the clinically apparent lesion. Incisions were then made along these lines to the appropriate tissue plane and the lesion was extirpated.
Split-Thickness Skin Graft Text: The defect edges were debeveled with a #15 scalpel blade.  Given the location of the defect, shape of the defect and the proximity to free margins a split thickness skin graft was deemed most appropriate.  Using a sterile surgical marker, the primary defect shape was transferred to the donor site. The split thickness graft was then harvested.  The skin graft was then placed in the primary defect and oriented appropriately.
Star Wedge Flap Text: The defect edges were debeveled with a #15 scalpel blade.  Given the location of the defect, shape of the defect and the proximity to free margins a star wedge flap was deemed most appropriate.  Using a sterile surgical marker, an appropriate rotation flap was drawn incorporating the defect and placing the expected incisions within the relaxed skin tension lines where possible. The area thus outlined was incised deep to adipose tissue with a #15 scalpel blade.  The skin margins were undermined to an appropriate distance in all directions utilizing iris scissors.
Complex Repair And Bilobe Flap Text: The defect edges were debeveled with a #15 scalpel blade.  The primary defect was closed partially with a complex linear closure.  Given the location of the remaining defect, shape of the defect and the proximity to free margins a bilobe flap was deemed most appropriate for complete closure of the defect.  Using a sterile surgical marker, an appropriate advancement flap was drawn incorporating the defect and placing the expected incisions within the relaxed skin tension lines where possible.    The area thus outlined was incised deep to adipose tissue with a #15 scalpel blade.  The skin margins were undermined to an appropriate distance in all directions utilizing iris scissors.
Island Pedicle Flap-Requiring Vessel Identification Text: The defect edges were debeveled with a #15 scalpel blade.  Given the location of the defect, shape of the defect and the proximity to free margins an island pedicle advancement flap was deemed most appropriate.  Using a sterile surgical marker, an appropriate advancement flap was drawn, based on the axial vessel mentioned above, incorporating the defect, outlining the appropriate donor tissue and placing the expected incisions within the relaxed skin tension lines where possible.    The area thus outlined was incised deep to adipose tissue with a #15 scalpel blade.  The skin margins were undermined to an appropriate distance in all directions around the primary defect and laterally outward around the island pedicle utilizing iris scissors.  There was minimal undermining beneath the pedicle flap.
Excisional Biopsy Additional Text (Leave Blank If You Do Not Want): The margin was drawn around the clinically apparent lesion. An elliptical shape was then drawn on the skin incorporating the lesion and margins.  Incisions were then made along these lines to the appropriate tissue plane and the lesion was extirpated.
Complex Repair And O-L Flap Text: The defect edges were debeveled with a #15 scalpel blade.  The primary defect was closed partially with a complex linear closure.  Given the location of the remaining defect, shape of the defect and the proximity to free margins an O-L flap was deemed most appropriate for complete closure of the defect.  Using a sterile surgical marker, an appropriate flap was drawn incorporating the defect and placing the expected incisions within the relaxed skin tension lines where possible.    The area thus outlined was incised deep to adipose tissue with a #15 scalpel blade.  The skin margins were undermined to an appropriate distance in all directions utilizing iris scissors.
Cartilage Graft Text: The defect edges were debeveled with a #15 scalpel blade.  Given the location of the defect, shape of the defect, the fact the defect involved a full thickness cartilage defect a cartilage graft was deemed most appropriate.  An appropriate donor site was identified, cleansed, and anesthetized. The cartilage graft was then harvested and transferred to the recipient site, oriented appropriately and then sutured into place.  The secondary defect was then repaired using a primary closure.
Path Notes (To The Dermatopathologist): Please check margins.
Transposition Flap Text: The defect edges were debeveled with a #15 scalpel blade.  Given the location of the defect and the proximity to free margins a transposition flap was deemed most appropriate.  Using a sterile surgical marker, an appropriate transposition flap was drawn incorporating the defect.    The area thus outlined was incised deep to adipose tissue with a #15 scalpel blade.  The skin margins were undermined to an appropriate distance in all directions utilizing iris scissors.
Keystone Flap Text: The defect edges were debeveled with a #15 scalpel blade.  Given the location of the defect, shape of the defect a keystone flap was deemed most appropriate.  Using a sterile surgical marker, an appropriate keystone flap was drawn incorporating the defect, outlining the appropriate donor tissue and placing the expected incisions within the relaxed skin tension lines where possible. The area thus outlined was incised deep to adipose tissue with a #15 scalpel blade.  The skin margins were undermined to an appropriate distance in all directions around the primary defect and laterally outward around the flap utilizing iris scissors.
Slit Excision Additional Text (Leave Blank If You Do Not Want): A linear line was drawn on the skin overlying the lesion. An incision was made slowly until the lesion was visualized.  Once visualized, the lesion was removed with blunt dissection.
Composite Graft Text: The defect edges were debeveled with a #15 scalpel blade.  Given the location of the defect, shape of the defect, the proximity to free margins and the fact the defect was full thickness a composite graft was deemed most appropriate.  The defect was outline and then transferred to the donor site.  A full thickness graft was then excised from the donor site. The graft was then placed in the primary defect, oriented appropriately and then sutured into place.  The secondary defect was then repaired using a primary closure.
Muscle Hinge Flap Text: The defect edges were debeveled with a #15 scalpel blade.  Given the size, depth and location of the defect and the proximity to free margins a muscle hinge flap was deemed most appropriate.  Using a sterile surgical marker, an appropriate hinge flap was drawn incorporating the defect. The area thus outlined was incised with a #15 scalpel blade.  The skin margins were undermined to an appropriate distance in all directions utilizing iris scissors.
Complex Repair And O-T Advancement Flap Text: The defect edges were debeveled with a #15 scalpel blade.  The primary defect was closed partially with a complex linear closure.  Given the location of the remaining defect, shape of the defect and the proximity to free margins an O-T advancement flap was deemed most appropriate for complete closure of the defect.  Using a sterile surgical marker, an appropriate advancement flap was drawn incorporating the defect and placing the expected incisions within the relaxed skin tension lines where possible.    The area thus outlined was incised deep to adipose tissue with a #15 scalpel blade.  The skin margins were undermined to an appropriate distance in all directions utilizing iris scissors.
O-T Plasty Text: The defect edges were debeveled with a #15 scalpel blade.  Given the location of the defect, shape of the defect and the proximity to free margins an O-T plasty was deemed most appropriate.  Using a sterile surgical marker, an appropriate O-T plasty was drawn incorporating the defect and placing the expected incisions within the relaxed skin tension lines where possible.    The area thus outlined was incised deep to adipose tissue with a #15 scalpel blade.  The skin margins were undermined to an appropriate distance in all directions utilizing iris scissors.
Scalpel Size: 15 blade
Complex Repair And Skin Substitute Graft Text: The defect edges were debeveled with a #15 scalpel blade.  The primary defect was closed partially with a complex linear closure.  Given the location of the remaining defect, shape of the defect and the proximity to free margins a skin substitute graft was deemed most appropriate to repair the remaining defect.  The graft was trimmed to fit the size of the remaining defect.  The graft was then placed in the primary defect, oriented appropriately, and sutured into place.
Previous Accession (Optional): [id number]
Saucerization Excision Additional Text (Leave Blank If You Do Not Want): The margin was drawn around the clinically apparent lesion.  Incisions were then made along these lines, in a tangential fashion, to the appropriate tissue plane and the lesion was extirpated.
Complex Repair And A-T Advancement Flap Text: The defect edges were debeveled with a #15 scalpel blade.  The primary defect was closed partially with a complex linear closure.  Given the location of the remaining defect, shape of the defect and the proximity to free margins an A-T advancement flap was deemed most appropriate for complete closure of the defect.  Using a sterile surgical marker, an appropriate advancement flap was drawn incorporating the defect and placing the expected incisions within the relaxed skin tension lines where possible.    The area thus outlined was incised deep to adipose tissue with a #15 scalpel blade.  The skin margins were undermined to an appropriate distance in all directions utilizing iris scissors.
Melolabial Transposition Flap Text: The defect edges were debeveled with a #15 scalpel blade.  Given the location of the defect and the proximity to free margins a melolabial flap was deemed most appropriate.  Using a sterile surgical marker, an appropriate melolabial transposition flap was drawn incorporating the defect.    The area thus outlined was incised deep to adipose tissue with a #15 scalpel blade.  The skin margins were undermined to an appropriate distance in all directions utilizing iris scissors.
O-Z Plasty Text: The defect edges were debeveled with a #15 scalpel blade.  Given the location of the defect, shape of the defect and the proximity to free margins an O-Z plasty (double transposition flap) was deemed most appropriate.  Using a sterile surgical marker, the appropriate transposition flaps were drawn incorporating the defect and placing the expected incisions within the relaxed skin tension lines where possible.    The area thus outlined was incised deep to adipose tissue with a #15 scalpel blade.  The skin margins were undermined to an appropriate distance in all directions utilizing iris scissors.  Hemostasis was achieved with electrocautery.  The flaps were then transposed into place, one clockwise and the other counterclockwise, and anchored with interrupted buried subcutaneous sutures.
Epidermal Autograft Text: The defect edges were debeveled with a #15 scalpel blade.  Given the location of the defect, shape of the defect and the proximity to free margins an epidermal autograft was deemed most appropriate.  Using a sterile surgical marker, the primary defect shape was transferred to the donor site. The epidermal graft was then harvested.  The skin graft was then placed in the primary defect and oriented appropriately.
Complex Repair And Xenograft Text: The defect edges were debeveled with a #15 scalpel blade.  The primary defect was closed partially with a complex linear closure.  Given the location of the defect, shape of the defect and the proximity to free margins a xenograft was deemed most appropriate to repair the remaining defect.  The graft was trimmed to fit the size of the remaining defect.  The graft was then placed in the primary defect, oriented appropriately, and sutured into place.
Elliptical Excision Additional Text (Leave Blank If You Do Not Want): The margin was drawn around the clinically apparent lesion.  An elliptical shape was then drawn on the skin incorporating the lesion and margins.  Incisions were then made along these lines to the appropriate tissue plane and the lesion was extirpated.
Dressing: dry sterile dressing
Complex Repair And Modified Advancement Flap Text: The defect edges were debeveled with a #15 scalpel blade.  The primary defect was closed partially with a complex linear closure.  Given the location of the remaining defect, shape of the defect and the proximity to free margins a modified advancement flap was deemed most appropriate for complete closure of the defect.  Using a sterile surgical marker, an appropriate advancement flap was drawn incorporating the defect and placing the expected incisions within the relaxed skin tension lines where possible.    The area thus outlined was incised deep to adipose tissue with a #15 scalpel blade.  The skin margins were undermined to an appropriate distance in all directions utilizing iris scissors.
Rhombic Flap Text: The defect edges were debeveled with a #15 scalpel blade.  Given the location of the defect and the proximity to free margins a rhombic flap was deemed most appropriate.  Using a sterile surgical marker, an appropriate rhombic flap was drawn incorporating the defect.    The area thus outlined was incised deep to adipose tissue with a #15 scalpel blade.  The skin margins were undermined to an appropriate distance in all directions utilizing iris scissors.
Complex Repair And Tissue Cultured Epidermal Autograft Text: The defect edges were debeveled with a #15 scalpel blade.  The primary defect was closed partially with a complex linear closure.  Given the location of the defect, shape of the defect and the proximity to free margins an tissue cultured epidermal autograft was deemed most appropriate to repair the remaining defect.  The graft was trimmed to fit the size of the remaining defect.  The graft was then placed in the primary defect, oriented appropriately, and sutured into place.
Double O-Z Plasty Text: The defect edges were debeveled with a #15 scalpel blade.  Given the location of the defect, shape of the defect and the proximity to free margins a Double O-Z plasty (double transposition flap) was deemed most appropriate.  Using a sterile surgical marker, the appropriate transposition flaps were drawn incorporating the defect and placing the expected incisions within the relaxed skin tension lines where possible. The area thus outlined was incised deep to adipose tissue with a #15 scalpel blade.  The skin margins were undermined to an appropriate distance in all directions utilizing iris scissors.  Hemostasis was achieved with electrocautery.  The flaps were then transposed into place, one clockwise and the other counterclockwise, and anchored with interrupted buried subcutaneous sutures.
Excision Depth: adipose tissue
Dermal Autograft Text: The defect edges were debeveled with a #15 scalpel blade.  Given the location of the defect, shape of the defect and the proximity to free margins a dermal autograft was deemed most appropriate.  Using a sterile surgical marker, the primary defect shape was transferred to the donor site. The area thus outlined was incised deep to adipose tissue with a #15 scalpel blade.  The harvested graft was then trimmed of adipose and epidermal tissue until only dermis was left.  The skin graft was then placed in the primary defect and oriented appropriately.
Graft Donor Site Bandage (Optional-Leave Blank If You Don't Want In Note): Steri-strips and a pressure bandage were applied to the donor site.
Deep Sutures: 2-0 Vicryl
Information: Selecting Yes will display possible errors in your note based on the variables you have selected. This validation is only offered as a suggestion for you. PLEASE NOTE THAT THE VALIDATION TEXT WILL BE REMOVED WHEN YOU FINALIZE YOUR NOTE. IF YOU WANT TO FAX A PRELIMINARY NOTE YOU WILL NEED TO TOGGLE THIS TO 'NO' IF YOU DO NOT WANT IT IN YOUR FAXED NOTE.
Fusiform Excision Additional Text (Leave Blank If You Do Not Want): The margin was drawn around the clinically apparent lesion.  A fusiform shape was then drawn on the skin incorporating the lesion and margins.  Incisions were then made along these lines to the appropriate tissue plane and the lesion was extirpated.
Complex Repair And Double Advancement Flap Text: The defect edges were debeveled with a #15 scalpel blade.  The primary defect was closed partially with a complex linear closure.  Given the location of the remaining defect, shape of the defect and the proximity to free margins a double advancement flap was deemed most appropriate for complete closure of the defect.  Using a sterile surgical marker, an appropriate advancement flap was drawn incorporating the defect and placing the expected incisions within the relaxed skin tension lines where possible.    The area thus outlined was incised deep to adipose tissue with a #15 scalpel blade.  The skin margins were undermined to an appropriate distance in all directions utilizing iris scissors.
O-L Flap Text: The defect edges were debeveled with a #15 scalpel blade.  Given the location of the defect, shape of the defect and the proximity to free margins an O-L flap was deemed most appropriate.  Using a sterile surgical marker, an appropriate advancement flap was drawn incorporating the defect and placing the expected incisions within the relaxed skin tension lines where possible.    The area thus outlined was incised deep to adipose tissue with a #15 scalpel blade.  The skin margins were undermined to an appropriate distance in all directions utilizing iris scissors.
Rhomboid Transposition Flap Text: The defect edges were debeveled with a #15 scalpel blade.  Given the location of the defect and the proximity to free margins a rhomboid transposition flap was deemed most appropriate.  Using a sterile surgical marker, an appropriate rhomboid flap was drawn incorporating the defect.    The area thus outlined was incised deep to adipose tissue with a #15 scalpel blade.  The skin margins were undermined to an appropriate distance in all directions utilizing iris scissors.
S Plasty Text: Given the location and shape of the defect, and the orientation of relaxed skin tension lines, an S-plasty was deemed most appropriate for repair.  Using a sterile surgical marker, the appropriate outline of the S-plasty was drawn, incorporating the defect and placing the expected incisions within the relaxed skin tension lines where possible.  The area thus outlined was incised deep to adipose tissue with a #15 scalpel blade.  The skin margins were undermined to an appropriate distance in all directions utilizing iris scissors. The skin flaps were advanced over the defect.  The opposing margins were then approximated with interrupted buried subcutaneous sutures.
Complex Repair And Dermal Autograft Text: The defect edges were debeveled with a #15 scalpel blade.  The primary defect was closed partially with a complex linear closure.  Given the location of the defect, shape of the defect and the proximity to free margins an dermal autograft was deemed most appropriate to repair the remaining defect.  The graft was trimmed to fit the size of the remaining defect.  The graft was then placed in the primary defect, oriented appropriately, and sutured into place.
Skin Substitute Text: The defect edges were debeveled with a #15 scalpel blade.  Given the location of the defect, shape of the defect and the proximity to free margins a skin substitute graft was deemed most appropriate.  The graft material was trimmed to fit the size of the defect. The graft was then placed in the primary defect and oriented appropriately.
Lab Facility: 
Estimated Blood Loss (Cc): minimal
Curvilinear Excision Additional Text (Leave Blank If You Do Not Want): The margin was drawn around the clinically apparent lesion.  A curvilinear shape was then drawn on the skin incorporating the lesion and margins.  Incisions were then made along these lines to the appropriate tissue plane and the lesion was extirpated.
Wound Care: Bacitracin
O-T Advancement Flap Text: The defect edges were debeveled with a #15 scalpel blade.  Given the location of the defect, shape of the defect and the proximity to free margins an O-T advancement flap was deemed most appropriate.  Using a sterile surgical marker, an appropriate advancement flap was drawn incorporating the defect and placing the expected incisions within the relaxed skin tension lines where possible.    The area thus outlined was incised deep to adipose tissue with a #15 scalpel blade.  The skin margins were undermined to an appropriate distance in all directions utilizing iris scissors.
Complex Repair And Single Advancement Flap Text: The defect edges were debeveled with a #15 scalpel blade.  The primary defect was closed partially with a complex linear closure.  Given the location of the remaining defect, shape of the defect and the proximity to free margins a single advancement flap was deemed most appropriate for complete closure of the defect.  Using a sterile surgical marker, an appropriate advancement flap was drawn incorporating the defect and placing the expected incisions within the relaxed skin tension lines where possible.    The area thus outlined was incised deep to adipose tissue with a #15 scalpel blade.  The skin margins were undermined to an appropriate distance in all directions utilizing iris scissors.
V-Y Plasty Text: The defect edges were debeveled with a #15 scalpel blade.  Given the location of the defect, shape of the defect and the proximity to free margins an V-Y advancement flap was deemed most appropriate.  Using a sterile surgical marker, an appropriate advancement flap was drawn incorporating the defect and placing the expected incisions within the relaxed skin tension lines where possible.    The area thus outlined was incised deep to adipose tissue with a #15 scalpel blade.  The skin margins were undermined to an appropriate distance in all directions utilizing iris scissors.
Lab: [id number]
Undermining Location (Optional): in the superficial subcutaneous fat
Complex Repair And Epidermal Autograft Text: The defect edges were debeveled with a #15 scalpel blade.  The primary defect was closed partially with a complex linear closure.  Given the location of the defect, shape of the defect and the proximity to free margins an epidermal autograft was deemed most appropriate to repair the remaining defect.  The graft was trimmed to fit the size of the remaining defect.  The graft was then placed in the primary defect, oriented appropriately, and sutured into place.
Tissue Cultured Epidermal Autograft Text: The defect edges were debeveled with a #15 scalpel blade.  Given the location of the defect, shape of the defect and the proximity to free margins a tissue cultured epidermal autograft was deemed most appropriate.  The graft was then trimmed to fit the size of the defect.  The graft was then placed in the primary defect and oriented appropriately.
Bi-Rhombic Flap Text: The defect edges were debeveled with a #15 scalpel blade.  Given the location of the defect and the proximity to free margins a bi-rhombic flap was deemed most appropriate.  Using a sterile surgical marker, an appropriate rhombic flap was drawn incorporating the defect. The area thus outlined was incised deep to adipose tissue with a #15 scalpel blade.  The skin margins were undermined to an appropriate distance in all directions utilizing iris scissors.

## 2019-11-19 NOTE — PROCEDURE: LIQUID NITROGEN
Post-Care Instructions: I reviewed with the patient in detail post-care instructions. Patient is to wear sunprotection, and avoid picking at any of the treated lesions. Pt may apply Vaseline to crusted or scabbing areas.
Number Of Freeze-Thaw Cycles: 2 freeze-thaw cycles
Aperture Size (Optional): C
Render Note In Bullet Format When Appropriate: No
Duration Of Freeze Thaw-Cycle (Seconds): 3
Consent: The patient's consent was obtained including but not limited to risks of crusting, scabbing, blistering, scarring, darker or lighter pigmentary change, recurrence, incomplete removal and infection.
Detail Level: Simple

## 2019-12-05 RX ORDER — CELECOXIB 100 MG/1
CAPSULE ORAL
Qty: 180 CAP | Refills: 0 | Status: SHIPPED | OUTPATIENT
Start: 2019-12-05 | End: 2020-03-03 | Stop reason: SDUPTHER

## 2020-01-21 ENCOUNTER — OFFICE VISIT (OUTPATIENT)
Dept: MEDICAL GROUP | Facility: PHYSICIAN GROUP | Age: 80
End: 2020-01-21
Payer: MEDICARE

## 2020-01-21 VITALS
HEIGHT: 63 IN | HEART RATE: 70 BPM | RESPIRATION RATE: 20 BRPM | DIASTOLIC BLOOD PRESSURE: 48 MMHG | TEMPERATURE: 97.9 F | BODY MASS INDEX: 29.77 KG/M2 | WEIGHT: 168 LBS | OXYGEN SATURATION: 100 % | SYSTOLIC BLOOD PRESSURE: 132 MMHG

## 2020-01-21 DIAGNOSIS — N18.30 CKD (CHRONIC KIDNEY DISEASE) STAGE 3, GFR 30-59 ML/MIN: ICD-10-CM

## 2020-01-21 DIAGNOSIS — I35.1 AORTIC VALVE INSUFFICIENCY, ETIOLOGY OF CARDIAC VALVE DISEASE UNSPECIFIED: ICD-10-CM

## 2020-01-21 DIAGNOSIS — G20.A1 PARKINSON DISEASE (HCC): ICD-10-CM

## 2020-01-21 DIAGNOSIS — E78.5 DYSLIPIDEMIA: ICD-10-CM

## 2020-01-21 DIAGNOSIS — R05.9 COUGH: ICD-10-CM

## 2020-01-21 DIAGNOSIS — Z23 NEED FOR VACCINATION: ICD-10-CM

## 2020-01-21 PROCEDURE — 8041 PR SCP AHA: Performed by: FAMILY MEDICINE

## 2020-01-21 PROCEDURE — 90662 IIV NO PRSV INCREASED AG IM: CPT | Performed by: FAMILY MEDICINE

## 2020-01-21 PROCEDURE — 99214 OFFICE O/P EST MOD 30 MIN: CPT | Mod: 25 | Performed by: FAMILY MEDICINE

## 2020-01-21 PROCEDURE — G0008 ADMIN INFLUENZA VIRUS VAC: HCPCS | Performed by: FAMILY MEDICINE

## 2020-01-21 ASSESSMENT — PATIENT HEALTH QUESTIONNAIRE - PHQ9: CLINICAL INTERPRETATION OF PHQ2 SCORE: 0

## 2020-01-21 NOTE — PROGRESS NOTES
Annual Health Assessment Questions:    1.  Are you currently engaging in any exercise or physical activity? Yes    2.  How would you describe your mood or emotional well-being today? good    3.  Have you had any falls in the last year? No    4.  Have you noticed any problems with your balance or had difficulty walking? No    5.  In the last six months have you experienced any leakage of urine? No    6. DPA/Advanced Directive: Patient does not have an Advanced Directive.  A packet and workshop information was given on Advanced Directives.     cc: Cough and night sweats    Subjective:     Emilie Gonzalez is a 79 y.o. female presenting for a follow-up.  She was seen approximately 6 months ago.  Would like to discuss her chronic cough and night sweats.  She also mentions that she was seen by a dermatologist and had a biopsy of her left upper back lesion with benign results.    1. Cough  Chronic medical problem.  She has been told that she might have allergies in the past.  Has been trying an over-the-counter [employer] medication with some improvement.  Continues to complain of a nonproductive dry cough.  Mainly worse in the evenings at nighttime.  Denies any weight loss or difficulty breathing.  Reviewing her records she had this evaluated by her previous PCP in 2018.  She is also noticed night sweats over the last 3 months.  Denies any fevers or chills.  Denies any shortness of breath.    2. CKD (chronic kidney disease) stage 3, GFR 30-59 ml/min ([facility])  Chronic medical diagnosis.  Denies any hematuria or flank pain.  Previous labs from July show stable GFR at 51.  We will go ahead and check a urine microalbumin creatinine ratio.    3. Need for vaccination  Would like to get her flu vaccine today.    4. Parkinson disease (HCC)  Chronic medical diagnosis for which she was seeing neurology, Dr. Colvin.  Had a follow-up with him last April and recommendations were to follow-up with in 1 year.  She does notice bilateral hand  "tremor, greater on the left side.  She notices it more when she is lifting heavy things such as plates.    5. Aortic valve insufficiency   chronic medical diagnosis.  Continues to follow-up with cardiology, Dr. Olivera.  She was here for a follow-up appointment in October.  Denies any chest pain or shortness of breath.  Most recent echocardiogram from 2018 did show moderate aortic insufficiency.  Recommendations were to follow-up in 6 months.    6. Dyslipidemia  Chronic medical diagnosis.  Recent labs from December 2018 have total cholesterol 169, triglycerides 202, HDL 40, and LDL 89.  Continues to take Zocor 20 mg daily.  Denies any lower extremity muscle cramps.      Review of systems:  See above negative for heartburn.  Allergies   Allergen Reactions   • Codeine      halucinations         Current Outpatient Medications:   •  celecoxib (CELEBREX) 100 MG Cap, TAKE 1 CAPSULE BY MOUTH TWICE DAILY, Disp: 180 Cap, Rfl: 0  •  simvastatin (ZOCOR) 20 MG Tab, TAKE 1 TABLET BY MOUTH EVERY EVENING, Disp: 90 Tab, Rfl: 1  •  vitamin D (CHOLECALCIFEROL) 1000 UNIT Tab, Take 1,000 Units by mouth every day., Disp: , Rfl:   •  Calcium Carbonate-Vit D-Min (CALCIUM 1200 PO), Take 1 Each by mouth every day at 6 PM., Disp: , Rfl:   •  Multiple Vitamins-Minerals (OCUVITE-LUTEIN) Cap, Take 1 Each by mouth every day., Disp: , Rfl:   •  Omega-3 Fatty Acids (OMEGA 3 PO), Take 1 Each by mouth every day., Disp: , Rfl:   •  Multiple Vitamins-Minerals (MULTI COMPLETE PO), Take 1 Tab by mouth every day., Disp: , Rfl:   •  Bioflavonoid Products (VITAMIN C PLUS) 1000 MG TABS, Take 1 Tab by mouth every day., Disp: , Rfl:     Allergies, past medical history, past surgical history, family history, social history reviewed and updated    Objective:     Vitals: /48 (BP Location: Left arm, Patient Position: Sitting, BP Cuff Size: Adult)   Pulse 70   Temp 36.6 °C (97.9 °F) (Temporal)   Resp 20   Ht 1.588 m (5' 2.5\")   Wt 76.2 kg (168 lb)   " SpO2 100%   BMI 30.24 kg/m²   General:  Alert, pleasant, NAD  Eyes:  normal inspection of conjunctivae and lids, EOMI,   ENMT:  External ears and nose are normal.    Neck  supple,   Heart:  Regular rate and rhythm,  No LE edema  Respiratory:  Normal respiratory effort, Clear to auscultation bilaterally.  Abdomen:   soft, Non-distended,   Skin:  Warm, dry, no rashes,   Musculoskeletal:  Normal gait, Normal digits and nails.  Neurological: No tremors,   Psych:   Affect/mood is normal, judgement is good, memory is intact, grooming is appropriate.    Assessment/Plan:     Emilie was seen today for follow-up.    Diagnoses and all orders for this visit:    Cough  Chronic medical diagnosis.  Not improving.  Encouraged to try oral antihistamine as well as Flonase.  Patient will follow-up with me in 4 to 6 weeks.  Will check labs.    CKD (chronic kidney disease) stage 3, GFR 30-59 ml/min (HCC)  Chronic medical diagnosis.  Stable.  We will continue to monitor.  -     CBC WITH DIFFERENTIAL; Future  -     Comp Metabolic Panel; Future  -     MICROALBUMIN CREAT RATIO URINE; Future    Need for vaccination  -     Influenza Vaccine, High Dose (65+ Only)    Parkinson disease (HCC)  Chronic medical diagnosis.  Stable.  Encouraged to follow-up with neurology this year.    Aortic valve insufficiency  Chronic medical diagnosis.  Stable.  Continue to follow-up with cardiology.  -     TSH WITH REFLEX TO FT4; Future    Dyslipidemia  Chronic medical diagnosis.  Stable.  Continue with simvastatin 20 mg.  -     Lipid Profile; Future          Return in about 6 weeks (around 3/3/2020), or 4-6 weeks, for allergies.  This note was created using voice recognition software (Dragon). The accuracy of the dictation is limited by the abilities of the software. I have reviewed the note prior to signing, however some errors in grammar and context are still possible. If you have any questions related to this note please do not hesitate to contact our  office.

## 2020-02-10 ENCOUNTER — HOSPITAL ENCOUNTER (OUTPATIENT)
Dept: LAB | Facility: MEDICAL CENTER | Age: 80
End: 2020-02-10
Attending: FAMILY MEDICINE
Payer: MEDICARE

## 2020-02-10 DIAGNOSIS — E78.5 DYSLIPIDEMIA: ICD-10-CM

## 2020-02-10 DIAGNOSIS — N18.30 CKD (CHRONIC KIDNEY DISEASE) STAGE 3, GFR 30-59 ML/MIN: ICD-10-CM

## 2020-02-10 DIAGNOSIS — I35.1 AORTIC VALVE INSUFFICIENCY, ETIOLOGY OF CARDIAC VALVE DISEASE UNSPECIFIED: ICD-10-CM

## 2020-02-10 LAB
ALBUMIN SERPL BCP-MCNC: 4.3 G/DL (ref 3.2–4.9)
ALBUMIN/GLOB SERPL: 1.5 G/DL
ALP SERPL-CCNC: 51 U/L (ref 30–99)
ALT SERPL-CCNC: 19 U/L (ref 2–50)
ANION GAP SERPL CALC-SCNC: 4 MMOL/L (ref 0–11.9)
AST SERPL-CCNC: 25 U/L (ref 12–45)
BASOPHILS # BLD AUTO: 0.9 % (ref 0–1.8)
BASOPHILS # BLD: 0.04 K/UL (ref 0–0.12)
BILIRUB SERPL-MCNC: 0.9 MG/DL (ref 0.1–1.5)
BUN SERPL-MCNC: 25 MG/DL (ref 8–22)
CALCIUM SERPL-MCNC: 9.7 MG/DL (ref 8.5–10.5)
CHLORIDE SERPL-SCNC: 103 MMOL/L (ref 96–112)
CHOLEST SERPL-MCNC: 167 MG/DL (ref 100–199)
CO2 SERPL-SCNC: 30 MMOL/L (ref 20–33)
CREAT SERPL-MCNC: 1.07 MG/DL (ref 0.5–1.4)
EOSINOPHIL # BLD AUTO: 0 K/UL (ref 0–0.51)
EOSINOPHIL NFR BLD: 0 % (ref 0–6.9)
ERYTHROCYTE [DISTWIDTH] IN BLOOD BY AUTOMATED COUNT: 43.6 FL (ref 35.9–50)
GLOBULIN SER CALC-MCNC: 2.9 G/DL (ref 1.9–3.5)
GLUCOSE SERPL-MCNC: 95 MG/DL (ref 65–99)
HCT VFR BLD AUTO: 44.4 % (ref 37–47)
HDLC SERPL-MCNC: 38 MG/DL
HGB BLD-MCNC: 14.5 G/DL (ref 12–16)
IMM GRANULOCYTES # BLD AUTO: 0.01 K/UL (ref 0–0.11)
IMM GRANULOCYTES NFR BLD AUTO: 0.2 % (ref 0–0.9)
LDLC SERPL CALC-MCNC: 89 MG/DL
LYMPHOCYTES # BLD AUTO: 1.86 K/UL (ref 1–4.8)
LYMPHOCYTES NFR BLD: 42.5 % (ref 22–41)
MCH RBC QN AUTO: 30.3 PG (ref 27–33)
MCHC RBC AUTO-ENTMCNC: 32.7 G/DL (ref 33.6–35)
MCV RBC AUTO: 92.7 FL (ref 81.4–97.8)
MONOCYTES # BLD AUTO: 0.57 K/UL (ref 0–0.85)
MONOCYTES NFR BLD AUTO: 13 % (ref 0–13.4)
NEUTROPHILS # BLD AUTO: 1.9 K/UL (ref 2–7.15)
NEUTROPHILS NFR BLD: 43.4 % (ref 44–72)
NRBC # BLD AUTO: 0 K/UL
NRBC BLD-RTO: 0 /100 WBC
PLATELET # BLD AUTO: 157 K/UL (ref 164–446)
PMV BLD AUTO: 12.1 FL (ref 9–12.9)
POTASSIUM SERPL-SCNC: 4.1 MMOL/L (ref 3.6–5.5)
PROT SERPL-MCNC: 7.2 G/DL (ref 6–8.2)
RBC # BLD AUTO: 4.79 M/UL (ref 4.2–5.4)
SODIUM SERPL-SCNC: 137 MMOL/L (ref 135–145)
TRIGL SERPL-MCNC: 198 MG/DL (ref 0–149)
TSH SERPL DL<=0.005 MIU/L-ACNC: 4.35 UIU/ML (ref 0.38–5.33)
WBC # BLD AUTO: 4.4 K/UL (ref 4.8–10.8)

## 2020-02-10 PROCEDURE — 36415 COLL VENOUS BLD VENIPUNCTURE: CPT

## 2020-02-10 PROCEDURE — 84443 ASSAY THYROID STIM HORMONE: CPT

## 2020-02-10 PROCEDURE — 80053 COMPREHEN METABOLIC PANEL: CPT

## 2020-02-10 PROCEDURE — 85025 COMPLETE CBC W/AUTO DIFF WBC: CPT

## 2020-02-10 PROCEDURE — 80061 LIPID PANEL: CPT

## 2020-02-27 RX ORDER — SIMVASTATIN 20 MG
TABLET ORAL
Qty: 90 TAB | Refills: 2 | Status: SHIPPED | OUTPATIENT
Start: 2020-02-27 | End: 2020-03-03 | Stop reason: SDUPTHER

## 2020-02-27 NOTE — TELEPHONE ENCOUNTER
Requested Prescriptions     Pending Prescriptions Disp Refills   • simvastatin (ZOCOR) 20 MG Tab [Pharmacy Med Name: SIMVASTATIN 20MG TABLETS] 90 Tab 2     Sig: TAKE 1 TABLET BY MOUTH EVERY EVENING   Summer Higuera M.D.

## 2020-03-03 ENCOUNTER — OFFICE VISIT (OUTPATIENT)
Dept: MEDICAL GROUP | Facility: PHYSICIAN GROUP | Age: 80
End: 2020-03-03
Payer: MEDICARE

## 2020-03-03 ENCOUNTER — HOSPITAL ENCOUNTER (OUTPATIENT)
Dept: LAB | Facility: MEDICAL CENTER | Age: 80
End: 2020-03-03
Attending: FAMILY MEDICINE
Payer: MEDICARE

## 2020-03-03 VITALS
HEART RATE: 70 BPM | HEIGHT: 63 IN | TEMPERATURE: 97.9 F | RESPIRATION RATE: 20 BRPM | OXYGEN SATURATION: 94 % | DIASTOLIC BLOOD PRESSURE: 48 MMHG | WEIGHT: 169 LBS | BODY MASS INDEX: 29.95 KG/M2 | SYSTOLIC BLOOD PRESSURE: 128 MMHG

## 2020-03-03 DIAGNOSIS — E78.5 DYSLIPIDEMIA: ICD-10-CM

## 2020-03-03 DIAGNOSIS — H92.01 RIGHT EAR PAIN: ICD-10-CM

## 2020-03-03 DIAGNOSIS — N18.30 CKD (CHRONIC KIDNEY DISEASE) STAGE 3, GFR 30-59 ML/MIN: ICD-10-CM

## 2020-03-03 DIAGNOSIS — G20.A1 PARKINSON DISEASE (HCC): ICD-10-CM

## 2020-03-03 DIAGNOSIS — M15.9 PRIMARY OSTEOARTHRITIS INVOLVING MULTIPLE JOINTS: ICD-10-CM

## 2020-03-03 PROBLEM — R05.9 COUGH: Status: RESOLVED | Noted: 2018-10-17 | Resolved: 2020-03-03

## 2020-03-03 PROCEDURE — 81001 URINALYSIS AUTO W/SCOPE: CPT

## 2020-03-03 PROCEDURE — 82570 ASSAY OF URINE CREATININE: CPT

## 2020-03-03 PROCEDURE — 87086 URINE CULTURE/COLONY COUNT: CPT

## 2020-03-03 PROCEDURE — 82043 UR ALBUMIN QUANTITATIVE: CPT

## 2020-03-03 PROCEDURE — 99214 OFFICE O/P EST MOD 30 MIN: CPT | Performed by: FAMILY MEDICINE

## 2020-03-03 RX ORDER — SIMVASTATIN 20 MG
TABLET ORAL
Qty: 90 TAB | Refills: 2 | Status: SHIPPED | OUTPATIENT
Start: 2020-03-03 | End: 2020-09-08 | Stop reason: SDUPTHER

## 2020-03-03 RX ORDER — CELECOXIB 100 MG/1
100 CAPSULE ORAL 2 TIMES DAILY
Qty: 180 CAP | Refills: 1 | Status: SHIPPED | OUTPATIENT
Start: 2020-03-03 | End: 2020-07-13 | Stop reason: SDUPTHER

## 2020-03-03 ASSESSMENT — PAIN SCALES - GENERAL: PAINLEVEL: 5=MODERATE PAIN

## 2020-03-03 ASSESSMENT — FIBROSIS 4 INDEX: FIB4 SCORE: 2.92

## 2020-03-03 NOTE — PROGRESS NOTES
cc: Right ear discomfort    Subjective:     Emilie Gonzalez is a 79 y.o. female presenting for possible sinus symptoms.    Right otalgia   New medical problem.  Mentions that her symptoms started yesterday.  She has been having right ear throbbing as well as right-sided facial pain and feels that her eyes are swollen.  She has been trying over-the-counter allergy medications as well as a nasal spray without much improvement.  Denies any sick contacts.  Denies any cough, fevers or chills.  Does have an appointment with her eye doctor next week, Dr Chan.    Primary arthritis  Chronic medical diagnosis.  Continues to complain of  Arthritis in fingers and takes celebrex bid.      Dyslipidemia  Chronic medical diagnosis.  She continues to follow-up with Dr. Jimbo Valentin.  Last saw him movement October 2019.  Does have a diagnosis of mild aortic stenosis as well as moderate aortic regurgitation.  Denies any chest pain.  Previous labs from February have total cholesterol 167, triglycerides 198, HDL 38, and LDL 89.  Continues to take simvastatin 20 mg daily.    CKD  Chronic medical diagnosis.  GFR down to 49 with recent labs.  Her labs were normal back in 2014.  Drinks 4-6 glasses water daily.  Denies any dysuria or hematuria.  Encouraged to cut back on Celebrex and only use Tylenol for pain.    Parkinson disease  Chronic medical diagnosis.  She has been told in the past that she has early Parkinson's.  Has an appointment with neurology, Dr. Colvin on March 12.  Currently not taking any medications.  Denies any tremors.    Review of systems:  See above and negative for fever chills.  Allergies   Allergen Reactions   • Codeine      halucinations         Current Outpatient Medications:   •  simvastatin (ZOCOR) 20 MG Tab, TAKE 1 TABLET BY MOUTH EVERY EVENING, Disp: 90 Tab, Rfl: 2  •  celecoxib (CELEBREX) 100 MG Cap, Take 1 Cap by mouth 2 times a day., Disp: 180 Cap, Rfl: 1  •  vitamin D (CHOLECALCIFEROL) 1000 UNIT Tab,  "Take 1,000 Units by mouth every day., Disp: , Rfl:   •  Calcium Carbonate-Vit D-Min (CALCIUM 1200 PO), Take 1 Each by mouth every day at 6 PM., Disp: , Rfl:   •  Multiple Vitamins-Minerals (OCUVITE-LUTEIN) Cap, Take 1 Each by mouth every day., Disp: , Rfl:   •  Omega-3 Fatty Acids (OMEGA 3 PO), Take 1 Each by mouth every day., Disp: , Rfl:   •  Multiple Vitamins-Minerals (MULTI COMPLETE PO), Take 1 Tab by mouth every day., Disp: , Rfl:   •  Bioflavonoid Products (VITAMIN C PLUS) 1000 MG TABS, Take 1 Tab by mouth every day., Disp: , Rfl:     Allergies, past medical history, past surgical history, family history, social history reviewed and updated    Objective:     Vitals: /48 (BP Location: Left arm, Patient Position: Sitting, BP Cuff Size: Adult)   Pulse 70   Temp 36.6 °C (97.9 °F) (Temporal)   Resp 20   Ht 1.588 m (5' 2.5\")   Wt 76.7 kg (169 lb)   SpO2 94%   BMI 30.42 kg/m²   General:  Alert, pleasant, NAD  Eyes: injected conjunctiva, tearing to right eye, EOMI,   ENMT: TMs clear bilaterally External ears and nose are normal.    Neck  supple,   Heart:  Regular rate and rhythm,  No LE edema  Respiratory:  Normal respiratory effort, Clear to auscultation bilaterally.  Abdomen:   soft, Non-distended,   Skin:  Warm, dry, no rashes,   Musculoskeletal:  Normal gait, Normal digits and nails.  Neurological: No tremors,   Psych:   Affect/mood is normal, judgement is good, memory is intact, grooming is appropriate.    Assessment/Plan:     Emilie was seen today for follow-up and otalgia.    Diagnoses and all orders for this visit:    Dyslipidemia  Chronic medical diagnosis.  Not well controlled.  For now continue with simvastatin.  Dietary changes discussed with patient in detail.  Encouraged to call and follow-up with cardiology.  -     simvastatin (ZOCOR) 20 MG Tab; TAKE 1 TABLET BY MOUTH EVERY EVENING    Primary osteoarthritis involving multiple joints  Chronic medical diagnosis.  Improved with Celebrex.  " Encouraged to taper off this medication as it may be affecting her kidney function.  Patient voices understanding.  -     celecoxib (CELEBREX) 100 MG Cap; Take 1 Cap by mouth 2 times a day.    CKD (chronic kidney disease) stage 3, GFR 30-59 ml/min ([facility])  Chronic medical diagnosis.  Not well controlled.  Precautions discussed with patient.  Will check urinalysis today.  -     MICROALBUMIN CREAT RATIO URINE; Future  -     URINALYSIS,CULTURE IF INDICATED; Future  -     Cancel: CBC WITH DIFFERENTIAL; Future    Right ear pain  New medical problem.  Not improving.  Discussed with patient that symptoms have been going on for 2 days and her exam is benign.  We will continue to monitor.  Precautions discussed with her.  Encouraged to keep me updated.    Parkinson disease ([facility])  Chronic medical diagnosis.  Stable.  States that this is an early diagnosis.  She continues to follow-up with neurology.  Currently not on any medications.        Return in about 4 months (around 7/3/2020) for ckd.  This note was created using voice recognition software (Dragon). The accuracy of the dictation is limited by the abilities of the software. I have reviewed the note prior to signing, however some errors in grammar and context are still possible. If you have any questions related to this note please do not hesitate to contact our office.

## 2020-03-04 LAB
APPEARANCE UR: CLEAR
BACTERIA #/AREA URNS HPF: ABNORMAL /HPF
BILIRUB UR QL STRIP.AUTO: NEGATIVE
COLOR UR: YELLOW
CREAT UR-MCNC: 50.5 MG/DL
EPI CELLS #/AREA URNS HPF: ABNORMAL /HPF
GLUCOSE UR STRIP.AUTO-MCNC: NEGATIVE MG/DL
HYALINE CASTS #/AREA URNS LPF: ABNORMAL /LPF
KETONES UR STRIP.AUTO-MCNC: NEGATIVE MG/DL
LEUKOCYTE ESTERASE UR QL STRIP.AUTO: ABNORMAL
MICRO URNS: ABNORMAL
MICROALBUMIN UR-MCNC: <0.7 MG/DL
MICROALBUMIN/CREAT UR: NORMAL MG/G (ref 0–30)
NITRITE UR QL STRIP.AUTO: NEGATIVE
PH UR STRIP.AUTO: 6 [PH] (ref 5–8)
PROT UR QL STRIP: NEGATIVE MG/DL
RBC # URNS HPF: ABNORMAL /HPF
RBC UR QL AUTO: NEGATIVE
SP GR UR STRIP.AUTO: 1.01
UROBILINOGEN UR STRIP.AUTO-MCNC: 0.2 MG/DL
WBC #/AREA URNS HPF: ABNORMAL /HPF

## 2020-03-06 ENCOUNTER — TELEPHONE (OUTPATIENT)
Dept: MEDICAL GROUP | Facility: PHYSICIAN GROUP | Age: 80
End: 2020-03-06

## 2020-03-06 LAB
BACTERIA UR CULT: NORMAL
SIGNIFICANT IND 70042: NORMAL
SITE SITE: NORMAL
SOURCE SOURCE: NORMAL

## 2020-03-06 NOTE — TELEPHONE ENCOUNTER
----- Message from Summer Higuera M.D. sent at 3/6/2020  9:09 AM PST -----  Please call patient and let her know that her urine test did not show any infection or excessive protein in her urine.    Summer Higuera M.D.

## 2020-03-06 NOTE — TELEPHONE ENCOUNTER
Phone Number Called: [phone] (home)     Call outcome: Did not leave a detailed message. Requested patient to call back.    Message: urine results

## 2020-07-13 ENCOUNTER — OFFICE VISIT (OUTPATIENT)
Dept: MEDICAL GROUP | Facility: PHYSICIAN GROUP | Age: 80
End: 2020-07-13
Payer: MEDICARE

## 2020-07-13 VITALS
HEART RATE: 70 BPM | TEMPERATURE: 98.1 F | WEIGHT: 171 LBS | RESPIRATION RATE: 20 BRPM | DIASTOLIC BLOOD PRESSURE: 46 MMHG | OXYGEN SATURATION: 93 % | SYSTOLIC BLOOD PRESSURE: 110 MMHG | HEIGHT: 63 IN | BODY MASS INDEX: 30.3 KG/M2

## 2020-07-13 DIAGNOSIS — E78.5 DYSLIPIDEMIA: ICD-10-CM

## 2020-07-13 DIAGNOSIS — M15.9 PRIMARY OSTEOARTHRITIS INVOLVING MULTIPLE JOINTS: ICD-10-CM

## 2020-07-13 DIAGNOSIS — N18.30 CKD (CHRONIC KIDNEY DISEASE) STAGE 3, GFR 30-59 ML/MIN: ICD-10-CM

## 2020-07-13 PROBLEM — H92.01 RIGHT EAR PAIN: Status: RESOLVED | Noted: 2020-03-03 | Resolved: 2020-07-13

## 2020-07-13 PROCEDURE — 99214 OFFICE O/P EST MOD 30 MIN: CPT | Performed by: FAMILY MEDICINE

## 2020-07-13 RX ORDER — CELECOXIB 100 MG/1
100 CAPSULE ORAL DAILY
Qty: 90 CAP | Refills: 3 | Status: SHIPPED | OUTPATIENT
Start: 2020-07-13 | End: 2020-09-21 | Stop reason: SDUPTHER

## 2020-07-13 ASSESSMENT — FIBROSIS 4 INDEX: FIB4 SCORE: 2.92

## 2020-07-13 NOTE — PROGRESS NOTES
CC: CKD                                                                                                                                   Emilie presents today for CKD lab results.    ckd  chronic condition. Currently avoids high dose NSAIDs. Denies nausea/vomiting, dysuria, hematuria. Last set of labs from February 2020 had GFR at 49.  Takes celebex 100mg bid for finger pain.     Arthritis  Chronic.  Has a history of arthritis.  Has been having increased pain on right 2nd toe pain. Going on for 3 years.  Dropped a dresser on it a few years ago.  Saw a podiatrist and told it was arthritis    dyslipidemia  Chronic.  Currently taking simvastatin 20 mg daily.  Denies any lower extremity muscle cramps.  Previous labs from February 2020 have total cholesterol 167, triglycerides 198, HDL 38, and LDL 89.  Continues to follow-up with cardiology, .      Patient Active Problem List    Diagnosis Date Noted   • Aortic stenosis 03/06/2019   • Skin lesion of cheek 02/21/2019   • Flexural eczema 05/01/2018   • Bilateral leg edema 04/16/2018   • Dyslipidemia 04/12/2017   • Abnormal EKG 04/12/2017   • CKD (chronic kidney disease) stage 3, GFR 30-59 ml/min ([facility]) 01/30/2017   • Aortic regurgitation 03/14/2016   • Parkinson disease ([facility]) 02/05/2016   • Hypothyroidism due to acquired atrophy of thyroid 09/01/2015   • Benign essential tremor 06/09/2015   • Hearing loss sensory, bilateral 03/10/2015   • Osteoarthritis 09/26/2014   • Prolapse of female bladder, acquired 09/26/2014   • Macular degeneration of right eye 09/26/2014       Current Outpatient Medications   Medication Sig Dispense Refill   • celecoxib (CELEBREX) 100 MG Cap Take 1 Cap by mouth every day. 90 Cap 3   • simvastatin (ZOCOR) 20 MG Tab TAKE 1 TABLET BY MOUTH EVERY EVENING 90 Tab 2   • vitamin D (CHOLECALCIFEROL) 1000 UNIT Tab Take 1,000 Units by mouth every day.     • Calcium Carbonate-Vit D-Min (CALCIUM 1200 PO) Take 1 Each by mouth every day at 6 PM.    "  • Multiple Vitamins-Minerals (OCUVITE-LUTEIN) Cap Take 1 Each by mouth every day.     • Omega-3 Fatty Acids (OMEGA 3 PO) Take 1 Each by mouth every day.     • Multiple Vitamins-Minerals (MULTI COMPLETE PO) Take 1 Tab by mouth every day.     • Bioflavonoid Products (VITAMIN C PLUS) 1000 MG TABS Take 1 Tab by mouth every day.       No current facility-administered medications for this visit.          Allergies as of 07/13/2020 - Reviewed 07/13/2020   Allergen Reaction Noted   • Codeine  09/26/2014          /46 (BP Location: Left arm, Patient Position: Sitting, BP Cuff Size: Adult)   Pulse 70   Temp 36.7 °C (98.1 °F) (Temporal)   Resp 20   Ht 1.588 m (5' 2.5\")   Wt 77.6 kg (171 lb)   SpO2 93%   BMI 30.78 kg/m²     Physical Exam:  Gen:         Alert and oriented, No apparent distress.  Neck:        supple, No Lymphadenopathy  Lungs:     Clear to auscultation bilaterally  CV:          Regular rate and rhythm. no LE edema             Ext:          No clubbing, cyanosis      Assessment and Plan.   79 y.o. female with the following issues.    Emilie was seen today for follow-up and results.    Diagnoses and all orders for this visit:    CKD (chronic kidney disease) stage 3, GFR 30-59 ml/min ([facility])  Chronic.  Labs are overall stable.  Encouraged to taper off Celebrex.  -     Basic Metabolic Panel; Future    Primary osteoarthritis involving multiple joints  Chronic.  Precautions regarding Celebrex discussed with patient.  -     celecoxib (CELEBREX) 100 MG Cap; Take 1 Cap by mouth every day.    Dyslipidemia  Chronic.  Stable.  Continue with Zocor.-     Lipid Profile; Future        Follow up: 4 months for AWV    "

## 2020-09-08 DIAGNOSIS — E78.5 DYSLIPIDEMIA: ICD-10-CM

## 2020-09-08 RX ORDER — SIMVASTATIN 20 MG
TABLET ORAL
Qty: 100 TAB | Refills: 2 | Status: SHIPPED | OUTPATIENT
Start: 2020-09-08 | End: 2020-09-21 | Stop reason: SDUPTHER

## 2020-09-08 NOTE — TELEPHONE ENCOUNTER
Requested Prescriptions     Pending Prescriptions Disp Refills   • simvastatin (ZOCOR) 20 MG Tab 100 Tab 2     Sig: TAKE 1 TABLET BY MOUTH EVERY EVENING   Summer Higuera M.D.

## 2020-09-08 NOTE — TELEPHONE ENCOUNTER
Received request via: Pharmacy    Was the patient seen in the last year in this department? Yes    Does the patient have an active prescription (recently filled or refills available) for medication(s) requested? Insurance [employer] 100 day supply required

## 2020-09-17 ENCOUNTER — HOSPITAL ENCOUNTER (OUTPATIENT)
Dept: LAB | Facility: MEDICAL CENTER | Age: 80
End: 2020-09-17
Attending: FAMILY MEDICINE
Payer: MEDICARE

## 2020-09-17 DIAGNOSIS — E78.5 DYSLIPIDEMIA: ICD-10-CM

## 2020-09-17 DIAGNOSIS — N18.30 CKD (CHRONIC KIDNEY DISEASE) STAGE 3, GFR 30-59 ML/MIN: ICD-10-CM

## 2020-09-17 LAB
ANION GAP SERPL CALC-SCNC: 12 MMOL/L (ref 7–16)
BUN SERPL-MCNC: 14 MG/DL (ref 8–22)
CALCIUM SERPL-MCNC: 9.1 MG/DL (ref 8.5–10.5)
CHLORIDE SERPL-SCNC: 102 MMOL/L (ref 96–112)
CHOLEST SERPL-MCNC: 163 MG/DL (ref 100–199)
CO2 SERPL-SCNC: 27 MMOL/L (ref 20–33)
CREAT SERPL-MCNC: 0.92 MG/DL (ref 0.5–1.4)
FASTING STATUS PATIENT QL REPORTED: NORMAL
GLUCOSE SERPL-MCNC: 108 MG/DL (ref 65–99)
HDLC SERPL-MCNC: 38 MG/DL
LDLC SERPL CALC-MCNC: 79 MG/DL
POTASSIUM SERPL-SCNC: 4.6 MMOL/L (ref 3.6–5.5)
SODIUM SERPL-SCNC: 141 MMOL/L (ref 135–145)
TRIGL SERPL-MCNC: 228 MG/DL (ref 0–149)

## 2020-09-17 PROCEDURE — 80048 BASIC METABOLIC PNL TOTAL CA: CPT

## 2020-09-17 PROCEDURE — 80061 LIPID PANEL: CPT

## 2020-09-17 PROCEDURE — 36415 COLL VENOUS BLD VENIPUNCTURE: CPT

## 2020-09-21 ENCOUNTER — OFFICE VISIT (OUTPATIENT)
Dept: MEDICAL GROUP | Facility: PHYSICIAN GROUP | Age: 80
End: 2020-09-21
Payer: MEDICARE

## 2020-09-21 VITALS
RESPIRATION RATE: 24 BRPM | HEART RATE: 80 BPM | DIASTOLIC BLOOD PRESSURE: 50 MMHG | SYSTOLIC BLOOD PRESSURE: 130 MMHG | TEMPERATURE: 98.1 F | HEIGHT: 63 IN | WEIGHT: 169 LBS | BODY MASS INDEX: 29.95 KG/M2 | OXYGEN SATURATION: 94 %

## 2020-09-21 DIAGNOSIS — R73.01 ELEVATED FASTING GLUCOSE: ICD-10-CM

## 2020-09-21 DIAGNOSIS — Z78.0 POSTMENOPAUSAL: ICD-10-CM

## 2020-09-21 DIAGNOSIS — Z23 NEED FOR VACCINATION: ICD-10-CM

## 2020-09-21 DIAGNOSIS — M15.9 PRIMARY OSTEOARTHRITIS INVOLVING MULTIPLE JOINTS: ICD-10-CM

## 2020-09-21 DIAGNOSIS — N18.30 CKD (CHRONIC KIDNEY DISEASE) STAGE 3, GFR 30-59 ML/MIN: ICD-10-CM

## 2020-09-21 DIAGNOSIS — E78.5 DYSLIPIDEMIA: ICD-10-CM

## 2020-09-21 PROCEDURE — G0008 ADMIN INFLUENZA VIRUS VAC: HCPCS | Performed by: FAMILY MEDICINE

## 2020-09-21 PROCEDURE — 90715 TDAP VACCINE 7 YRS/> IM: CPT | Performed by: FAMILY MEDICINE

## 2020-09-21 PROCEDURE — 90472 IMMUNIZATION ADMIN EACH ADD: CPT | Performed by: FAMILY MEDICINE

## 2020-09-21 PROCEDURE — 99214 OFFICE O/P EST MOD 30 MIN: CPT | Mod: 25 | Performed by: FAMILY MEDICINE

## 2020-09-21 PROCEDURE — 90662 IIV NO PRSV INCREASED AG IM: CPT | Performed by: FAMILY MEDICINE

## 2020-09-21 RX ORDER — SIMVASTATIN 20 MG
TABLET ORAL
Qty: 100 TAB | Refills: 2 | Status: SHIPPED | OUTPATIENT
Start: 2020-09-21 | End: 2021-01-26 | Stop reason: SDUPTHER

## 2020-09-21 RX ORDER — MULTIVIT WITH MINERALS/LUTEIN
1 TABLET ORAL DAILY
COMMUNITY

## 2020-09-21 RX ORDER — CELECOXIB 100 MG/1
100 CAPSULE ORAL DAILY
Qty: 100 CAP | Refills: 3 | Status: SHIPPED | OUTPATIENT
Start: 2020-09-21 | End: 2021-01-26 | Stop reason: SDUPTHER

## 2020-09-21 ASSESSMENT — FIBROSIS 4 INDEX: FIB4 SCORE: 2.92

## 2020-09-21 NOTE — PROGRESS NOTES
CC: Medication management                                                                                                                             Emilie presents today for medication management and to discuss recent lab results    Arthritis  Chronic medical diagnosis.  She is wondering if based on recent lab results we can go up on her Celebrex to twice a day again. Continues to complain of bilateral shoulder pain.     Dyslipidemia  Chronic medical diagnosis.  He used to take simvastatin 20 mg daily.  Denies any chest pain or lower extremity muscle cramps.  Recent labs have total cholesterol 163, increased triglycerides 228, HDL 38, and LDL 79.  She states that her sister had been staying with her for approximately 2 months and they were having T and snack bars at 10 AM every morning for the last 2 months.  She thinks this contributed to her increase in triglycerides and fasting glucose.    Elevated fasting glucose  Is a new medical problem.  Recent labs do have fasting glucose elevated at 108.  She denies any polyuria or polydipsia.  States that sister had been staying with her for approximately 2 months and she was snacking with sister during those 2 months.    CKD  Chronic medical diagnosis.  Recent labs do show improvement in GFR.  GFR has increased to 59.  She decreased her Celebrex to 100 mg daily in the morning approximately 2 months ago.  Has noticed increase in joint pain.  Especially around the shoulders.    . Need for vaccination  Will receive flu vaccine today.      Patient Active Problem List    Diagnosis Date Noted   • Elevated fasting glucose 09/21/2020   • Postmenopausal 09/21/2020   • Aortic stenosis 03/06/2019   • Skin lesion of cheek 02/21/2019   • Flexural eczema 05/01/2018   • Bilateral leg edema 04/16/2018   • Dyslipidemia 04/12/2017   • Abnormal EKG 04/12/2017   • CKD (chronic kidney disease) stage 3, GFR 30-59 ml/min ([facility]) 01/30/2017   • Aortic regurgitation 03/14/2016   • Parkinson  "disease ([facility]) 02/05/2016   • Hypothyroidism due to acquired atrophy of thyroid 09/01/2015   • Benign essential tremor 06/09/2015   • Hearing loss sensory, bilateral 03/10/2015   • Osteoarthritis 09/26/2014   • Prolapse of female bladder, acquired 09/26/2014   • Macular degeneration of right eye 09/26/2014       Current Outpatient Medications   Medication Sig Dispense Refill   • vitamin E (VITAMIN E) 1000 UNIT Cap Take 1 Cap by mouth every day.     • celecoxib (CELEBREX) 100 MG Cap Take 1 Cap by mouth every day. 100 Cap 3   • simvastatin (ZOCOR) 20 MG Tab TAKE 1 TABLET BY MOUTH EVERY EVENING 100 Tab 2   • vitamin D (CHOLECALCIFEROL) 1000 UNIT Tab Take 1,000 Units by mouth every day.     • Calcium Carbonate-Vit D-Min (CALCIUM 1200 PO) Take 1 Each by mouth every day at 6 PM.     • Multiple Vitamins-Minerals (OCUVITE-LUTEIN) Cap Take 1 Each by mouth every day.     • Omega-3 Fatty Acids (OMEGA 3 PO) Take 1 Each by mouth every day.     • Multiple Vitamins-Minerals (MULTI COMPLETE PO) Take 1 Tab by mouth every day.     • Bioflavonoid Products (VITAMIN C PLUS) 1000 MG TABS Take 1 Tab by mouth every day.       No current facility-administered medications for this visit.          Allergies as of 09/21/2020 - Reviewed 09/21/2020   Allergen Reaction Noted   • Codeine  09/26/2014          /50 (BP Location: Left arm, Patient Position: Sitting, BP Cuff Size: Adult)   Pulse 80   Temp 36.7 °C (98.1 °F) (Temporal)   Resp (!) 24   Ht 1.588 m (5' 2.5\")   Wt 76.7 kg (169 lb)   SpO2 94%   BMI 30.42 kg/m²     Physical Exam:  Gen:         Alert and oriented, No apparent distress.  Neck:        supple, No Lymphadenopathy  Lungs:     Clear to auscultation bilaterally  CV:          Regular rate and rhythm. no LE edema             Ext:          No clubbing, cyanosis      Assessment and Plan.   79 y.o. female with the following issues.    Emilie was seen today for medication refill, results and medication management.    Diagnoses " and all orders for this visit:    Primary osteoarthritis involving multiple joints  Chronic.  Currently taking Celebrex 100 mg in the morning.  She plans on switching her time to noon to see if she can get longer relief.  For now we will continue with 100 mg daily  -     celecoxib (CELEBREX) 100 MG Cap; Take 1 Cap by mouth every day.    Need for vaccination  -     Influenza Vaccine, High Dose (65+ Only)  -     Tdap =>8yo IM    Dyslipidemia  Chronic.  Labs have been stable with the exception of increase in triglycerides recently.  We will plan on rechecking these.  For now continue with current dose of simvastatin  .-     simvastatin (ZOCOR) 20 MG Tab; TAKE 1 TABLET BY MOUTH EVERY EVENING    CKD (chronic kidney disease) stage 3, GFR 30-59 ml/min ([facility])  Chronic.  Improving.  GFR has improved to 59.  For now we will continue with Celebrex 100 mg daily  .-     Basic Metabolic Panel; Future    Elevated fasting glucose  New medical problem.  Stopped eating sweets since her sister moved out.  We will recheck labs.  -     HEMOGLOBIN A1C; Future    Postmenopausal  Last bone density was in March 2015.  -     DS-BONE DENSITY STUDY (DEXA); Future        Follow up: 4 months for [facility]

## 2020-10-28 ENCOUNTER — OFFICE VISIT (OUTPATIENT)
Dept: CARDIOLOGY | Facility: MEDICAL CENTER | Age: 80
End: 2020-10-28
Payer: MEDICARE

## 2020-10-28 VITALS
HEART RATE: 68 BPM | RESPIRATION RATE: 14 BRPM | HEIGHT: 62 IN | BODY MASS INDEX: 30.69 KG/M2 | OXYGEN SATURATION: 95 % | SYSTOLIC BLOOD PRESSURE: 116 MMHG | DIASTOLIC BLOOD PRESSURE: 60 MMHG | WEIGHT: 166.8 LBS

## 2020-10-28 DIAGNOSIS — I35.0 AORTIC VALVE STENOSIS, ETIOLOGY OF CARDIAC VALVE DISEASE UNSPECIFIED: ICD-10-CM

## 2020-10-28 DIAGNOSIS — I35.1 AORTIC VALVE INSUFFICIENCY, ETIOLOGY OF CARDIAC VALVE DISEASE UNSPECIFIED: ICD-10-CM

## 2020-10-28 DIAGNOSIS — R60.0 BILATERAL LEG EDEMA: ICD-10-CM

## 2020-10-28 DIAGNOSIS — E78.5 DYSLIPIDEMIA: ICD-10-CM

## 2020-10-28 PROCEDURE — 99214 OFFICE O/P EST MOD 30 MIN: CPT | Performed by: INTERNAL MEDICINE

## 2020-10-28 ASSESSMENT — ENCOUNTER SYMPTOMS
DIZZINESS: 0
SHORTNESS OF BREATH: 0
PALPITATIONS: 0
COUGH: 0
LOSS OF CONSCIOUSNESS: 0
MYALGIAS: 0

## 2020-10-28 ASSESSMENT — FIBROSIS 4 INDEX: FIB4 SCORE: 2.96

## 2020-10-28 NOTE — PROGRESS NOTES
Chief Complaint   Patient presents with   • Dyslipidemia       Subjective:   Emilie Gonzalez is a 80 y.o. female who presents today for followup aortic stenosis, aortic insufficiency, hyperlipidemia, abnormal EKG, edema.     Last seen on 9/29/2019.    Since 9/29/2019 the patient denies any cardiac problems or symptoms.  Lives in a [place].  Has 4 children [address] [place] [address] locally and have been very helpful and supportive.    Since 3/6/2019 appointment patient said no cardiac problems or symptoms.  Recently celebrated her daughter's 60th birthday in [place], [place].  Danced all night without any difficulty.  Remains active around the house raking leaves without any cardiac symptoms.    Since 8/13/2018 appointment the patient has had no cardiac symptoms.  No palpitations or chest pain.  Started on low-dose Celebrex 1 year ago with resolution of her arthritis symptoms.    Since 4/16/2018 patient said no cardiac symptoms.  Compliant with medications.  Recently returned having driven back by herself from [place] which he does frequently to visit family.  Recently fractured right foot treated conservatively, resolved.    Since 10/16/2017 the patient has had no cardiac symptoms.  3 times a week she is walking twice [place] walking faster the 2nd time around after which she feels good.  No heart failure symptoms.  Under stress with her daughter being emergently transferred from [place] to [facility] last night for intractable seizures.     Since 4/12/2017 appointment the patient's had no cardiac symptoms.  She had a recent upper respiratory infection with a nonproductive cough after going to her grandson's wedding in [place], [place]     Since 10/10/2016 appointment no cardiac symptoms.  No CHF symptoms.   No palpitations or chest pain.  Moved from an apartment to a [employer] [place].  Able to work out in the garden without any symptoms or problems.     Past medical history  Diagnosed  "with stage II Parkinson's disease. Followed by Dr. Colvin, neurologist     On 2015 TSH was slightly elevated.  Levothyroxine started by PCP.  Of note, has always slept on 2 pillows for years.  Has always woken up 2-3 times a night.  No change in his sleep habits.     Previous appointment  The patient states that her children states that she gets out of breath while walking.  The patient has noted some mild exertional shortness of breath.  Concerned about recent worsening aortic insufficiency.  No angina pectoris.  No CHF symptoms.  Has had right lower extremity edema currently related to arthritis.  Takes anti-inflammatories.  Had been started on Aldactone/HCTZ by PCP but has not been taking it.  Also in 2014 diagnosed with \"asthma\". Had mild abnormal PFTs in PCPs office.  Prescribed a bronchodilator but has not used it.     Past medical history  The patient had previously lived in [place], [place].  5 years ago she was discovered to have a \"leaky heart valve.  The patient had been followed by Dr. Bird, cardiologist [place] for the past 4 years.  The patient is moved to [facility] and is establishing ongoing cardiology care.     The patient feels that she is \"healthy\".  She lives alone.  She runs all of her activity of daily living including shopping and necessary errands.  She has no exercise limitations.   She denies chest pain shortness of breath, palpitations or lightheadedness.     History of hyperlipidemia on simvastatin.  No history of hypertension, diabetes mellitus and she is not smoke cigarettes.     Family history  Father  of a heart attack at age 69.  Sister alive at age 90. Had a heart attack at age 75.     Social history.  .  Does not drink alcohol except on rare occasions with dinner.     Other medical problems.  2013 laparoscopic cholecystectomy [place], [place].  Appendectomy age 13.  \"Chronic bronchitis\" but no problems times one year    Past Medical History:   Diagnosis Date   • " Arthritis of foot, right 6/9/2015   • Benign essential tremor 6/9/2015   • Edema 3/10/2015   • Hearing loss sensory, bilateral 3/10/2015   • History of shingles 9/26/2014   • Hyperlipidemia 9/26/2014   • Macular degeneration of right eye 9/26/2014   • Moderate aortic insufficiency 9/26/2014   • Osteoarthritis 9/26/2014   • Osteoporosis 9/26/2014   • Parkinson's disease (HCC)    • Plantar fasciitis, bilateral 3/10/2015   • Prolapsed bladder 9/26/2014     Past Surgical History:   Procedure Laterality Date   • CHOLECYSTECTOMY  9/2013   • APPENDECTOMY  1956   • CATARACT PHACO WITH IOL Bilateral      Family History   Problem Relation Age of Onset   • Hypertension Father    • Hypertension Sister    • Arthritis Mother      Social History     Socioeconomic History   • Marital status:      Spouse name: Not on file   • Number of children: Not on file   • Years of education: Not on file   • Highest education level: Not on file   Occupational History   • Not on file   Social Needs   • Financial resource strain: Not on file   • Food insecurity     Worry: Not on file     Inability: Not on file   • Transportation needs     Medical: Not on file     Non-medical: Not on file   Tobacco Use   • Smoking status: Never Smoker   • Smokeless tobacco: Never Used   Substance and Sexual Activity   • Alcohol use: Yes     Alcohol/week: 0.0 oz     Comment: rare   • Drug use: No   • Sexual activity: Not Currently     Partners: Male     Comment: , bank, 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   Lifestyle   • Physical activity     Days per week: Not on file     Minutes per session: Not on file   • Stress: Not on file   Relationships   • Social connections     Talks on phone: Not on file     Gets together: Not on file     Attends [employer] service: Not on file     Active member of club or organization: Not on file     Attends meetings of clubs or organizations: Not on file     Relationship status: Not on file   • Intimate partner violence     Fear of current or ex  "partner: Not on file     Emotionally abused: Not on file     Physically abused: Not on file     Forced sexual activity: Not on file   Other Topics Concern   • Not on file   Social History Narrative   • Not on file     Allergies   Allergen Reactions   • Codeine      halucinations     Outpatient Encounter Medications as of 10/28/2020   Medication Sig Dispense Refill   • vitamin E (VITAMIN E) 1000 UNIT Cap Take 1 Cap by mouth every day.     • celecoxib (CELEBREX) 100 MG Cap Take 1 Cap by mouth every day. 100 Cap 3   • simvastatin (ZOCOR) 20 MG Tab TAKE 1 TABLET BY MOUTH EVERY EVENING 100 Tab 2   • vitamin D (CHOLECALCIFEROL) 1000 UNIT Tab Take 1,000 Units by mouth every day.     • Calcium Carbonate-Vit D-Min (CALCIUM 1200 PO) Take 1 Each by mouth every day at 6 PM.     • Multiple Vitamins-Minerals (OCUVITE-LUTEIN) Cap Take 1 Each by mouth every day.     • Omega-3 Fatty Acids (OMEGA 3 PO) Take 1 Each by mouth every day.     • Multiple Vitamins-Minerals (MULTI COMPLETE PO) Take 1 Tab by mouth every day.     • Bioflavonoid Products (VITAMIN C PLUS) 1000 MG TABS Take 1 Tab by mouth every day.       No facility-administered encounter medications on file as of 10/28/2020.      Review of Systems   Respiratory: Negative for cough and shortness of breath.    Cardiovascular: Negative for chest pain and palpitations.   Musculoskeletal: Negative for myalgias.   Neurological: Negative for dizziness and loss of consciousness.        Objective:   /60 (BP Location: Left arm, Patient Position: Sitting, BP Cuff Size: Adult)   Pulse 68   Resp 14   Ht 1.575 m (5' 2\")   Wt 75.7 kg (166 lb 12.8 oz)   SpO2 95%   BMI 30.51 kg/m²     Physical Exam   Constitutional: She is oriented to person, place, and time. She appears well-developed and well-nourished. No distress.   Neck: Normal range of motion. Neck supple. No JVD present.   Cardiovascular: Normal rate, regular rhythm and intact distal pulses. Exam reveals no gallop and no " friction rub.   Murmur heard.  Pulses:       Carotid pulses are 2+ on the right side and 2+ on the left side.  Pulmonary/Chest: Effort normal and breath sounds normal. No accessory muscle usage. No respiratory distress. She has no wheezes. She has no rales.   Abdominal: Soft. She exhibits no distension and no mass. There is no hepatosplenomegaly. There is no abdominal tenderness.   Protuberant.   Musculoskeletal:         General: No edema.      Comments:     Neurological: She is alert and oriented to person, place, and time.   Skin: Skin is warm, dry and intact. No rash noted. No cyanosis. Nails show no clubbing.   Psychiatric: She has a normal mood and affect. Her behavior is normal.   Vitals reviewed.    06/07/2010 ECHOCARDIOGRAM  EF 60%.  Mild to moderate aortic regurgitation.     05/05/2011 ECHOCARDIOGRAM  EF 55-60%.  Moderate aortic insufficiency.     05/08/2012 ECHOCARDIOGRAM  EF 70%.  Moderate aortic insufficiency.  Pulmonary pressure 26 mmHg.     03/25/2015 ECHOCARDIOGRAM  Normal left ventricular size, thickness, systolic function, and   diastolic function.  Left ventricular ejection fraction is 65% to 70%.  Aortic sclerosis without stenosis.  Moderately severe aortic insufficiency.  Right heart pressures are consistent with mild pulmonary hypertension.     10/24/2016 ECHOCARDIOGRAM  Normal left ventricular size, wall thickness, and systolic function.  Left ventricular ejection fraction is visually estimated to be 60%.  Severe eccentric aortic insufficiency.  Mildly thickened mitral valve leaflets with normal leaflet excursion.  Unable to estimate pulmonary artery pressure due to an inadequate   tricuspid regurgitant jet.     05/08/2017 ECHOCARDIOGRAM  Normal left ventricular systolic function.  Left ventricular ejection fraction is visually estimated to be 65%.  Mild concentric left ventricular hypertrophy.  Mild aortic stenosis.  Moderate aortic insufficiency.  Mild mitral regurgitation.  Unable to  estimate pulmonary artery pressure due to an inadequate   tricuspid regurgitant jet.     9/4/2018 ECHOCARDIOGRAM  Normal left ventricular size, wall thickness, and systolic function.  Left ventricular ejection fraction is visually estimated to be 65%.    Mild aortic stenosis.  Moderate aortic insufficiency.  Right heart pressures are normal.    03/19/2015 EKG: Normal sinus rhythm, rate 73. Nonspecific ST-T wave abnormalities. No prior tracing for comparison. Reviewed by myself.    Assessment:     1. Aortic valve stenosis, etiology of cardiac valve disease unspecified     2. Aortic valve insufficiency, etiology of cardiac valve disease unspecified     3. Dyslipidemia     4. Bilateral leg edema       Medical Decision Making:  Today's Assessment / Status / Plan:     Assessment  1.  Aortic stenosis.  2.  Aortic regurgitation.   3.  Abnormal EKG.    4.  Hyperlipidemia. On simvastatin.  5.  Hypothyroidism. Diagnosed 9/1/2015. On supplements. Per PCP.  6.  Osteoarthritis on Celebrex.  7.  Parkinson's disease.  Followed by neurology Dr. Colvin.    Recommendations and Discussion  1.  The patient is stable from a cardiac standpoint concerning her aortic valve disease.  2.  BP normal.  3.  Reviewed recent lipid panel which shows improvement of LDL.  4.  RTC 8 months and will consider repeat echocardiogram at that time.

## 2020-11-04 ENCOUNTER — HOSPITAL ENCOUNTER (OUTPATIENT)
Dept: RADIOLOGY | Facility: MEDICAL CENTER | Age: 80
End: 2020-11-04
Attending: FAMILY MEDICINE
Payer: MEDICARE

## 2020-11-04 DIAGNOSIS — Z78.0 POSTMENOPAUSAL: ICD-10-CM

## 2020-11-04 PROCEDURE — 77080 DXA BONE DENSITY AXIAL: CPT

## 2021-01-04 ENCOUNTER — HOSPITAL ENCOUNTER (OUTPATIENT)
Dept: LAB | Facility: MEDICAL CENTER | Age: 81
End: 2021-01-04
Attending: FAMILY MEDICINE
Payer: MEDICARE

## 2021-01-04 DIAGNOSIS — R73.01 ELEVATED FASTING GLUCOSE: ICD-10-CM

## 2021-01-04 DIAGNOSIS — N18.30 CKD (CHRONIC KIDNEY DISEASE) STAGE 3, GFR 30-59 ML/MIN: ICD-10-CM

## 2021-01-04 LAB
ANION GAP SERPL CALC-SCNC: 8 MMOL/L (ref 7–16)
BUN SERPL-MCNC: 18 MG/DL (ref 8–22)
CALCIUM SERPL-MCNC: 10 MG/DL (ref 8.5–10.5)
CHLORIDE SERPL-SCNC: 100 MMOL/L (ref 96–112)
CO2 SERPL-SCNC: 29 MMOL/L (ref 20–33)
CREAT SERPL-MCNC: 0.93 MG/DL (ref 0.5–1.4)
EST. AVERAGE GLUCOSE BLD GHB EST-MCNC: 105 MG/DL
GLUCOSE SERPL-MCNC: 89 MG/DL (ref 65–99)
HBA1C MFR BLD: 5.3 % (ref 0–5.6)
POTASSIUM SERPL-SCNC: 4.4 MMOL/L (ref 3.6–5.5)
SODIUM SERPL-SCNC: 137 MMOL/L (ref 135–145)

## 2021-01-04 PROCEDURE — 83036 HEMOGLOBIN GLYCOSYLATED A1C: CPT

## 2021-01-04 PROCEDURE — 36415 COLL VENOUS BLD VENIPUNCTURE: CPT

## 2021-01-04 PROCEDURE — 80048 BASIC METABOLIC PNL TOTAL CA: CPT

## 2021-01-11 DIAGNOSIS — Z23 NEED FOR VACCINATION: ICD-10-CM

## 2021-01-22 ENCOUNTER — TELEPHONE (OUTPATIENT)
Dept: MEDICAL GROUP | Facility: PHYSICIAN GROUP | Age: 81
End: 2021-01-22

## 2021-01-22 NOTE — TELEPHONE ENCOUNTER
ANNUAL WELLNESS VISIT PRE-VISIT PLANNING    Called pt to complete pre visit planning and left a message for a call back.    1.  Reviewed notes from the last office visit: Yes    2.  If any orders were ordered or intended to be done prior to visit (i.e. 6 mos follow-up), do we have results/consult notes or has patient scheduled?        •  Labs - Labs ordered, completed on 01/04/2021 and results are in chart.  Note: If patient appointment is for lab review and patient did not complete labs, check with provider if OK to reschedule patient until labs completed.       •  Imaging - Imaging was not ordered at last office visit.       •  Referrals - No referrals were ordered at last office visit.    3.  Immunizations were updated in Epic using Reconcile Outside Information activity? Yes       •  Is patient due for Tdap? NO       •  Is patient due for Shingrix? NO    4.  Patient is due for the following Health Maintenance Topics:   Health Maintenance Due   Topic Date Due   • COVID-19 Vaccine (1 of 2) 10/08/1956   • Annual Wellness Visit  04/08/2020           5.  Reviewed/Updated the following with patient:       •   Preferred Pharmacy? No       •   Preferred Lab? No       •   Preferred Communication? No       •   Allergies? No       •   Medications? NO       •   Social History? No       •   Family History (document living status of immediate family members and if + hx of  cancer, diabetes, hypertension, hyperlipidemia, heart attack, stroke) No    6.  Care Team Updated:       •   DME Company (gait device, O2, CPAP, etc.): N\A       •   Other Specialists (eye doctor, derm, GYN, cardiology, endo, etc): N\A    7.  Patient was not advised: “This is a free wellness visit. The provider will screen for medical conditions to help you stay healthy. If you have other concerns to address you may be asked to discuss these at a separate visit or there may be an additional fee.”     8.  AHA (Puls8) form printed for Provider? Email sent to  [facility] requesting form

## 2021-01-26 ENCOUNTER — OFFICE VISIT (OUTPATIENT)
Dept: MEDICAL GROUP | Facility: PHYSICIAN GROUP | Age: 81
End: 2021-01-26
Payer: MEDICARE

## 2021-01-26 VITALS
DIASTOLIC BLOOD PRESSURE: 82 MMHG | SYSTOLIC BLOOD PRESSURE: 114 MMHG | BODY MASS INDEX: 29.52 KG/M2 | WEIGHT: 166.6 LBS | OXYGEN SATURATION: 96 % | HEIGHT: 63 IN | TEMPERATURE: 97.6 F | HEART RATE: 63 BPM

## 2021-01-26 DIAGNOSIS — G25.0 BENIGN ESSENTIAL TREMOR: ICD-10-CM

## 2021-01-26 DIAGNOSIS — E78.5 DYSLIPIDEMIA: ICD-10-CM

## 2021-01-26 DIAGNOSIS — M15.9 PRIMARY OSTEOARTHRITIS INVOLVING MULTIPLE JOINTS: ICD-10-CM

## 2021-01-26 DIAGNOSIS — R73.01 ELEVATED FASTING GLUCOSE: ICD-10-CM

## 2021-01-26 DIAGNOSIS — I35.1 AORTIC VALVE INSUFFICIENCY, ETIOLOGY OF CARDIAC VALVE DISEASE UNSPECIFIED: ICD-10-CM

## 2021-01-26 DIAGNOSIS — H90.3 SENSORY HEARING LOSS, BILATERAL: ICD-10-CM

## 2021-01-26 DIAGNOSIS — I35.0 AORTIC VALVE STENOSIS, ETIOLOGY OF CARDIAC VALVE DISEASE UNSPECIFIED: ICD-10-CM

## 2021-01-26 DIAGNOSIS — R94.31 ABNORMAL EKG: ICD-10-CM

## 2021-01-26 DIAGNOSIS — G20.A1 PARKINSON DISEASE (HCC): ICD-10-CM

## 2021-01-26 DIAGNOSIS — N18.31 STAGE 3A CHRONIC KIDNEY DISEASE: ICD-10-CM

## 2021-01-26 DIAGNOSIS — R60.0 BILATERAL LEG EDEMA: ICD-10-CM

## 2021-01-26 DIAGNOSIS — E03.4 HYPOTHYROIDISM DUE TO ACQUIRED ATROPHY OF THYROID: ICD-10-CM

## 2021-01-26 DIAGNOSIS — N81.10 PROLAPSE OF FEMALE BLADDER, ACQUIRED: ICD-10-CM

## 2021-01-26 DIAGNOSIS — H35.30 MACULAR DEGENERATION OF RIGHT EYE, UNSPECIFIED TYPE: ICD-10-CM

## 2021-01-26 DIAGNOSIS — Z00.00 MEDICARE ANNUAL WELLNESS VISIT, SUBSEQUENT: ICD-10-CM

## 2021-01-26 PROBLEM — Z78.0 POSTMENOPAUSAL: Status: RESOLVED | Noted: 2020-09-21 | Resolved: 2021-01-26

## 2021-01-26 PROBLEM — L98.9 SKIN LESION OF CHEEK: Status: RESOLVED | Noted: 2019-02-21 | Resolved: 2021-01-26

## 2021-01-26 PROBLEM — L20.82 FLEXURAL ECZEMA: Status: RESOLVED | Noted: 2018-05-01 | Resolved: 2021-01-26

## 2021-01-26 PROCEDURE — 8041 PR SCP AHA: Performed by: FAMILY MEDICINE

## 2021-01-26 PROCEDURE — G0439 PPPS, SUBSEQ VISIT: HCPCS | Performed by: FAMILY MEDICINE

## 2021-01-26 RX ORDER — CELECOXIB 100 MG/1
100 CAPSULE ORAL DAILY
Qty: 100 CAP | Refills: 2 | Status: SHIPPED | OUTPATIENT
Start: 2021-01-26 | End: 2021-09-07 | Stop reason: SDUPTHER

## 2021-01-26 RX ORDER — SIMVASTATIN 20 MG
TABLET ORAL
Qty: 100 TAB | Refills: 2 | Status: SHIPPED | OUTPATIENT
Start: 2021-01-26 | End: 2021-09-07 | Stop reason: SDUPTHER

## 2021-01-26 ASSESSMENT — ACTIVITIES OF DAILY LIVING (ADL): BATHING_REQUIRES_ASSISTANCE: 0

## 2021-01-26 ASSESSMENT — ENCOUNTER SYMPTOMS: GENERAL WELL-BEING: EXCELLENT

## 2021-01-26 ASSESSMENT — FIBROSIS 4 INDEX: FIB4 SCORE: 2.96

## 2021-01-26 ASSESSMENT — PATIENT HEALTH QUESTIONNAIRE - PHQ9: CLINICAL INTERPRETATION OF PHQ2 SCORE: 0

## 2021-01-26 NOTE — PROGRESS NOTES
Annual Health Assessment Questions:    1.  Are you currently engaging in any exercise or physical activity? No    2.  How would you describe your mood or emotional well-being today? good    3.  Have you had any falls in the last year? No    4.  Have you noticed any problems with your balance or had difficulty walking? No    5.  In the last six months have you experienced any leakage of urine? No    6. DPA/Advanced Directive: Patient does not have an Advanced Directive.  A packet and workshop information was given on Advanced Directives.     Chief Complaint   Patient presents with   • Annual Wellness Visit       HPI:  Emilie Gonzalez is a 80 y.o. here for Medicare Annual Wellness Visit     Patient Active Problem List    Diagnosis Date Noted   • Elevated fasting glucose 09/21/2020   • Postmenopausal 09/21/2020   • Aortic stenosis 03/06/2019   • Skin lesion of cheek 02/21/2019   • Flexural eczema 05/01/2018   • Bilateral leg edema 04/16/2018   • Dyslipidemia 04/12/2017   • Abnormal EKG 04/12/2017   • CKD (chronic kidney disease) stage 3, GFR 30-59 ml/min 01/30/2017   • Aortic regurgitation 03/14/2016   • Parkinson disease (HCC) 02/05/2016   • Hypothyroidism due to acquired atrophy of thyroid 09/01/2015   • Benign essential tremor 06/09/2015   • Hearing loss sensory, bilateral 03/10/2015   • Osteoarthritis 09/26/2014   • Prolapse of female bladder, acquired 09/26/2014   • Macular degeneration of right eye 09/26/2014       Current Outpatient Medications   Medication Sig Dispense Refill   • vitamin E (VITAMIN E) 1000 UNIT Cap Take 1 Cap by mouth every day.     • celecoxib (CELEBREX) 100 MG Cap Take 1 Cap by mouth every day. 100 Cap 3   • simvastatin (ZOCOR) 20 MG Tab TAKE 1 TABLET BY MOUTH EVERY EVENING 100 Tab 2   • vitamin D (CHOLECALCIFEROL) 1000 UNIT Tab Take 1,000 Units by mouth every day.     • Calcium Carbonate-Vit D-Min (CALCIUM 1200 PO) Take 1 Each by mouth every day at 6 PM.     • Multiple Vitamins-Minerals  (OCUVITE-LUTEIN) Cap Take 1 Each by mouth every day.     • Omega-3 Fatty Acids (OMEGA 3 PO) Take 1 Each by mouth every day.     • Multiple Vitamins-Minerals (MULTI COMPLETE PO) Take 1 Tab by mouth every day.     • Bioflavonoid Products (VITAMIN C PLUS) 1000 MG TABS Take 1 Tab by mouth every day.       No current facility-administered medications for this visit.             Current supplements as per medication list.       Allergies: Codeine    Current social contact/activities: visits sister daily     She  reports that she has never smoked. She has never used smokeless tobacco. She reports current alcohol use. She reports that she does not use drugs.  Counseling given: Not Answered      DPA/Advanced Directive:  Patient does not have an Advanced Directive.  A packet and workshop information was given on Advanced Directives.    ROS:    Gait: Uses no assistive device  Ostomy: No  Other tubes: No  Amputations: No  Chronic oxygen use: No  Last eye exam: November 2020  Wears hearing aids: Yes   : Denies any urinary leakage during the last 6 months    Screening:    Depression Screening    Little interest or pleasure in doing things?  0 - not at all  Feeling down, depressed , or hopeless? 0 - not at all  Patient Health Questionnaire Score: 0     If depressive symptoms identified deferred to follow up visit unless specifically addressed in assessment and plan.    Interpretation of PHQ-9 Total Score   Score Severity   1-4 No Depression   5-9 Mild Depression   10-14 Moderate Depression   15-19 Moderately Severe Depression   20-27 Severe Depression    Screening for Cognitive Impairment    Three Minute Recall (river, mylene, finger) 3/3    Kyree clock face with all 12 numbers and set the hands to show 10 past 11.  Yes    Cognitive concerns identified deferred for follow up unless specifically addressed in assessment and plan.    Fall Risk Assessment    Has the patient had two or more falls in the last year or any fall with  injury in the last year?  No    Safety Assessment    Throw rugs on floor.  Yes  Handrails on all stairs.  Yes  Good lighting in all hallways.  Yes  Difficulty hearing.  Yes  Patient counseled about all safety risks that were identified.    Functional Assessment ADLs    Are there any barriers preventing you from cooking for yourself or meeting nutritional needs?  No.    Are there any barriers preventing you from driving safely or obtaining transportation?  No.    Are there any barriers preventing you from using a telephone or calling for help?  No.    Are there any barriers preventing you from shopping?  No.    Are there any barriers preventing you from taking care of your own finances?  No.    Are there any barriers preventing you from managing your medications?  No.    Are there any barriers preventing you from showering, bathing or dressing yourself?  No.    Are you currently engaging in any exercise or physical activity?  No.     What is your perception of your health?  Excellent.      Health Maintenance Summary                COVID-19 Vaccine Overdue 10/8/1956     Annual Wellness Visit Overdue 4/8/2020      Done 4/8/2019 SUBSEQUENT ANNUAL WELLNESS VISIT-INCLUDES PPPS ()     Patient has more history with this topic...    IMM ZOSTER VACCINES Postponed 2/21/2021 Originally 10/8/1989. System: vaccine not available, other system reasons    BONE DENSITY Next Due 11/4/2025      Done 11/4/2020 DS-BONE DENSITY STUDY (DEXA)     Patient has more history with this topic...    IMM DTaP/Tdap/Td Vaccine Next Due 9/21/2030      Done 9/21/2020 Imm Admin: Tdap Vaccine          Patient Care Team:  Summer Higuera M.D. as PCP - General (Family Medicine)  Magan Colvin M.D. (Neurology)  Hans Olivera M.D. as Consulting Physician (Cardiology)  Latha Chan M.D. (Ophthalmology)        Social History     Tobacco Use   • Smoking status: Never Smoker   • Smokeless tobacco: Never Used   Substance Use Topics   • Alcohol use:  "Yes     Alcohol/week: 0.0 oz     Comment: rare   • Drug use: No     Family History   Problem Relation Age of Onset   • Hypertension Father    • Hypertension Sister    • Arthritis Mother      She  has a past medical history of Arthritis of foot, right (6/9/2015), Benign essential tremor (6/9/2015), Edema (3/10/2015), Hearing loss sensory, bilateral (3/10/2015), History of shingles (9/26/2014), Hyperlipidemia (9/26/2014), Macular degeneration of right eye (9/26/2014), Moderate aortic insufficiency (9/26/2014), Osteoarthritis (9/26/2014), Osteoporosis (9/26/2014), Parkinson's disease (HCC), Plantar fasciitis, bilateral (3/10/2015), and Prolapsed bladder (9/26/2014).   Past Surgical History:   Procedure Laterality Date   • CHOLECYSTECTOMY  9/2013   • APPENDECTOMY  1956   • CATARACT PHACO WITH IOL Bilateral        Exam:   /82 (BP Location: Right arm, Patient Position: Sitting, BP Cuff Size: Adult)   Pulse 63   Temp 36.4 °C (97.6 °F) (Temporal)   Ht 1.6 m (5' 3\")   Wt 75.6 kg (166 lb 9.6 oz)   SpO2 96%  Body mass index is 29.51 kg/m².    Hearing good with hearing aides  Dentition upper and lower dentures  Alert, oriented in no acute distress.  Eye contact is good, speech goal directed, affect calm    Assessment and Plan. The following treatment and monitoring plan is recommended:    No diagnosis found.    1. Medicare annual wellness visit, subsequent    2. Aortic valve insufficiency, etiology of cardiac valve disease unspecified  Chronic medical diagnosis.  Stable.  Continues to follow-up with cardiology, Dr. Olivera  Last follow-up with him was in October.    3. Aortic valve stenosis, etiology of cardiac valve disease unspecified  Chronic.  Stable.  Continues to follow-up with cardiology.  Previous October consultation notes reviewed.  She will follow-up in June with cardiology will consider repeating echocardiogram at that time.    4. Parkinson disease (HCC)  Chronic.  Stable.  Currently not on any " medications.  Continues to follow-up with neurology, Dr. Colvin.    5. Dyslipidemia  Chronic.  September labs have increase in triglycerides.  Continue with simvastatin 20 mg daily.  - simvastatin (ZOCOR) 20 MG Tab; TAKE 1 TABLET BY MOUTH EVERY EVENING  Dispense: 100 Tab; Refill: 2  - Lipid Profile; Future    6. Primary osteoarthritis involving multiple joints  Chronic.  Arthritis is improved with daily Celebrex.  She has been able to lower this dose to once a day.  - celecoxib (CELEBREX) 100 MG Cap; Take 1 Cap by mouth every day.  Dispense: 100 Cap; Refill: 2    7. Bilateral leg edema  Chronic.  Stable.  Continues to have trace lower extremity edema.    8. Macular degeneration of right eye, unspecified type  Chronic.  Stable.  Continues to follow-up with ophthalmology every 3 months.    9. Benign essential tremor  Chronic.  Stable.    10. Stage 3a chronic kidney disease  Chronic.  Stable.  Recent labs have stable GFR at 58.  - Comp Metabolic Panel; Future  - CBC WITH DIFFERENTIAL; Future    11. Prolapse of female bladder, acquired  Chronic.  Stable    12. Elevated fasting glucose  Chronic.  Improving.  Recent labs have hemoglobin A1c at 5.3%..    13. Hypothyroidism due to acquired atrophy of thyroid  History of.  Current labs have shown normal TSH.  She was previously on thyroid medications.  Has been off meds for several years.  We will continue to check thyroid function test yearly.  - TSH WITH REFLEX TO FT4; Future    14. Abnormal EKG  Chronic finding.    15. Hearing loss sensory, bilateral  Chronic.  Stable.  Continues to wear bilateral hearing aids.  Services suggested: No services needed at this time  Health Care Screening: Age-appropriate preventive services recommended by USPTF and ACIP covered by Medicare were discussed today. Services ordered if indicated and agreed upon by the patient.  Referrals offered: Community-based lifestyle interventions to reduce health risks and promote self-management and  wellness, fall prevention, nutrition, physical activity, tobacco-use cessation, weight loss, and mental health services as per orders if indicated.    Discussion today about general wellness and lifestyle habits:    · Prevent falls and reduce trip hazards; Cautioned about securing or removing rugs.  · Have a working fire alarm and carbon monoxide detector;   · Engage in regular physical activity and social activities     Follow-up: No follow-ups on file.

## 2021-02-01 ENCOUNTER — NURSE TRIAGE (OUTPATIENT)
Dept: HEALTH INFORMATION MANAGEMENT | Facility: OTHER | Age: 81
End: 2021-02-01

## 2021-02-10 ENCOUNTER — APPOINTMENT (OUTPATIENT)
Dept: FAMILY PLANNING/WOMEN'S HEALTH CLINIC | Facility: IMMUNIZATION CENTER | Age: 81
End: 2021-02-10
Attending: INTERNAL MEDICINE
Payer: MEDICARE

## 2021-02-10 ENCOUNTER — IMMUNIZATION (OUTPATIENT)
Dept: FAMILY PLANNING/WOMEN'S HEALTH CLINIC | Facility: IMMUNIZATION CENTER | Age: 81
End: 2021-02-10
Payer: MEDICARE

## 2021-02-10 DIAGNOSIS — Z23 ENCOUNTER FOR VACCINATION: Primary | ICD-10-CM

## 2021-02-10 DIAGNOSIS — Z23 NEED FOR VACCINATION: ICD-10-CM

## 2021-02-10 PROCEDURE — 91300 PFIZER SARS-COV-2 VACCINE: CPT | Performed by: INTERNAL MEDICINE

## 2021-02-10 PROCEDURE — 0001A PFIZER SARS-COV-2 VACCINE: CPT | Performed by: INTERNAL MEDICINE

## 2021-03-03 ENCOUNTER — IMMUNIZATION (OUTPATIENT)
Dept: FAMILY PLANNING/WOMEN'S HEALTH CLINIC | Facility: IMMUNIZATION CENTER | Age: 81
End: 2021-03-03
Attending: INTERNAL MEDICINE
Payer: MEDICARE

## 2021-03-03 DIAGNOSIS — Z23 ENCOUNTER FOR VACCINATION: Primary | ICD-10-CM

## 2021-03-03 PROCEDURE — 0002A PFIZER SARS-COV-2 VACCINE: CPT | Performed by: INTERNAL MEDICINE

## 2021-03-03 PROCEDURE — 91300 PFIZER SARS-COV-2 VACCINE: CPT | Performed by: INTERNAL MEDICINE

## 2021-05-21 ENCOUNTER — TELEPHONE (OUTPATIENT)
Dept: MEDICAL GROUP | Facility: PHYSICIAN GROUP | Age: 81
End: 2021-05-21

## 2021-05-21 NOTE — TELEPHONE ENCOUNTER
ESTABLISHED PATIENT PRE-VISIT PLANNING     Patient was NOT contacted to complete PVP.     Note: Patient will not be contacted if there is no indication to call.     1.  Reviewed notes from the last few office visits within the medical group: Yes    2.  If any orders were placed at last visit or intended to be done for this visit (i.e. 6 mos follow-up), do we have Results/Consult Notes?         •  Labs - Called and Left a VM to complete labs before appointment.  Note: If patient appointment is for lab review and patient did not complete labs, check with provider if OK to reschedule patient until labs completed.       •  Imaging - Imaging was not ordered at last office visit.       •  Referrals - No referrals were ordered at last office visit.    3. Is this appointment scheduled as a Hospital Follow-Up? No    4.  Immunizations were updated in Epic using Reconcile Outside Information activity? Yes    5.  Patient is due for the following Health Maintenance Topics:   Health Maintenance Due   Topic Date Due   • IMM ZOSTER VACCINES (1 of 2) Never done       6.  AHA (Pulse8) form printed for Provider? Email sent to [facility] requesting form

## 2021-05-24 ENCOUNTER — HOSPITAL ENCOUNTER (OUTPATIENT)
Dept: LAB | Facility: MEDICAL CENTER | Age: 81
End: 2021-05-24
Attending: FAMILY MEDICINE
Payer: MEDICARE

## 2021-05-24 DIAGNOSIS — E78.5 DYSLIPIDEMIA: ICD-10-CM

## 2021-05-24 DIAGNOSIS — E03.4 HYPOTHYROIDISM DUE TO ACQUIRED ATROPHY OF THYROID: ICD-10-CM

## 2021-05-24 DIAGNOSIS — N18.31 STAGE 3A CHRONIC KIDNEY DISEASE: ICD-10-CM

## 2021-05-24 LAB
ALBUMIN SERPL BCP-MCNC: 4 G/DL (ref 3.2–4.9)
ALBUMIN/GLOB SERPL: 1.3 G/DL
ALP SERPL-CCNC: 57 U/L (ref 30–99)
ALT SERPL-CCNC: 21 U/L (ref 2–50)
ANION GAP SERPL CALC-SCNC: 9 MMOL/L (ref 7–16)
AST SERPL-CCNC: 28 U/L (ref 12–45)
BASOPHILS # BLD AUTO: 0.7 % (ref 0–1.8)
BASOPHILS # BLD: 0.03 K/UL (ref 0–0.12)
BILIRUB SERPL-MCNC: 0.9 MG/DL (ref 0.1–1.5)
BUN SERPL-MCNC: 17 MG/DL (ref 8–22)
CALCIUM SERPL-MCNC: 9.3 MG/DL (ref 8.5–10.5)
CHLORIDE SERPL-SCNC: 106 MMOL/L (ref 96–112)
CHOLEST SERPL-MCNC: 151 MG/DL (ref 100–199)
CO2 SERPL-SCNC: 27 MMOL/L (ref 20–33)
CREAT SERPL-MCNC: 1.01 MG/DL (ref 0.5–1.4)
EOSINOPHIL # BLD AUTO: 0.01 K/UL (ref 0–0.51)
EOSINOPHIL NFR BLD: 0.2 % (ref 0–6.9)
ERYTHROCYTE [DISTWIDTH] IN BLOOD BY AUTOMATED COUNT: 43.9 FL (ref 35.9–50)
FASTING STATUS PATIENT QL REPORTED: NORMAL
GLOBULIN SER CALC-MCNC: 3.2 G/DL (ref 1.9–3.5)
GLUCOSE SERPL-MCNC: 92 MG/DL (ref 65–99)
HCT VFR BLD AUTO: 43.7 % (ref 37–47)
HDLC SERPL-MCNC: 39 MG/DL
HGB BLD-MCNC: 14.3 G/DL (ref 12–16)
IMM GRANULOCYTES # BLD AUTO: 0.01 K/UL (ref 0–0.11)
IMM GRANULOCYTES NFR BLD AUTO: 0.2 % (ref 0–0.9)
LDLC SERPL CALC-MCNC: 76 MG/DL
LYMPHOCYTES # BLD AUTO: 1.73 K/UL (ref 1–4.8)
LYMPHOCYTES NFR BLD: 41.6 % (ref 22–41)
MCH RBC QN AUTO: 29.9 PG (ref 27–33)
MCHC RBC AUTO-ENTMCNC: 32.7 G/DL (ref 33.6–35)
MCV RBC AUTO: 91.4 FL (ref 81.4–97.8)
MONOCYTES # BLD AUTO: 0.53 K/UL (ref 0–0.85)
MONOCYTES NFR BLD AUTO: 12.7 % (ref 0–13.4)
NEUTROPHILS # BLD AUTO: 1.85 K/UL (ref 2–7.15)
NEUTROPHILS NFR BLD: 44.6 % (ref 44–72)
NRBC # BLD AUTO: 0 K/UL
NRBC BLD-RTO: 0 /100 WBC
PLATELET # BLD AUTO: 148 K/UL (ref 164–446)
PMV BLD AUTO: 11.9 FL (ref 9–12.9)
POTASSIUM SERPL-SCNC: 4.4 MMOL/L (ref 3.6–5.5)
PROT SERPL-MCNC: 7.2 G/DL (ref 6–8.2)
RBC # BLD AUTO: 4.78 M/UL (ref 4.2–5.4)
SODIUM SERPL-SCNC: 142 MMOL/L (ref 135–145)
TRIGL SERPL-MCNC: 179 MG/DL (ref 0–149)
TSH SERPL DL<=0.005 MIU/L-ACNC: 5.2 UIU/ML (ref 0.38–5.33)
WBC # BLD AUTO: 4.2 K/UL (ref 4.8–10.8)

## 2021-05-24 PROCEDURE — 36415 COLL VENOUS BLD VENIPUNCTURE: CPT

## 2021-05-24 PROCEDURE — 85025 COMPLETE CBC W/AUTO DIFF WBC: CPT

## 2021-05-24 PROCEDURE — 80053 COMPREHEN METABOLIC PANEL: CPT

## 2021-05-24 PROCEDURE — 84443 ASSAY THYROID STIM HORMONE: CPT

## 2021-05-24 PROCEDURE — 80061 LIPID PANEL: CPT

## 2021-05-26 ENCOUNTER — OFFICE VISIT (OUTPATIENT)
Dept: MEDICAL GROUP | Facility: PHYSICIAN GROUP | Age: 81
End: 2021-05-26
Payer: MEDICARE

## 2021-05-26 VITALS
HEIGHT: 63 IN | SYSTOLIC BLOOD PRESSURE: 116 MMHG | BODY MASS INDEX: 29.27 KG/M2 | WEIGHT: 165.2 LBS | DIASTOLIC BLOOD PRESSURE: 62 MMHG | HEART RATE: 65 BPM | OXYGEN SATURATION: 96 % | TEMPERATURE: 97.7 F

## 2021-05-26 DIAGNOSIS — N18.31 STAGE 3A CHRONIC KIDNEY DISEASE: ICD-10-CM

## 2021-05-26 DIAGNOSIS — D70.8 OTHER NEUTROPENIA (HCC): ICD-10-CM

## 2021-05-26 DIAGNOSIS — D69.6 THROMBOCYTOPENIA (HCC): ICD-10-CM

## 2021-05-26 PROCEDURE — 99214 OFFICE O/P EST MOD 30 MIN: CPT | Performed by: FAMILY MEDICINE

## 2021-05-26 ASSESSMENT — FIBROSIS 4 INDEX: FIB4 SCORE: 3.34

## 2021-05-26 NOTE — PROGRESS NOTES
CC:   Chief Complaint   Patient presents with   • Follow-Up     4 months.    • Lab Results     HPI:   Emilie presents today a 4 month follow up appt.   Saw neuro-Dr Colvin in January 2021.   Last visit was January 26.  Has an appointment with cardiology next month.    CKD (chronic kidney disease) stage 3, GFR 30-59 ml/min  Chronic. Recent labs with GFR at 53.   Denies any dysuria.    Thrombocytopenia (HCC)  New issue.  Noted on labs since 2020.  Most recent labs from last week have platelet count at 148,000.    She denies any easy bruising or nosebleeds.    Other neutropenia (HCC)  New since 2020.  Most recent labs from last  Week have WBC at 4.2 and absolute neutrophils decreased at 1.85.  She denies any frequent respiratory infections.  Does state that she has been battling allergies for several years now.  Has tried over-the-counter medications and nasal sprays without much improvement.      Current Outpatient Medications Ordered in Epic   Medication Sig Dispense Refill   • celecoxib (CELEBREX) 100 MG Cap Take 1 Cap by mouth every day. 100 Cap 2   • simvastatin (ZOCOR) 20 MG Tab TAKE 1 TABLET BY MOUTH EVERY EVENING 100 Tab 2   • vitamin E (VITAMIN E) 1000 UNIT Cap Take 1 Cap by mouth every day.     • vitamin D (CHOLECALCIFEROL) 1000 UNIT Tab Take 1,000 Units by mouth every day.     • Calcium Carbonate-Vit D-Min (CALCIUM 1200 PO) Take 1 Each by mouth every day at 6 PM.     • Multiple Vitamins-Minerals (OCUVITE-LUTEIN) Cap Take 1 Each by mouth every day.     • Omega-3 Fatty Acids (OMEGA 3 PO) Take 1 Each by mouth every day.     • Multiple Vitamins-Minerals (MULTI COMPLETE PO) Take 1 Tab by mouth every day.     • Bioflavonoid Products (VITAMIN C PLUS) 1000 MG TABS Take 1 Tab by mouth every day.       No current [facility]-ordered facility-administered medications on file.       Past Medical History:   Diagnosis Date   • Arthritis of foot, right 6/9/2015   • Benign essential tremor 6/9/2015   • Edema 3/10/2015   • Hearing  "loss sensory, bilateral 3/10/2015   • History of shingles 9/26/2014   • Hyperlipidemia 9/26/2014   • Macular degeneration of right eye 9/26/2014   • Moderate aortic insufficiency 9/26/2014   • Osteoarthritis 9/26/2014   • Osteoporosis 9/26/2014   • Parkinson's disease (HCC)    • Plantar fasciitis, bilateral 3/10/2015   • Prolapsed bladder 9/26/2014       Social History     Tobacco Use   • Smoking status: Never Smoker   • Smokeless tobacco: Never Used   Vaping Use   • Vaping Use: Never used   Substance Use Topics   • Alcohol use: Yes     Alcohol/week: 0.0 oz     Comment: rare   • Drug use: No       Allergies:  Codeine    Health Maintenance: shingles vaccine d/w her      Objective:       Exam:  /62 (BP Location: Left arm, Patient Position: Sitting, BP Cuff Size: Adult)   Pulse 65   Temp 36.5 °C (97.7 °F) (Temporal)   Ht 1.6 m (5' 3\")   Wt 74.9 kg (165 lb 3.2 oz)   SpO2 96%   BMI 29.26 kg/m²   Body mass index is 29.26 kg/m².  Wt Readings from Last 4 Encounters:   05/26/21 74.9 kg (165 lb 3.2 oz)   01/26/21 75.6 kg (166 lb 9.6 oz)   10/28/20 75.7 kg (166 lb 12.8 oz)   09/21/20 76.7 kg (169 lb)       Gen: Alert and oriented, No apparent distress. Appropriately groomed.  Neck: Neck is supple without lymphadenopathy.No thyromegaly.   Lungs: Normal effort, CTA bilaterally, no wheezes, rhonchi, or rales  CV: Regular rate and rhythm. No Lower extremity edema  Skin: No rash noted or bruising.       Assessment & Plan:     80 y.o. female with the following -     1. Stage 3a chronic kidney disease (HCC)  Chronic medical diagnosis.  She has had decreased GFR dating back to 2015.  Discussed with patient to increase fluid intake and that this could be due to her Celebrex as well.  We will continue to monitor.    2. Thrombocytopenia (HCC)  3. Other neutropenia (HCC)  New medical diagnoses.  Her last 2 set of labs from 2020 and 2021 continue to show neutropenia and thrombocytopenia.  Has never been evaluated by " hematologist.  Her records were reviewed and there were no other CBCs available for review.  - REFERRAL TO HEMATOLOGY ONCOLOGY      Return in about 4 months (around 9/26/2021) for neutropenia.    Please note that this dictation was created using voice recognition software. I have made every reasonable attempt to correct obvious errors, but I expect that there are errors of grammar and possibly content that I did not discover before finalizing the note.

## 2021-05-26 NOTE — ASSESSMENT & PLAN NOTE
New since 2020.  Most recent labs from last  Week have WBC at 4.2 and absolute neutrophils decreased at 1.85.  She denies any frequent respiratory infections.  Does state that she has been battling allergies for several years now.  Has tried over-the-counter medications and nasal sprays without much improvement.

## 2021-05-26 NOTE — ASSESSMENT & PLAN NOTE
New issue.  Noted on labs since 2020.  Most recent labs from last week have platelet count at 148,000.    She denies any easy bruising or nosebleeds.

## 2021-05-28 ENCOUNTER — TELEPHONE (OUTPATIENT)
Dept: MEDICAL GROUP | Facility: PHYSICIAN GROUP | Age: 81
End: 2021-05-28

## 2021-05-28 NOTE — TELEPHONE ENCOUNTER
1. Caller Name: Percy Son                         Call Back Number: [phone]        Called and spoke to patients son about lab results. He will inform his mother of the following. LM

## 2021-05-28 NOTE — TELEPHONE ENCOUNTER
----- Message from Summer Higuera M.D. sent at 5/27/2021 11:36 AM PDT -----  Please call patient and let her know taht I have received a message from the hematology dept.    They reviewed her labs and do not need to see her at this point.  Their recommendation was to repeat the blood test in 2-3 months.  We can do it the next time she sees me.    Thank You,  Dr Higuera

## 2021-06-24 ENCOUNTER — OFFICE VISIT (OUTPATIENT)
Dept: CARDIOLOGY | Facility: MEDICAL CENTER | Age: 81
End: 2021-06-24
Payer: MEDICARE

## 2021-06-24 VITALS
HEART RATE: 74 BPM | HEIGHT: 62 IN | RESPIRATION RATE: 14 BRPM | WEIGHT: 163.2 LBS | SYSTOLIC BLOOD PRESSURE: 112 MMHG | DIASTOLIC BLOOD PRESSURE: 62 MMHG | BODY MASS INDEX: 30.03 KG/M2 | OXYGEN SATURATION: 95 %

## 2021-06-24 DIAGNOSIS — I35.1 AORTIC VALVE INSUFFICIENCY, ETIOLOGY OF CARDIAC VALVE DISEASE UNSPECIFIED: ICD-10-CM

## 2021-06-24 DIAGNOSIS — I35.0 AORTIC VALVE STENOSIS, ETIOLOGY OF CARDIAC VALVE DISEASE UNSPECIFIED: ICD-10-CM

## 2021-06-24 DIAGNOSIS — E78.5 DYSLIPIDEMIA: ICD-10-CM

## 2021-06-24 PROBLEM — R60.0 BILATERAL LEG EDEMA: Status: RESOLVED | Noted: 2018-04-16 | Resolved: 2021-06-24

## 2021-06-24 LAB — EKG IMPRESSION: NORMAL

## 2021-06-24 PROCEDURE — 93000 ELECTROCARDIOGRAM COMPLETE: CPT | Performed by: INTERNAL MEDICINE

## 2021-06-24 PROCEDURE — 99214 OFFICE O/P EST MOD 30 MIN: CPT | Performed by: INTERNAL MEDICINE

## 2021-06-24 RX ORDER — BIOTIN 10000 MCG
CAPSULE ORAL
COMMUNITY

## 2021-06-24 ASSESSMENT — ENCOUNTER SYMPTOMS
SHORTNESS OF BREATH: 0
COUGH: 0
DIZZINESS: 0
LOSS OF CONSCIOUSNESS: 0
MYALGIAS: 0
PALPITATIONS: 0

## 2021-06-24 ASSESSMENT — FIBROSIS 4 INDEX: FIB4 SCORE: 3.34

## 2021-06-24 NOTE — PROGRESS NOTES
"Chief Complaint   Patient presents with   • Aortic Stenosis     Dx: Aortic valve stenosis, etiology of cardiac valve disease unspecified   • Dyslipidemia     Dx: Dyslipidemia       Subjective:   Emilie Gonzalez is a 80 y.o. female who presents today for followup aortic stenosis, aortic insufficiency, hyperlipidemia, abnormal EKG, edema.     Since 10/28/2020 the patient has no specific cardiac problems or symptoms.  She is under stress taking care of her 89-year-old sister who has had difficult problems with recurring right hip problems having undergone 2 hip replacement surgeries now suffering from severe chronic pain and is scheduled for third hip surgery in the near future.  The patient reports occasional on and off symptoms of \"walking us\" in the head but no lightheadedness, dizziness or syncope.     L[place] in a [employer].  Has 4 children [facility] locally and have been very helpful and supportive.    Past medical history  Diagnosed with stage II Parkinson's disease. Followed by Dr. Colvin, neurologist     The patient had previously lived in [place], [place].  5 years ago she was discovered to have a \"leaky heart valve.  The patient had been followed by Dr. Garcia, cardiologist [place] for the past 4 years.  The patient is moved to [facility] and is establishing ongoing cardiology care.     History of hyperlipidemia on simvastatin.  No history of hypertension, diabetes mellitus and she is not smoke cigarettes.     Family history  Father  of a heart attack at age 69.  Sister alive at age 90. Had a heart attack at age 75.     Social history.  .  Does not drink alcohol except on rare occasions with dinner.     Other medical problems.  2013 laparoscopic cholecystectomy [place], [place].  Appendectomy age 13.  \"Chronic bronchitis\" but no problems times one year    Past Medical History:   Diagnosis Date   • Arthritis of foot, right 2015   • Benign essential tremor 2015   • Edema " 3/10/2015   • Hearing loss sensory, bilateral 3/10/2015   • History of shingles 9/26/2014   • Hyperlipidemia 9/26/2014   • Macular degeneration of right eye 9/26/2014   • Moderate aortic insufficiency 9/26/2014   • Osteoarthritis 9/26/2014   • Osteoporosis 9/26/2014   • Parkinson's disease (HCC)    • Plantar fasciitis, bilateral 3/10/2015   • Prolapsed bladder 9/26/2014     Past Surgical History:   Procedure Laterality Date   • CHOLECYSTECTOMY  9/2013   • APPENDECTOMY  1956   • CATARACT PHACO WITH IOL Bilateral      Family History   Problem Relation Age of Onset   • Hypertension Father    • Hypertension Sister    • Arthritis Mother      Social History     Socioeconomic History   • Marital status:      Spouse name: Not on file   • Number of children: Not on file   • Years of education: Not on file   • Highest education level: Not on file   Occupational History   • Not on file   Tobacco Use   • Smoking status: Never Smoker   • Smokeless tobacco: Never Used   Vaping Use   • Vaping Use: Never used   Substance and Sexual Activity   • Alcohol use: Yes     Alcohol/week: 0.0 oz     Comment: rare   • Drug use: No   • Sexual activity: Not Currently     Partners: Male     Comment: , bank, 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   Other Topics Concern   • Not on file   Social History Narrative   • Not on file     Social Determinants of Health     Financial Resource Strain:    • Difficulty of Paying Living Expenses:    Food Insecurity:    • Worried About Running Out of Food in the Last Year:    • Ran Out of Food in the Last Year:    Transportation Needs:    • Lack of Transportation (Medical):    • Lack of Transportation (Non-Medical):    Physical Activity:    • Days of Exercise per Week:    • Minutes of Exercise per Session:    Stress:    • Feeling of Stress :    Social Connections:    • Frequency of Communication with Friends and Family:    • Frequency of Social Gatherings with Friends and Family:    • Attends [employer] Services:    • Active  "Member of Clubs or Organizations:    • Attends Club or Organization Meetings:    • Marital Status:    Intimate Partner Violence:    • Fear of Current or Ex-Partner:    • Emotionally Abused:    • Physically Abused:    • Sexually Abused:      Allergies   Allergen Reactions   • Codeine      halucinations     Outpatient Encounter Medications as of 6/24/2021   Medication Sig Dispense Refill   • Biotin 10 MG Cap Take  by mouth.     • celecoxib (CELEBREX) 100 MG Cap Take 1 Cap by mouth every day. 100 Cap 2   • simvastatin (ZOCOR) 20 MG Tab TAKE 1 TABLET BY MOUTH EVERY EVENING 100 Tab 2   • vitamin E (VITAMIN E) 1000 UNIT Cap Take 1 Cap by mouth every day.     • vitamin D (CHOLECALCIFEROL) 1000 UNIT Tab Take 1,000 Units by mouth every day.     • Calcium Carbonate-Vit D-Min (CALCIUM 1200 PO) Take 1 Each by mouth every day at 6 PM.     • Multiple Vitamins-Minerals (OCUVITE-LUTEIN) Cap Take 1 Each by mouth every day.     • Omega-3 Fatty Acids (OMEGA 3 PO) Take 1 Each by mouth every day.     • Multiple Vitamins-Minerals (MULTI COMPLETE PO) Take 1 Tab by mouth every day.     • Bioflavonoid Products (VITAMIN C PLUS) 1000 MG TABS Take 1 Tab by mouth every day.       No facility-administered encounter medications on file as of 6/24/2021.     Review of Systems   Respiratory: Negative for cough and shortness of breath.    Cardiovascular: Negative for chest pain and palpitations.   Musculoskeletal: Negative for myalgias.   Neurological: Negative for dizziness and loss of consciousness.        Objective:   /62 (BP Location: Left arm, Patient Position: Sitting, BP Cuff Size: Adult)   Pulse 74   Resp 14   Ht 1.575 m (5' 2\")   Wt 74 kg (163 lb 3.2 oz)   SpO2 95%   BMI 29.85 kg/m²     Physical Exam   Constitutional: She is oriented to person, place, and time. She appears well-developed. No distress.   Eyes: Pupils are equal, round, and reactive to light. Conjunctivae are normal.   Neck: No JVD present.   Cardiovascular: Normal " rate and regular rhythm. Exam reveals no gallop and no friction rub.   Murmur heard.  Pulses:       Carotid pulses are 2+ on the right side and 2+ on the left side.  Pulmonary/Chest: Effort normal and breath sounds normal. No accessory muscle usage. No respiratory distress. She has no wheezes. She has no rales.   Abdominal: Soft. She exhibits no distension and no mass. There is no abdominal tenderness.   Protuberant.   Musculoskeletal:      Cervical back: Normal range of motion and neck supple.      Comments:     Neurological: She is alert and oriented to person, place, and time.   Skin: Skin is warm and dry. No rash noted. Nails show no clubbing.   Psychiatric: Her behavior is normal.   Vitals reviewed.    06/07/2010 ECHOCARDIOGRAM  EF 60%.  Mild to moderate aortic regurgitation.     05/05/2011 ECHOCARDIOGRAM  EF 55-60%.  Moderate aortic insufficiency.     05/08/2012 ECHOCARDIOGRAM  EF 70%.  Moderate aortic insufficiency.  Pulmonary pressure 26 mmHg.     03/25/2015 ECHOCARDIOGRAM  Normal left ventricular size, thickness, systolic function, and   diastolic function.  Left ventricular ejection fraction is 65% to 70%.  Aortic sclerosis without stenosis.  Moderately severe aortic insufficiency.  Right heart pressures are consistent with mild pulmonary hypertension.     10/24/2016 ECHOCARDIOGRAM  Normal left ventricular size, wall thickness, and systolic function.  Left ventricular ejection fraction is visually estimated to be 60%.  Severe eccentric aortic insufficiency.  Mildly thickened mitral valve leaflets with normal leaflet excursion.  Unable to estimate pulmonary artery pressure due to an inadequate   tricuspid regurgitant jet.     05/08/2017 ECHOCARDIOGRAM  Normal left ventricular systolic function.  Left ventricular ejection fraction is visually estimated to be 65%.  Mild concentric left ventricular hypertrophy.  Mild aortic stenosis.  Moderate aortic insufficiency.  Mild mitral regurgitation.  Unable to  estimate pulmonary artery pressure due to an inadequate   tricuspid regurgitant jet.     9/4/2018 ECHOCARDIOGRAM  Normal left ventricular size, wall thickness, and systolic function.  Left ventricular ejection fraction is visually estimated to be 65%.    Mild aortic stenosis.  Moderate aortic insufficiency.  Right heart pressures are normal.    03/19/2015 EKG: Normal sinus rhythm, rate 73. Nonspecific ST-T wave abnormalities. No prior tracing for comparison. Reviewed by myself.    Assessment:     1. Aortic valve insufficiency, etiology of cardiac valve disease unspecified  EKG   2. Dyslipidemia     3. Aortic valve stenosis, etiology of cardiac valve disease unspecified       Medical Decision Making:  Today's Assessment / Status / Plan:     Assessment  1.  Aortic stenosis.  2.  Aortic regurgitation.   3.  Abnormal EKG.    4.  Hyperlipidemia. On simvastatin.  5.  Hypothyroidism. Diagnosed 9/1/2015. On supplements. Per PCP.  6.  Osteoarthritis on Celebrex.  7.  Parkinson's disease.  Followed by neurology Dr. Colvin.    Recommendations and Discussion  1.  The patient is stable from a cardiac standpoint concerning her aortic stenosis, aortic insufficiency and dyslipidemia.  2.  BP normal.  3.  EKG today shows normal sinus.  4.  Instructed the patient to contact the office for any worsening symptoms.  5.  Continue current therapy.  6.  RTC 6 months with an echocardiogram prior to next visit..

## 2021-08-16 ENCOUNTER — PATIENT OUTREACH (OUTPATIENT)
Dept: HEALTH INFORMATION MANAGEMENT | Facility: OTHER | Age: 81
End: 2021-08-16

## 2021-08-16 NOTE — NON-PROVIDER
Outcome:   Member declined scheduling anything stated she is well taken care of by her PCP office.   Attempt to verify HIPAA. Member disconnected call.       [facility] Verified: yes    Attempt # 1

## 2021-08-31 ENCOUNTER — TELEPHONE (OUTPATIENT)
Dept: MEDICAL GROUP | Facility: PHYSICIAN GROUP | Age: 81
End: 2021-08-31

## 2021-08-31 NOTE — TELEPHONE ENCOUNTER
Patient LVM regarding DMV paperwork she left to get filled out and mailed back to her. She Just wants to know if it was mailed out. Please Advise.

## 2021-08-31 NOTE — TELEPHONE ENCOUNTER
Left voicemail for patient to call back. Dr. Higuera is requesting an appointment to complete the DMV forms. Called multiple times last week and have not received a call back.

## 2021-09-03 ENCOUNTER — TELEPHONE (OUTPATIENT)
Dept: MEDICAL GROUP | Facility: PHYSICIAN GROUP | Age: 81
End: 2021-09-03

## 2021-09-03 NOTE — TELEPHONE ENCOUNTER
ESTABLISHED PATIENT PRE-VISIT PLANNING     Patient was NOT contacted to complete PVP.     Note: Patient will not be contacted if there is no indication to call.     1.  Reviewed notes from the last few office visits within the medical group: Yes    2.  If any orders were placed at last visit or intended to be done for this visit (i.e. 6 mos follow-up), do we have Results/Consult Notes?         •  Labs - Labs were not ordered at last office visit.  Note: If patient appointment is for lab review and patient did not complete labs, check with provider if OK to reschedule patient until labs completed.       •  Imaging - Imaging was not ordered at last office visit.       •  Referrals - Referral ordered, patient has NOT been seen.    3. Is this appointment scheduled as a Hospital Follow-Up? No    4.  Immunizations were updated in Epic using Reconcile Outside Information activity? Yes    5.  Patient is due for the following Health Maintenance Topics:   Health Maintenance Due   Topic Date Due   • IMM ZOSTER VACCINES (1 of 2) Never done   • IMM INFLUENZA (1) 09/01/2021         6.  AHA (Pulse8) form printed for Provider? No, already completed

## 2021-09-07 ENCOUNTER — OFFICE VISIT (OUTPATIENT)
Dept: MEDICAL GROUP | Facility: PHYSICIAN GROUP | Age: 81
End: 2021-09-07
Payer: MEDICARE

## 2021-09-07 ENCOUNTER — HOSPITAL ENCOUNTER (OUTPATIENT)
Facility: MEDICAL CENTER | Age: 81
End: 2021-09-07
Attending: FAMILY MEDICINE
Payer: MEDICARE

## 2021-09-07 VITALS
RESPIRATION RATE: 14 BRPM | OXYGEN SATURATION: 98 % | WEIGHT: 160.5 LBS | TEMPERATURE: 97.2 F | SYSTOLIC BLOOD PRESSURE: 122 MMHG | HEIGHT: 62 IN | HEART RATE: 68 BPM | DIASTOLIC BLOOD PRESSURE: 70 MMHG | BODY MASS INDEX: 29.53 KG/M2

## 2021-09-07 DIAGNOSIS — D69.6 THROMBOCYTOPENIA (HCC): ICD-10-CM

## 2021-09-07 DIAGNOSIS — E78.5 DYSLIPIDEMIA: ICD-10-CM

## 2021-09-07 DIAGNOSIS — D70.8 OTHER NEUTROPENIA (HCC): ICD-10-CM

## 2021-09-07 DIAGNOSIS — N18.31 STAGE 3A CHRONIC KIDNEY DISEASE: ICD-10-CM

## 2021-09-07 DIAGNOSIS — M15.9 PRIMARY OSTEOARTHRITIS INVOLVING MULTIPLE JOINTS: ICD-10-CM

## 2021-09-07 LAB
BASOPHILS # BLD AUTO: 0.8 % (ref 0–1.8)
BASOPHILS # BLD: 0.04 K/UL (ref 0–0.12)
EOSINOPHIL # BLD AUTO: 0.01 K/UL (ref 0–0.51)
EOSINOPHIL NFR BLD: 0.2 % (ref 0–6.9)
ERYTHROCYTE [DISTWIDTH] IN BLOOD BY AUTOMATED COUNT: 45.1 FL (ref 35.9–50)
HCT VFR BLD AUTO: 43.5 % (ref 37–47)
HGB BLD-MCNC: 14 G/DL (ref 12–16)
IMM GRANULOCYTES # BLD AUTO: 0.02 K/UL (ref 0–0.11)
IMM GRANULOCYTES NFR BLD AUTO: 0.4 % (ref 0–0.9)
LYMPHOCYTES # BLD AUTO: 1.94 K/UL (ref 1–4.8)
LYMPHOCYTES NFR BLD: 39.7 % (ref 22–41)
MCH RBC QN AUTO: 29.5 PG (ref 27–33)
MCHC RBC AUTO-ENTMCNC: 32.2 G/DL (ref 33.6–35)
MCV RBC AUTO: 91.6 FL (ref 81.4–97.8)
MONOCYTES # BLD AUTO: 0.66 K/UL (ref 0–0.85)
MONOCYTES NFR BLD AUTO: 13.5 % (ref 0–13.4)
NEUTROPHILS # BLD AUTO: 2.22 K/UL (ref 2–7.15)
NEUTROPHILS NFR BLD: 45.4 % (ref 44–72)
NRBC # BLD AUTO: 0 K/UL
NRBC BLD-RTO: 0 /100 WBC
PLATELET # BLD AUTO: 154 K/UL (ref 164–446)
PMV BLD AUTO: 13 FL (ref 9–12.9)
RBC # BLD AUTO: 4.75 M/UL (ref 4.2–5.4)
WBC # BLD AUTO: 4.9 K/UL (ref 4.8–10.8)

## 2021-09-07 PROCEDURE — 82570 ASSAY OF URINE CREATININE: CPT

## 2021-09-07 PROCEDURE — 99214 OFFICE O/P EST MOD 30 MIN: CPT | Performed by: FAMILY MEDICINE

## 2021-09-07 PROCEDURE — 87086 URINE CULTURE/COLONY COUNT: CPT

## 2021-09-07 PROCEDURE — 99000 SPECIMEN HANDLING OFFICE-LAB: CPT | Performed by: FAMILY MEDICINE

## 2021-09-07 PROCEDURE — 36415 COLL VENOUS BLD VENIPUNCTURE: CPT | Performed by: FAMILY MEDICINE

## 2021-09-07 PROCEDURE — 82043 UR ALBUMIN QUANTITATIVE: CPT

## 2021-09-07 PROCEDURE — 80048 BASIC METABOLIC PNL TOTAL CA: CPT

## 2021-09-07 PROCEDURE — 85025 COMPLETE CBC W/AUTO DIFF WBC: CPT

## 2021-09-07 PROCEDURE — 81001 URINALYSIS AUTO W/SCOPE: CPT

## 2021-09-07 RX ORDER — SIMVASTATIN 20 MG
TABLET ORAL
Qty: 100 TABLET | Refills: 2 | Status: SHIPPED | OUTPATIENT
Start: 2021-09-07 | End: 2022-04-13 | Stop reason: SDUPTHER

## 2021-09-07 RX ORDER — CELECOXIB 100 MG/1
100 CAPSULE ORAL DAILY
Qty: 100 CAPSULE | Refills: 2 | Status: SHIPPED | OUTPATIENT
Start: 2021-09-07 | End: 2022-04-13 | Stop reason: SDUPTHER

## 2021-09-07 ASSESSMENT — FIBROSIS 4 INDEX: FIB4 SCORE: 3.34

## 2021-09-07 NOTE — PROGRESS NOTES
CC:   Chief Complaint   Patient presents with   • Paperwork     DMV Physical    • Medication Refill       HPI:   Emilie presents today for a follow up visit and to discuss her DMV paperwork.     Thrombocytopenia (HCC)/neutropenia  Chronic issue. Oncology referral was placed and their recommendations were to repeat cbc in 2 to 3 months from May 24.  At that time her labs had white blood cell count of 4.2 with absolute neutrophils at 1.85.  Platelet count was at 148.  Denies any nosebleeds or easy bruising.     CKD (chronic kidney disease) stage 3, GFR 30-59 ml/min  Chronic issue.  She does continue to take Celebrex for her arthritis.  Interactions with this medication and chronic kidney disease were discussed with the patient today.  Complains of polyuria.  May labs with GFR at 53    Dyslipidemia  Chronic.  Most recent labs from May do have an improvement in total cholesterol down to 151 and triglycerides down to 179.  She did have her follow-up with cardiology in June to recommendations to follow-up in 6 months. currently takes zocor 20mg.   Requesting refills.       Osteoarthritis  Chronic issue. Requesting celebrex refills.  Currently taking it at nightime for her bilateral arthritic hand pain.       Current Outpatient Medications Ordered in Epic   Medication Sig Dispense Refill   • celecoxib (CELEBREX) 100 MG Cap Take 1 Capsule by mouth every day. 100 Capsule 2   • simvastatin (ZOCOR) 20 MG Tab TAKE 1 TABLET BY MOUTH EVERY EVENING 100 Tablet 2   • Biotin 10 MG Cap Take  by mouth.     • vitamin E (VITAMIN E) 1000 UNIT Cap Take 1 Cap by mouth every day.     • vitamin D (CHOLECALCIFEROL) 1000 UNIT Tab Take 1,000 Units by mouth every day.     • Calcium Carbonate-Vit D-Min (CALCIUM 1200 PO) Take 1 Each by mouth every day at 6 PM.     • Multiple Vitamins-Minerals (OCUVITE-LUTEIN) Cap Take 1 Each by mouth every day.     • Omega-3 Fatty Acids (OMEGA 3 PO) Take 1 Each by mouth every day.     • Multiple Vitamins-Minerals  "(MULTI COMPLETE PO) Take 1 Tab by mouth every day.     • Bioflavonoid Products (VITAMIN C PLUS) 1000 MG TABS Take 1 Tab by mouth every day.       No current [facility]-ordered facility-administered medications on file.       Past Medical History:   Diagnosis Date   • Arthritis of foot, right 6/9/2015   • Benign essential tremor 6/9/2015   • Edema 3/10/2015   • Hearing loss sensory, bilateral 3/10/2015   • History of shingles 9/26/2014   • Hyperlipidemia 9/26/2014   • Macular degeneration of right eye 9/26/2014   • Moderate aortic insufficiency 9/26/2014   • Osteoarthritis 9/26/2014   • Osteoporosis 9/26/2014   • Parkinson's disease (HCC)    • Plantar fasciitis, bilateral 3/10/2015   • Prolapsed bladder 9/26/2014       Social History     Tobacco Use   • Smoking status: Never Smoker   • Smokeless tobacco: Never Used   Vaping Use   • Vaping Use: Never used   Substance Use Topics   • Alcohol use: Yes     Alcohol/week: 0.0 oz     Comment: rare   • Drug use: No     Allergies:  Codeine    Objective:     Exam:  /70 (BP Location: Left arm, Patient Position: Sitting, BP Cuff Size: Adult)   Pulse 68   Temp 36.2 °C (97.2 °F) (Temporal)   Resp 14   Ht 1.575 m (5' 2\")   Wt 72.8 kg (160 lb 8 oz)   SpO2 98%   BMI 29.36 kg/m²   Body mass index is 29.36 kg/m².  Wt Readings from Last 4 Encounters:   09/07/21 72.8 kg (160 lb 8 oz)   06/24/21 74 kg (163 lb 3.2 oz)   05/26/21 74.9 kg (165 lb 3.2 oz)   01/26/21 75.6 kg (166 lb 9.6 oz)       Gen: Alert and oriented, No apparent distress. Appropriately groomed.  Neck: Neck is supple without lymphadenopathy.No thyromegaly.   Lungs: Normal effort, CTA bilaterally, no wheezes, rhonchi, or rales  CV: Regular rate and rhythm. Trace bilateal Lower extremity edema  Skin: No rash noted.      Assessment & Plan:     80 y.o. female with the following -     1. Dyslipidemia  -Chronic.  Improving.  Continue with Zocor 20 mg daily.  Follow-up with cardiology   in December as scheduled.  Simvastatin " (ZOCOR) 20 MG Tab; TAKE 1 TABLET BY MOUTH EVERY EVENING  Dispense: 100 Tablet; Refill: 2    2. Primary osteoarthritis involving multiple joints  Chronic.  Improving with Celebrex.  Precautions with CKD discussed with patient.  Encouraged to use it as needed.  - celecoxib (CELEBREX) 100 MG Cap; Take 1 Capsule by mouth every day.  Dispense: 100 Capsule; Refill: 2    3. Other neutropenia (HCC)  4. Thrombocytopenia (HCC)  Chronic medical diagnosis.  We will repeat CBC in the office today.  - CBC WITH DIFFERENTIAL; Future    5. Stage 3a chronic kidney disease (HCC)  Chronic medical diagnosis.  Will repeat labs and urinalysis in the office today.  - Basic Metabolic Panel; Future  - MICROALBUMIN CREAT RATIO URINE; Future  - URINALYSIS,CULTURE IF INDICATED; Future    DMV paperwork completed in office.  Paperwork was scanned into patient's chart and Original was handed back to patient by medical assistant.      Return in about 3 months (around 12/7/2021).    Please note that this dictation was created using voice recognition software. I have made every reasonable attempt to correct obvious errors, but I expect that there are errors of grammar and possibly content that I did not discover before finalizing the note.

## 2021-09-07 NOTE — ASSESSMENT & PLAN NOTE
Chronic issue. Requesting celebrex refills.  Currently taking it at nightime for her bilateral arthritic hand pain.

## 2021-09-08 LAB
ANION GAP SERPL CALC-SCNC: 10 MMOL/L (ref 7–16)
APPEARANCE UR: CLEAR
BACTERIA #/AREA URNS HPF: NEGATIVE /HPF
BILIRUB UR QL STRIP.AUTO: NEGATIVE
BUN SERPL-MCNC: 20 MG/DL (ref 8–22)
CALCIUM SERPL-MCNC: 9.8 MG/DL (ref 8.5–10.5)
CHLORIDE SERPL-SCNC: 107 MMOL/L (ref 96–112)
CO2 SERPL-SCNC: 25 MMOL/L (ref 20–33)
COLOR UR: YELLOW
CREAT SERPL-MCNC: 0.83 MG/DL (ref 0.5–1.4)
CREAT UR-MCNC: 33.63 MG/DL
EPI CELLS #/AREA URNS HPF: ABNORMAL /HPF
GLUCOSE SERPL-MCNC: 92 MG/DL (ref 65–99)
GLUCOSE UR STRIP.AUTO-MCNC: NEGATIVE MG/DL
HYALINE CASTS #/AREA URNS LPF: ABNORMAL /LPF
KETONES UR STRIP.AUTO-MCNC: NEGATIVE MG/DL
LEUKOCYTE ESTERASE UR QL STRIP.AUTO: ABNORMAL
MICRO URNS: ABNORMAL
MICROALBUMIN UR-MCNC: <1.2 MG/DL
MICROALBUMIN/CREAT UR: NORMAL MG/G (ref 0–30)
NITRITE UR QL STRIP.AUTO: NEGATIVE
PH UR STRIP.AUTO: 6.5 [PH] (ref 5–8)
POTASSIUM SERPL-SCNC: 5.2 MMOL/L (ref 3.6–5.5)
PROT UR QL STRIP: NEGATIVE MG/DL
RBC # URNS HPF: ABNORMAL /HPF
RBC UR QL AUTO: NEGATIVE
SODIUM SERPL-SCNC: 142 MMOL/L (ref 135–145)
SP GR UR STRIP.AUTO: 1.01
UROBILINOGEN UR STRIP.AUTO-MCNC: 0.2 MG/DL
WBC #/AREA URNS HPF: ABNORMAL /HPF

## 2021-09-10 ENCOUNTER — TELEPHONE (OUTPATIENT)
Dept: MEDICAL GROUP | Facility: PHYSICIAN GROUP | Age: 81
End: 2021-09-10

## 2021-09-10 LAB
BACTERIA UR CULT: NORMAL
SIGNIFICANT IND 70042: NORMAL
SITE SITE: NORMAL
SOURCE SOURCE: NORMAL

## 2021-09-10 NOTE — TELEPHONE ENCOUNTER
Phone Number Called: Emilie Gonzalez      Call outcome: Left detailed message for patient. Informed to call back with any additional questions.    Message: LAB RESULTS. Lm

## 2021-09-10 NOTE — TELEPHONE ENCOUNTER
----- Message from Summer Higuera M.D. sent at 9/10/2021 10:33 AM PDT -----  Please call patient  and let her know that    Your urine,electrolytes, fasting blood sugar, kidney and liver function are all within normal range.    Her WBC count is now in normal range.   Platelets counts have also improved.    Please let me know if she has any further questions/concerns.     Thank You,  Dr Higuera

## 2021-09-15 NOTE — TELEPHONE ENCOUNTER
Phone Number Called: [phone] (home)       Call outcome: Left detailed message for patient. Informed to call back with any additional questions.    Message: LVM for pt to return phone call. LM

## 2021-09-21 NOTE — TELEPHONE ENCOUNTER
VOICEMAIL  1. Caller Name: Emilie Gonzalez                        Call Back Number: [phone] (home)       2. Message: Basho message. LM     3. Patient approves office to leave a detailed voicemail/MyChart message: no

## 2021-12-01 ENCOUNTER — TELEPHONE (OUTPATIENT)
Dept: MEDICAL GROUP | Facility: PHYSICIAN GROUP | Age: 81
End: 2021-12-01

## 2021-12-01 NOTE — TELEPHONE ENCOUNTER
ESTABLISHED PATIENT PRE-VISIT PLANNING     Patient was NOT contacted to complete PVP.     Note: Patient will not be contacted if there is no indication to call.     1.  Reviewed notes from the last few office visits within the medical group: Yes    2.  If any orders were placed at last visit or intended to be done for this visit (i.e. 6 mos follow-up), do we have Results/Consult Notes?         •  Labs - Labs ordered, completed on 9/7/2021 and results are in chart.  Note: If patient appointment is for lab review and patient did not complete labs, check with provider if OK to reschedule patient until labs completed.       •  Imaging - Imaging was not ordered at last office visit.       •  Referrals - No referrals were ordered at last office visit.    3. Is this appointment scheduled as a Hospital Follow-Up? No    4.  Immunizations were updated in Epic using Reconcile Outside Information activity? Yes    5.  Patient is due for the following Health Maintenance Topics:   Health Maintenance Due   Topic Date Due   • IMM ZOSTER VACCINES (1 of 2) Never done   • IMM INFLUENZA (1) 09/01/2021   • COVID-19 Vaccine (3 - Booster for Pfizer series) 09/03/2021       6.  AHA (Pulse8) form printed for Provider? No, already completed

## 2021-12-07 ENCOUNTER — OFFICE VISIT (OUTPATIENT)
Dept: MEDICAL GROUP | Facility: PHYSICIAN GROUP | Age: 81
End: 2021-12-07
Payer: MEDICARE

## 2021-12-07 VITALS
WEIGHT: 154.7 LBS | DIASTOLIC BLOOD PRESSURE: 68 MMHG | HEIGHT: 62 IN | HEART RATE: 64 BPM | OXYGEN SATURATION: 97 % | TEMPERATURE: 99.1 F | SYSTOLIC BLOOD PRESSURE: 120 MMHG | BODY MASS INDEX: 28.47 KG/M2 | RESPIRATION RATE: 12 BRPM

## 2021-12-07 DIAGNOSIS — Z23 NEED FOR VACCINATION: ICD-10-CM

## 2021-12-07 DIAGNOSIS — Z12.31 ENCOUNTER FOR SCREENING MAMMOGRAM FOR BREAST CANCER: ICD-10-CM

## 2021-12-07 DIAGNOSIS — D69.6 THROMBOCYTOPENIA (HCC): ICD-10-CM

## 2021-12-07 DIAGNOSIS — E78.5 DYSLIPIDEMIA: ICD-10-CM

## 2021-12-07 PROCEDURE — 90662 IIV NO PRSV INCREASED AG IM: CPT | Performed by: FAMILY MEDICINE

## 2021-12-07 PROCEDURE — 99213 OFFICE O/P EST LOW 20 MIN: CPT | Mod: 25 | Performed by: FAMILY MEDICINE

## 2021-12-07 PROCEDURE — G0008 ADMIN INFLUENZA VIRUS VAC: HCPCS | Performed by: FAMILY MEDICINE

## 2021-12-07 ASSESSMENT — FIBROSIS 4 INDEX: FIB4 SCORE: 3.25

## 2021-12-07 NOTE — PROGRESS NOTES
CC:   Chief Complaint   Patient presents with   • Follow-Up     3 months    • Immunizations     Flu Shot        HPI:   Emilie presents today for a 3-month follow-up visit.  She will be following up with her cardiologist on the 22nd of this month..     On 12/29- will be f/u ophthoDr LOZANO for right eye, q3 month injections.   Will be f/u in January with neuroDr Colvin.    Dyslipidemia  Chronic.  Continues to take zocor.  May labs with chol at 151 and triglycerides 179.    Thrombocytopenia (HCC)  Chronic.  Recent labs from September with platelets improved at 154.  Denies any nosebleeds or easy bruising.       Current Outpatient Medications Ordered in Epic   Medication Sig Dispense Refill   • celecoxib (CELEBREX) 100 MG Cap Take 1 Capsule by mouth every day. 100 Capsule 2   • simvastatin (ZOCOR) 20 MG Tab TAKE 1 TABLET BY MOUTH EVERY EVENING 100 Tablet 2   • Biotin 10 MG Cap Take  by mouth.     • vitamin E (VITAMIN E) 1000 UNIT Cap Take 1 Cap by mouth every day.     • vitamin D (CHOLECALCIFEROL) 1000 UNIT Tab Take 1,000 Units by mouth every day.     • Calcium Carbonate-Vit D-Min (CALCIUM 1200 PO) Take 1 Each by mouth every day at 6 PM.     • Multiple Vitamins-Minerals (OCUVITE-LUTEIN) Cap Take 1 Each by mouth every day.     • Omega-3 Fatty Acids (OMEGA 3 PO) Take 1 Each by mouth every day.     • Multiple Vitamins-Minerals (MULTI COMPLETE PO) Take 1 Tab by mouth every day.     • Bioflavonoid Products (VITAMIN C PLUS) 1000 MG TABS Take 1 Tab by mouth every day.       No current [facility]-ordered facility-administered medications on file.       Past Medical History:   Diagnosis Date   • Arthritis of foot, right 6/9/2015   • Benign essential tremor 6/9/2015   • Edema 3/10/2015   • Hearing loss sensory, bilateral 3/10/2015   • History of shingles 9/26/2014   • Hyperlipidemia 9/26/2014   • Macular degeneration of right eye 9/26/2014   • Moderate aortic insufficiency 9/26/2014   • Osteoarthritis 9/26/2014   • Osteoporosis  "9/26/2014   • Parkinson's disease (HCC)    • Plantar fasciitis, bilateral 3/10/2015   • Prolapsed bladder 9/26/2014       Social History     Tobacco Use   • Smoking status: Never Smoker   • Smokeless tobacco: Never Used   Vaping Use   • Vaping Use: Never used   Substance Use Topics   • Alcohol use: Yes     Alcohol/week: 0.0 oz     Comment: rare   • Drug use: No       Allergies:  Codeine    Health Maintenance: flu vaccine today, last mammogram 2015--will resume these, new orders placed today.       Objective:       Exam:  /68 (BP Location: Right arm, Patient Position: Sitting, BP Cuff Size: Adult)   Pulse 64   Temp 37.3 °C (99.1 °F) (Temporal)   Resp 12   Ht 1.575 m (5' 2\")   Wt 70.2 kg (154 lb 11.2 oz)   SpO2 97%   BMI 28.30 kg/m²   Body mass index is 28.3 kg/m².  Wt Readings from Last 4 Encounters:   12/07/21 70.2 kg (154 lb 11.2 oz)   09/07/21 72.8 kg (160 lb 8 oz)   06/24/21 74 kg (163 lb 3.2 oz)   05/26/21 74.9 kg (165 lb 3.2 oz)       Gen: Alert and oriented, No apparent distress. Appropriately groomed.  Neck: Neck is supple without lymphadenopathy.No thyromegaly.   Lungs: Normal effort, CTA bilaterally, no wheezes, rhonchi, or rales  CV: Regular rate and rhythm. trace Lower extremity edema  Skin: No rash noted.      Assessment & Plan:     81 y.o. female with the following -     1. Dyslipidemia  Chronic medical diagnosis.  Improving.  Continue with Zocor 20 mg daily.  - CBC WITH DIFFERENTIAL; Future  - Comp Metabolic Panel; Future  - Lipid Profile; Future    2. Need for vaccination  - INFLUENZA VACCINE, HIGH DOSE (65+ ONLY)    3. Thrombocytopenia (HCC)  Chronic medical diagnosis.  Improving.  We will recheck labs.    4. Encounter for screening mammogram for breast cancer  Her last mammogram was in 2015.  - MA-SCREENING MAMMO BILAT W/TOMOSYNTHESIS W/CAD; Future      Return in about 4 months (around 4/7/2022) for hyperlipidemia, thrombocytopenia.    Please note that this dictation was created using " voice recognition software. I have made every reasonable attempt to correct obvious errors, but I expect that there are errors of grammar and possibly content that I did not discover before finalizing the note.

## 2021-12-07 NOTE — ASSESSMENT & PLAN NOTE
Chronic.  Recent labs from September with platelets improved at 154.  Denies any nosebleeds or easy bruising.

## 2021-12-15 ENCOUNTER — HOSPITAL ENCOUNTER (OUTPATIENT)
Dept: LAB | Facility: MEDICAL CENTER | Age: 81
End: 2021-12-15
Attending: FAMILY MEDICINE
Payer: MEDICARE

## 2021-12-15 DIAGNOSIS — E78.5 DYSLIPIDEMIA: ICD-10-CM

## 2021-12-15 LAB
ALBUMIN SERPL BCP-MCNC: 4.2 G/DL (ref 3.2–4.9)
ALBUMIN/GLOB SERPL: 1.4 G/DL
ALP SERPL-CCNC: 56 U/L (ref 30–99)
ALT SERPL-CCNC: 16 U/L (ref 2–50)
ANION GAP SERPL CALC-SCNC: 9 MMOL/L (ref 7–16)
AST SERPL-CCNC: 25 U/L (ref 12–45)
BASOPHILS # BLD AUTO: 0.6 % (ref 0–1.8)
BASOPHILS # BLD: 0.02 K/UL (ref 0–0.12)
BILIRUB SERPL-MCNC: 0.8 MG/DL (ref 0.1–1.5)
BUN SERPL-MCNC: 23 MG/DL (ref 8–22)
CALCIUM SERPL-MCNC: 9.4 MG/DL (ref 8.5–10.5)
CHLORIDE SERPL-SCNC: 107 MMOL/L (ref 96–112)
CHOLEST SERPL-MCNC: 146 MG/DL (ref 100–199)
CO2 SERPL-SCNC: 27 MMOL/L (ref 20–33)
CREAT SERPL-MCNC: 0.91 MG/DL (ref 0.5–1.4)
EOSINOPHIL # BLD AUTO: 0.02 K/UL (ref 0–0.51)
EOSINOPHIL NFR BLD: 0.6 % (ref 0–6.9)
ERYTHROCYTE [DISTWIDTH] IN BLOOD BY AUTOMATED COUNT: 43 FL (ref 35.9–50)
FASTING STATUS PATIENT QL REPORTED: NORMAL
GLOBULIN SER CALC-MCNC: 3 G/DL (ref 1.9–3.5)
GLUCOSE SERPL-MCNC: 101 MG/DL (ref 65–99)
HCT VFR BLD AUTO: 43.4 % (ref 37–47)
HDLC SERPL-MCNC: 39 MG/DL
HGB BLD-MCNC: 14.1 G/DL (ref 12–16)
IMM GRANULOCYTES # BLD AUTO: 0.01 K/UL (ref 0–0.11)
IMM GRANULOCYTES NFR BLD AUTO: 0.3 % (ref 0–0.9)
LDLC SERPL CALC-MCNC: 82 MG/DL
LYMPHOCYTES # BLD AUTO: 1.46 K/UL (ref 1–4.8)
LYMPHOCYTES NFR BLD: 42.9 % (ref 22–41)
MCH RBC QN AUTO: 29.6 PG (ref 27–33)
MCHC RBC AUTO-ENTMCNC: 32.5 G/DL (ref 33.6–35)
MCV RBC AUTO: 91.2 FL (ref 81.4–97.8)
MONOCYTES # BLD AUTO: 0.43 K/UL (ref 0–0.85)
MONOCYTES NFR BLD AUTO: 12.6 % (ref 0–13.4)
NEUTROPHILS # BLD AUTO: 1.46 K/UL (ref 2–7.15)
NEUTROPHILS NFR BLD: 43 % (ref 44–72)
NRBC # BLD AUTO: 0 K/UL
NRBC BLD-RTO: 0 /100 WBC
PLATELET # BLD AUTO: 142 K/UL (ref 164–446)
PMV BLD AUTO: 12.2 FL (ref 9–12.9)
POTASSIUM SERPL-SCNC: 4.6 MMOL/L (ref 3.6–5.5)
PROT SERPL-MCNC: 7.2 G/DL (ref 6–8.2)
RBC # BLD AUTO: 4.76 M/UL (ref 4.2–5.4)
SODIUM SERPL-SCNC: 143 MMOL/L (ref 135–145)
TRIGL SERPL-MCNC: 127 MG/DL (ref 0–149)
WBC # BLD AUTO: 3.4 K/UL (ref 4.8–10.8)

## 2021-12-15 PROCEDURE — 80061 LIPID PANEL: CPT

## 2021-12-15 PROCEDURE — 85025 COMPLETE CBC W/AUTO DIFF WBC: CPT

## 2021-12-15 PROCEDURE — 36415 COLL VENOUS BLD VENIPUNCTURE: CPT

## 2021-12-15 PROCEDURE — 80053 COMPREHEN METABOLIC PANEL: CPT

## 2021-12-21 ENCOUNTER — TELEPHONE (OUTPATIENT)
Dept: MEDICAL GROUP | Facility: PHYSICIAN GROUP | Age: 81
End: 2021-12-21

## 2021-12-21 NOTE — TELEPHONE ENCOUNTER
VOICEMAIL  1. Caller Name: Emilie Gonzalez                        Call Back Number: [phone] (home)       2. Message: LAB RESULTS. LM     3. Patient approves office to leave a detailed voicemail/MyChart message: N\A

## 2021-12-21 NOTE — TELEPHONE ENCOUNTER
----- Message from Summer Higuera M.D. sent at 12/20/2021  9:13 PM PST -----  Please call patient and let her know that    Your electrolytes and liver function are all within normal range.    Her lipid panel and triglycerides are stable. She should continue with her zocor 20mg daily.     Her fasting glucose was slightly elevated at 101.    Her kidney function was slightly below normal at 59.  I would like her to avoid nsaids and to increase her fluid intake.     Her blood counts continue to show low WBC and platelets.  This is not a new finding and has been on for some time now.   Please ask her if she has been having frequent infections or nosebleeds.  We can refer her to a specialist for further evaluation now or we can monitor these results and discuss further at her next appt with me based on how she's feeling.     Thank You,  Dr Higuera

## 2021-12-22 ENCOUNTER — OFFICE VISIT (OUTPATIENT)
Dept: CARDIOLOGY | Facility: MEDICAL CENTER | Age: 81
End: 2021-12-22
Payer: MEDICARE

## 2021-12-22 VITALS
BODY MASS INDEX: 28.52 KG/M2 | RESPIRATION RATE: 14 BRPM | DIASTOLIC BLOOD PRESSURE: 70 MMHG | SYSTOLIC BLOOD PRESSURE: 130 MMHG | OXYGEN SATURATION: 97 % | WEIGHT: 155 LBS | HEART RATE: 69 BPM | HEIGHT: 62 IN

## 2021-12-22 DIAGNOSIS — I35.1 AORTIC VALVE INSUFFICIENCY, ETIOLOGY OF CARDIAC VALVE DISEASE UNSPECIFIED: ICD-10-CM

## 2021-12-22 DIAGNOSIS — E78.5 DYSLIPIDEMIA: ICD-10-CM

## 2021-12-22 DIAGNOSIS — R94.31 ABNORMAL EKG: ICD-10-CM

## 2021-12-22 DIAGNOSIS — I35.0 AORTIC VALVE STENOSIS, ETIOLOGY OF CARDIAC VALVE DISEASE UNSPECIFIED: ICD-10-CM

## 2021-12-22 PROCEDURE — 99214 OFFICE O/P EST MOD 30 MIN: CPT | Performed by: INTERNAL MEDICINE

## 2021-12-22 ASSESSMENT — ENCOUNTER SYMPTOMS
MYALGIAS: 0
SHORTNESS OF BREATH: 0
PALPITATIONS: 0
LOSS OF CONSCIOUSNESS: 0
DIZZINESS: 0
COUGH: 0

## 2021-12-22 ASSESSMENT — FIBROSIS 4 INDEX: FIB4 SCORE: 3.61

## 2021-12-22 NOTE — PROGRESS NOTES
"Chief Complaint   Patient presents with   • Aortic Stenosis     fv/dx: Aortic valve insufficiency, etiology of cardiac valve disease unspecified   • Dyslipidemia       Subjective     Emilie Gonzalez is a 81 y.o. female who presents today for followup aortic stenosis, aortic insufficiency, hyperlipidemia, abnormal EKG, edema with additional PMH of Parkinson's disease, hypothyroidism, osteoarthritis.     Since 2021 appointment the patient has had no cardiac problems or symptoms.  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  Monitors BP generally runs \"114-120\"      Lives in a [facility] [address] [place].  Has 4 children [address] [place] [address] locally and have been very helpful and supportive.     Past medical history  Diagnosed with stage II Parkinson's disease. Followed by Dr. Colvin, neurologist     The patient had previously lived in [place], [place].  5 years ago she was discovered to have a \"leaky heart valve.  The patient had been followed by Dr. Garcia, cardiologist [place] for the past 4 years.  The patient is moved to [facility] and is establishing ongoing cardiology care.     History of hyperlipidemia on simvastatin.  No history of hypertension, diabetes mellitus and she is not smoke cigarettes.     Family history  Father  of a heart attack at age 69.  Sister alive at age 90. Had a heart attack at age 75.     Social history.  .  Does not drink alcohol except on rare occasions with dinner.     Other medical problems.  2013 laparoscopic cholecystectomy [place], [place].  Appendectomy age 13.  \"Chronic bronchitis\" but no problems times one year    Past Medical History:   Diagnosis Date   • Arthritis of foot, right 2015   • Benign essential tremor 2015   • Edema 3/10/2015   • Hearing loss sensory, bilateral 3/10/2015   • History of " shingles 9/26/2014   • Hyperlipidemia 9/26/2014   • Macular degeneration of right eye 9/26/2014   • Moderate aortic insufficiency 9/26/2014   • Osteoarthritis 9/26/2014   • Osteoporosis 9/26/2014   • Parkinson's disease (HCC)    • Plantar fasciitis, bilateral 3/10/2015   • Prolapsed bladder 9/26/2014     Past Surgical History:   Procedure Laterality Date   • CHOLECYSTECTOMY  9/2013   • APPENDECTOMY  1956   • CATARACT PHACO WITH IOL Bilateral      Family History   Problem Relation Age of Onset   • Hypertension Father    • Hypertension Sister    • Arthritis Mother      Social History     Socioeconomic History   • Marital status:      Spouse name: Not on file   • Number of children: Not on file   • Years of education: Not on file   • Highest education level: Not on file   Occupational History   • Not on file   Tobacco Use   • Smoking status: Never Smoker   • Smokeless tobacco: Never Used   Vaping Use   • Vaping Use: Never used   Substance and Sexual Activity   • Alcohol use: Yes     Alcohol/week: 0.0 oz     Comment: rare   • Drug use: No   • Sexual activity: Not Currently     Partners: Male     Comment: , bank, 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   Other Topics Concern   • Not on file   Social History Narrative   • Not on file     Social Determinants of Health     Financial Resource Strain:    • Difficulty of Paying Living Expenses: Not on file   Food Insecurity:    • Worried About Running Out of Food in the Last Year: Not on file   • Ran Out of Food in the Last Year: Not on file   Transportation Needs:    • Lack of Transportation (Medical): Not on file   • Lack of Transportation (Non-Medical): Not on file   Physical Activity:    • Days of Exercise per Week: Not on file   • Minutes of Exercise per Session: Not on file   Stress:    • Feeling of Stress : Not on file   Social Connections:    • Frequency of Communication with Friends and Family: Not on file   • Frequency of Social Gatherings with Friends and Family: Not on file   •  Attends Denominational Services: Not on file   • Active Member of Clubs or Organizations: Not on file   • Attends Club or Organization Meetings: Not on file   • Marital Status: Not on file   Intimate Partner Violence:    • Fear of Current or Ex-Partner: Not on file   • Emotionally Abused: Not on file   • Physically Abused: Not on file   • Sexually Abused: Not on file   Housing Stability:    • Unable to Pay for Housing in the Last Year: Not on file   • Number of Places Lived in the Last Year: Not on file   • Unstable Housing in the Last Year: Not on file     Allergies   Allergen Reactions   • Codeine      halucinations     Outpatient Encounter Medications as of 12/22/2021   Medication Sig Dispense Refill   • celecoxib (CELEBREX) 100 MG Cap Take 1 Capsule by mouth every day. 100 Capsule 2   • simvastatin (ZOCOR) 20 MG Tab TAKE 1 TABLET BY MOUTH EVERY EVENING 100 Tablet 2   • Biotin 10 MG Cap Take  by mouth.     • vitamin E (VITAMIN E) 1000 UNIT Cap Take 1 Cap by mouth every day.     • vitamin D (CHOLECALCIFEROL) 1000 UNIT Tab Take 1,000 Units by mouth every day.     • Calcium Carbonate-Vit D-Min (CALCIUM 1200 PO) Take 1 Each by mouth every day at 6 PM.     • Multiple Vitamins-Minerals (OCUVITE-LUTEIN) Cap Take 1 Each by mouth every day.     • Omega-3 Fatty Acids (OMEGA 3 PO) Take 1 Each by mouth every day.     • Multiple Vitamins-Minerals (MULTI COMPLETE PO) Take 1 Tab by mouth every day.     • Bioflavonoid Products (VITAMIN C PLUS) 1000 MG TABS Take 1 Tab by mouth every day.       No facility-administered encounter medications on file as of 12/22/2021.     Review of Systems   Respiratory: Negative for cough and shortness of breath.    Cardiovascular: Negative for chest pain and palpitations.   Musculoskeletal: Negative for myalgias.   Neurological: Negative for dizziness and loss of consciousness.              Objective     /70 (BP Location: Left arm, Patient Position: Sitting, BP Cuff Size: Adult)   Pulse 69    "Resp 14   Ht 1.575 m (5' 2\")   Wt 70.3 kg (155 lb)   SpO2 97%   BMI 28.35 kg/m²     Physical Exam  Vitals reviewed.   Constitutional:       General: She is not in acute distress.     Appearance: She is well-developed.   Eyes:      Conjunctiva/sclera: Conjunctivae normal.      Pupils: Pupils are equal, round, and reactive to light.   Neck:      Vascular: No JVD.   Cardiovascular:      Rate and Rhythm: Normal rate and regular rhythm.      Pulses:           Carotid pulses are 2+ on the right side and 2+ on the left side.     Heart sounds: Murmur heard.    Systolic murmur is present with a grade of 1/6.  No friction rub. No gallop.    Pulmonary:      Effort: Pulmonary effort is normal. No accessory muscle usage or respiratory distress.      Breath sounds: Normal breath sounds. No wheezing or rales.   Abdominal:      General: There is no distension.      Palpations: Abdomen is soft. There is no mass.      Tenderness: There is no abdominal tenderness.      Comments: Protuberant.   Musculoskeletal:      Cervical back: Normal range of motion and neck supple.      Comments:     Skin:     General: Skin is warm and dry.      Findings: No rash.      Nails: There is no clubbing.   Neurological:      Mental Status: She is alert and oriented to person, place, and time.   Psychiatric:         Behavior: Behavior normal.            06/07/2010 ECHOCARDIOGRAM  EF 60%.  Mild to moderate aortic regurgitation.     05/05/2011 ECHOCARDIOGRAM  EF 55-60%.  Moderate aortic insufficiency.     05/08/2012 ECHOCARDIOGRAM  EF 70%.  Moderate aortic insufficiency.  Pulmonary pressure 26 mmHg.     03/25/2015 ECHOCARDIOGRAM  Normal left ventricular size, thickness, systolic function, and   diastolic function.  Left ventricular ejection fraction is 65% to 70%.  Aortic sclerosis without stenosis.  Moderately severe aortic insufficiency.  Right heart pressures are consistent with mild pulmonary hypertension.     10/24/2016 ECHOCARDIOGRAM  Normal left " ventricular size, wall thickness, and systolic function.  Left ventricular ejection fraction is visually estimated to be 60%.  Severe eccentric aortic insufficiency.  Mildly thickened mitral valve leaflets with normal leaflet excursion.  Unable to estimate pulmonary artery pressure due to an inadequate   tricuspid regurgitant jet.     05/08/2017 ECHOCARDIOGRAM  Normal left ventricular systolic function.  Left ventricular ejection fraction is visually estimated to be 65%.  Mild concentric left ventricular hypertrophy.  Mild aortic stenosis.  Moderate aortic insufficiency.  Mild mitral regurgitation.  Unable to estimate pulmonary artery pressure due to an inadequate   tricuspid regurgitant jet.     9/4/2018 ECHOCARDIOGRAM  Normal left ventricular size, wall thickness, and systolic function.  Left ventricular ejection fraction is visually estimated to be 65%.    Mild aortic stenosis.  Moderate aortic insufficiency.  Right heart pressures are normal.     03/19/2015 EKG: Normal sinus rhythm, rate 73. Nonspecific ST-T wave abnormalities. No prior tracing for comparison. Reviewed by myself.        Assessment & Plan     1. Aortic valve stenosis, etiology of cardiac valve disease unspecified     2. Aortic valve insufficiency, etiology of cardiac valve disease unspecified     3. Dyslipidemia     4. Abnormal EKG         Medical Decision Making: Today's Assessment/Status/Plan:   Assessment  1.  Aortic stenosis.  2.  Aortic regurgitation.   3.  Abnormal EKG.    4.  Hyperlipidemia.   5.  Hypothyroidism, diagnosed 9/1/2015.  6.  Osteoarthritis on Celebrex.  7.  Parkinson's disease, followed by neurology Dr. Colvin.     Recommendations and Discussion  1.  The patient is stable from a cardiac standpoint concerning her aortic stenosis, aortic insufficiency and dyslipidemia.  2.  BP normal at home.  3.  RTC 9 months with follow-up echocardiogram.

## 2021-12-22 NOTE — TELEPHONE ENCOUNTER
VOICEMAIL  1. Caller Name: Emilie Gonzalez                        Call Back Number: [phone] (home)       2. Message: Lab results. Lm     3. Patient approves office to leave a detailed voicemail/MyChart message: N\A

## 2021-12-27 NOTE — TELEPHONE ENCOUNTER
VOICEMAIL  1. Caller Name: Ed                       Call Back Number: [phone]    2. Message: Called pt and LVM but also called Son Ed and LVM to have him call us back.     3. Patient approves office to leave a detailed voicemail/Yopimahart message: N\A

## 2021-12-28 NOTE — TELEPHONE ENCOUNTER
Patients son Ed left a voicemail inquiring about her lab results. Relayed message from Dr. Higuera and did not have any questions or concerns. He will talk with Emilie and decide if she wants to see a specialist for the ongoing low WBC and platelets.

## 2022-01-14 ENCOUNTER — HOSPITAL ENCOUNTER (OUTPATIENT)
Dept: RADIOLOGY | Facility: MEDICAL CENTER | Age: 82
End: 2022-01-14
Attending: FAMILY MEDICINE
Payer: MEDICARE

## 2022-01-14 DIAGNOSIS — Z12.31 ENCOUNTER FOR SCREENING MAMMOGRAM FOR BREAST CANCER: ICD-10-CM

## 2022-01-14 PROCEDURE — 77063 BREAST TOMOSYNTHESIS BI: CPT

## 2022-03-30 ENCOUNTER — TELEPHONE (OUTPATIENT)
Dept: HEALTH INFORMATION MANAGEMENT | Facility: OTHER | Age: 82
End: 2022-03-30

## 2022-04-13 ENCOUNTER — OFFICE VISIT (OUTPATIENT)
Dept: MEDICAL GROUP | Facility: PHYSICIAN GROUP | Age: 82
End: 2022-04-13
Payer: MEDICARE

## 2022-04-13 VITALS
RESPIRATION RATE: 12 BRPM | OXYGEN SATURATION: 97 % | HEIGHT: 62 IN | BODY MASS INDEX: 28.16 KG/M2 | DIASTOLIC BLOOD PRESSURE: 62 MMHG | TEMPERATURE: 98.2 F | HEART RATE: 62 BPM | WEIGHT: 153 LBS | SYSTOLIC BLOOD PRESSURE: 110 MMHG

## 2022-04-13 DIAGNOSIS — H91.13 PRESBYCUSIS OF BOTH EARS: ICD-10-CM

## 2022-04-13 DIAGNOSIS — E78.5 DYSLIPIDEMIA: ICD-10-CM

## 2022-04-13 DIAGNOSIS — N18.31 STAGE 3A CHRONIC KIDNEY DISEASE: ICD-10-CM

## 2022-04-13 DIAGNOSIS — M15.9 PRIMARY OSTEOARTHRITIS INVOLVING MULTIPLE JOINTS: ICD-10-CM

## 2022-04-13 DIAGNOSIS — D70.8 OTHER NEUTROPENIA (HCC): ICD-10-CM

## 2022-04-13 DIAGNOSIS — G20.A1 PARKINSON DISEASE (HCC): ICD-10-CM

## 2022-04-13 DIAGNOSIS — D69.6 THROMBOCYTOPENIA (HCC): ICD-10-CM

## 2022-04-13 PROCEDURE — 99214 OFFICE O/P EST MOD 30 MIN: CPT | Performed by: FAMILY MEDICINE

## 2022-04-13 RX ORDER — SIMVASTATIN 20 MG
TABLET ORAL
Qty: 100 TABLET | Refills: 2 | Status: SHIPPED | OUTPATIENT
Start: 2022-04-13 | End: 2022-10-26 | Stop reason: SDUPTHER

## 2022-04-13 RX ORDER — CELECOXIB 100 MG/1
100 CAPSULE ORAL DAILY
Qty: 100 CAPSULE | Refills: 2 | Status: SHIPPED | OUTPATIENT
Start: 2022-04-13 | End: 2023-03-01 | Stop reason: SDUPTHER

## 2022-04-13 ASSESSMENT — FIBROSIS 4 INDEX: FIB4 SCORE: 3.61

## 2022-04-13 ASSESSMENT — PATIENT HEALTH QUESTIONNAIRE - PHQ9: CLINICAL INTERPRETATION OF PHQ2 SCORE: 0

## 2022-04-13 NOTE — ASSESSMENT & PLAN NOTE
Chronic diagnosis. Continues to take zocor 20mg daily.    Results for LINDSEY REZA (MRN [id number]) as of 4/13/2022 12:12   Ref. Range 12/15/2021 07:30   Cholesterol,Tot Latest Ref Range: 100 - 199 mg/dL 146   Triglycerides Latest Ref Range: 0 - 149 mg/dL 127   HDL Latest Ref Range: >=40 mg/dL 39 (A)   LDL Latest Ref Range: <100 mg/dL 82

## 2022-04-13 NOTE — ASSESSMENT & PLAN NOTE
Chronic.  Has noticed bilateral hand stiffness over the last year. Worse with cold weather.  Continues to take celebrex 100 hs and an occasional tylenol which helps. States that she was taking it bid before.

## 2022-04-13 NOTE — ASSESSMENT & PLAN NOTE
Chronic. F/u with neuro, dr cartagena , once a year. Last saw him in January 2022. Notices an   Occasional tremor when she is carrying things or if the weather is cold.

## 2022-04-13 NOTE — PROGRESS NOTES
Annual Health Assessment Questions:    1.  Are you currently engaging in any exercise or physical activity? Yes    2.  How would you describe your mood or emotional well-being today? good    3.  Have you had any falls in the last year? No    4.  Have you noticed any problems with your balance or had difficulty walking? No    5.  In the last six months have you experienced any leakage of urine? Yes    6. DPA/Advanced Directive: Patient does not have an Advanced Directive.  A packet and workshop information was given on Advanced Directives.     CC:   Chief Complaint   Patient presents with   • Follow-Up     4 months.    • Arthritis     Both hands.        HPI:   Lindsey presents today for f/u appt.   Last seen by me on 12/7/21.     In the interim, has seen cards, Dr Olivera, on 12/22/21  Saw neuro, Dr Cartagena, in January 2022.    Parkinson disease (HCC)  Chronic. F/u with neuro, dr cartagena , once a year. Last saw him in January 2022. Notices an   Occasional tremor when she is carrying things or if the weather is cold.      CKD (chronic kidney disease) stage 3, GFR 30-59 ml/min  Chronic. Recent December labs with gfr at 59.   Denies any urinary symptoms or complaints.     Osteoarthritis  Chronic.  Has noticed bilateral hand stiffness over the last year. Worse with cold weather.  Continues to take celebrex 100 hs and an occasional tylenol which helps. States that she was taking it bid before.     Dyslipidemia  Chronic diagnosis. Continues to take zocor 20mg daily.    Results for LINDSEY REZA (MRN [id number]) as of 4/13/2022 12:12   Ref. Range 12/15/2021 07:30   Cholesterol,Tot Latest Ref Range: 100 - 199 mg/dL 146   Triglycerides Latest Ref Range: 0 - 149 mg/dL 127   HDL Latest Ref Range: >=40 mg/dL 39 (A)   LDL Latest Ref Range: <100 mg/dL 82       Thrombocytopenia (HCC)  Chronic diagnosis. Present since at least 2020, no previous labs available for review.  Recent December labs with platelets decreased to 142k. Does  complain of prolonged healing time with injuries.     Other neutropenia (HCC)  Chronic issue. Present since at least 2020. No prior labs available for review.  Recent labs with wbc 3.4 and absolute neutrophils decreased to 1.46. denies any frequent infections.  States that we did a consult with hematology for review.      Current Outpatient Medications Ordered in Epic   Medication Sig Dispense Refill   • simvastatin (ZOCOR) 20 MG Tab TAKE 1 TABLET BY MOUTH EVERY EVENING 100 Tablet 2   • celecoxib (CELEBREX) 100 MG Cap Take 1 Capsule by mouth every day. 100 Capsule 2   • Biotin 10 MG Cap Take  by mouth.     • vitamin E (VITAMIN E) 1000 UNIT Cap Take 1 Cap by mouth every day.     • vitamin D (CHOLECALCIFEROL) 1000 UNIT Tab Take 1,000 Units by mouth every day.     • Calcium Carbonate-Vit D-Min (CALCIUM 1200 PO) Take 1 Each by mouth every day at 6 PM.     • Multiple Vitamins-Minerals (OCUVITE-LUTEIN) Cap Take 1 Each by mouth every day.     • Omega-3 Fatty Acids (OMEGA 3 PO) Take 1 Each by mouth every day.     • Multiple Vitamins-Minerals (MULTI COMPLETE PO) Take 1 Tab by mouth every day.     • Bioflavonoid Products (VITAMIN C PLUS) 1000 MG TABS Take 1 Tab by mouth every day.       No current [facility]-ordered facility-administered medications on file.       Past Medical History:   Diagnosis Date   • Arthritis of foot, right 6/9/2015   • Benign essential tremor 6/9/2015   • Edema 3/10/2015   • Hearing loss sensory, bilateral 3/10/2015   • History of shingles 9/26/2014   • Hyperlipidemia 9/26/2014   • Macular degeneration of right eye 9/26/2014   • Moderate aortic insufficiency 9/26/2014   • Osteoarthritis 9/26/2014   • Osteoporosis 9/26/2014   • Parkinson's disease (HCC)    • Plantar fasciitis, bilateral 3/10/2015   • Prolapsed bladder 9/26/2014       Social History     Tobacco Use   • Smoking status: Never Smoker   • Smokeless tobacco: Never Used   Vaping Use   • Vaping Use: Never used   Substance Use Topics   • Alcohol use:  "Yes     Alcohol/week: 0.0 oz     Comment: rare   • Drug use: No       Allergies:  Codeine    Health Maintenance:  Encouraged to get covid booster #2.     Objective:       Exam:  /62 (BP Location: Right arm, Patient Position: Sitting, BP Cuff Size: Adult)   Pulse 62   Temp 36.8 °C (98.2 °F) (Temporal)   Resp 12   Ht 1.575 m (5' 2\")   Wt 69.4 kg (153 lb)   SpO2 97%   BMI 27.98 kg/m²   Body mass index is 27.98 kg/m².  Wt Readings from Last 4 Encounters:   04/13/22 69.4 kg (153 lb)   12/22/21 70.3 kg (155 lb)   12/07/21 70.2 kg (154 lb 11.2 oz)   09/07/21 72.8 kg (160 lb 8 oz)       Gen: Alert and oriented, No apparent distress. Appropriately groomed.  Neck: Neck is supple without lymphadenopathy.No thyromegaly.   Lungs: Normal effort, CTA bilaterally, no wheezes, rhonchi, or rales  CV: Regular rate and rhythm. No Lower extremity edema  Skin: No rash noted.      Assessment & Plan:     81 y.o. female with the following -     1. Stage 3a chronic kidney disease (HCC)  Chronic issue. Since at least 2015. Overall stable. celebrex dose decreased from bid to qd.   Encouraged to take with food and water.     2. Parkinson disease (HCC)  Chronic. Stable. Continues to f/u neuro annually.     3. Dyslipidemia  Chronic. Stable. C/w zocor 20mg daily. Will be f/u cards this fall.  - simvastatin (ZOCOR) 20 MG Tab; TAKE 1 TABLET BY MOUTH EVERY EVENING  Dispense: 100 Tablet; Refill: 2  - Lipid Profile; Future  - VITAMIN B12; Future  - VITAMIN D,25 HYDROXY; Future  - TSH WITH REFLEX TO FT4; Future    4. Primary osteoarthritis involving multiple joints  -chronic issue. Encouraged to try tylenol prn in addition to the celebrex.   celecoxib (CELEBREX) 100 MG Cap; Take 1 Capsule by mouth every day.  Dispense: 100 Capsule; Refill: 2    5. Other neutropenia (HCC)  6. Thrombocytopenia (HCC)  Chronic.  Referred to hematology last may.  Referral message sent on 5/26/21 that labs were reviewed and recommended that repeat cbc in 2-3 " months.  Will recheck labs and then decide on referral.  - CBC WITH DIFFERENTIAL; Future  - Comp Metabolic Panel; Future  - VITAMIN B12; Future  - VITAMIN D,25 HYDROXY; Future  - TSH WITH REFLEX TO FT4; Future    7. Presbycusis of both ears  - Referral to Audiology      Return in about 4 months (around 8/13/2022) for Annual Wellness Visit.    Please note that this dictation was created using voice recognition software. I have made every reasonable attempt to correct obvious errors, but I expect that there are errors of grammar and possibly content that I did not discover before finalizing the note.

## 2022-04-13 NOTE — ASSESSMENT & PLAN NOTE
Chronic issue. Present since at least 2020. No prior labs available for review.  Recent labs with wbc 3.4 and absolute neutrophils decreased to 1.46. denies any frequent infections.  States that we did a consult with hematology for review.

## 2022-04-13 NOTE — ASSESSMENT & PLAN NOTE
Chronic diagnosis. Present since at least 2020, no previous labs available for review.  Recent December labs with platelets decreased to 142k. Does complain of prolonged healing time with injuries.

## 2022-05-27 ENCOUNTER — TELEPHONE (OUTPATIENT)
Dept: MEDICAL GROUP | Facility: PHYSICIAN GROUP | Age: 82
End: 2022-05-27
Payer: MEDICARE

## 2022-05-27 NOTE — TELEPHONE ENCOUNTER
Phone Number Called: [phone] (home)       Call outcome: Left detailed message for patient. Informed to call back with any additional questions.    Message: Follow up, offer sooner apt.

## 2022-05-27 NOTE — TELEPHONE ENCOUNTER
----- Message from Summer Higuera M.D. sent at 5/27/2022  7:33 AM PDT -----  Please call and see how patient is feeling?  She had dispatch health evaluate her 5/25.  Offer a f/u appt if necessary.    Thanks,  Summer Higuera M.D.

## 2022-05-31 NOTE — TELEPHONE ENCOUNTER
Phone Number Called: [phone] (home)       Call outcome: Left detailed message for patient. Informed to call back with any additional questions.    Message: Follow Up.

## 2022-06-01 NOTE — TELEPHONE ENCOUNTER
Patient is currently in the hospital with pneumonia. Son will call to schedule a follow up appointment once she is discharged.

## 2022-06-02 ENCOUNTER — HOME HEALTH ADMISSION (OUTPATIENT)
Dept: HOME HEALTH SERVICES | Facility: HOME HEALTHCARE | Age: 82
End: 2022-06-02
Payer: MEDICARE

## 2022-06-06 ENCOUNTER — DOCUMENTATION (OUTPATIENT)
Dept: MEDICAL GROUP | Facility: PHYSICIAN GROUP | Age: 82
End: 2022-06-06
Payer: MEDICARE

## 2022-06-06 ENCOUNTER — HOME CARE VISIT (OUTPATIENT)
Dept: HOME HEALTH SERVICES | Facility: HOME HEALTHCARE | Age: 82
End: 2022-06-06
Payer: MEDICARE

## 2022-06-06 VITALS
HEART RATE: 72 BPM | TEMPERATURE: 98.2 F | WEIGHT: 145 LBS | HEIGHT: 63 IN | DIASTOLIC BLOOD PRESSURE: 53 MMHG | OXYGEN SATURATION: 96 % | SYSTOLIC BLOOD PRESSURE: 107 MMHG | BODY MASS INDEX: 25.69 KG/M2 | RESPIRATION RATE: 16 BRPM

## 2022-06-06 PROCEDURE — 665001 SOC-HOME HEALTH

## 2022-06-06 PROCEDURE — G0493 RN CARE EA 15 MIN HH/HOSPICE: HCPCS

## 2022-06-06 SDOH — ECONOMIC STABILITY: HOUSING INSECURITY: EVIDENCE OF SMOKING MATERIAL: 0

## 2022-06-06 SDOH — ECONOMIC STABILITY: HOUSING INSECURITY: HOME SAFETY: SON SMOKES- BUT OUTSIDE

## 2022-06-06 ASSESSMENT — ENCOUNTER SYMPTOMS
SHORTNESS OF BREATH: 1
NAUSEA: DENIES
LOWEST PAIN SEVERITY IN PAST 24 HOURS: 0/10
SUBJECTIVE PAIN PROGRESSION: UNCHANGED
PERSON REPORTING PAIN: PATIENT
HIGHEST PAIN SEVERITY IN PAST 24 HOURS: 0/10
DENIES PAIN: 1
DYSPNEA ACTIVITY LEVEL: AFTER AMBULATING LESS THAN 10 FT
VOMITING: DENIES
PAIN SEVERITY GOAL: 0/10
ASSOCIATED SYMPTOMS: DENIES

## 2022-06-06 ASSESSMENT — PATIENT HEALTH QUESTIONNAIRE - PHQ9
1. LITTLE INTEREST OR PLEASURE IN DOING THINGS: 00
2. FEELING DOWN, DEPRESSED, IRRITABLE, OR HOPELESS: 00
CLINICAL INTERPRETATION OF PHQ2 SCORE: 0

## 2022-06-06 ASSESSMENT — ACTIVITIES OF DAILY LIVING (ADL): OASIS_M1830: 03

## 2022-06-06 ASSESSMENT — FIBROSIS 4 INDEX: FIB4 SCORE: 3.61

## 2022-06-06 NOTE — PROGRESS NOTES
Medication chart review for [facility] services        Current medication list     Current Outpatient Medications:   •  doxycycline, 100 mg, Oral, DAILY  •  cefdinir, 300 mg, Oral, BID  •  artificial tears, 1 Application, Both Eyes, BID  •  non-formulary med, 600 mg, Oral, BID  •  VITAMIN D PO, 1,000 mg, Oral, DAILY  •  Tylenol PM Extra Strength, 1 Tablet, Oral, 4X/DAY PRN  •  acetaminophen, 500 mg, Oral, Q4HRS PRN  •  Home Care Oxygen, 2 L/min, Inhalation, QHS  •  simvastatin, TAKE 1 TABLET BY MOUTH EVERY EVENING  •  celecoxib, 100 mg, Oral, DAILY  •  Biotin, Take  by mouth.  •  vitamin E, 1 Capsule, Oral, DAILY  •  vitamin D, 1,000 Units, Oral, DAILY  •  Calcium Carbonate-Vit D-Min (CALCIUM 1200 PO), 1 Each, Oral, DAILY AT 1800  •  Ocuvite-Lutein, 1 Each, Oral, DAILY  •  Omega-3 Fatty Acids (OMEGA 3 PO), 1 Each, Oral, DAILY  •  Multiple Vitamins-Minerals (MULTI COMPLETE PO), 1 Tablet, Oral, DAILY  •  Vitamin C Plus, 1 Tablet, Oral, DAILY    Pt recently discharged from:   n/a      Allergies   Allergen Reactions   • Codeine      halucinations         Labs     Lab Results   Component Value Date/Time    SODIUM 143 12/15/2021 07:30 AM    POTASSIUM 4.6 12/15/2021 07:30 AM    CHLORIDE 107 12/15/2021 07:30 AM    CO2 27 12/15/2021 07:30 AM    GLUCOSE 101 (H) 12/15/2021 07:30 AM    BUN 23 (H) 12/15/2021 07:30 AM    CREATININE 0.91 12/15/2021 07:30 AM     Lab Results   Component Value Date/Time    ALKPHOSPHAT 56 12/15/2021 07:30 AM    ASTSGOT 25 12/15/2021 07:30 AM    ALTSGPT 16 12/15/2021 07:30 AM    TBILIRUBIN 0.8 12/15/2021 07:30 AM    ALBUMIN 4.2 12/15/2021 07:30 AM        Assessment and Plan:   • Received referral from [facility]. Medications reviewed, and compared with discharge summary if available.        LUCI Higuera M.D.  [address]  [place] [address]  Fax: [phone]    [facility]  Phone [phone] fax [phone]    This note was created using voice  recognition software (Dragon). The accuracy of the dictation is limited by the abilities of the software. I have reviewed the note prior to signing, however some errors in grammar and context are still possible. If you have any questions related to this note please do not hesitate to contact our office.

## 2022-06-07 ENCOUNTER — HOME CARE VISIT (OUTPATIENT)
Dept: HOME HEALTH SERVICES | Facility: HOME HEALTHCARE | Age: 82
End: 2022-06-07
Payer: MEDICARE

## 2022-06-07 PROCEDURE — G0152 HHCP-SERV OF OT,EA 15 MIN: HCPCS

## 2022-06-08 VITALS
RESPIRATION RATE: 16 BRPM | DIASTOLIC BLOOD PRESSURE: 50 MMHG | SYSTOLIC BLOOD PRESSURE: 100 MMHG | TEMPERATURE: 97.5 F | OXYGEN SATURATION: 98 % | HEART RATE: 66 BPM

## 2022-06-08 SDOH — ECONOMIC STABILITY: HOUSING INSECURITY: EVIDENCE OF SMOKING MATERIAL: 0

## 2022-06-08 ASSESSMENT — ACTIVITIES OF DAILY LIVING (ADL)
AMBULATION ASSISTANCE: SUPERVISION
TELEPHONE USE ASSESSED: 1
WASHING_UPB_CURRENT_FUNCTION: STAND BY ASSIST
DRESSING_UB_CURRENT_FUNCTION: INDEPENDENT
WASHING_LB_CURRENT_FUNCTION: STAND BY ASSIST
LAUNDRY: NEEDS ASSISTANCE
DRESSING_LB_CURRENT_FUNCTION: STAND BY ASSIST
USING THE TELPHONE: NEEDS ASSISTANCE
ORAL_CARE_ASSESSED: 1
GROOMING_CURRENT_FUNCTION: INDEPENDENT
FEEDING_WITHIN_DEFINED_LIMITS: 1
GROOMING ASSESSED: 1
TOILETING: INDEPENDENT
CURRENT_FUNCTION: INDEPENDENT
PHYSICAL TRANSFERS ASSESSED: 1
PREPARING MEALS: NEEDS ASSISTANCE
ORAL_CARE_CURRENT_FUNCTION: INDEPENDENT
GROOMING_WITHIN_DEFINED_LIMITS: 1
TOILETING: 1
LAUNDRY ASSESSED: 1
AMBULATION ASSISTANCE: 1
FEEDING: INDEPENDENT
FEEDING ASSESSED: 1

## 2022-06-08 ASSESSMENT — ENCOUNTER SYMPTOMS
DENIES PAIN: 1
PERSON REPORTING PAIN: PATIENT

## 2022-06-10 ENCOUNTER — TELEPHONE (OUTPATIENT)
Dept: MEDICAL GROUP | Facility: PHYSICIAN GROUP | Age: 82
End: 2022-06-10
Payer: MEDICARE

## 2022-06-10 ENCOUNTER — HOME CARE VISIT (OUTPATIENT)
Dept: HOME HEALTH SERVICES | Facility: HOME HEALTHCARE | Age: 82
End: 2022-06-10
Payer: MEDICARE

## 2022-06-10 VITALS
HEART RATE: 68 BPM | DIASTOLIC BLOOD PRESSURE: 58 MMHG | SYSTOLIC BLOOD PRESSURE: 106 MMHG | OXYGEN SATURATION: 98 % | TEMPERATURE: 98.5 F | RESPIRATION RATE: 16 BRPM

## 2022-06-10 VITALS
OXYGEN SATURATION: 98 % | HEART RATE: 68 BPM | RESPIRATION RATE: 16 BRPM | TEMPERATURE: 98.5 F | DIASTOLIC BLOOD PRESSURE: 58 MMHG | SYSTOLIC BLOOD PRESSURE: 106 MMHG

## 2022-06-10 PROCEDURE — G0495 RN CARE TRAIN/EDU IN HH: HCPCS

## 2022-06-10 PROCEDURE — G0151 HHCP-SERV OF PT,EA 15 MIN: HCPCS

## 2022-06-10 ASSESSMENT — BALANCE ASSESSMENTS
STANDING BALANCE: 1 - STEADY BUT WIDE STANCE AND USES CANE OR OTHER SUPPORT
SITTING DOWN: 1 - USES ARMS OR NOT SMOOTH MOTION
ARISES: 1 - ABLE, USES ARMS TO HELP
SITTING BALANCE: 1 - STEADY, SAFE
NUDGED: 2 - STEADY
NUDGED SCORE: 2
IMMEDIATE STANDING BALANCE FIRST 5 SECONDS: 2 - STEADY WITHOUT WALKER OR OTHER SUPPORT
BALANCE SCORE: 11
ATTEMPTS TO ARISE: 2 - ABLE TO RISE, ONE ATTEMPT
ARISING SCORE: 1
TURNING 360 DEGREES STEPS: 0 - DISCONTINUOUS STEPS
EYES CLOSED AT MAXIMUM POSITION NUDGED: 0 - UNSTEADY

## 2022-06-10 ASSESSMENT — GAIT ASSESSMENTS
WALKING STANCE: 1 - HEELS ALMOST TOUCHING WHILE WALKING
PATH: 1 - MILD/MODERATE DEVIATION OR USES WALKING AID
PATH SCORE: 1
STEP CONTINUITY: 1 - STEPS APPEAR CONTINUOUS
TRUNK: 2 - NO SWAY, NO FLEXION, NO USE OF ARMS, NO WALKING AID
STEP SYMMETRY: 1 - RIGHT AND LEFT STEP LENGTH APPEAR EQUAL
INITIATION OF GAIT IMMEDIATELY AFTER GO: 1 - NO HESITANCY
TRUNK SCORE: 2
BALANCE AND GAIT SCORE: 22
GAIT SCORE: 11

## 2022-06-10 ASSESSMENT — ENCOUNTER SYMPTOMS
PERSON REPORTING PAIN: PATIENT
LOWEST PAIN SEVERITY IN PAST 24 HOURS: 0/10
VOMITING: DENIES
DENIES PAIN: 1
SEVERE DYSPNEA: 1
SUBJECTIVE PAIN PROGRESSION: UNCHANGED
DENIES PAIN: 1
HIGHEST PAIN SEVERITY IN PAST 24 HOURS: 0/10
NAUSEA: DENIES
PAIN SEVERITY GOAL: 0/10
PAIN: DENIES ANY PAIN OR DISCOMFORT AT TIME OF NURSING VISIT.
PERSON REPORTING PAIN: PATIENT

## 2022-06-10 ASSESSMENT — ACTIVITIES OF DAILY LIVING (ADL)
AMBULATION ASSISTANCE: 1
AMBULATION ASSISTANCE: SUPERVISION
AMBULATION ASSISTANCE ON FLAT SURFACES: 1
AMBULATION_DISTANCE/DURATION_TOLERATED: 120FT
CURRENT_FUNCTION: SUPERVISION
PHYSICAL TRANSFERS ASSESSED: 1
AMBULATION ASSISTANCE ON UNEVEN SURFACES: 1

## 2022-06-10 NOTE — TELEPHONE ENCOUNTER
ESTABLISHED PATIENT PRE-VISIT PLANNING     Patient was NOT contacted to complete PVP.     Note: Patient will not be contacted if there is no indication to call.     1.  Reviewed notes from the last few office visits within the medical group: Yes    2.  If any orders were placed at last visit or intended to be done for this visit (i.e. 6 mos follow-up), do we have Results/Consult Notes?         •  Labs - Labs ordered, NOT completed. Patient advised to complete prior to next appointment.  Note: If patient appointment is for lab review and patient did not complete labs, check with provider if OK to reschedule patient until labs completed.       •  Imaging - Imaging was not ordered at last office visit.       •  Referrals - Referral ordered, patient has NOT been seen.    3. Is this appointment scheduled as a Hospital Follow-Up? No    4.  Immunizations were updated in Epic using Reconcile Outside Information activity? Yes    5.  Patient is due for the following Health Maintenance Topics:   Health Maintenance Due   Topic Date Due   • IMM ZOSTER VACCINES (1 of 2) Never done   • Annual Wellness Visit  01/27/2022   • COVID-19 Vaccine (4 - Booster for Pfizer series) 02/01/2022       6.  AHA (Pulse8) form printed for Provider? Yes

## 2022-06-11 ENCOUNTER — HOME CARE VISIT (OUTPATIENT)
Dept: HOME HEALTH SERVICES | Facility: HOME HEALTHCARE | Age: 82
End: 2022-06-11
Payer: MEDICARE

## 2022-06-11 ENCOUNTER — HOSPITAL ENCOUNTER (OUTPATIENT)
Facility: MEDICAL CENTER | Age: 82
End: 2022-06-11
Attending: FAMILY MEDICINE
Payer: MEDICARE

## 2022-06-11 VITALS
SYSTOLIC BLOOD PRESSURE: 130 MMHG | OXYGEN SATURATION: 98 % | RESPIRATION RATE: 16 BRPM | TEMPERATURE: 97.9 F | HEART RATE: 70 BPM | DIASTOLIC BLOOD PRESSURE: 60 MMHG

## 2022-06-11 DIAGNOSIS — E78.5 DYSLIPIDEMIA: ICD-10-CM

## 2022-06-11 DIAGNOSIS — D69.6 THROMBOCYTOPENIA (HCC): ICD-10-CM

## 2022-06-11 LAB
25(OH)D3 SERPL-MCNC: 37 NG/ML (ref 30–100)
ALBUMIN SERPL BCP-MCNC: 3.2 G/DL (ref 3.2–4.9)
ALBUMIN/GLOB SERPL: 0.9 G/DL
ALP SERPL-CCNC: 82 U/L (ref 30–99)
ALT SERPL-CCNC: 27 U/L (ref 2–50)
ANION GAP SERPL CALC-SCNC: 9 MMOL/L (ref 7–16)
AST SERPL-CCNC: 35 U/L (ref 12–45)
BASOPHILS # BLD AUTO: 1.2 % (ref 0–1.8)
BASOPHILS # BLD: 0.05 K/UL (ref 0–0.12)
BILIRUB SERPL-MCNC: 0.4 MG/DL (ref 0.1–1.5)
BUN SERPL-MCNC: 15 MG/DL (ref 8–22)
CALCIUM SERPL-MCNC: 9 MG/DL (ref 8.5–10.5)
CHLORIDE SERPL-SCNC: 103 MMOL/L (ref 96–112)
CHOLEST SERPL-MCNC: 158 MG/DL (ref 100–199)
CO2 SERPL-SCNC: 24 MMOL/L (ref 20–33)
CREAT SERPL-MCNC: 0.79 MG/DL (ref 0.5–1.4)
EOSINOPHIL # BLD AUTO: 0.04 K/UL (ref 0–0.51)
EOSINOPHIL NFR BLD: 1 % (ref 0–6.9)
ERYTHROCYTE [DISTWIDTH] IN BLOOD BY AUTOMATED COUNT: 43.5 FL (ref 35.9–50)
GFR SERPLBLD CREATININE-BSD FMLA CKD-EPI: 74 ML/MIN/1.73 M 2
GLOBULIN SER CALC-MCNC: 3.4 G/DL (ref 1.9–3.5)
GLUCOSE SERPL-MCNC: 83 MG/DL (ref 65–99)
HCT VFR BLD AUTO: 40.3 % (ref 37–47)
HDLC SERPL-MCNC: 29 MG/DL
HGB BLD-MCNC: 13.1 G/DL (ref 12–16)
IMM GRANULOCYTES # BLD AUTO: 0.05 K/UL (ref 0–0.11)
IMM GRANULOCYTES NFR BLD AUTO: 1.2 % (ref 0–0.9)
LDLC SERPL CALC-MCNC: 84 MG/DL
LYMPHOCYTES # BLD AUTO: 1.7 K/UL (ref 1–4.8)
LYMPHOCYTES NFR BLD: 41.4 % (ref 22–41)
MCH RBC QN AUTO: 29 PG (ref 27–33)
MCHC RBC AUTO-ENTMCNC: 32.5 G/DL (ref 33.6–35)
MCV RBC AUTO: 89.2 FL (ref 81.4–97.8)
MONOCYTES # BLD AUTO: 0.57 K/UL (ref 0–0.85)
MONOCYTES NFR BLD AUTO: 13.9 % (ref 0–13.4)
NEUTROPHILS # BLD AUTO: 1.7 K/UL (ref 2–7.15)
NEUTROPHILS NFR BLD: 41.3 % (ref 44–72)
NRBC # BLD AUTO: 0 K/UL
NRBC BLD-RTO: 0 /100 WBC
PLATELET # BLD AUTO: 198 K/UL (ref 164–446)
PMV BLD AUTO: 12.1 FL (ref 9–12.9)
POTASSIUM SERPL-SCNC: 4 MMOL/L (ref 3.6–5.5)
PROT SERPL-MCNC: 6.6 G/DL (ref 6–8.2)
RBC # BLD AUTO: 4.52 M/UL (ref 4.2–5.4)
SODIUM SERPL-SCNC: 136 MMOL/L (ref 135–145)
TRIGL SERPL-MCNC: 225 MG/DL (ref 0–149)
TSH SERPL DL<=0.005 MIU/L-ACNC: 2.88 UIU/ML (ref 0.38–5.33)
VIT B12 SERPL-MCNC: 943 PG/ML (ref 211–911)
WBC # BLD AUTO: 4.1 K/UL (ref 4.8–10.8)

## 2022-06-11 PROCEDURE — 84443 ASSAY THYROID STIM HORMONE: CPT

## 2022-06-11 PROCEDURE — 80061 LIPID PANEL: CPT

## 2022-06-11 PROCEDURE — 82607 VITAMIN B-12: CPT

## 2022-06-11 PROCEDURE — 85025 COMPLETE CBC W/AUTO DIFF WBC: CPT

## 2022-06-11 PROCEDURE — G0299 HHS/HOSPICE OF RN EA 15 MIN: HCPCS

## 2022-06-11 PROCEDURE — 80053 COMPREHEN METABOLIC PANEL: CPT

## 2022-06-11 PROCEDURE — 82306 VITAMIN D 25 HYDROXY: CPT

## 2022-06-11 SDOH — ECONOMIC STABILITY: HOUSING INSECURITY
HOME SAFETY: FIRE ESCAPE PLAN DEVELOPED. PATIENT DOES NOT HAVE FLAMMABLE MATERIALS PRESENT IN THE HOME PRESENTING A FIRE HAZARD. NO EVIDENCE FOUND OF SMOKING MATERIALS PRESENT IN THE HOME.

## 2022-06-11 SDOH — ECONOMIC STABILITY: HOUSING INSECURITY
HOME SAFETY: OXYGEN SAFETY RISK ASSESSMENT PERFORMED. PATIENT DOES HAVE A NO SMOKING SIGN POSTED IN THE HOME. PATIENT DOES HAVE A WORKING FIRE EXTINGUISHER PRESENT IN THE HOME. SMOKE ALARMS ARE PRESENT AND FUNCTIONAL ON EACH LEVEL OF THE HOME. PATIENT DOES HAVE A

## 2022-06-11 SDOH — ECONOMIC STABILITY: HOUSING INSECURITY: EVIDENCE OF SMOKING MATERIAL: 0

## 2022-06-11 ASSESSMENT — ENCOUNTER SYMPTOMS: DENIES PAIN: 1

## 2022-06-13 ENCOUNTER — OFFICE VISIT (OUTPATIENT)
Dept: MEDICAL GROUP | Facility: PHYSICIAN GROUP | Age: 82
End: 2022-06-13
Payer: MEDICARE

## 2022-06-13 ENCOUNTER — HOME CARE VISIT (OUTPATIENT)
Dept: HOME HEALTH SERVICES | Facility: HOME HEALTHCARE | Age: 82
End: 2022-06-13
Payer: MEDICARE

## 2022-06-13 VITALS
WEIGHT: 142 LBS | HEIGHT: 62 IN | HEART RATE: 72 BPM | DIASTOLIC BLOOD PRESSURE: 40 MMHG | RESPIRATION RATE: 16 BRPM | BODY MASS INDEX: 26.13 KG/M2 | SYSTOLIC BLOOD PRESSURE: 120 MMHG | OXYGEN SATURATION: 96 % | TEMPERATURE: 98.1 F

## 2022-06-13 DIAGNOSIS — R91.8 LUNG MASS: ICD-10-CM

## 2022-06-13 DIAGNOSIS — R59.0 LOCALIZED ENLARGED LYMPH NODES: ICD-10-CM

## 2022-06-13 PROBLEM — J18.9 PNEUMONIA: Status: ACTIVE | Noted: 2022-06-13

## 2022-06-13 PROCEDURE — 99214 OFFICE O/P EST MOD 30 MIN: CPT | Performed by: FAMILY MEDICINE

## 2022-06-13 RX ORDER — ASPIRIN 325 MG
600 TABLET ORAL 2 TIMES DAILY
COMMUNITY
Start: 2022-06-02 | End: 2022-06-21 | Stop reason: ALTCHOICE

## 2022-06-13 ASSESSMENT — FIBROSIS 4 INDEX: FIB4 SCORE: 2.79

## 2022-06-13 NOTE — CASE COMMUNICATION
Quality Review for SOC OASIS by TOÑO Mckeon, LOLITA on  June 13, 2022    Edits completed by TOÑO Mckeon RN:  1.  is 6/5/22 per [facility] order  2. Per narrative that patient needs supervision for safe ambulation , ,  are 2  3.  is 6 per care plan therapy sets.  4. Functional limitations, checked incontinence  5. Completed F2F information

## 2022-06-13 NOTE — PROGRESS NOTES
CC:   Chief Complaint   Patient presents with   • Follow-Up     [facility]. Pneumonia, did do lung punture. X2 MRI's, 1 w/ Dye. US chest scan and Heart US. Liver enzymes were high, back to normal when discharged. Swollen lymph node under clavicle.    • Head Pain     Still feeling some head pressure   • Lab Results         HPI:   Emilie presents today with her son for a hospital follow-up visit.  She was hospitalized from May 26 through June 2.  Last seen by me on  4/13/22.     She had not been feeling well and was experiencing body aches, fever dyspnea and had dispatch come out on May 25.  They went ahead and tested her for COVID.  However she continued to experience the symptoms and family took her to [facility] on May 26.  She was found to have a left upper lobe pneumonia with suspicion that there may be an underlying mass.  She is started on IV antibiotics as well as oxygen.  Was discharged home on oral doxycycline which she will be completing today and is currently on 2 L at nighttime oxygen.  She was also found to have Gardnerella UTI.  She underwent extensive imaging including CT thorax without contrast on May 29 with findings of a 3.49 cm apical medial mass and a small left pleural effusion.  She also underwent CT neck soft tissue with contrast on May 31 with findings of prominent right level 4 cervical lymph node, enlarged right supraclavicular lymph node, asymmetric fullness left hypopharynx.    She was discharged on guaifenesin 600 mg ER and oral doxycycline to complete a 14-day course of antibiotics.  Since being discharged she has been receiving home health and remains on 2 L oxygen at nighttime.  States that she has a follow-up appointment with [facility] group pulmonary on July 8.  She continues to feel weak states her appetite is improving slowly.  She has lost 11 pounds since her last visit with me in April.  Discharge summary was reviewed and  there are recommendations for patient to have a repeat CT chest as well as repeat CT neck soft tissue.  Based on these findings, further evaluation was recommended.      No problem-specific Assessment & Plan notes found for this encounter.      Current Outpatient Medications Ordered in Epic   Medication Sig Dispense Refill   • MUCUS RELIEF  MG TABLET SR 12 HR Take 600 mg by mouth 2 times a day. FOR 10 DAYS     • artificial tears (EYE LUBRICANT) Ointment ophth ointment Apply 1 Application to both eyes 2 times a day. 3 drops into the right eye, 2 drops in the left.     • VITAMIN D PO Take 1,000 mg by mouth every day.     • diphenhydrAMINE-APAP, sleep, (TYLENOL PM EXTRA STRENGTH)  MG Tab Take 1 Tablet by mouth 4 times a day as needed (pain at night/sleep).     • acetaminophen (TYLENOL) 650 MG CR tablet Take 500 mg by mouth every four hours as needed for Fever or Mild Pain.     • Home Care Oxygen Inhale 2 L/min at bedtime. Oxygen dose range: 2 L/min  Respiratory route via: Nasal Cannula   Oxygen supplier: Preferred     • simvastatin (ZOCOR) 20 MG Tab TAKE 1 TABLET BY MOUTH EVERY EVENING 100 Tablet 2   • celecoxib (CELEBREX) 100 MG Cap Take 1 Capsule by mouth every day. 100 Capsule 2   • Biotin 10 MG Cap Take  by mouth.     • vitamin E (VITAMIN E) 1000 UNIT Cap Take 1 Cap by mouth every day.     • vitamin D (CHOLECALCIFEROL) 1000 UNIT Tab Take 1,000 Units by mouth every day.     • Calcium Carbonate-Vit D-Min (CALCIUM 1200 PO) Take 1 Each by mouth every day at 6 PM.     • Multiple Vitamins-Minerals (OCUVITE-LUTEIN) Cap Take 1 Each by mouth every day.     • Omega-3 Fatty Acids (OMEGA 3 PO) Take 1 Each by mouth every day.     • Multiple Vitamins-Minerals (MULTI COMPLETE PO) Take 1 Tab by mouth every day.     • Bioflavonoid Products (VITAMIN C PLUS) 1000 MG TABS Take 1 Tab by mouth every day.       No current [facility]-ordered facility-administered medications on file.       Past Medical History:   Diagnosis Date   •  "Arthritis of foot, right 6/9/2015   • Benign essential tremor 6/9/2015   • Edema 3/10/2015   • Hearing loss sensory, bilateral 3/10/2015   • History of shingles 9/26/2014   • Hyperlipidemia 9/26/2014   • Macular degeneration of right eye 9/26/2014   • Moderate aortic insufficiency 9/26/2014   • Osteoarthritis 9/26/2014   • Osteoporosis 9/26/2014   • Parkinson's disease (HCC)    • Plantar fasciitis, bilateral 3/10/2015   • Prolapsed bladder 9/26/2014       Social History     Tobacco Use   • Smoking status: Never Smoker   • Smokeless tobacco: Never Used   Vaping Use   • Vaping Use: Never used   Substance Use Topics   • Alcohol use: Yes     Alcohol/week: 0.0 oz     Comment: rare   • Drug use: No       Allergies:  Codeine      Objective:       Exam:  /40 (BP Location: Right arm, Patient Position: Sitting, BP Cuff Size: Adult)   Pulse 72   Temp 36.7 °C (98.1 °F) (Temporal)   Resp 16   Ht 1.575 m (5' 2\")   Wt 64.4 kg (142 lb)   SpO2 96%   BMI 25.97 kg/m²   Body mass index is 25.97 kg/m².  Wt Readings from Last 4 Encounters:   06/13/22 64.4 kg (142 lb)   06/06/22 65.8 kg (145 lb)   04/13/22 69.4 kg (153 lb)   12/22/21 70.3 kg (155 lb)       Gen: Alert and oriented, No apparent distress. Appropriately groomed.  Neck: Neck is supple without lymphadenopathy.No thyromegaly.   Lungs: Normal effort, CTA bilaterally, decreased breath sounds at bases  CV: Regular rate and rhythm. No Lower extremity edema  Skin: No rash noted.      Assessment & Plan:     81 y.o. female with the following -     1. Localized enlarged lymph nodes  2. Lung mass  New issue noted on CT neck and chest from [facility].  Recent lab results discussed with patient and son.  Negative smoking history. Denies any dysphagia but son has noticed hoarseness.  States that her cough is improving.  Will be completing antibiotics for her pneumonia today.  She had left ultrasound-guided thoracocentesis with 400 cc of clear yellow fluid removed.  We will request " these pathology reports from [facility].  New referral to [facility] pulmonology placed.  Currently she is scheduled to follow-up with  [facility] on July 8.  CT neck and chest scheduled for June 28.  Patient will follow-up with me afterwards.  - CT-CHEST (THORAX) W/O; Future  - CT-SOFT TISSUE NECK WITH; Future  - Referral to Pulmonary and Sleep Medicine    Other orders  - MUCUS RELIEF  MG TABLET SR 12 HR; Take 600 mg by mouth 2 times a day. FOR 10 DAYS        Return in about 2 weeks (around 6/27/2022), or after CTs.    Please note that this dictation was created using voice recognition software. I have made every reasonable attempt to correct obvious errors, but I expect that there are errors of grammar and possibly content that I did not discover before finalizing the note.

## 2022-06-13 NOTE — LETTER
[facility]  Summer Higuera M.D.  [address]  [place] [address]  Fax: [phone]   Authorization for Release/Disclosure of   Protected Health Information   Name: EMILIE REZA : 1940 SSN: [id number]   Address: [address] [place], [place] [address] [facility] [id number]  [place] [address] Phone:    [phone] (home)    I authorize the entity listed below to release/disclose the PHI below to:   [facility]/Summer Higuera M.D. and Summer Higuera M.D.   Provider or Entity Name:  [facility]   Address   City, State, [facility]   Phone:      Fax:     Reason for request: continuity of care   Information to be released:    [  ] LAST COLONOSCOPY,  including any PATH REPORT and follow-up  [  ] LAST FIT/COLOGUARD RESULT [  ] LAST DEXA  [  ] LAST MAMMOGRAM  [  ] LAST PAP  [  ] LAST LABS [  ] RETINA EXAM REPORT  [  ] IMMUNIZATION RECORDS  [ XXX ] Release all info      [ XXX ] Check here and initial the line next to each item to release ALL health information INCLUDING  _____ Care and treatment for drug and / or alcohol abuse  _____ HIV testing, infection status, or AIDS  _____ Genetic Testing    DATES OF SERVICE OR TIME PERIOD TO BE DISCLOSED: _____________  I understand and acknowledge that:  * This Authorization may be revoked at any time by you in writing, except if your health information has already been used or disclosed.  * Your health information that will be used or disclosed as a result of you signing this authorization could be re-disclosed by the recipient. If this occurs, your re-disclosed health information may no longer be protected by State or Federal laws.  * You may refuse to sign this Authorization. Your refusal will not affect your ability to obtain treatment.  * This Authorization becomes effective upon signing and will  on (date) __________.      If no date is indicated, this Authorization will  one (1) year from the signature date.    Name: Emilie IVETT Carlos    Signature:   Date:      6/13/2022       PLEASE FAX REQUESTED RECORDS BACK TO: [phone]

## 2022-06-13 NOTE — LETTER
[facility]  Summer Higuera M.D.  [address]  [place] [address]  Fax: [phone]   Authorization for Release/Disclosure of   Protected Health Information   Name: EMILIE ROOT REZA : 1940 SSN: [id number]   Address: [address] [place] [address] [facility] [address]  [place] [address] Phone:    [phone] (home)    I authorize the entity listed below to release/disclose the PHI below to:   [facility]/Summer Higuera M.D. and Summer Higuera M.D.   Provider or Entity Name:  [facility]   Address   City, State, [facility]   Phone:      Fax:     Reason for request: continuity of care   Information to be released:    [  ] LAST COLONOSCOPY,  including any PATH REPORT and follow-up  [  ] LAST FIT/COLOGUARD RESULT [  ] LAST DEXA  [  ] LAST MAMMOGRAM  [  ] LAST PAP  [  ] LAST LABS [  ] RETINA EXAM REPORT  [  ] IMMUNIZATION RECORDS  [ XXX ] Release all info      [ XXX ] Check here and initial the line next to each item to release ALL health information INCLUDING  _____ Care and treatment for drug and / or alcohol abuse  _____ HIV testing, infection status, or AIDS  _____ Genetic Testing    DATES OF SERVICE OR TIME PERIOD TO BE DISCLOSED: _____________  I understand and acknowledge that:  * This Authorization may be revoked at any time by you in writing, except if your health information has already been used or disclosed.  * Your health information that will be used or disclosed as a result of you signing this authorization could be re-disclosed by the recipient. If this occurs, your re-disclosed health information may no longer be protected by State or Federal laws.  * You may refuse to sign this Authorization. Your refusal will not affect your ability to obtain treatment.  * This Authorization becomes effective upon signing and will  on (date) __________.      If no date is indicated, this Authorization will  one (1) year from the signature date.    Name: Emilie IVETT Carlos    Signature:   Date:      6/13/2022       PLEASE FAX REQUESTED RECORDS BACK TO: [phone]

## 2022-06-13 NOTE — Clinical Note
I agree with these changes.  ----- Message -----  From: Radha Mckeon R.N.  Sent: 6/13/2022   2:27 PM PDT  To: Rubi Eduardo R.N.      Quality Review for SOC OASIS by TOÑO Mckeon, LOLITA on  June 13, 2022    Edits completed by TOÑO Mckeon RN:  1.  is 6/5/22 per [facility] order  2. Per narrative that patient needs supervision for safe ambulation , ,  are 2  3.  is 6 per care plan therapy sets.  4. Functional limitations, checked incontinence  5. Completed F2F information

## 2022-06-14 ENCOUNTER — HOME CARE VISIT (OUTPATIENT)
Dept: HOME HEALTH SERVICES | Facility: HOME HEALTHCARE | Age: 82
End: 2022-06-14
Payer: MEDICARE

## 2022-06-14 VITALS
TEMPERATURE: 98.2 F | HEART RATE: 71 BPM | RESPIRATION RATE: 18 BRPM | SYSTOLIC BLOOD PRESSURE: 108 MMHG | DIASTOLIC BLOOD PRESSURE: 70 MMHG | OXYGEN SATURATION: 95 %

## 2022-06-14 VITALS
HEART RATE: 71 BPM | DIASTOLIC BLOOD PRESSURE: 50 MMHG | RESPIRATION RATE: 16 BRPM | SYSTOLIC BLOOD PRESSURE: 108 MMHG | TEMPERATURE: 98.2 F | OXYGEN SATURATION: 95 %

## 2022-06-14 PROCEDURE — G0495 RN CARE TRAIN/EDU IN HH: HCPCS

## 2022-06-14 PROCEDURE — G0152 HHCP-SERV OF OT,EA 15 MIN: HCPCS

## 2022-06-14 PROCEDURE — G0151 HHCP-SERV OF PT,EA 15 MIN: HCPCS

## 2022-06-14 SDOH — ECONOMIC STABILITY: HOUSING INSECURITY: EVIDENCE OF SMOKING MATERIAL: 0

## 2022-06-14 ASSESSMENT — ENCOUNTER SYMPTOMS
SEVERE DYSPNEA: 1
MUSCLE WEAKNESS: 1
PERSON REPORTING PAIN: PATIENT
DENIES PAIN: 1
NAUSEA: DENIES
PAIN: NONE REPORTED AT THIS TIME.
VOMITING: DENIES
PERSON REPORTING PAIN: PATIENT
DENIES PAIN: 1
ARTHRALGIAS: 1

## 2022-06-14 ASSESSMENT — ACTIVITIES OF DAILY LIVING (ADL)
CURRENT_FUNCTION: STAND BY ASSIST
AMBULATION ASSISTANCE: STAND BY ASSIST

## 2022-06-15 VITALS
SYSTOLIC BLOOD PRESSURE: 108 MMHG | TEMPERATURE: 98.2 F | RESPIRATION RATE: 16 BRPM | DIASTOLIC BLOOD PRESSURE: 50 MMHG | OXYGEN SATURATION: 95 % | HEART RATE: 71 BPM

## 2022-06-15 PROCEDURE — G0180 MD CERTIFICATION HHA PATIENT: HCPCS | Performed by: FAMILY MEDICINE

## 2022-06-15 ASSESSMENT — ENCOUNTER SYMPTOMS
DENIES PAIN: 1
PERSON REPORTING PAIN: PATIENT

## 2022-06-15 NOTE — RESULT ENCOUNTER NOTE
Please call patient and let her know that,      Your recent mammogram was normal but it did reveal you have dense breasts.   Dense breasts may have small masses that can be obscured on typical mammogram studies.  SonoCine is a new technique that is being used to screen for breast cancer in women with dense breasts. This study is not yet covered by insurance and is approximately $150 out of pocket. If you are interested in having this study done, please contact me for an order.  Otherwise, it is recommended you follow up in one to two years for your screening mammogram.       Thank you,  Summer Higuera M.D.       Please review and sign covid infusion order

## 2022-06-18 ENCOUNTER — HOME CARE VISIT (OUTPATIENT)
Dept: HOME HEALTH SERVICES | Facility: HOME HEALTHCARE | Age: 82
End: 2022-06-18
Payer: MEDICARE

## 2022-06-18 VITALS
HEART RATE: 70 BPM | TEMPERATURE: 97.4 F | SYSTOLIC BLOOD PRESSURE: 108 MMHG | DIASTOLIC BLOOD PRESSURE: 61 MMHG | OXYGEN SATURATION: 98 % | RESPIRATION RATE: 16 BRPM

## 2022-06-18 PROCEDURE — G0495 RN CARE TRAIN/EDU IN HH: HCPCS

## 2022-06-18 ASSESSMENT — ENCOUNTER SYMPTOMS
DENIES PAIN: 1
SUBJECTIVE PAIN PROGRESSION: UNCHANGED
NAUSEA: DENIES
PERSON REPORTING PAIN: PATIENT
PAIN: DENIES ANY PAIN OR DISCOMFORT AT TIME OF NURSING VISIT.
HIGHEST PAIN SEVERITY IN PAST 24 HOURS: 0/10
VOMITING: DENIES
LOWEST PAIN SEVERITY IN PAST 24 HOURS: 0/10
PAIN SEVERITY GOAL: 0/10

## 2022-06-21 ENCOUNTER — HOME CARE VISIT (OUTPATIENT)
Dept: HOME HEALTH SERVICES | Facility: HOME HEALTHCARE | Age: 82
End: 2022-06-21
Payer: MEDICARE

## 2022-06-21 VITALS
OXYGEN SATURATION: 95 % | HEART RATE: 71 BPM | TEMPERATURE: 97.9 F | RESPIRATION RATE: 16 BRPM | DIASTOLIC BLOOD PRESSURE: 54 MMHG | SYSTOLIC BLOOD PRESSURE: 98 MMHG

## 2022-06-21 VITALS
TEMPERATURE: 97.9 F | DIASTOLIC BLOOD PRESSURE: 68 MMHG | RESPIRATION RATE: 18 BRPM | SYSTOLIC BLOOD PRESSURE: 110 MMHG | OXYGEN SATURATION: 95 % | HEART RATE: 68 BPM

## 2022-06-21 PROCEDURE — G0151 HHCP-SERV OF PT,EA 15 MIN: HCPCS

## 2022-06-21 PROCEDURE — G0495 RN CARE TRAIN/EDU IN HH: HCPCS

## 2022-06-21 SDOH — ECONOMIC STABILITY: HOUSING INSECURITY: EVIDENCE OF SMOKING MATERIAL: 1

## 2022-06-21 SDOH — ECONOMIC STABILITY: HOUSING INSECURITY: HOME SAFETY: ADVISED PT. TO OBTAIN A FIRE EXTINGUISHER    SON SMOKES BUT GOES OUTSIDE

## 2022-06-21 ASSESSMENT — GAIT ASSESSMENTS
PATH: 1 - MILD/MODERATE DEVIATION OR USES WALKING AID
TRUNK SCORE: 2
STEP SYMMETRY: 1 - RIGHT AND LEFT STEP LENGTH APPEAR EQUAL
TRUNK: 2 - NO SWAY, NO FLEXION, NO USE OF ARMS, NO WALKING AID
INITIATION OF GAIT IMMEDIATELY AFTER GO: 1 - NO HESITANCY
PATH SCORE: 1
GAIT SCORE: 11
STEP CONTINUITY: 1 - STEPS APPEAR CONTINUOUS
WALKING STANCE: 1 - HEELS ALMOST TOUCHING WHILE WALKING
BALANCE AND GAIT SCORE: 25

## 2022-06-21 ASSESSMENT — ENCOUNTER SYMPTOMS
NAUSEA: DENIED
DENIES PAIN: 1
HIGHEST PAIN SEVERITY IN PAST 24 HOURS: 0/10
PERSON REPORTING PAIN: PATIENT
PERSON REPORTING PAIN: PATIENT
VOMITING: DENIED
LOWEST PAIN SEVERITY IN PAST 24 HOURS: 0/10
DENIES PAIN: 1

## 2022-06-21 ASSESSMENT — ACTIVITIES OF DAILY LIVING (ADL): TRANSPORTATION COMMENTS: YES

## 2022-06-21 ASSESSMENT — BALANCE ASSESSMENTS
IMMEDIATE STANDING BALANCE FIRST 5 SECONDS: 2 - STEADY WITHOUT WALKER OR OTHER SUPPORT
SITTING DOWN: 2 - SAFE, SMOOTH MOTION
EYES CLOSED AT MAXIMUM POSITION NUDGED: 1 - STEADY
TURNING 360 DEGREES STEPS: 1 - CONTINUOUS STEPS
ARISES: 1 - ABLE, USES ARMS TO HELP
SITTING BALANCE: 1 - STEADY, SAFE
STANDING BALANCE: 2 - NARROW STANCE WITHOUT SUPPORT
NUDGED: 1 - STAGGERS, GRABS, CATCHES SELF
NUDGED SCORE: 1
ARISING SCORE: 1
ATTEMPTS TO ARISE: 2 - ABLE TO RISE, ONE ATTEMPT
BALANCE SCORE: 14

## 2022-06-21 NOTE — CASE COMMUNICATION
PT. A&OX4. DENIED ANY PAIN. VSS. LUNGS CLEAR T/O. TEACHING DONE ON USE/SCHEDULE/TECHNIQUE FOR I.S. PT. ABLE TO PULL 600 CC ON RETURN DEMO, WITHOUT ADVERSE AFFECTS.INSTRUCTED TO DO EVERY HOUR, WITH 5 REPETITIONS. PT. WEATS O2 AT NIGHT ONLY. TEACHING DONE ON O2 PRECAUTIONS-NOSMOKING, NO OPEN FLAMES, NO PETROLEUM PRODUCTS IN NOSE, E TANKS TO BE UPRIGHT IN A RACK OR LYING DOWN ON FLOOR.TEACHING DONE ON NEED TO WASH HANDS WELL BEFORE EATING,  AFTER PETTING DOG, AFTER USING BR. TO USE SOAP/WATER, NOT , AFTER BR USE.  TEACHING DONE ON USE OF NIGHT  LIGHTS IN BEDROOM, BATHROOM, HALLWAY, ALWAYS WEAR SHOES/SLIPPERS WHEN AMBULATING, IF ANY FALLS, CALL 911.MED RECONCILIATION DONE WITH PT. DENIED ANY NEW/CHANGED MEDS.

## 2022-06-24 ENCOUNTER — HOME CARE VISIT (OUTPATIENT)
Dept: HOME HEALTH SERVICES | Facility: HOME HEALTHCARE | Age: 82
End: 2022-06-24
Payer: MEDICARE

## 2022-06-24 VITALS
OXYGEN SATURATION: 95 % | DIASTOLIC BLOOD PRESSURE: 50 MMHG | SYSTOLIC BLOOD PRESSURE: 102 MMHG | TEMPERATURE: 97.9 F | RESPIRATION RATE: 18 BRPM | HEART RATE: 72 BPM

## 2022-06-24 PROCEDURE — G0299 HHS/HOSPICE OF RN EA 15 MIN: HCPCS

## 2022-06-24 SDOH — ECONOMIC STABILITY: HOUSING INSECURITY: EVIDENCE OF SMOKING MATERIAL: 0

## 2022-06-24 ASSESSMENT — ENCOUNTER SYMPTOMS
PERSON REPORTING PAIN: PATIENT
VOMITING: DENIES
HIGHEST PAIN SEVERITY IN PAST 24 HOURS: 0/10
LOWEST PAIN SEVERITY IN PAST 24 HOURS: 0/10
MUSCLE WEAKNESS: 1
DENIES PAIN: 1
NAUSEA: DENIES
PAIN SEVERITY GOAL: 0/10

## 2022-06-24 ASSESSMENT — ACTIVITIES OF DAILY LIVING (ADL)
CURRENT_FUNCTION: STAND BY ASSIST
AMBULATION ASSISTANCE: STAND BY ASSIST

## 2022-06-27 ENCOUNTER — HOSPITAL ENCOUNTER (OUTPATIENT)
Dept: RADIOLOGY | Facility: MEDICAL CENTER | Age: 82
End: 2022-06-27
Attending: FAMILY MEDICINE
Payer: MEDICARE

## 2022-06-27 DIAGNOSIS — R59.0 LOCALIZED ENLARGED LYMPH NODES: ICD-10-CM

## 2022-06-27 PROCEDURE — 71260 CT THORAX DX C+: CPT | Mod: MG

## 2022-06-27 PROCEDURE — 70491 CT SOFT TISSUE NECK W/DYE: CPT | Mod: MG

## 2022-06-27 PROCEDURE — 700117 HCHG RX CONTRAST REV CODE 255: Performed by: FAMILY MEDICINE

## 2022-06-27 RX ADMIN — IOHEXOL 80 ML: 300 INJECTION, SOLUTION INTRAVENOUS at 18:15

## 2022-06-28 ENCOUNTER — HOSPITAL ENCOUNTER (OUTPATIENT)
Dept: RADIOLOGY | Facility: MEDICAL CENTER | Age: 82
End: 2022-06-28
Payer: MEDICARE

## 2022-06-28 ENCOUNTER — APPOINTMENT (OUTPATIENT)
Dept: RADIOLOGY | Facility: MEDICAL CENTER | Age: 82
End: 2022-06-28
Attending: FAMILY MEDICINE
Payer: MEDICARE

## 2022-06-29 ENCOUNTER — OFFICE VISIT (OUTPATIENT)
Dept: MEDICAL GROUP | Facility: PHYSICIAN GROUP | Age: 82
End: 2022-06-29
Payer: MEDICARE

## 2022-06-29 VITALS
BODY MASS INDEX: 27.33 KG/M2 | HEART RATE: 66 BPM | HEIGHT: 62 IN | DIASTOLIC BLOOD PRESSURE: 62 MMHG | SYSTOLIC BLOOD PRESSURE: 124 MMHG | TEMPERATURE: 97.7 F | RESPIRATION RATE: 12 BRPM | OXYGEN SATURATION: 95 % | WEIGHT: 148.5 LBS

## 2022-06-29 DIAGNOSIS — H65.191 ACUTE MIDDLE EAR EFFUSION, RIGHT: ICD-10-CM

## 2022-06-29 DIAGNOSIS — M25.512 ACUTE PAIN OF LEFT SHOULDER: ICD-10-CM

## 2022-06-29 DIAGNOSIS — E78.5 DYSLIPIDEMIA: ICD-10-CM

## 2022-06-29 DIAGNOSIS — G31.9 CEREBRAL ATROPHY (HCC): ICD-10-CM

## 2022-06-29 PROCEDURE — 99214 OFFICE O/P EST MOD 30 MIN: CPT | Performed by: FAMILY MEDICINE

## 2022-06-29 ASSESSMENT — FIBROSIS 4 INDEX: FIB4 SCORE: 2.79

## 2022-06-29 NOTE — PROGRESS NOTES
CC:   Chief Complaint   Patient presents with   • Follow-Up     2 weeks.    • Lab Results     CT Scan    • Advance Care Planning     POLST         HPI:   Emilie presents today for a follow-up visit with her son..  Last seen by me on June 13.  She would like to discuss recent CT results and complete the POLST form.  We discussed that the lung mass and the cervical lymphadenopathy were not seen on CT completed 2 days ago.    Since our last appointment, has been seen by:  Has been receiving home health    Acute pain of left shoulder  Left shoulder weakness for the last month.  Denies any hx trauma. Has been taking celebrex and tylenol pm.     Dyslipidemia  Chronic medical diagnosis.  Continues to take simvastatin 20 mg daily.   Latest Reference Range & Units 06/11/22 09:45   Cholesterol,Tot 100 - 199 mg/dL 158   Triglycerides 0 - 149 mg/dL 225 (H)   HDL >=40 mg/dL 29 !   LDL <100 mg/dL 84   (H): Data is abnormally high  !: Data is abnormal    Cerebral atrophy (HCC)  Chronic medical diagnosis.  This was noted on MRI of the brain dated August 13, 2016.    Acute middle ear effusion, right  Feels like her equilibrium is off. Cough has improved. Denies any dyspnea.  Remains on 2 L hs.       Current Outpatient Medications Ordered in Epic   Medication Sig Dispense Refill   • diclofenac sodium (VOLTAREN) 1 % Gel Apply 2 g topically 4 times a day as needed (apply to left shoulder up to four times a day). 150 g 1   • artificial tears (EYE LUBRICANT) Ointment ophth ointment Apply 1 Application to both eyes 2 times a day. 3 drops into the right eye, 2 drops in the left.     • VITAMIN D PO Take 1,000 mg by mouth every day.     • diphenhydrAMINE-APAP, sleep, (TYLENOL PM EXTRA STRENGTH)  MG Tab Take 1 Tablet by mouth 4 times a day as needed (pain at night/sleep).     • acetaminophen (TYLENOL) 650 MG CR tablet Take 500 mg by mouth every four hours as needed for Fever or Mild Pain.     • Home Care Oxygen Inhale 2 L/min at  "bedtime. Oxygen dose range: 2 L/min  Respiratory route via: Nasal Cannula   Oxygen supplier: Preferred     • simvastatin (ZOCOR) 20 MG Tab TAKE 1 TABLET BY MOUTH EVERY EVENING 100 Tablet 2   • celecoxib (CELEBREX) 100 MG Cap Take 1 Capsule by mouth every day. 100 Capsule 2   • Biotin 10 MG Cap Take  by mouth.     • vitamin E (VITAMIN E) 1000 UNIT Cap Take 1 Cap by mouth every day.     • Calcium Carbonate-Vit D-Min (CALCIUM 1200 PO) Take 1 Each by mouth every day at 6 PM.     • Multiple Vitamins-Minerals (OCUVITE-LUTEIN) Cap Take 1 Each by mouth every day.     • Omega-3 Fatty Acids (OMEGA 3 PO) Take 1 Each by mouth every day.     • Multiple Vitamins-Minerals (MULTI COMPLETE PO) Take 1 Tab by mouth every day.     • Bioflavonoid Products (VITAMIN C PLUS) 1000 MG TABS Take 1 Tab by mouth every day.       No current [facility]-ordered facility-administered medications on file.       Past Medical History:   Diagnosis Date   • Arthritis of foot, right 6/9/2015   • Benign essential tremor 6/9/2015   • Edema 3/10/2015   • Hearing loss sensory, bilateral 3/10/2015   • History of shingles 9/26/2014   • Hyperlipidemia 9/26/2014   • Macular degeneration of right eye 9/26/2014   • Moderate aortic insufficiency 9/26/2014   • Osteoarthritis 9/26/2014   • Osteoporosis 9/26/2014   • Parkinson's disease (HCC)    • Plantar fasciitis, bilateral 3/10/2015   • Prolapsed bladder 9/26/2014       Social History     Tobacco Use   • Smoking status: Never Smoker   • Smokeless tobacco: Never Used   Vaping Use   • Vaping Use: Never used   Substance Use Topics   • Alcohol use: Yes     Alcohol/week: 0.0 oz     Comment: rare   • Drug use: No       Allergies:  Codeine    Health Maintenance:  States that she received the 4 t covid vaccine.       Objective:       Exam:  /62   Pulse 66   Temp 36.5 °C (97.7 °F) (Temporal)   Resp 12   Ht 1.575 m (5' 2\")   Wt 67.4 kg (148 lb 8 oz)   SpO2 95%   BMI 27.16 kg/m²   Body mass index is 27.16 kg/m².  Wt " Readings from Last 4 Encounters:   06/29/22 67.4 kg (148 lb 8 oz)   06/13/22 64.4 kg (142 lb)   06/06/22 65.8 kg (145 lb)   04/13/22 69.4 kg (153 lb)       Gen: Alert and oriented, No apparent distress. Appropriately groomed.  Right middle ear effusion  Neck: Neck is supple without lymphadenopathy.No thyromegaly.   Lungs: Normal effort, CTA bilaterally, no wheezes, rhonchi, or rales  CV: Regular rate and rhythm. No Lower extremity edema  Skin: No rash noted.    Left shoulder : No deformity, erythema, edema or ecchymosis. Tenderness to palpation AC jt and biceps region. ROM limited extension and internal rotation. .5/5 strength bilaterally.          Assessment & Plan:     81 y.o. female with the following -     1. Acute pain of left shoulder  New issue.  Not improving.  We will try Voltaren gel.  New referral to physical therapy placed.  - Referral to Physical Therapy  - diclofenac sodium (VOLTAREN) 1 % Gel; Apply 2 g topically 4 times a day as needed (apply to left shoulder up to four times a day).  Dispense: 150 g; Refill: 1    2. Dyslipidemia  Chronic medical diagnosis.  Recent labs with increase in triglycerides.  For now, continue with Zocor.  - CBC WITH DIFFERENTIAL; Future  - Lipid Profile; Future    3. Cerebral atrophy (HCC)  Chronic finding noted on MRI 2016.    4. Acute middle ear effusion, right  New issue.  We will continue to monitor.precautions d/w patient.       I spent a total of 38 minutes with record review, exam, communication with the patient, communication with other providers, and documentation of this encounter.      No follow-ups on file.    Please note that this dictation was created using voice recognition software. I have made every reasonable attempt to correct obvious errors, but I expect that there are errors of grammar and possibly content that I did not discover before finalizing the note.

## 2022-06-29 NOTE — ASSESSMENT & PLAN NOTE
Left shoulder weakness for the last month.  Denies any hx trauma. Has been taking celebrex and tylenol pm.

## 2022-06-30 ENCOUNTER — HOME CARE VISIT (OUTPATIENT)
Dept: HOME HEALTH SERVICES | Facility: HOME HEALTHCARE | Age: 82
End: 2022-06-30
Payer: MEDICARE

## 2022-06-30 VITALS
HEART RATE: 68 BPM | TEMPERATURE: 97.9 F | DIASTOLIC BLOOD PRESSURE: 58 MMHG | SYSTOLIC BLOOD PRESSURE: 142 MMHG | OXYGEN SATURATION: 96 % | RESPIRATION RATE: 16 BRPM

## 2022-06-30 PROCEDURE — G0493 RN CARE EA 15 MIN HH/HOSPICE: HCPCS

## 2022-06-30 SDOH — ECONOMIC STABILITY: HOUSING INSECURITY
HOME SAFETY: PATIENT EDUCATED ON DISCARDING BINDER OR ITS CONTENTS AFTER HH CARE COMPLETED.  PATIENT IS TO SHRED ANY DOCUMENT WITH MORE THAN 1 PATIENT IDENTIFIER.  PATIENT ALSO HAD NOT BEEN AWARE THAT THE PINK POLST SHOULD BE PLACED IN A VISIBLE POSITION.  SHE HA

## 2022-06-30 SDOH — ECONOMIC STABILITY: HOUSING INSECURITY
HOME SAFETY: S HERS IN HER FILE CABINET.  SON WILL PLACE IT ON A WALL.  PATIENT STATES THE MD HAD SIGNED IT. MD PUT A COPY OF IT IN HER CHART.

## 2022-06-30 SDOH — ECONOMIC STABILITY: HOUSING INSECURITY: EVIDENCE OF SMOKING MATERIAL: 0

## 2022-06-30 ASSESSMENT — ENCOUNTER SYMPTOMS
PERSON REPORTING PAIN: PATIENT
DENIES PAIN: 1

## 2022-06-30 ASSESSMENT — ACTIVITIES OF DAILY LIVING (ADL)
OASIS_M1830: 00
HOME_HEALTH_OASIS: 00

## 2022-06-30 ASSESSMENT — PATIENT HEALTH QUESTIONNAIRE - PHQ9: CLINICAL INTERPRETATION OF PHQ2 SCORE: 0

## 2022-07-01 ENCOUNTER — HOME CARE VISIT (OUTPATIENT)
Dept: HOME HEALTH SERVICES | Facility: HOME HEALTHCARE | Age: 82
End: 2022-07-01
Payer: MEDICARE

## 2022-07-01 NOTE — CASE COMMUNICATION
Quality Review Completed for NV OASIS by SANJIV Aponte RN on 7/1/2022:  Edits completed by SANJIV Aponte RN:  1.  is NA to PU per care plan interventions

## 2022-07-02 NOTE — CASE COMMUNICATION
I agree with the following edits.  Tea Fowler RN 7/1/22 @2113  ----- Message -----  From: Priya Aponte R.N.  Sent: 7/1/2022  12:14 PM PDT  To: Tea Fowler R.N.      Quality Review Completed for DC OASIS by SANJIV Aponte RN on 7/1/2022:  Edits completed by SANJIV Aponte RN:  1.  is NA to PU per care plan interventions

## 2022-07-06 ENCOUNTER — OFFICE VISIT (OUTPATIENT)
Dept: SLEEP MEDICINE | Facility: MEDICAL CENTER | Age: 82
End: 2022-07-06
Payer: MEDICARE

## 2022-07-06 VITALS
RESPIRATION RATE: 16 BRPM | WEIGHT: 150 LBS | BODY MASS INDEX: 27.6 KG/M2 | HEART RATE: 73 BPM | OXYGEN SATURATION: 96 % | HEIGHT: 62 IN | DIASTOLIC BLOOD PRESSURE: 42 MMHG | SYSTOLIC BLOOD PRESSURE: 130 MMHG

## 2022-07-06 DIAGNOSIS — J18.9 PNEUMONIA OF LEFT UPPER LOBE DUE TO INFECTIOUS ORGANISM: ICD-10-CM

## 2022-07-06 PROBLEM — R91.8 LUNG MASS: Status: ACTIVE | Noted: 2022-07-06

## 2022-07-06 PROCEDURE — 99205 OFFICE O/P NEW HI 60 MIN: CPT | Performed by: INTERNAL MEDICINE

## 2022-07-06 ASSESSMENT — FIBROSIS 4 INDEX: FIB4 SCORE: 2.79

## 2022-07-07 NOTE — PROGRESS NOTES
Pulmonary Clinic Note    Chief Complaint:  Chief Complaint   Patient presents with   • Establish Care     Ref by Summer Higuera M.D. for Lung mass     HPI:   Emilie Gonzalez is a very pleasant 81 y.o. female evaluated in the pulmonary clinic on July 6, 2022.  She was referred for concern of a possible lung mass.  She was hospitalized with a pretty significant left upper lobe pneumonia in May.  CAT scan at that time demonstrated nearly complete opacification of the entire left upper lung with small-to-moderate effusion on the left and a smaller effusion on the right.  Subsequent CT scan just a few days ago demonstrates near complete resolution of the opacity.  She does have some mild scarring but no nodule.  She states that she is back in her usual state of health.  She has no review of systems complaints today.      Patient has not been seen by pulmonary in the past.  she has no prior PFTs  she has never been hospitalized for respiratory issues before this occasion. She did have PNA as an 7 year old.       Past Medical History:   Diagnosis Date   • Arthritis of foot, right 06/09/2015   • Benign essential tremor 06/09/2015   • Cough    • Edema 03/10/2015   • Hearing loss sensory, bilateral 03/10/2015   • History of shingles 09/26/2014   • Hyperlipidemia 09/26/2014   • Lung mass 7/6/2022   • Macular degeneration of right eye 09/26/2014   • Moderate aortic insufficiency 09/26/2014   • Osteoarthritis 09/26/2014   • Osteoporosis 09/26/2014   • Parkinson's disease (HCC)    • Plantar fasciitis, bilateral 03/10/2015   • Prolapsed bladder 09/26/2014       Past Surgical History:   Procedure Laterality Date   • CHOLECYSTECTOMY  9/2013   • APPENDECTOMY  1956   • CATARACT PHACO WITH IOL Bilateral        Social History     Socioeconomic History   • Marital status:      Spouse name: Not on file   • Number of children: Not on file   • Years of education: Not on file   • Highest education level: Not on file   Occupational  History   • Not on file   Tobacco Use   • Smoking status: Never Smoker   • Smokeless tobacco: Never Used   Vaping Use   • Vaping Use: Never used   Substance and Sexual Activity   • Alcohol use: Yes     Alcohol/week: 0.0 oz     Comment: rare   • Drug use: No   • Sexual activity: Not Currently     Partners: Male     Comment: , bank, 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   Other Topics Concern   • Not on file   Social History Narrative   • Not on file     Social Determinants of Health     Financial Resource Strain: Not on file   Food Insecurity: Not on file   Transportation Needs: Not on file   Physical Activity: Not on file   Stress: Not on file   Social Connections: Not on file   Intimate Partner Violence: Not on file   Housing Stability: Not on file          Family History   Problem Relation Age of Onset   • Hypertension Father    • Hypertension Sister    • Arthritis Mother        Current Outpatient Medications on File Prior to Visit   Medication Sig Dispense Refill   • diclofenac sodium (VOLTAREN) 1 % Gel Apply 2 g topically 4 times a day as needed (apply to left shoulder up to four times a day). 150 g 1   • artificial tears (EYE LUBRICANT) Ointment ophth ointment Apply 1 Application to both eyes 2 times a day. 3 drops into the right eye, 2 drops in the left.     • VITAMIN D PO Take 1,000 mg by mouth every day.     • diphenhydrAMINE-APAP, sleep, (TYLENOL PM EXTRA STRENGTH)  MG Tab Take 1 Tablet by mouth 4 times a day as needed (pain at night/sleep).     • acetaminophen (TYLENOL) 650 MG CR tablet Take 500 mg by mouth every four hours as needed for Fever or Mild Pain.     • Home Care Oxygen Inhale 2 L/min at bedtime. Oxygen dose range: 2 L/min  Respiratory route via: Nasal Cannula   Oxygen supplier: Preferred     • simvastatin (ZOCOR) 20 MG Tab TAKE 1 TABLET BY MOUTH EVERY EVENING 100 Tablet 2   • celecoxib (CELEBREX) 100 MG Cap Take 1 Capsule by mouth every day. 100 Capsule 2   • Biotin 10 MG Cap Take  by mouth.     • vitamin E  "(VITAMIN E) 1000 UNIT Cap Take 1 Cap by mouth every day.     • Calcium Carbonate-Vit D-Min (CALCIUM 1200 PO) Take 1 Each by mouth every day at 6 PM.     • Multiple Vitamins-Minerals (OCUVITE-LUTEIN) Cap Take 1 Each by mouth every day.     • Omega-3 Fatty Acids (OMEGA 3 PO) Take 1 Each by mouth every day.     • Multiple Vitamins-Minerals (MULTI COMPLETE PO) Take 1 Tab by mouth every day.     • Bioflavonoid Products (VITAMIN C PLUS) 1000 MG TABS Take 1 Tab by mouth every day.       No current facility-administered medications on file prior to visit.       Allergies: Codeine      ROS: A 12 point ROS was performed on intake and during my interview. ROS negative unless specifically noted in HPI.     Vitals:  /42 (BP Location: Left arm, Patient Position: Sitting, BP Cuff Size: Adult)   Pulse 73   Resp 16   Ht 1.575 m (5' 2\")   Wt 68 kg (150 lb)   SpO2 96%     Physical Exam:  Physical Exam  Vitals reviewed. Exam conducted with a chaperone present.   Constitutional:       Appearance: Normal appearance. She is normal weight.   HENT:      Head: Normocephalic.   Eyes:      Pupils: Pupils are equal, round, and reactive to light.   Cardiovascular:      Rate and Rhythm: Normal rate.      Heart sounds: Murmur heard.   Pulmonary:      Effort: No respiratory distress.      Breath sounds: No wheezing, rhonchi or rales.   Musculoskeletal:      Right lower leg: No edema.      Left lower leg: No edema.   Skin:     General: Skin is warm and dry.      Capillary Refill: Capillary refill takes less than 2 seconds.   Neurological:      Mental Status: She is alert and oriented to person, place, and time.   Psychiatric:         Mood and Affect: Mood normal.         Behavior: Behavior normal.         Thought Content: Thought content normal.         Judgment: Judgment normal.         Laboratory Data: I personally reviewed labs including historical lab trends.         Imaging as reviewed by me personally show:  She was hospitalized " with a pretty significant left upper lobe pneumonia in May.  CAT scan at that time demonstrated nearly complete opacification of the entire left upper lung with small-to-moderate effusion on the left and a smaller effusion on the right.  Subsequent CT scan just a few days ago demonstrates near complete resolution of the opacity. The effusions have also resolved.     Assessment/Plan:    Problem List Items Addressed This Visit     Pneumonia due to infectious organism        Plan:  -She does not have a lung mass.  The left upper lobe opacity was from her pneumonia and it has resolved.  She does have some mild scarring.  I did share this result with her so that she is aware of it.  She is up-to-date with her vaccines.  She has no further need for pulmonary follow-up.    No follow-ups on file.     There are no diagnoses linked to this encounter.    Total consult time was 65 minutes. This includes reviewing previous provider notes, personally reviewing previous imaging educating the patient about their disease process.  __________  Ralph Barnes, DO  Pulmonary and Critical Care Medicine  [facility], later [facility]    Please note that this dictation was created using voice recognition software. The accuracy of the dictation is limited to the abilities of the software. I have made every reasonable attempt to correct obvious errors, but I expect that there are errors of grammar and possibly content that I did not discover before finalizing the note.

## 2022-07-13 ENCOUNTER — TELEPHONE (OUTPATIENT)
Dept: MEDICAL GROUP | Facility: PHYSICIAN GROUP | Age: 82
End: 2022-07-13
Payer: MEDICARE

## 2022-07-13 NOTE — TELEPHONE ENCOUNTER
Patient has called and M x2 asking for a call back. Patient leaves phone number [phone], and when you call the number it goes straight to . [facility] to give us a call back.

## 2022-07-21 ENCOUNTER — TELEPHONE (OUTPATIENT)
Dept: SLEEP MEDICINE | Facility: MEDICAL CENTER | Age: 82
End: 2022-07-21

## 2022-07-21 ENCOUNTER — APPOINTMENT (OUTPATIENT)
Dept: SLEEP MEDICINE | Facility: MEDICAL CENTER | Age: 82
End: 2022-07-21
Payer: MEDICARE

## 2022-07-21 ENCOUNTER — TELEPHONE (OUTPATIENT)
Dept: MEDICAL GROUP | Facility: PHYSICIAN GROUP | Age: 82
End: 2022-07-21
Payer: MEDICARE

## 2022-07-21 DIAGNOSIS — R06.02 SHORTNESS OF BREATH: ICD-10-CM

## 2022-07-21 NOTE — TELEPHONE ENCOUNTER
1. Caller Name: Emilie Gonzalez                          Call Back Number: [phone] (home)         How would the patient prefer to be contacted with a response: Phone call OK to leave a detailed message    Nnamdi came into the office and wanted to ask about the oxygen she got put on in the hospital and wanted an order to stop.  I sent her back to her Pulmonologist as they are the ones who told her she can stop the oxygen

## 2022-07-21 NOTE — TELEPHONE ENCOUNTER
Patient doesn't need an order from pulmonary to d/c oxygen.  I can give it right now.  Please let the oxygen company/ patient know that    Thanks,  Summer Higuera M.D.

## 2022-07-21 NOTE — TELEPHONE ENCOUNTER
Pt's PCP called earlier, pt was at there office wanting an order to D/C Oxygen. Notified them I will call pt after I spoke with Dr Barnes.    Pt is going to need to do a Multi-Ox to confirm if okay to DC O2    Spoke with Dr Barnes, he is okay with this. Routed an order encounter to sign order for Multi-Ox.    Called pt and lm, To return my call.

## 2022-07-21 NOTE — TELEPHONE ENCOUNTER
Spoke with Pulmonology and Dr. Barnes will have the patient come into the office today to perform a Multi-ox test to confirm it is okay to discontinue the oxygen. They will take care of the orders and contact preferred home care if they decide to discontinue.

## 2022-07-26 NOTE — TELEPHONE ENCOUNTER
Tried several attempts to reach pt, unable to reach pt.     Will wait for pt to call back. (Multi-Ox order in chart.)

## 2022-08-01 ENCOUNTER — TELEPHONE (OUTPATIENT)
Dept: SLEEP MEDICINE | Facility: MEDICAL CENTER | Age: 82
End: 2022-08-01
Payer: MEDICARE

## 2022-08-01 NOTE — TELEPHONE ENCOUNTER
PT came in to say she still needs that release from oxygen. From previous encounter Tiffany tried to schedule a Multi-Oxi but was unable to reach PT. I am unsure of availability please reach out and to schedule and let her know why she needs this test.

## 2022-08-05 ENCOUNTER — NON-PROVIDER VISIT (OUTPATIENT)
Dept: SLEEP MEDICINE | Facility: MEDICAL CENTER | Age: 82
End: 2022-08-05
Payer: MEDICARE

## 2022-08-05 DIAGNOSIS — J18.9 PNEUMONIA OF LEFT UPPER LOBE DUE TO INFECTIOUS ORGANISM: ICD-10-CM

## 2022-08-05 PROCEDURE — 94761 N-INVAS EAR/PLS OXIMETRY MLT: CPT | Performed by: INTERNAL MEDICINE

## 2022-08-05 NOTE — PROGRESS NOTES
Emilie Gonzalez is a 81 y.o. female here for a non-provider visit for Multi-Ox(walk test)    Pulmonary Vitals  Multi OX #1: Room air  O2 Sat % at rest: 95  O2 SAT % on exertion: 96    If abnormal, was the Registered Nurse (office provider if RN is unavailable) notified today?Not abnormal but routing to MD to place D/C order for Oxygen    Routed to PCP/Requested Provider? Yes to Dr Barnes

## 2022-08-11 ENCOUNTER — HOSPITAL ENCOUNTER (OUTPATIENT)
Dept: LAB | Facility: MEDICAL CENTER | Age: 82
End: 2022-08-11
Attending: FAMILY MEDICINE
Payer: MEDICARE

## 2022-08-11 DIAGNOSIS — E78.5 DYSLIPIDEMIA: ICD-10-CM

## 2022-08-11 LAB
BASOPHILS # BLD AUTO: 0.5 % (ref 0–1.8)
BASOPHILS # BLD: 0.02 K/UL (ref 0–0.12)
CHOLEST SERPL-MCNC: 154 MG/DL (ref 100–199)
EOSINOPHIL # BLD AUTO: 0.02 K/UL (ref 0–0.51)
EOSINOPHIL NFR BLD: 0.5 % (ref 0–6.9)
ERYTHROCYTE [DISTWIDTH] IN BLOOD BY AUTOMATED COUNT: 46.7 FL (ref 35.9–50)
FASTING STATUS PATIENT QL REPORTED: NORMAL
HCT VFR BLD AUTO: 44.2 % (ref 37–47)
HDLC SERPL-MCNC: 43 MG/DL
HGB BLD-MCNC: 14.3 G/DL (ref 12–16)
IMM GRANULOCYTES # BLD AUTO: 0.01 K/UL (ref 0–0.11)
IMM GRANULOCYTES NFR BLD AUTO: 0.3 % (ref 0–0.9)
LDLC SERPL CALC-MCNC: 84 MG/DL
LYMPHOCYTES # BLD AUTO: 1.34 K/UL (ref 1–4.8)
LYMPHOCYTES NFR BLD: 36.7 % (ref 22–41)
MCH RBC QN AUTO: 29.2 PG (ref 27–33)
MCHC RBC AUTO-ENTMCNC: 32.4 G/DL (ref 33.6–35)
MCV RBC AUTO: 90.4 FL (ref 81.4–97.8)
MONOCYTES # BLD AUTO: 0.5 K/UL (ref 0–0.85)
MONOCYTES NFR BLD AUTO: 13.7 % (ref 0–13.4)
NEUTROPHILS # BLD AUTO: 1.76 K/UL (ref 2–7.15)
NEUTROPHILS NFR BLD: 48.3 % (ref 44–72)
NRBC # BLD AUTO: 0 K/UL
NRBC BLD-RTO: 0 /100 WBC
PLATELET # BLD AUTO: 146 K/UL (ref 164–446)
PMV BLD AUTO: 11.9 FL (ref 9–12.9)
RBC # BLD AUTO: 4.89 M/UL (ref 4.2–5.4)
TRIGL SERPL-MCNC: 134 MG/DL (ref 0–149)
WBC # BLD AUTO: 3.7 K/UL (ref 4.8–10.8)

## 2022-08-11 PROCEDURE — 36415 COLL VENOUS BLD VENIPUNCTURE: CPT

## 2022-08-11 PROCEDURE — 85025 COMPLETE CBC W/AUTO DIFF WBC: CPT

## 2022-08-11 PROCEDURE — 80061 LIPID PANEL: CPT

## 2022-08-14 NOTE — RESULT ENCOUNTER NOTE
Please call patient and let her know that recent labs have stable lipid panel.  Her CBC continues to show low WBC and platelet counts. These are not new findinggs and we can discuss further at upcoming appt with me.  Thanks,  Summer Higuera M.D.

## 2022-08-15 ENCOUNTER — TELEPHONE (OUTPATIENT)
Dept: MEDICAL GROUP | Facility: PHYSICIAN GROUP | Age: 82
End: 2022-08-15
Payer: MEDICARE

## 2022-08-15 NOTE — TELEPHONE ENCOUNTER
----- Message from Summer Higuera M.D. sent at 8/14/2022  3:39 PM PDT -----  Please call patient and let her know that recent labs have stable lipid panel.  Her CBC continues to show low WBC and platelet counts. These are not new findinggs and we can discuss further at upcoming appt with me.  Thanks,  Summer Higuera M.D.

## 2022-08-15 NOTE — TELEPHONE ENCOUNTER
ESTABLISHED PATIENT PRE-VISIT PLANNING     Patient was NOT contacted to complete PVP.     Note: Patient will not be contacted if there is no indication to call.     1.  Reviewed notes from the last few office visits within the medical group: Yes    2.  If any orders were placed at last visit or intended to be done for this visit (i.e. 6 mos follow-up), do we have Results/Consult Notes?           Labs - Labs ordered, completed on 8/11/2022 and results are in chart.  Note: If patient appointment is for lab review and patient did not complete labs, check with provider if OK to reschedule patient until labs completed.         Imaging - Imaging was not ordered at last office visit.         Referrals - Referral ordered, patient has NOT been seen.    3. Is this appointment scheduled as a Hospital Follow-Up? No    4.  Immunizations were updated in Epic using Reconcile Outside Information activity? Yes    5.  Patient is due for the following Health Maintenance Topics:   Health Maintenance Due   Topic Date Due    IMM HEP B VACCINE (1 of 3 - 3-dose series) Never done    IMM ZOSTER VACCINES (1 of 2) Never done    Annual Wellness Visit  01/27/2022    COVID-19 Vaccine (4 - Booster for Pfizer series) 02/01/2022

## 2022-08-15 NOTE — TELEPHONE ENCOUNTER
Phone Number Called: [phone] (home)       Call outcome: Left detailed message for patient. Informed to call back with any additional questions.    Message: Call back for lab results.

## 2022-08-15 NOTE — TELEPHONE ENCOUNTER
Multi Ox done.  Order was just signed today by Dr Barnes.     Faxed Order to [facility]

## 2022-08-16 ENCOUNTER — OFFICE VISIT (OUTPATIENT)
Dept: MEDICAL GROUP | Facility: PHYSICIAN GROUP | Age: 82
End: 2022-08-16
Payer: MEDICARE

## 2022-08-16 VITALS
SYSTOLIC BLOOD PRESSURE: 116 MMHG | HEART RATE: 68 BPM | BODY MASS INDEX: 27.95 KG/M2 | WEIGHT: 151.9 LBS | RESPIRATION RATE: 12 BRPM | OXYGEN SATURATION: 96 % | TEMPERATURE: 97.8 F | HEIGHT: 62 IN | DIASTOLIC BLOOD PRESSURE: 72 MMHG

## 2022-08-16 DIAGNOSIS — D70.8 OTHER NEUTROPENIA (HCC): ICD-10-CM

## 2022-08-16 DIAGNOSIS — E78.5 DYSLIPIDEMIA: ICD-10-CM

## 2022-08-16 DIAGNOSIS — D69.6 THROMBOCYTOPENIA (HCC): ICD-10-CM

## 2022-08-16 PROCEDURE — 99213 OFFICE O/P EST LOW 20 MIN: CPT | Performed by: FAMILY MEDICINE

## 2022-08-16 ASSESSMENT — FIBROSIS 4 INDEX: FIB4 SCORE: 3.78

## 2022-08-16 NOTE — PROGRESS NOTES
CC:   Chief Complaint   Patient presents with    Follow-Up     4 months     Lab Results         HPI:   Emilie presents today for a f/u visit. .      Dyslipidemia    Chronic. Continues to take zocor 20mg.  Recent labs show improvement   Latest Reference Range & Units 8/11/22 07:10   Cholesterol,Tot 100 - 199 mg/dL 154   Triglycerides 0 - 149 mg/dL 134   HDL >=40 mg/dL 43   LDL <100 mg/dL 84       Other neutropenia (HCC)  Chronic medical diagnosis.  This is been present since at least 2020.  She does have a history of recent pneumonia requiring hospitalization in May.Recent labs with white blood cell count at 3.7 and decreased absolute neutrophils at 1.76 and elevated monocytes at 13.7.      Current Outpatient Medications Ordered in Epic   Medication Sig Dispense Refill    diclofenac sodium (VOLTAREN) 1 % Gel Apply 2 g topically 4 times a day as needed (apply to left shoulder up to four times a day). 150 g 1    artificial tears (EYE LUBRICANT) Ointment ophth ointment Apply 1 Application to both eyes 2 times a day. 3 drops into the right eye, 2 drops in the left.      VITAMIN D PO Take 1,000 mg by mouth every day.      diphenhydrAMINE-APAP, sleep, (TYLENOL PM EXTRA STRENGTH)  MG Tab Take 1 Tablet by mouth 4 times a day as needed (pain at night/sleep).      acetaminophen (TYLENOL) 650 MG CR tablet Take 500 mg by mouth every four hours as needed for Fever or Mild Pain.      Home Care Oxygen Inhale 2 L/min at bedtime. Oxygen dose range: 2 L/min  Respiratory route via: Nasal Cannula   Oxygen supplier: Preferred      simvastatin (ZOCOR) 20 MG Tab TAKE 1 TABLET BY MOUTH EVERY EVENING 100 Tablet 2    celecoxib (CELEBREX) 100 MG Cap Take 1 Capsule by mouth every day. 100 Capsule 2    Biotin 10 MG Cap Take  by mouth.      vitamin E (VITAMIN E) 1000 UNIT Cap Take 1 Cap by mouth every day.      Calcium Carbonate-Vit D-Min (CALCIUM 1200 PO) Take 1 Each by mouth every day at 6 PM.      Multiple Vitamins-Minerals  "(OCUVITE-LUTEIN) Cap Take 1 Each by mouth every day.      Omega-3 Fatty Acids (OMEGA 3 PO) Take 1 Each by mouth every day.      Multiple Vitamins-Minerals (MULTI COMPLETE PO) Take 1 Tab by mouth every day.      Bioflavonoid Products (VITAMIN C PLUS) 1000 MG TABS Take 1 Tab by mouth every day.       No current [facility]-ordered facility-administered medications on file.       Past Medical History:   Diagnosis Date    Arthritis of foot, right 06/09/2015    Benign essential tremor 06/09/2015    Cough     Edema 03/10/2015    Hearing loss sensory, bilateral 03/10/2015    History of shingles 09/26/2014    Hyperlipidemia 09/26/2014    Lung mass 7/6/2022    Macular degeneration of right eye 09/26/2014    Moderate aortic insufficiency 09/26/2014    Osteoarthritis 09/26/2014    Osteoporosis 09/26/2014    Parkinson's disease (HCC)     Plantar fasciitis, bilateral 03/10/2015    Pneumonia due to infectious organism 6/13/2022    Prolapsed bladder 09/26/2014       Social History     Tobacco Use    Smoking status: Never    Smokeless tobacco: Never   Vaping Use    Vaping Use: Never used   Substance Use Topics    Alcohol use: Yes     Alcohol/week: 0.0 oz     Comment: rare    Drug use: No       Allergies:  Codeine    Health Maintenance: D/w patient and encouraged to receive covid booster #2. Patient agrees.      Objective:       Exam:  /72   Pulse 68   Temp 36.6 °C (97.8 °F) (Temporal)   Resp 12   Ht 1.575 m (5' 2\")   Wt 68.9 kg (151 lb 14.4 oz)   SpO2 96%   BMI 27.78 kg/m²   Body mass index is 27.78 kg/m².  Wt Readings from Last 4 Encounters:   08/16/22 68.9 kg (151 lb 14.4 oz)   07/06/22 68 kg (150 lb)   06/29/22 67.4 kg (148 lb 8 oz)   06/13/22 64.4 kg (142 lb)       Gen: Alert and oriented, No apparent distress. Appropriately groomed.  Neck: Neck is supple without lymphadenopathy.No thyromegaly.   Lungs: Normal effort, CTA bilaterally, no wheezes, rhonchi, or rales  CV: Regular rate and rhythm. No Lower extremity " edema  Skin: No rash noted.      Assessment & Plan:     81 y.o. female with the following -     1. Thrombocytopenia (HCC)  2. Other neutropenia (HCC)  -chronic. Stable. Present since at least 2020. Hospitalized with PNA 3 months ago.  Recovered now and off oxygen.  Has never been evaluated by hematologist.   Referral to Hematology Oncology    3. Dyslipidemia  Chronic.  Stable and improved.  Continue with Zocor 20 mg daily.      Return in about 3 months (around 11/16/2022) for Annual Wellness Visit.    Please note that this dictation was created using voice recognition software. I have made every reasonable attempt to correct obvious errors, but I expect that there are errors of grammar and possibly content that I did not discover before finalizing the note.

## 2022-08-16 NOTE — ASSESSMENT & PLAN NOTE
Chronic medical diagnosis.  This is been present since at least 2020.  She does have a history of recent pneumonia requiring hospitalization in May.Recent labs with white blood cell count at 3.7 and decreased absolute neutrophils at 1.76 and elevated monocytes at 13.7.

## 2022-08-16 NOTE — ASSESSMENT & PLAN NOTE
Chronic medical diagnosis.  This has been present since 2020.  Recent labs with platelet count at 146k.  Denies any easy bruising or bleeding.

## 2022-08-16 NOTE — ASSESSMENT & PLAN NOTE
Chronic. Continues to take zocor 20mg.  Recent labs show improvement   Latest Reference Range & Units 8/11/22 07:10   Cholesterol,Tot 100 - 199 mg/dL 154   Triglycerides 0 - 149 mg/dL 134   HDL >=40 mg/dL 43   LDL <100 mg/dL 84

## 2022-09-06 PROBLEM — N18.2 CKD (CHRONIC KIDNEY DISEASE) STAGE 2, GFR 60-89 ML/MIN: Status: ACTIVE | Noted: 2022-09-06

## 2022-09-06 PROBLEM — D70.9 NEUTROPENIA (HCC): Status: ACTIVE | Noted: 2021-05-26

## 2022-09-06 PROBLEM — I77.89 ASCENDING AORTA ENLARGEMENT (HCC): Status: ACTIVE | Noted: 2022-09-06

## 2022-09-27 ENCOUNTER — OFFICE VISIT (OUTPATIENT)
Dept: CARDIOLOGY | Facility: MEDICAL CENTER | Age: 82
End: 2022-09-27
Payer: MEDICARE

## 2022-09-27 VITALS
BODY MASS INDEX: 26.31 KG/M2 | OXYGEN SATURATION: 96 % | DIASTOLIC BLOOD PRESSURE: 46 MMHG | WEIGHT: 143 LBS | HEIGHT: 62 IN | RESPIRATION RATE: 12 BRPM | HEART RATE: 67 BPM | SYSTOLIC BLOOD PRESSURE: 102 MMHG

## 2022-09-27 DIAGNOSIS — I35.1 AORTIC VALVE INSUFFICIENCY, ETIOLOGY OF CARDIAC VALVE DISEASE UNSPECIFIED: ICD-10-CM

## 2022-09-27 DIAGNOSIS — I35.0 AORTIC VALVE STENOSIS, ETIOLOGY OF CARDIAC VALVE DISEASE UNSPECIFIED: ICD-10-CM

## 2022-09-27 PROCEDURE — 99214 OFFICE O/P EST MOD 30 MIN: CPT | Performed by: INTERNAL MEDICINE

## 2022-09-27 ASSESSMENT — ENCOUNTER SYMPTOMS
PALPITATIONS: 0
MYALGIAS: 0
LOSS OF CONSCIOUSNESS: 0
COUGH: 0
SHORTNESS OF BREATH: 0
DIZZINESS: 0

## 2022-09-27 ASSESSMENT — FIBROSIS 4 INDEX: FIB4 SCORE: 3.78

## 2022-09-27 NOTE — PROGRESS NOTES
"Chief Complaint   Patient presents with    Aortic Stenosis     F/V Dx: Aortic valve stenosis, etiology of cardiac valve disease unspecified       Subjective     Emilie Gonzalez is a 81 y.o. female who presents today for followup aortic stenosis, aortic insufficiency, hyperlipidemia, abnormal EKG, edema with additional PMH of Parkinson's disease, hypothyroidism, osteoarthritis.    Since 2021 appointment the patient was hospitalized at [facility] 2022 for PNA, AFR with hypoxia, sepsis, hyponatremia, required left thoracentesis, pleural fluid negative for malignancy, has had follow-up outpatient chest CTA is unremarkable, followed by pulmonary.  Has had no cardiac symptoms including chest pain, palpitations, shortness of breath including ERICKSON or orthopnea, sleeping well.     Since 2021 appointment the patient has had no cardiac problems or symptoms.  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  Monitors BP generally runs \"114-120\"      L[place] [facility].  Has 4 children [address] [facility] locally and have been very helpful and supportive.     Past medical history  Diagnosed with stage II Parkinson's disease. Followed by Dr. Colvin, neurologist     The patient had previously lived in [place], [place].  5 years ago she was discovered to have a \"leaky heart valve.  The patient had been followed by Dr. Garcia, cardiologist [place] for the past 4 years.  The patient is moved to [facility] and is establishing ongoing cardiology care.     History of hyperlipidemia on simvastatin.  No history of hypertension, diabetes mellitus and she is not smoke cigarettes.     Family history  Father  of a heart attack at age 69.  Sister alive at age 90. Had a heart attack at age 75.     Social history.  .  Does not drink alcohol except on rare " "occasions with dinner.     Other medical problems.  9/2013 laparoscopic cholecystectomy Shanika Shaw.  Appendectomy age 13.  \"Chronic bronchitis\" but no problems times one year    Past Medical History:   Diagnosis Date    Arthritis of foot, right 06/09/2015    Benign essential tremor 06/09/2015    Cough     Edema 03/10/2015    Hearing loss sensory, bilateral 03/10/2015    History of shingles 09/26/2014    Hyperlipidemia 09/26/2014    Lung mass 7/6/2022    Macular degeneration of right eye 09/26/2014    Moderate aortic insufficiency 09/26/2014    Osteoarthritis 09/26/2014    Osteoporosis 09/26/2014    Parkinson's disease (HCC)     Plantar fasciitis, bilateral 03/10/2015    Pneumonia due to infectious organism 6/13/2022    Prolapsed bladder 09/26/2014     Past Surgical History:   Procedure Laterality Date    CHOLECYSTECTOMY  9/2013    APPENDECTOMY  1956    CATARACT PHACO WITH IOL Bilateral      Family History   Problem Relation Age of Onset    Hypertension Father     Hypertension Sister     Arthritis Mother      Social History     Socioeconomic History    Marital status:      Spouse name: Not on file    Number of children: Not on file    Years of education: Not on file    Highest education level: Not on file   Occupational History    Not on file   Tobacco Use    Smoking status: Never    Smokeless tobacco: Never   Vaping Use    Vaping Use: Never used   Substance and Sexual Activity    Alcohol use: Not Currently     Comment: rare    Drug use: No    Sexual activity: Not Currently     Partners: Male     Comment: , bank, 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   Other Topics Concern    Not on file   Social History Narrative    Not on file     Social Determinants of Health     Financial Resource Strain: Not on file   Food Insecurity: Not on file   Transportation Needs: Not on file   Physical Activity: Not on file   Stress: Not on file   Social Connections: Not on file   Intimate Partner Violence: Not on file   Housing Stability: Not on file "     Allergies   Allergen Reactions    Codeine      halucinations     Outpatient Encounter Medications as of 9/27/2022   Medication Sig Dispense Refill    diclofenac sodium (VOLTAREN) 1 % Gel Apply 2 g topically 4 times a day as needed (apply to left shoulder up to four times a day). 150 g 1    artificial tears (EYE LUBRICANT) Ointment ophth ointment Apply 1 Application to both eyes 2 times a day. 3 drops into the right eye, 2 drops in the left.      VITAMIN D PO Take 1,000 mg by mouth every day.      diphenhydrAMINE-APAP, sleep, (TYLENOL PM EXTRA STRENGTH)  MG Tab Take 1 Tablet by mouth 4 times a day as needed (pain at night/sleep).      acetaminophen (TYLENOL) 650 MG CR tablet Take 500 mg by mouth every four hours as needed for Fever or Mild Pain.      Home Care Oxygen Inhale 2 L/min at bedtime. Oxygen dose range: 2 L/min  Respiratory route via: Nasal Cannula   Oxygen supplier: Preferred      simvastatin (ZOCOR) 20 MG Tab TAKE 1 TABLET BY MOUTH EVERY EVENING 100 Tablet 2    celecoxib (CELEBREX) 100 MG Cap Take 1 Capsule by mouth every day. 100 Capsule 2    Biotin 10 MG Cap Take  by mouth.      vitamin E (VITAMIN E) 1000 UNIT Cap Take 1 Cap by mouth every day.      Calcium Carbonate-Vit D-Min (CALCIUM 1200 PO) Take 1 Each by mouth every day at 6 PM.      Multiple Vitamins-Minerals (OCUVITE-LUTEIN) Cap Take 1 Each by mouth every day.      Omega-3 Fatty Acids (OMEGA 3 PO) Take 1 Each by mouth every day.      Multiple Vitamins-Minerals (MULTI COMPLETE PO) Take 1 Tab by mouth every day.      Bioflavonoid Products (VITAMIN C PLUS) 1000 MG TABS Take 1 Tab by mouth every day.       No facility-administered encounter medications on file as of 9/27/2022.     Review of Systems   Respiratory:  Negative for cough and shortness of breath.    Cardiovascular:  Negative for chest pain and palpitations.   Musculoskeletal:  Negative for myalgias.   Neurological:  Negative for dizziness and loss of consciousness.       "      Objective     /46 (BP Location: Left arm, Patient Position: Sitting, BP Cuff Size: Adult)   Pulse 67   Resp 12   Ht 1.575 m (5' 2\")   Wt 64.9 kg (143 lb)   SpO2 96%   BMI 26.16 kg/m²     Physical Exam  Vitals reviewed.   Constitutional:       General: She is not in acute distress.     Appearance: She is well-developed.   Neck:      Vascular: No JVD.   Cardiovascular:      Rate and Rhythm: Normal rate and regular rhythm.      Pulses:           Carotid pulses are 2+ on the right side and 2+ on the left side.     Heart sounds: Murmur heard.   Systolic murmur is present with a grade of 1/6.     No friction rub. No gallop.   Pulmonary:      Effort: Pulmonary effort is normal. No accessory muscle usage or respiratory distress.      Breath sounds: Normal breath sounds. No wheezing or rales.   Abdominal:      General: There is no distension.      Palpations: Abdomen is soft. There is no mass.      Tenderness: There is no abdominal tenderness.      Comments: Protuberant.   Musculoskeletal:      Cervical back: Normal range of motion and neck supple.      Right lower leg: No edema.      Left lower leg: No edema.      Comments:     Skin:     General: Skin is warm and dry.      Findings: No rash.      Nails: There is no clubbing.   Neurological:      Mental Status: She is alert and oriented to person, place, and time.   Psychiatric:         Behavior: Behavior normal.          06/07/2010 ECHOCARDIOGRAM  EF 60%.  Mild to moderate aortic regurgitation.     05/05/2011 ECHOCARDIOGRAM  EF 55-60%.  Moderate aortic insufficiency.     05/08/2012 ECHOCARDIOGRAM  EF 70%.  Moderate aortic insufficiency.  Pulmonary pressure 26 mmHg.     03/25/2015 ECHOCARDIOGRAM  Normal left ventricular size, thickness, systolic function, and   diastolic function.  Left ventricular ejection fraction is 65% to 70%.  Aortic sclerosis without stenosis.  Moderately severe aortic insufficiency.  Right heart pressures are consistent with mild " pulmonary hypertension.     10/24/2016 ECHOCARDIOGRAM  Normal left ventricular size, wall thickness, and systolic function.  Left ventricular ejection fraction is visually estimated to be 60%.  Severe eccentric aortic insufficiency.  Mildly thickened mitral valve leaflets with normal leaflet excursion.  Unable to estimate pulmonary artery pressure due to an inadequate   tricuspid regurgitant jet.     05/08/2017 ECHOCARDIOGRAM  Normal left ventricular systolic function.  Left ventricular ejection fraction is visually estimated to be 65%.  Mild concentric left ventricular hypertrophy.  Mild aortic stenosis.  Moderate aortic insufficiency.  Mild mitral regurgitation.  Unable to estimate pulmonary artery pressure due to an inadequate   tricuspid regurgitant jet.     9/4/2018 ECHOCARDIOGRAM  Normal left ventricular size, wall thickness, and systolic function.  Left ventricular ejection fraction is visually estimated to be 65%.    Mild aortic stenosis.  Moderate aortic insufficiency.  Right heart pressures are normal.     03/19/2015 EKG: Normal sinus rhythm, rate 73. Nonspecific ST-T wave abnormalities. No prior tracing for comparison. Reviewed by myself.    Assessment & Plan     1. Aortic valve stenosis, etiology of cardiac valve disease unspecified  EC-ECHOCARDIOGRAM COMPLETE W/O CONT      2. Aortic valve insufficiency, etiology of cardiac valve disease unspecified  EC-ECHOCARDIOGRAM COMPLETE W/O CONT          Medical Decision Making: Today's Assessment/Status/Plan:   Assessment  1.  Aortic stenosis.  2.  Aortic regurgitation.   3.  Abnormal EKG.    4.  Hyperlipidemia.   5.  Hypothyroidism, diagnosed 9/1/2015.  6.  Osteoarthritis on Celebrex.  7.  Parkinson's disease, followed by neurology Dr. Colvin.     Recommendations and Discussion  1.  The patient is stable from a cardiac standpoint concerning her aortic stenosis, aortic insufficiency and dyslipidemia.  2.  I reviewed [facility] medical records from 5/2022.  3.  RTC 9  months with follow-up echocardiogram.

## 2022-10-19 NOTE — PROGRESS NOTES
10/25/22    Subjective    Chief Complaint:  Neutropenia and thrombocytopenia    HPI:  82 female referred for consultation by Dr. Higuera because of neutropenia and mild thrombocytopenia. These abnormalities have been present and stable since at least 2/2020. B12 and TSH are normal. REX was negative in 2016. She had a CT chest in June - the spleen looks generous to me but the scan did not encompass the entire organ.     ROS:    Constitutional: No weight loss  Skin: No rash or jaundice  HENT: No change in eyesight or hearing  Cardiovascular:No chest pain or arrythmia  Respiratory:No cough or SOB  GI:No nausea, vomiting, diarrhea, constipation  :No dysuria or frequency  Musculoskeletal:No bone or joint pain  Neuro:No sx's of neuropathy  Psych: No complaints    PMH:      Allergies   Allergen Reactions    Codeine      halucinations       Past Medical History:   Diagnosis Date    Arthritis of foot, right 06/09/2015    Benign essential tremor 06/09/2015    Cough     Edema 03/10/2015    Hearing loss sensory, bilateral 03/10/2015    History of shingles 09/26/2014    Hyperlipidemia 09/26/2014    Lung mass 7/6/2022    Macular degeneration of right eye 09/26/2014    Moderate aortic insufficiency 09/26/2014    Osteoarthritis 09/26/2014    Osteoporosis 09/26/2014    Parkinson's disease (HCC)     Plantar fasciitis, bilateral 03/10/2015    Pneumonia due to infectious organism 6/13/2022    Prolapsed bladder 09/26/2014        Past Surgical History:   Procedure Laterality Date    CHOLECYSTECTOMY  9/2013    APPENDECTOMY  1956    CATARACT PHACO WITH IOL Bilateral         Medications:    Current Outpatient Medications on File Prior to Encounter   Medication Sig Dispense Refill    diclofenac sodium (VOLTAREN) 1 % Gel Apply 2 g topically 4 times a day as needed (apply to left shoulder up to four times a day). 150 g 1    artificial tears (EYE LUBRICANT) Ointment ophth ointment Apply 1 Application to both eyes 2 times a day. 3 drops into the  "right eye, 2 drops in the left.      VITAMIN D PO Take 1,000 mg by mouth every day.      diphenhydrAMINE-APAP, sleep, (TYLENOL PM EXTRA STRENGTH)  MG Tab Take 1 Tablet by mouth 4 times a day as needed (pain at night/sleep).      acetaminophen (TYLENOL) 650 MG CR tablet Take 500 mg by mouth every four hours as needed for Fever or Mild Pain.      Home Care Oxygen Inhale 2 L/min at bedtime. Oxygen dose range: 2 L/min  Respiratory route via: Nasal Cannula   Oxygen supplier: Preferred      simvastatin (ZOCOR) 20 MG Tab TAKE 1 TABLET BY MOUTH EVERY EVENING 100 Tablet 2    celecoxib (CELEBREX) 100 MG Cap Take 1 Capsule by mouth every day. 100 Capsule 2    Biotin 10 MG Cap Take  by mouth.      vitamin E (VITAMIN E) 1000 UNIT Cap Take 1 Cap by mouth every day.      Calcium Carbonate-Vit D-Min (CALCIUM 1200 PO) Take 1 Each by mouth every day at 6 PM.      Multiple Vitamins-Minerals (OCUVITE-LUTEIN) Cap Take 1 Each by mouth every day.      Omega-3 Fatty Acids (OMEGA 3 PO) Take 1 Each by mouth every day.      Multiple Vitamins-Minerals (MULTI COMPLETE PO) Take 1 Tab by mouth every day.      Bioflavonoid Products (VITAMIN C PLUS) 1000 MG TABS Take 1 Tab by mouth every day.       No current facility-administered medications on file prior to encounter.       Social History     Tobacco Use    Smoking status: Never    Smokeless tobacco: Never   Substance Use Topics    Alcohol use: Not Currently     Comment: rare        Family History   Problem Relation Age of Onset    Hypertension Father     Hypertension Sister     Arthritis Mother         Objective    Vitals:    BP (!) 140/82 (BP Location: Right arm, Patient Position: Sitting, BP Cuff Size: Adult)   Pulse 77   Temp 36.4 °C (97.5 °F) (Temporal)   Resp 18   Ht 1.575 m (5' 2.01\")   Wt 70.1 kg (154 lb 10.4 oz)   SpO2 93%   BMI 28.28 kg/m²     Physical Exam:    Appears well-developed and well-nourished. No distress.    Head -  Normocephalic .   Eyes - Pupils are equal. " Conjunctivae normal. No scleral icterus.   Ears - normal hearing  Mouth - Throat: Oropharynx is clear and moist. No oropharyngeal exudate  Neck - Neck supple. No thyromegaly  Cardiovascular - Normal rate, regular rhythm, normal heart sounds and intact distal pulses. No  gallop, murmur or rub  Pulmonary - Normal breath sounds.  No wheeze, rales or rhonchi  Breast - symmetrical. No mass on indentation.  Abdominal -Soft. No distension, tenderness, organomegaly or mass. Accessory nipple on left.   Extremities-  No edema or tenderness.    Nodes - No submental, submandibular, preauricular, cervical, axillary or inguinal adenopathy.    Neurological -   Alert and oriented.  Skin - Skin is warm and dry. No rash noted. Not diaphoretic. No erythema. No pallor. No jaundice   Psychiatric -  Normal mood and affect.    Labs:     Latest Reference Range & Units 2/10/20 07:37 5/24/21 07:07 9/7/21 11:40 12/15/21 07:30 6/11/22 09:45 8/11/22 07:10   WBC 4.8 - 10.8 K/uL 4.4 (L) 4.2 (L) 4.9 3.4 (L) 4.1 (L) 3.7 (L)   RBC 4.20 - 5.40 M/uL 4.79 4.78 4.75 4.76 4.52 4.89   Hemoglobin 12.0 - 16.0 g/dL 14.5 14.3 14.0 14.1 13.1 14.3   Hematocrit 37.0 - 47.0 % 44.4 43.7 43.5 43.4 40.3 44.2   MCV 81.4 - 97.8 fL 92.7 91.4 91.6 91.2 89.2 90.4   MCH 27.0 - 33.0 pg 30.3 29.9 29.5 29.6 29.0 29.2   MCHC 33.6 - 35.0 g/dL 32.7 (L) 32.7 (L) 32.2 (L) 32.5 (L) 32.5 (L) 32.4 (L)   RDW 35.9 - 50.0 fL 43.6 43.9 45.1 43.0 43.5 46.7   Platelet Count 164 - 446 K/uL 157 (L) 148 (L) 154 (L) 142 (L) 198 146 (L)   MPV 9.0 - 12.9 fL 12.1 11.9 13.0 (H) 12.2 12.1 11.9   Neutrophils-Polys 44.00 - 72.00 % 43.40 (L) 44.60 45.40 43.00 (L) 41.30 (L) 48.30   Neutrophils (Absolute) 2.00 - 7.15 K/uL 1.90 (L) 1.85 (L) 2.22 1.46 (L) 1.70 (L) 1.76 (L)   Lymphocytes 22.00 - 41.00 % 42.50 (H) 41.60 (H) 39.70 42.90 (H) 41.40 (H) 36.70   Lymphs (Absolute) 1.00 - 4.80 K/uL 1.86 1.73 1.94 1.46 1.70 1.34   Monocytes 0.00 - 13.40 % 13.00 12.70 13.50 (H) 12.60 13.90 (H) 13.70 (H)        Assessment  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   Imp:    Visit Diagnosis:    1. Thrombocytopenia (HCC)  US-ABDOMEN COMPLETE SURVEY    CBC WITH DIFFERENTIAL      2. Neutropenia, unspecified type (HCC)  US-ABDOMEN COMPLETE SURVEY    CBC WITH DIFFERENTIAL    REX REFLEXIVE PROFILE        Plan:  Above lab today plus an US spleen  Repeat CBC in 3 months -> OV    Abdirahman Snyder M.D.

## 2022-10-25 ENCOUNTER — HOSPITAL ENCOUNTER (OUTPATIENT)
Dept: LAB | Facility: MEDICAL CENTER | Age: 82
End: 2022-10-25
Attending: INTERNAL MEDICINE
Payer: MEDICARE

## 2022-10-25 ENCOUNTER — HOSPITAL ENCOUNTER (OUTPATIENT)
Dept: HEMATOLOGY ONCOLOGY | Facility: MEDICAL CENTER | Age: 82
End: 2022-10-25
Attending: INTERNAL MEDICINE
Payer: MEDICARE

## 2022-10-25 VITALS
DIASTOLIC BLOOD PRESSURE: 82 MMHG | HEART RATE: 77 BPM | SYSTOLIC BLOOD PRESSURE: 140 MMHG | WEIGHT: 154.65 LBS | OXYGEN SATURATION: 93 % | BODY MASS INDEX: 28.46 KG/M2 | HEIGHT: 62 IN | RESPIRATION RATE: 18 BRPM | TEMPERATURE: 97.5 F

## 2022-10-25 DIAGNOSIS — D70.9 NEUTROPENIA, UNSPECIFIED TYPE (HCC): ICD-10-CM

## 2022-10-25 DIAGNOSIS — D69.6 THROMBOCYTOPENIA (HCC): ICD-10-CM

## 2022-10-25 LAB
BASOPHILS # BLD AUTO: 0.6 % (ref 0–1.8)
BASOPHILS # BLD: 0.03 K/UL (ref 0–0.12)
EOSINOPHIL # BLD AUTO: 0.01 K/UL (ref 0–0.51)
EOSINOPHIL NFR BLD: 0.2 % (ref 0–6.9)
ERYTHROCYTE [DISTWIDTH] IN BLOOD BY AUTOMATED COUNT: 42.4 FL (ref 35.9–50)
HCT VFR BLD AUTO: 43.7 % (ref 37–47)
HGB BLD-MCNC: 14.2 G/DL (ref 12–16)
IMM GRANULOCYTES # BLD AUTO: 0.01 K/UL (ref 0–0.11)
IMM GRANULOCYTES NFR BLD AUTO: 0.2 % (ref 0–0.9)
LYMPHOCYTES # BLD AUTO: 1.73 K/UL (ref 1–4.8)
LYMPHOCYTES NFR BLD: 36.1 % (ref 22–41)
MCH RBC QN AUTO: 29 PG (ref 27–33)
MCHC RBC AUTO-ENTMCNC: 32.5 G/DL (ref 33.6–35)
MCV RBC AUTO: 89.4 FL (ref 81.4–97.8)
MONOCYTES # BLD AUTO: 0.6 K/UL (ref 0–0.85)
MONOCYTES NFR BLD AUTO: 12.5 % (ref 0–13.4)
NEUTROPHILS # BLD AUTO: 2.41 K/UL (ref 2–7.15)
NEUTROPHILS NFR BLD: 50.4 % (ref 44–72)
NRBC # BLD AUTO: 0 K/UL
NRBC BLD-RTO: 0 /100 WBC
PLATELET # BLD AUTO: 160 K/UL (ref 164–446)
PMV BLD AUTO: 12.3 FL (ref 9–12.9)
RBC # BLD AUTO: 4.89 M/UL (ref 4.2–5.4)
WBC # BLD AUTO: 4.8 K/UL (ref 4.8–10.8)

## 2022-10-25 PROCEDURE — 85025 COMPLETE CBC W/AUTO DIFF WBC: CPT

## 2022-10-25 PROCEDURE — 36415 COLL VENOUS BLD VENIPUNCTURE: CPT

## 2022-10-25 PROCEDURE — 99203 OFFICE O/P NEW LOW 30 MIN: CPT | Performed by: INTERNAL MEDICINE

## 2022-10-25 PROCEDURE — 99212 OFFICE O/P EST SF 10 MIN: CPT | Performed by: INTERNAL MEDICINE

## 2022-10-25 PROCEDURE — 86038 ANTINUCLEAR ANTIBODIES: CPT

## 2022-10-25 ASSESSMENT — PAIN SCALES - GENERAL: PAINLEVEL: NO PAIN

## 2022-10-25 ASSESSMENT — FIBROSIS 4 INDEX: FIB4 SCORE: 3.83

## 2022-10-26 DIAGNOSIS — E78.5 DYSLIPIDEMIA: ICD-10-CM

## 2022-10-26 RX ORDER — SIMVASTATIN 20 MG
TABLET ORAL
Qty: 100 TABLET | Refills: 3 | Status: SHIPPED | OUTPATIENT
Start: 2022-10-26 | End: 2023-03-01 | Stop reason: SDUPTHER

## 2022-10-26 NOTE — TELEPHONE ENCOUNTER
Requested Prescriptions     Pending Prescriptions Disp Refills   • simvastatin (ZOCOR) 20 MG Tab 100 Tablet 3     Sig: TAKE 1 TABLET BY MOUTH EVERY EVENING   Summer Higuera M.D.

## 2022-10-27 LAB — NUCLEAR IGG SER QL IA: NORMAL

## 2022-11-03 ENCOUNTER — HOSPITAL ENCOUNTER (OUTPATIENT)
Dept: RADIOLOGY | Facility: MEDICAL CENTER | Age: 82
End: 2022-11-03
Attending: INTERNAL MEDICINE
Payer: MEDICARE

## 2022-11-03 DIAGNOSIS — D69.6 THROMBOCYTOPENIA (HCC): ICD-10-CM

## 2022-11-03 DIAGNOSIS — D70.9 NEUTROPENIA, UNSPECIFIED TYPE (HCC): ICD-10-CM

## 2022-11-03 PROCEDURE — 76700 US EXAM ABDOM COMPLETE: CPT

## 2022-11-22 ENCOUNTER — OFFICE VISIT (OUTPATIENT)
Dept: MEDICAL GROUP | Facility: PHYSICIAN GROUP | Age: 82
End: 2022-11-22
Payer: MEDICARE

## 2022-11-22 VITALS
RESPIRATION RATE: 12 BRPM | WEIGHT: 154.7 LBS | SYSTOLIC BLOOD PRESSURE: 112 MMHG | BODY MASS INDEX: 28.47 KG/M2 | DIASTOLIC BLOOD PRESSURE: 66 MMHG | HEART RATE: 73 BPM | OXYGEN SATURATION: 96 % | HEIGHT: 62 IN | TEMPERATURE: 98.5 F

## 2022-11-22 DIAGNOSIS — G20.A1 PARKINSON DISEASE (HCC): ICD-10-CM

## 2022-11-22 DIAGNOSIS — I35.1 AORTIC VALVE INSUFFICIENCY, ETIOLOGY OF CARDIAC VALVE DISEASE UNSPECIFIED: ICD-10-CM

## 2022-11-22 DIAGNOSIS — H90.3 SENSORY HEARING LOSS, BILATERAL: ICD-10-CM

## 2022-11-22 DIAGNOSIS — Z23 NEED FOR VACCINATION: ICD-10-CM

## 2022-11-22 DIAGNOSIS — N18.31 STAGE 3A CHRONIC KIDNEY DISEASE: ICD-10-CM

## 2022-11-22 DIAGNOSIS — D69.6 THROMBOCYTOPENIA (HCC): ICD-10-CM

## 2022-11-22 DIAGNOSIS — E78.5 DYSLIPIDEMIA: ICD-10-CM

## 2022-11-22 DIAGNOSIS — M15.9 PRIMARY OSTEOARTHRITIS INVOLVING MULTIPLE JOINTS: ICD-10-CM

## 2022-11-22 DIAGNOSIS — D70.9 NEUTROPENIA, UNSPECIFIED TYPE (HCC): ICD-10-CM

## 2022-11-22 DIAGNOSIS — R73.01 ELEVATED FASTING GLUCOSE: ICD-10-CM

## 2022-11-22 DIAGNOSIS — Z87.01 HISTORY OF PNEUMONIA: ICD-10-CM

## 2022-11-22 DIAGNOSIS — G31.9 CEREBRAL ATROPHY (HCC): ICD-10-CM

## 2022-11-22 DIAGNOSIS — H34.8330: ICD-10-CM

## 2022-11-22 DIAGNOSIS — Z00.00 MEDICARE ANNUAL WELLNESS VISIT, SUBSEQUENT: ICD-10-CM

## 2022-11-22 DIAGNOSIS — I77.89 ASCENDING AORTA ENLARGEMENT (HCC): ICD-10-CM

## 2022-11-22 DIAGNOSIS — I35.0 AORTIC VALVE STENOSIS, ETIOLOGY OF CARDIAC VALVE DISEASE UNSPECIFIED: ICD-10-CM

## 2022-11-22 DIAGNOSIS — E03.4 HYPOTHYROIDISM DUE TO ACQUIRED ATROPHY OF THYROID: ICD-10-CM

## 2022-11-22 PROBLEM — N18.2 CKD (CHRONIC KIDNEY DISEASE) STAGE 2, GFR 60-89 ML/MIN: Status: RESOLVED | Noted: 2022-09-06 | Resolved: 2022-11-22

## 2022-11-22 PROBLEM — M25.512 ACUTE PAIN OF LEFT SHOULDER: Status: RESOLVED | Noted: 2022-06-29 | Resolved: 2022-11-22

## 2022-11-22 PROBLEM — R94.31 ABNORMAL EKG: Status: RESOLVED | Noted: 2017-04-12 | Resolved: 2022-11-22

## 2022-11-22 PROBLEM — H65.191 ACUTE MIDDLE EAR EFFUSION, RIGHT: Status: RESOLVED | Noted: 2022-06-29 | Resolved: 2022-11-22

## 2022-11-22 PROCEDURE — 90662 IIV NO PRSV INCREASED AG IM: CPT | Performed by: FAMILY MEDICINE

## 2022-11-22 PROCEDURE — G0439 PPPS, SUBSEQ VISIT: HCPCS | Performed by: FAMILY MEDICINE

## 2022-11-22 PROCEDURE — G0008 ADMIN INFLUENZA VIRUS VAC: HCPCS | Performed by: FAMILY MEDICINE

## 2022-11-22 ASSESSMENT — ACTIVITIES OF DAILY LIVING (ADL): BATHING_REQUIRES_ASSISTANCE: 0

## 2022-11-22 ASSESSMENT — PATIENT HEALTH QUESTIONNAIRE - PHQ9: CLINICAL INTERPRETATION OF PHQ2 SCORE: 0

## 2022-11-22 ASSESSMENT — FIBROSIS 4 INDEX: FIB4 SCORE: 3.49

## 2022-11-22 ASSESSMENT — ENCOUNTER SYMPTOMS: GENERAL WELL-BEING: GOOD

## 2022-11-22 NOTE — PROGRESS NOTES
Chief Complaint   Patient presents with    Annual Wellness Visit    Paperwork     Granting son permission to stay at her facility while she is sick.        HPI:  Emilie Gonzalez is a 82 y.o. here for Medicare Annual Wellness Visit     Patient Active Problem List    Diagnosis Date Noted    Ascending aorta enlargement (HCC) 09/06/2022    Cerebral atrophy (HCC) 06/29/2022    History of pneumonia 06/13/2022    Thrombocytopenia (HCC) 05/26/2021    Neutropenia ([facility]) 05/26/2021    Elevated fasting glucose 09/21/2020    Aortic stenosis 03/06/2019    Dyslipidemia 04/12/2017    CKD (chronic kidney disease) stage 3, GFR 30-59 ml/min ([facility]) 01/30/2017    Aortic regurgitation 03/14/2016    Parkinson disease ([facility]) 02/05/2016    Hypothyroidism due to acquired atrophy of thyroid 09/01/2015    Hearing loss sensory, bilateral 03/10/2015    Osteoarthritis 09/26/2014    Prolapse of female bladder, acquired 09/26/2014    Branch retinal vein occlusion with macular edema of both eyes 09/26/2014       Current Outpatient Medications   Medication Sig Dispense Refill    simvastatin (ZOCOR) 20 MG Tab TAKE 1 TABLET BY MOUTH EVERY EVENING 100 Tablet 3    diclofenac sodium (VOLTAREN) 1 % Gel Apply 2 g topically 4 times a day as needed (apply to left shoulder up to four times a day). 150 g 1    artificial tears (EYE LUBRICANT) Ointment ophth ointment Apply 1 Application to both eyes 2 times a day. 3 drops into the right eye, 2 drops in the left.      VITAMIN D PO Take 1,000 mg by mouth every day.      diphenhydrAMINE-APAP, sleep, (TYLENOL PM EXTRA STRENGTH)  MG Tab Take 1 Tablet by mouth 4 times a day as needed (pain at night/sleep).      acetaminophen (TYLENOL) 650 MG CR tablet Take 500 mg by mouth every four hours as needed for Fever or Mild Pain.      celecoxib (CELEBREX) 100 MG Cap Take 1 Capsule by mouth every day. 100 Capsule 2    Biotin 10 MG Cap Take  by mouth.      vitamin E (VITAMIN E) 1000 UNIT Cap Take 1 Cap by mouth every day.       Calcium Carbonate-Vit D-Min (CALCIUM 1200 PO) Take 1 Each by mouth every day at 6 PM.      Multiple Vitamins-Minerals (OCUVITE-LUTEIN) Cap Take 1 Each by mouth every day.      Omega-3 Fatty Acids (OMEGA 3 PO) Take 1 Each by mouth every day.      Multiple Vitamins-Minerals (MULTI COMPLETE PO) Take 1 Tab by mouth every day.      Bioflavonoid Products (VITAMIN C PLUS) 1000 MG TABS Take 1 Tab by mouth every day.       No current facility-administered medications for this visit.          Current supplements as per medication list.     Allergies: Codeine    Current social contact/activities: [place]     She  reports that she has never smoked. She has never used smokeless tobacco. She reports that she does not currently use alcohol. She reports that she does not use drugs.  Counseling given: Not Answered      ROS:    Gait: Uses no assistive device  Ostomy: No  Other tubes: No  Amputations: No  Chronic oxygen use: No  Last eye exam: 3 months ago/ Upcoming appointment this month  Wears hearing aids: Yes   : Denies any urinary leakage during the last 6 months    Screening:    Depression Screening  Little interest or pleasure in doing things?  0 - not at all  Feeling down, depressed , or hopeless? 0 - not at all  Trouble falling or staying asleep, or sleeping too much?     Feeling tired or having little energy?     Poor appetite or overeating?     Feeling bad about yourself - or that you are a failure or have let yourself or your family down?    Trouble concentrating on things, such as reading the newspaper or watching television?    Moving or speaking so slowly that other people could have noticed.  Or the opposite - being so fidgety or restless that you have been moving around a lot more than usual?     Thoughts that you would be better off dead, or of hurting yourself?     Patient Health Questionnaire Score:      If depressive symptoms identified deferred to follow up visit unless specifically addressed in  assessment and plan.    Interpretation of PHQ-9 Total Score   Score Severity   1-4 No Depression   5-9 Mild Depression   10-14 Moderate Depression   15-19 Moderately Severe Depression   20-27 Severe Depression    Screening for Cognitive Impairment  Three Minute Recall (daughter, heaven, mountain) 3/3    Kyree clock face with all 12 numbers and set the hands to show 10 past 11.  Yes    Cognitive concerns identified deferred for follow up unless specifically addressed in assessment and plan.    Fall Risk Assessment  Has the patient had two or more falls in the last year or any fall with injury in the last year?  No    Safety Assessment  Throw rugs on floor.  Yes  Handrails on all stairs.  Yes  Good lighting in all hallways.  Yes  Difficulty hearing.  Yes  Patient counseled about all safety risks that were identified.    Functional Assessment ADLs  Are there any barriers preventing you from cooking for yourself or meeting nutritional needs?  No.    Are there any barriers preventing you from driving safely or obtaining transportation?  No.    Are there any barriers preventing you from using a telephone or calling for help?  No    Are there any barriers preventing you from shopping?  No.    Are there any barriers preventing you from taking care of your own finances?  No    Are there any barriers preventing you from managing your medications?  No    Are there any barriers preventing you from showering, bathing or dressing yourself? No    Are you currently engaging in any exercise or physical activity?  Yes.    What is your perception of your health? Good    Advance Care Planning  Do you have an Advance Directive, Living Will, Durable Power of , or POLST? Yes  Advance Directive Living Will     is on file      Health Maintenance Summary            Overdue - IMM HEP B VACCINE (1 of 3 - 3-dose series) Overdue - never done      No completion history exists for this topic.              Overdue - IMM ZOSTER VACCINES (1  of 2) Overdue - never done      No completion history exists for this topic.              Annual Wellness Visit (Every 366 Days) Next due on 11/23/2023 11/22/2022  Visit Dx: Medicare annual wellness visit, subsequent    09/06/2022  Level of Service: ANNUAL WELLNESS VISIT-INCLUDES PPPS SUBSEQUE*    01/26/2021  Visit Dx: Medicare annual wellness visit, subsequent    04/08/2019  Subsequent Annual Wellness Visit - Includes PPPS ()    03/23/2018  Visit Dx: Medicare annual wellness visit, subsequent    Only the first 5 history entries have been loaded, but more history exists.              BONE DENSITY (Every 5 Years) Next due on 11/4/2025 11/04/2020  DS-BONE DENSITY STUDY (DEXA)    03/20/2015  DS-BONE DENSITY STUDY (DEXA)    04/16/2014  Done              IMM DTaP/Tdap/Td Vaccine (2 - Td or Tdap) Next due on 9/21/2030 09/21/2020  Imm Admin: Tdap Vaccine              IMM PNEUMOCOCCAL VACCINE: 65+ Years (Series Information) Completed      10/18/2017  Imm Admin: Pneumococcal polysaccharide vaccine (PPSV-23)    11/04/2015  Imm Admin: Pneumococcal Conjugate Vaccine (Prevnar/PCV-13)              COVID-19 Vaccine (Series Information) Completed      09/06/2022  Imm Admin: MODERNA BIVALENT BOOSTER SARS-COV-2 VACCINE (6+)    10/01/2021  Imm Admin: PFIZER PURPLE CAP SARS-COV-2 VACCINATION (12+)    03/03/2021  Imm Admin: PFIZER PURPLE CAP SARS-COV-2 VACCINATION (12+)    02/10/2021  Imm Admin: PFIZER PURPLE CAP SARS-COV-2 VACCINATION (12+)              IMM INFLUENZA (Series Information) Completed      11/22/2022  Imm Admin: Influenza Vaccine Adult HD    12/07/2021  Imm Admin: Influenza Vaccine Adult HD    09/21/2020  Imm Admin: Influenza Vaccine Adult HD    01/21/2020  Imm Admin: Influenza Vaccine Adult HD    11/20/2018  Imm Admin: Influenza Vaccine Adult HD    Only the first 5 history entries have been loaded, but more history exists.              IMM MENINGOCOCCAL ACWY VACCINE (Series Information) Aged Out       No completion history exists for this topic.              Discontinued - MAMMOGRAM  Discontinued        Frequency changed to Never automatically (Topic No Longer Applies)    01/14/2022  MA-SCREENING MAMMO BILAT W/TOMOSYNTHESIS W/CAD    03/20/2015  MA-SCREENING MAMMOGRAM W/ CAD    04/10/2013  Done                    Patient Care Team:  Summer Higuera M.D. as PCP - General (Family Medicine)  Stephania Barrios P.A.-C. as PCP - [facility] Paneled  Magan Colvin M.D. (Neurology)  Hans Olivera M.D. as Consulting Physician (Cardiovascular Disease (Cardiology))  Latha Chan M.D. (Ophthalmology)  [facility]  preferred home care as Respiratory Therapist (DME Services)      Social History     Tobacco Use    Smoking status: Never    Smokeless tobacco: Never   Vaping Use    Vaping Use: Never used   Substance Use Topics    Alcohol use: Not Currently     Comment: rare    Drug use: No     Family History   Problem Relation Age of Onset    Hypertension Father     Hypertension Sister     Arthritis Mother      She  has a past medical history of Abnormal EKG (4/12/2017), Acute middle ear effusion, right (6/29/2022), Acute pain of left shoulder (6/29/2022), Arthritis of foot, right (06/09/2015), Benign essential tremor (06/09/2015), Benign essential tremor (6/9/2015), BMI 27.0-27.9,adult (9/6/2022), CKD (chronic kidney disease) stage 2, GFR 60-89 ml/min (9/6/2022), Cough, Edema (03/10/2015), Hearing loss sensory, bilateral (03/10/2015), History of shingles (09/26/2014), Hyperlipidemia (09/26/2014), Lung mass (7/6/2022), Macular degeneration of right eye (09/26/2014), Moderate aortic insufficiency (09/26/2014), Osteoarthritis (09/26/2014), Osteoporosis (09/26/2014), Parkinson's disease (HCC), Plantar fasciitis, bilateral (03/10/2015), Pneumonia due to infectious organism (6/13/2022), and Prolapsed bladder (09/26/2014).   Past Surgical History:   Procedure Laterality Date    CHOLECYSTECTOMY  9/2013    APPENDECTOMY  1956     "CATARACT PHACO WITH IOL Bilateral        Exam:   /66   Pulse 73   Temp 36.9 °C (98.5 °F) (Temporal)   Resp 12   Ht 1.575 m (5' 2\")   Wt 70.2 kg (154 lb 11.2 oz)   SpO2 96%  Body mass index is 28.3 kg/m².    Hearing good.  -hearing aides  Dentition upper and lower dentures  Alert, oriented in no acute distress.  Eye contact is good, speech goal directed, affect calm    Assessment and Plan. The following treatment and monitoring plan is recommended:      1. Medicare annual wellness visit, subsequent  Normal mini cog.    2. Hypothyroidism due to acquired atrophy of thyroid  Chronic.  Stable.  Last TSH from June was stable at 2.8.    3. Stage 3a chronic kidney disease (HCC)  Chronic medical diagnosis.  Her last set of labs from June had normal GFR.  Continues to take celebrex 100mg daily for arthritis.  Will also take tylenol prn for pain.    4. Thrombocytopenia (HCC)  5. Neutropenia, unspecified type (HCC)  Chronic. Was evaluated by hematologist, Dr Snyder. abdominal u/s ordered.results in normal range.  Will f/u with him on 1/25/23.     6. History of pneumonia  History of. Hospitalized [facility] end of May for pneumonia.   Recovered and doing well.    7. Cerebral atrophy (HCC)  Chronic. Stable. Denies any memory issues.  Last MRI completed in 2016 with age related cerebral atrophy    8. Hearing loss sensory, bilateral  Chronic. Stable. Has bilateral hearing aides    9. Aortic valve insufficiency, etiology of cardiac valve disease unspecified  10. Ascending aorta enlargement (HCC)  11. Aortic valve stenosis, etiology of cardiac valve disease unspecified  Chronic. Stable. F/u with cards, Dr Olivera. Last seen September with recommendations to f/u in June.   Echo from 2018 noted to have moderate AI,.  Ascending aorta diameter is 3.7cm.     12. Dyslipidemia  Chronic. Improving and stable. C/w zocor 20mg.    13.  Branch retinal vein occlusion with macular edema of both eyes  Chronic. Stable. F/u with Dr LOZANO every 3 " months.    14 Need for vaccination  - INFLUENZA VACCINE, HIGH DOSE (65+ ONLY)    15. Parkinson disease (HCC)  Chronic. Stable. Will f/u with Dr Colvin on 1/10/23.  Continues to have a hand tremor if carrying anything heavy or resting hand.  Notices it more when she's holding something.   Has never been on meds for Parkinson.    16. Elevated fasting glucose  Chronic medical diagnosis.  Last set of labs from June had improvement in fasting glucose to 83.  We will continue to monitor.    17 Osteoarthritis  Chronic. Takes celebrex 100 daily. Notices it mainly in the hands and at times in the knees.     Services suggested: No services needed at this time  Health Care Screening: Age-appropriate preventive services recommended by USPTF and ACIP covered by Medicare were discussed today. Services ordered if indicated and agreed upon by the patient.  Referrals offered: Community-based lifestyle interventions to reduce health risks and promote self-management and wellness, fall prevention, nutrition, physical activity, tobacco-use cessation, weight loss, and mental health services as per orders if indicated.    Discussion today about general wellness and lifestyle habits:    Prevent falls and reduce trip hazards; Cautioned about securing or removing rugs.  Have a working fire alarm and carbon monoxide detector;   Engage in regular physical activity and social activities     Follow-up: Return in about 4 months (around 3/22/2023), or end of March/beg April, for hyperlipidemia.

## 2022-12-14 ENCOUNTER — DOCUMENTATION (OUTPATIENT)
Dept: HEALTH INFORMATION MANAGEMENT | Facility: OTHER | Age: 82
End: 2022-12-14
Payer: MEDICARE

## 2023-01-17 DIAGNOSIS — D70.9 NEUTROPENIA, UNSPECIFIED TYPE (HCC): ICD-10-CM

## 2023-01-17 DIAGNOSIS — D69.6 THROMBOCYTOPENIA (HCC): ICD-10-CM

## 2023-01-20 NOTE — PROGRESS NOTES
01/25/23    Subjective    Chief Complaint:  Follow up from consultation for neutropenia and thrombocytopenia    HPI:  82 female seen in October for the above issues. Work-up to date is negative. REX negative. US shows and 11.41 cm spleen which is upper limit normal in size. Does complain about nail changes.    ROS:    Constitutional: No weight loss  Skin: No rash or jaundice  HENT: No change in eyesight or hearing  Cardiovascular:No chest pain or arrythmia  Respiratory:No cough or SOB  GI:No nausea, vomiting, diarrhea, constipation  :No dysuria or frequency  Musculoskeletal:No bone or joint pain  Neuro:No sx's of neuropathy  Psych: No complaints    PMH:      Allergies   Allergen Reactions    Codeine      halucinations       Past Medical History:   Diagnosis Date    Abnormal EKG 4/12/2017    Acute middle ear effusion, right 6/29/2022    Acute pain of left shoulder 6/29/2022    Arthritis of foot, right 06/09/2015    Benign essential tremor 06/09/2015    Benign essential tremor 6/9/2015    Dr Colvin    BMI 27.0-27.9,adult 9/6/2022    CKD (chronic kidney disease) stage 2, GFR 60-89 ml/min 9/6/2022    Cough     Edema 03/10/2015    Hearing loss sensory, bilateral 03/10/2015    History of shingles 09/26/2014    Hyperlipidemia 09/26/2014    Lung mass 7/6/2022    Macular degeneration of right eye 09/26/2014    Moderate aortic insufficiency 09/26/2014    Osteoarthritis 09/26/2014    Osteoporosis 09/26/2014    Parkinson's disease (HCC)     Plantar fasciitis, bilateral 03/10/2015    Pneumonia due to infectious organism 6/13/2022    Prolapsed bladder 09/26/2014        Past Surgical History:   Procedure Laterality Date    CHOLECYSTECTOMY  9/2013    APPENDECTOMY  1956    CATARACT PHACO WITH IOL Bilateral         Medications:    Current Outpatient Medications on File Prior to Encounter   Medication Sig Dispense Refill    simvastatin (ZOCOR) 20 MG Tab TAKE 1 TABLET BY MOUTH EVERY EVENING 100 Tablet 3    diclofenac sodium (VOLTAREN)  "1 % Gel Apply 2 g topically 4 times a day as needed (apply to left shoulder up to four times a day). 150 g 1    artificial tears (EYE LUBRICANT) Ointment ophth ointment Apply 1 Application to both eyes 2 times a day. 3 drops into the right eye, 2 drops in the left.      VITAMIN D PO Take 1,000 mg by mouth every day.      diphenhydrAMINE-APAP, sleep, (TYLENOL PM EXTRA STRENGTH)  MG Tab Take 1 Tablet by mouth 4 times a day as needed (pain at night/sleep).      acetaminophen (TYLENOL) 650 MG CR tablet Take 500 mg by mouth every four hours as needed for Fever or Mild Pain.      celecoxib (CELEBREX) 100 MG Cap Take 1 Capsule by mouth every day. 100 Capsule 2    Biotin 10 MG Cap Take  by mouth.      vitamin E (VITAMIN E) 1000 UNIT Cap Take 1 Cap by mouth every day.      Calcium Carbonate-Vit D-Min (CALCIUM 1200 PO) Take 1 Each by mouth every day at 6 PM.      Multiple Vitamins-Minerals (OCUVITE-LUTEIN) Cap Take 1 Each by mouth every day.      Omega-3 Fatty Acids (OMEGA 3 PO) Take 1 Each by mouth every day.      Multiple Vitamins-Minerals (MULTI COMPLETE PO) Take 1 Tab by mouth every day.      Bioflavonoid Products (VITAMIN C PLUS) 1000 MG TABS Take 1 Tab by mouth every day.       No current facility-administered medications on file prior to encounter.       Social History     Tobacco Use    Smoking status: Never    Smokeless tobacco: Never   Substance Use Topics    Alcohol use: Not Currently     Comment: rare        Family History   Problem Relation Age of Onset    Hypertension Father     Hypertension Sister     Arthritis Mother         Objective    Vitals:    /60 (BP Location: Left arm, Patient Position: Sitting, BP Cuff Size: Adult)   Pulse 74   Temp (!) 24.8 °C (76.6 °F) (Temporal)   Resp 18   Ht 1.575 m (5' 2.01\")   Wt 70.6 kg (155 lb 10.3 oz)   SpO2 95%   BMI 28.46 kg/m²     Physical Exam:    Appears well-developed and well-nourished. No distress.    Head -  Normocephalic .   Eyes - Pupils are " equal. Conjunctivae normal. No scleral icterus.   Ears - normal hearing   Neurological -   Alert and oriented.  Ext 0- slightly spooned nails  Skin - Skin is warm and dry. No rash noted. Not diaphoretic. No erythema. No pallor. No jaundice   Psychiatric -  Normal mood and affect.    Labs:     Latest Reference Range & Units 06/11/22 09:45 08/11/22 07:10 10/25/22 16:12   WBC 4.8 - 10.8 K/uL 4.1 (L) 3.7 (L) 4.8   RBC 4.20 - 5.40 M/uL 4.52 4.89 4.89   Hemoglobin 12.0 - 16.0 g/dL 13.1 14.3 14.2   Hematocrit 37.0 - 47.0 % 40.3 44.2 43.7   MCV 81.4 - 97.8 fL 89.2 90.4 89.4   MCH 27.0 - 33.0 pg 29.0 29.2 29.0   MCHC 33.6 - 35.0 g/dL 32.5 (L) 32.4 (L) 32.5 (L)   RDW 35.9 - 50.0 fL 43.5 46.7 42.4   Platelet Count 164 - 446 K/uL 198 146 (L) 160 (L)   MPV 9.0 - 12.9 fL 12.1 11.9 12.3   Neutrophils-Polys 44.00 - 72.00 % 41.30 (L) 48.30 50.40   Neutrophils (Absolute) 2.00 - 7.15 K/uL 1.70 (L) 1.76 (L) 2.41      Latest Reference Range & Units 06/11/22 09:45   Vitamin B12 -True Cobalamin 211 - 911 pg/mL 943 (H)   TSH 0.380 - 5.330 uIU/mL 2.880      Latest Reference Range & Units 10/25/22 16:12   Antinuclear Antibody None Detected  None Detected     Assessment    Imp:    Visit Diagnosis:    1. Thrombocytopenia (HCC)            Plan:  Check iron because of nail changes  See me prn    Abdirahman Snyder M.D.

## 2023-01-24 ENCOUNTER — HOSPITAL ENCOUNTER (OUTPATIENT)
Dept: LAB | Facility: MEDICAL CENTER | Age: 83
End: 2023-01-24
Attending: INTERNAL MEDICINE
Payer: MEDICARE

## 2023-01-24 DIAGNOSIS — D70.9 NEUTROPENIA, UNSPECIFIED TYPE (HCC): ICD-10-CM

## 2023-01-24 DIAGNOSIS — D69.6 THROMBOCYTOPENIA (HCC): ICD-10-CM

## 2023-01-24 LAB
BASOPHILS # BLD AUTO: 0.8 % (ref 0–1.8)
BASOPHILS # BLD: 0.04 K/UL (ref 0–0.12)
EOSINOPHIL # BLD AUTO: 0.05 K/UL (ref 0–0.51)
EOSINOPHIL NFR BLD: 1 % (ref 0–6.9)
ERYTHROCYTE [DISTWIDTH] IN BLOOD BY AUTOMATED COUNT: 44.7 FL (ref 35.9–50)
HCT VFR BLD AUTO: 43.8 % (ref 37–47)
HGB BLD-MCNC: 14.1 G/DL (ref 12–16)
IMM GRANULOCYTES # BLD AUTO: 0.01 K/UL (ref 0–0.11)
IMM GRANULOCYTES NFR BLD AUTO: 0.2 % (ref 0–0.9)
LYMPHOCYTES # BLD AUTO: 1.75 K/UL (ref 1–4.8)
LYMPHOCYTES NFR BLD: 36.5 % (ref 22–41)
MCH RBC QN AUTO: 29.6 PG (ref 27–33)
MCHC RBC AUTO-ENTMCNC: 32.2 G/DL (ref 33.6–35)
MCV RBC AUTO: 91.8 FL (ref 81.4–97.8)
MONOCYTES # BLD AUTO: 0.67 K/UL (ref 0–0.85)
MONOCYTES NFR BLD AUTO: 14 % (ref 0–13.4)
NEUTROPHILS # BLD AUTO: 2.28 K/UL (ref 2–7.15)
NEUTROPHILS NFR BLD: 47.5 % (ref 44–72)
NRBC # BLD AUTO: 0 K/UL
NRBC BLD-RTO: 0 /100 WBC
PLATELET # BLD AUTO: 150 K/UL (ref 164–446)
PMV BLD AUTO: 12.5 FL (ref 9–12.9)
RBC # BLD AUTO: 4.77 M/UL (ref 4.2–5.4)
WBC # BLD AUTO: 4.8 K/UL (ref 4.8–10.8)

## 2023-01-24 PROCEDURE — 85025 COMPLETE CBC W/AUTO DIFF WBC: CPT

## 2023-01-24 PROCEDURE — 36415 COLL VENOUS BLD VENIPUNCTURE: CPT

## 2023-01-25 ENCOUNTER — HOSPITAL ENCOUNTER (OUTPATIENT)
Dept: LAB | Facility: MEDICAL CENTER | Age: 83
End: 2023-01-25
Attending: INTERNAL MEDICINE
Payer: MEDICARE

## 2023-01-25 ENCOUNTER — HOSPITAL ENCOUNTER (OUTPATIENT)
Dept: HEMATOLOGY ONCOLOGY | Facility: MEDICAL CENTER | Age: 83
End: 2023-01-25
Attending: INTERNAL MEDICINE
Payer: MEDICARE

## 2023-01-25 VITALS
WEIGHT: 155.65 LBS | HEIGHT: 62 IN | HEART RATE: 74 BPM | BODY MASS INDEX: 28.64 KG/M2 | OXYGEN SATURATION: 95 % | DIASTOLIC BLOOD PRESSURE: 60 MMHG | RESPIRATION RATE: 18 BRPM | SYSTOLIC BLOOD PRESSURE: 124 MMHG | TEMPERATURE: 76.6 F

## 2023-01-25 DIAGNOSIS — D70.9 NEUTROPENIA, UNSPECIFIED TYPE (HCC): ICD-10-CM

## 2023-01-25 DIAGNOSIS — D69.6 THROMBOCYTOPENIA (HCC): ICD-10-CM

## 2023-01-25 LAB
FERRITIN SERPL-MCNC: 198 NG/ML (ref 10–291)
IRON SATN MFR SERPL: 25 % (ref 15–55)
IRON SERPL-MCNC: 63 UG/DL (ref 40–170)
TIBC SERPL-MCNC: 248 UG/DL (ref 250–450)
UIBC SERPL-MCNC: 185 UG/DL (ref 110–370)

## 2023-01-25 PROCEDURE — 99213 OFFICE O/P EST LOW 20 MIN: CPT | Performed by: INTERNAL MEDICINE

## 2023-01-25 PROCEDURE — 82728 ASSAY OF FERRITIN: CPT

## 2023-01-25 PROCEDURE — 99212 OFFICE O/P EST SF 10 MIN: CPT | Performed by: INTERNAL MEDICINE

## 2023-01-25 PROCEDURE — 36415 COLL VENOUS BLD VENIPUNCTURE: CPT

## 2023-01-25 PROCEDURE — 83540 ASSAY OF IRON: CPT

## 2023-01-25 PROCEDURE — 83550 IRON BINDING TEST: CPT

## 2023-01-25 ASSESSMENT — PAIN SCALES - GENERAL: PAINLEVEL: NO PAIN

## 2023-01-25 ASSESSMENT — FIBROSIS 4 INDEX: FIB4 SCORE: 3.73

## 2023-02-02 ENCOUNTER — HOSPITAL ENCOUNTER (OUTPATIENT)
Dept: CARDIOLOGY | Facility: MEDICAL CENTER | Age: 83
End: 2023-02-02
Attending: INTERNAL MEDICINE
Payer: MEDICARE

## 2023-02-02 DIAGNOSIS — I35.0 AORTIC VALVE STENOSIS, ETIOLOGY OF CARDIAC VALVE DISEASE UNSPECIFIED: ICD-10-CM

## 2023-02-02 DIAGNOSIS — I35.1 AORTIC VALVE INSUFFICIENCY, ETIOLOGY OF CARDIAC VALVE DISEASE UNSPECIFIED: ICD-10-CM

## 2023-02-02 PROCEDURE — 93306 TTE W/DOPPLER COMPLETE: CPT

## 2023-02-03 LAB
LV EJECT FRACT  99904: 60
LV EJECT FRACT MOD 2C 99903: 63.6
LV EJECT FRACT MOD 4C 99902: 58.17
LV EJECT FRACT MOD BP 99901: 61.61

## 2023-02-03 PROCEDURE — 93306 TTE W/DOPPLER COMPLETE: CPT | Mod: 26 | Performed by: INTERNAL MEDICINE

## 2023-02-04 NOTE — RESULT ENCOUNTER NOTE
Please notify Emilie that I have reviewed her echocardiogram which continues to show good heart function with the aortic valve leakage still moderate and is unchanged in 5 years which is good news  No new recommendations

## 2023-02-04 NOTE — RESULT ENCOUNTER NOTE
Please notify Emilie that I have reviewed her echocardiogram which continues to show good heart function and the aortic heart valve still has moderate leakage and has not changed in 5 years which is good news  No new recommendations

## 2023-02-06 ENCOUNTER — TELEPHONE (OUTPATIENT)
Dept: CARDIOLOGY | Facility: MEDICAL CENTER | Age: 83
End: 2023-02-06
Payer: MEDICARE

## 2023-02-06 NOTE — TELEPHONE ENCOUNTER
----- Message from Hans Olivera M.D. sent at 2/3/2023  5:34 PM PST -----  Please notify Emilie that I have reviewed her echocardiogram which continues to show good heart function with the aortic valve leakage still moderate and is unchanged in 5 years which is good news  No new recommendations

## 2023-03-01 DIAGNOSIS — M15.9 PRIMARY OSTEOARTHRITIS INVOLVING MULTIPLE JOINTS: ICD-10-CM

## 2023-03-01 DIAGNOSIS — E78.5 DYSLIPIDEMIA: ICD-10-CM

## 2023-03-01 RX ORDER — CELECOXIB 100 MG/1
100 CAPSULE ORAL DAILY
Qty: 100 CAPSULE | Refills: 2 | Status: SHIPPED | OUTPATIENT
Start: 2023-03-01 | End: 2023-09-12 | Stop reason: SDUPTHER

## 2023-03-01 RX ORDER — SIMVASTATIN 20 MG
TABLET ORAL
Qty: 100 TABLET | Refills: 3 | Status: SHIPPED | OUTPATIENT
Start: 2023-03-01 | End: 2023-12-20 | Stop reason: SDUPTHER

## 2023-03-30 ENCOUNTER — HOSPITAL ENCOUNTER (OUTPATIENT)
Dept: LAB | Facility: MEDICAL CENTER | Age: 83
End: 2023-03-30
Attending: FAMILY MEDICINE
Payer: MEDICARE

## 2023-03-30 DIAGNOSIS — E78.5 DYSLIPIDEMIA: ICD-10-CM

## 2023-03-30 DIAGNOSIS — E03.4 HYPOTHYROIDISM DUE TO ACQUIRED ATROPHY OF THYROID: ICD-10-CM

## 2023-03-30 DIAGNOSIS — D69.6 THROMBOCYTOPENIA (HCC): ICD-10-CM

## 2023-03-30 DIAGNOSIS — D70.9 NEUTROPENIA, UNSPECIFIED TYPE (HCC): ICD-10-CM

## 2023-03-30 LAB
ALBUMIN SERPL BCP-MCNC: 4.1 G/DL (ref 3.2–4.9)
ALBUMIN/GLOB SERPL: 1.2 G/DL
ALP SERPL-CCNC: 57 U/L (ref 30–99)
ALT SERPL-CCNC: 22 U/L (ref 2–50)
ANION GAP SERPL CALC-SCNC: 11 MMOL/L (ref 7–16)
AST SERPL-CCNC: 34 U/L (ref 12–45)
BASOPHILS # BLD AUTO: 0.7 % (ref 0–1.8)
BASOPHILS # BLD: 0.03 K/UL (ref 0–0.12)
BILIRUB SERPL-MCNC: 0.8 MG/DL (ref 0.1–1.5)
BUN SERPL-MCNC: 20 MG/DL (ref 8–22)
CALCIUM ALBUM COR SERPL-MCNC: 9.3 MG/DL (ref 8.5–10.5)
CALCIUM SERPL-MCNC: 9.4 MG/DL (ref 8.5–10.5)
CHLORIDE SERPL-SCNC: 104 MMOL/L (ref 96–112)
CHOLEST SERPL-MCNC: 175 MG/DL (ref 100–199)
CO2 SERPL-SCNC: 25 MMOL/L (ref 20–33)
CREAT SERPL-MCNC: 0.95 MG/DL (ref 0.5–1.4)
EOSINOPHIL # BLD AUTO: 0 K/UL (ref 0–0.51)
EOSINOPHIL NFR BLD: 0 % (ref 0–6.9)
ERYTHROCYTE [DISTWIDTH] IN BLOOD BY AUTOMATED COUNT: 43.6 FL (ref 35.9–50)
FASTING STATUS PATIENT QL REPORTED: NORMAL
GFR SERPLBLD CREATININE-BSD FMLA CKD-EPI: 59 ML/MIN/1.73 M 2
GLOBULIN SER CALC-MCNC: 3.3 G/DL (ref 1.9–3.5)
GLUCOSE SERPL-MCNC: 95 MG/DL (ref 65–99)
HCT VFR BLD AUTO: 44.8 % (ref 37–47)
HDLC SERPL-MCNC: 39 MG/DL
HGB BLD-MCNC: 14.8 G/DL (ref 12–16)
IMM GRANULOCYTES # BLD AUTO: 0.02 K/UL (ref 0–0.11)
IMM GRANULOCYTES NFR BLD AUTO: 0.4 % (ref 0–0.9)
LDLC SERPL CALC-MCNC: 100 MG/DL
LYMPHOCYTES # BLD AUTO: 1.55 K/UL (ref 1–4.8)
LYMPHOCYTES NFR BLD: 33.9 % (ref 22–41)
MCH RBC QN AUTO: 29.8 PG (ref 27–33)
MCHC RBC AUTO-ENTMCNC: 33 G/DL (ref 33.6–35)
MCV RBC AUTO: 90.1 FL (ref 81.4–97.8)
MONOCYTES # BLD AUTO: 0.58 K/UL (ref 0–0.85)
MONOCYTES NFR BLD AUTO: 12.7 % (ref 0–13.4)
NEUTROPHILS # BLD AUTO: 2.39 K/UL (ref 2–7.15)
NEUTROPHILS NFR BLD: 52.3 % (ref 44–72)
NRBC # BLD AUTO: 0 K/UL
NRBC BLD-RTO: 0 /100 WBC
PLATELET # BLD AUTO: 135 K/UL (ref 164–446)
PMV BLD AUTO: 12.5 FL (ref 9–12.9)
POTASSIUM SERPL-SCNC: 4.3 MMOL/L (ref 3.6–5.5)
PROT SERPL-MCNC: 7.4 G/DL (ref 6–8.2)
RBC # BLD AUTO: 4.97 M/UL (ref 4.2–5.4)
SODIUM SERPL-SCNC: 140 MMOL/L (ref 135–145)
TRIGL SERPL-MCNC: 178 MG/DL (ref 0–149)
TSH SERPL DL<=0.005 MIU/L-ACNC: 5.19 UIU/ML (ref 0.38–5.33)
WBC # BLD AUTO: 4.6 K/UL (ref 4.8–10.8)

## 2023-03-30 PROCEDURE — 36415 COLL VENOUS BLD VENIPUNCTURE: CPT

## 2023-03-30 PROCEDURE — 85025 COMPLETE CBC W/AUTO DIFF WBC: CPT

## 2023-03-30 PROCEDURE — 84443 ASSAY THYROID STIM HORMONE: CPT

## 2023-03-30 PROCEDURE — 80053 COMPREHEN METABOLIC PANEL: CPT

## 2023-03-30 PROCEDURE — 80061 LIPID PANEL: CPT

## 2023-04-04 ENCOUNTER — OFFICE VISIT (OUTPATIENT)
Dept: MEDICAL GROUP | Facility: PHYSICIAN GROUP | Age: 83
End: 2023-04-04
Payer: MEDICARE

## 2023-04-04 VITALS
SYSTOLIC BLOOD PRESSURE: 138 MMHG | DIASTOLIC BLOOD PRESSURE: 60 MMHG | OXYGEN SATURATION: 96 % | HEART RATE: 63 BPM | BODY MASS INDEX: 30.18 KG/M2 | TEMPERATURE: 97.8 F | RESPIRATION RATE: 16 BRPM | WEIGHT: 164 LBS | HEIGHT: 62 IN

## 2023-04-04 DIAGNOSIS — E78.5 DYSLIPIDEMIA: ICD-10-CM

## 2023-04-04 DIAGNOSIS — G31.9 CEREBRAL ATROPHY (HCC): ICD-10-CM

## 2023-04-04 DIAGNOSIS — R05.3 CHRONIC COUGH: ICD-10-CM

## 2023-04-04 DIAGNOSIS — I77.89 ASCENDING AORTA ENLARGEMENT (HCC): ICD-10-CM

## 2023-04-04 DIAGNOSIS — N18.31 STAGE 3A CHRONIC KIDNEY DISEASE: ICD-10-CM

## 2023-04-04 DIAGNOSIS — D69.6 THROMBOCYTOPENIA (HCC): ICD-10-CM

## 2023-04-04 PROCEDURE — 99214 OFFICE O/P EST MOD 30 MIN: CPT | Performed by: FAMILY MEDICINE

## 2023-04-04 ASSESSMENT — PATIENT HEALTH QUESTIONNAIRE - PHQ9: CLINICAL INTERPRETATION OF PHQ2 SCORE: 0

## 2023-04-04 ASSESSMENT — FIBROSIS 4 INDEX: FIB4 SCORE: 4.46

## 2023-04-04 NOTE — ASSESSMENT & PLAN NOTE
chronic. Had her f/u with hematology on 1/25 and will f/u prn.  Recent labs with WBC at 4.6 and platelets at 135k.

## 2023-04-04 NOTE — ASSESSMENT & PLAN NOTE
Chronic. Continues to take zocor 20mg daily. Has gained 10 pounds in the last 4 months. Will eat beef 3-4 times a week. had her f/u with cards, Dr Olivera in sept 2022.  Echo completed in Feb 2023 with stable findings.    Latest Reference Range & Units 03/30/23 07:21   Cholesterol,Tot 100 - 199 mg/dL 175   Triglycerides 0 - 149 mg/dL 178 (H)   HDL >=40 mg/dL 39 !   LDL <100 mg/dL 100 (H)   (H): Data is abnormally high  !: Data is abnormal

## 2023-04-04 NOTE — ASSESSMENT & PLAN NOTE
Chronic. Has had this for years. Continues to have a dry cough. Notices it more since her pneumonia dx last year.  Feels like her throat will close up and she has to cough.. no dysphagia. No trouble with food.  Oxygen sats 96% RA. Was at eye doctor's last month and was told by their office that she was wheezing. Continues to have bilateral eye injections, every 3 months.

## 2023-04-04 NOTE — PROGRESS NOTES
CC:   Chief Complaint   Patient presents with    Follow-Up     5 month f/v    labs 3/30/23         HPI:   Emilie presents today for a f/u visit..  Last seen by me on 11/22/22.    Since our last appointment, has been seen by:  Hematology-Dr. Snyder on January 25 March-Neuro- [facility]      CKD (chronic kidney disease) stage 3, GFR 30-59 ml/min (HCC)  Chronic. Recent GFR at 59.  Denies any urinary issues.    Dyslipidemia  Chronic. Continues to take zocor 20mg daily. Has gained 10 pounds in the last 4 months. Will eat beef 3-4 times a week. had her f/u with cards, Dr Olivera in sept 2022.  Echo completed in Feb 2023 with stable findings.    Latest Reference Range & Units 03/30/23 07:21   Cholesterol,Tot 100 - 199 mg/dL 175   Triglycerides 0 - 149 mg/dL 178 (H)   HDL >=40 mg/dL 39 !   LDL <100 mg/dL 100 (H)   (H): Data is abnormally high  !: Data is abnormal    Chronic cough    Chronic. Has had this for years. Continues to have a dry cough. Notices it more since her pneumonia dx last year.  Feels like her throat will close up and she has to cough.. no dysphagia. No trouble with food.  Oxygen sats 96% RA. Was at eye doctor's last month and was told by their office that she was wheezing. Continues to have bilateral eye injections, every 3 months.     Thrombocytopenia (HCC)  chronic. Had her f/u with hematology on 1/25 and will f/u prn.  Recent labs with WBC at 4.6 and platelets at 135k.      Current Outpatient Medications Ordered in Epic   Medication Sig Dispense Refill    celecoxib (CELEBREX) 100 MG Cap Take 1 Capsule by mouth every day. 100 Capsule 2    simvastatin (ZOCOR) 20 MG Tab TAKE 1 TABLET BY MOUTH EVERY EVENING 100 Tablet 3    diclofenac sodium (VOLTAREN) 1 % Gel Apply 2 g topically 4 times a day as needed (apply to left shoulder up to four times a day). 150 g 1    artificial tears (EYE LUBRICANT) Ointment ophth ointment Apply 1 Application to both eyes 2 times a day. 3 drops into the right eye, 2 drops in the  left.      VITAMIN D PO Take 1,000 mg by mouth every day.      diphenhydrAMINE-APAP, sleep, (TYLENOL PM EXTRA STRENGTH)  MG Tab Take 1 Tablet by mouth 4 times a day as needed (pain at night/sleep).      acetaminophen (TYLENOL) 650 MG CR tablet Take 500 mg by mouth every four hours as needed for Fever or Mild Pain.      Biotin 10 MG Cap Take  by mouth.      vitamin E (VITAMIN E) 1000 UNIT Cap Take 1 Cap by mouth every day.      Calcium Carbonate-Vit D-Min (CALCIUM 1200 PO) Take 1 Each by mouth every day at 6 PM.      Multiple Vitamins-Minerals (OCUVITE-LUTEIN) Cap Take 1 Each by mouth every day.      Omega-3 Fatty Acids (OMEGA 3 PO) Take 1 Each by mouth every day.      Multiple Vitamins-Minerals (MULTI COMPLETE PO) Take 1 Tab by mouth every day.      Bioflavonoid Products (VITAMIN C PLUS) 1000 MG TABS Take 1 Tab by mouth every day.       No current [facility]-ordered facility-administered medications on file.       Past Medical History:   Diagnosis Date    Abnormal EKG 4/12/2017    Acute middle ear effusion, right 6/29/2022    Acute pain of left shoulder 6/29/2022    Arthritis of foot, right 06/09/2015    Benign essential tremor 06/09/2015    Benign essential tremor 6/9/2015    Dr Colvin    BMI 27.0-27.9,adult 9/6/2022    CKD (chronic kidney disease) stage 2, GFR 60-89 ml/min 9/6/2022    Cough     Edema 03/10/2015    Hearing loss sensory, bilateral 03/10/2015    History of shingles 09/26/2014    Hyperlipidemia 09/26/2014    Lung mass 7/6/2022    Macular degeneration of right eye 09/26/2014    Moderate aortic insufficiency 09/26/2014    Osteoarthritis 09/26/2014    Osteoporosis 09/26/2014    Parkinson's disease (HCC)     Plantar fasciitis, bilateral 03/10/2015    Pneumonia due to infectious organism 6/13/2022    Prolapsed bladder 09/26/2014       Social History     Tobacco Use    Smoking status: Never    Smokeless tobacco: Never   Vaping Use    Vaping Use: Never used   Substance Use Topics    Alcohol use: Not Currently  "    Comment: rare    Drug use: No       Allergies:  Codeine      Objective:       Exam:  /60 (BP Location: Left arm, Patient Position: Sitting, BP Cuff Size: Adult)   Pulse 63   Temp 36.6 °C (97.8 °F) (Temporal)   Resp 16   Ht 1.575 m (5' 2\")   Wt 74.4 kg (164 lb)   SpO2 96%   BMI 30.00 kg/m²   Body mass index is 30 kg/m².  Wt Readings from Last 4 Encounters:   04/04/23 74.4 kg (164 lb)   01/25/23 70.6 kg (155 lb 10.3 oz)   11/22/22 70.2 kg (154 lb 11.2 oz)   10/25/22 70.1 kg (154 lb 10.4 oz)       Gen: Alert and oriented, No apparent distress. Appropriately groomed.  Neck: Neck is supple without lymphadenopathy.No thyromegaly.   Lungs: Normal effort, CTA bilaterally, no wheezes, rhonchi, or rales  CV: Regular rate and rhythm. No Lower extremity edema  Skin: No rash noted.      Assessment & Plan:     82 y.o. female with the following -     1. Chronic cough  -chronic issue. Going on for years. Has noticed it more since having pneumonia last year.  Was at her eye doctors appointment last month and it was mentioned to her that she was wheezing.  She denies any shortness of breath or wheezing currently.  She did have imaging and a follow-up with pulmonology last year after her diagnosis of the pneumonia.  There were no acute findings.  We will start off with pulmonary function tests.    PULMONARY FUNCTION TESTS -Test requested: Complete Pulmonary Function Test; Include MIPS/MEPS? Yes; Future    2. Dyslipidemia  Chronic medical diagnosis.  Recent labs with elevation in lipid panel.  Only taking Zocor 20 mg daily.  States that her son has been cooking at home and she has been eating beef approximately 3-4 times in a week.  Has also gained 10 pounds over the last 4 months.  Dietary and lifestyle precautions discussed with patient.    3. Stage 3a chronic kidney disease (HCC)  4. Ascending aorta enlargement (HCC)  5. Cerebral atrophy (HCC)  HCC Gap Form    Diagnosis to address: I77.89 - Ascending aorta " enlargement (HCC)  Assessment and plan: Chronic, stable. Recent echo with measurement of 3.5cm  Diagnosis: N18.31 - Stage 3a chronic kidney disease (HCC)  Assessment and plan: Chronic, stable.recent labs with gfr 59.  Diagnosis: G31.9 - Cerebral atrophy (HCC)  Assessment and plan: Chronic, stable. Denies any memory issues.   Noted on MRI brain from 2016  Last edited 04/04/23 10:42 PDT by Summer Higuera M.D.        6. Thrombocytopenia (HCC)  Chronic medical diagnosis.  Stable.  She had her evaluation and work-up with hematology Dr. Snyder.  Work-up was negative.  It is recommended that she follow-up as needed.      Return in about 3 months (around 7/4/2023), or mid July, for cough.    Please note that this dictation was created using voice recognition software. I have made every reasonable attempt to correct obvious errors, but I expect that there are errors of grammar and possibly content that I did not discover before finalizing the note.

## 2023-04-25 ENCOUNTER — NON-PROVIDER VISIT (OUTPATIENT)
Dept: SLEEP MEDICINE | Facility: MEDICAL CENTER | Age: 83
End: 2023-04-25
Attending: FAMILY MEDICINE
Payer: MEDICARE

## 2023-04-25 VITALS — WEIGHT: 162 LBS | BODY MASS INDEX: 28.7 KG/M2 | HEIGHT: 63 IN

## 2023-04-25 DIAGNOSIS — R05.3 CHRONIC COUGH: ICD-10-CM

## 2023-04-25 PROCEDURE — 94726 PLETHYSMOGRAPHY LUNG VOLUMES: CPT | Performed by: INTERNAL MEDICINE

## 2023-04-25 PROCEDURE — 94060 EVALUATION OF WHEEZING: CPT | Mod: 26 | Performed by: INTERNAL MEDICINE

## 2023-04-25 PROCEDURE — 94726 PLETHYSMOGRAPHY LUNG VOLUMES: CPT | Mod: 26 | Performed by: INTERNAL MEDICINE

## 2023-04-25 PROCEDURE — 94060 EVALUATION OF WHEEZING: CPT | Performed by: INTERNAL MEDICINE

## 2023-04-25 PROCEDURE — 94729 DIFFUSING CAPACITY: CPT | Performed by: INTERNAL MEDICINE

## 2023-04-25 PROCEDURE — 94729 DIFFUSING CAPACITY: CPT | Mod: 26 | Performed by: INTERNAL MEDICINE

## 2023-04-25 ASSESSMENT — PULMONARY FUNCTION TESTS
FEV1/FVC_PERCENT_PREDICTED: 109
FEV1_LLN: 1.48
FEV1/FVC_PERCENT_PREDICTED: 100
FVC_PERCENT_PREDICTED: 82
FEV1: 1.58
FVC: 1.92
FEV1: 1.48
FEV1/FVC: 77
FEV1/FVC_PERCENT_PREDICTED: 101
FEV1/FVC_PERCENT_PREDICTED: 76
FEV1_PREDICTED: 1.77
FEV1/FVC_PERCENT_PREDICTED: 107
FVC_LLN: 1.95
FEV1/FVC: 78
FVC: 1.91
FEV1_PERCENT_PREDICTED: 89
FEV1/FVC_PERCENT_LLN: 64
FEV1/FVC: 82.29
FVC_PREDICTED: 2.33
FVC_PERCENT_PREDICTED: 82
FEV1/FVC_PERCENT_CHANGE: 6
FEV1_PERCENT_PREDICTED: 83
FEV1/FVC_PREDICTED: 77
FEV1_PERCENT_CHANGE: 0
FEV1_PERCENT_CHANGE: 6
FEV1/FVC: 82

## 2023-04-25 ASSESSMENT — FIBROSIS 4 INDEX: FIB4 SCORE: 4.46

## 2023-04-25 NOTE — PROCEDURES
Technician: Juliana Hernandez RRT, CPFT  Good patient effort & cooperation.  The results of this test meet the ATS/ERS standards for acceptability & reproducibility.  Test was performed on the [employer] Body Plethysmograph-Elite DX system.  Predicted equations for Spirometry are GLI-2012, ITS for lung volumes, and GLI-2017 for DLCO.  The DLCO was uncorrected for Hgb.  A bronchodilator of Ventolin HFA -2puffs via spacer administered.  DLCO performed during dilation period.  Patient C/O being claustrophobic, but was able to perform the Pleth maneuver without incident.    Interpretation;       The results of this test meet the criteria for acceptability and repeatability by the ATS.  SPIROMETRY:  There is no evidence of obstruction in this test manifested by an FEV1/FVC ratio of 78%.  Noted that FEV1 was 89% ( 1.58 lt) of predicted on the post bronchodilator arm.  There was no significant response to bronchodilators in this test, although this does not preclude the patient from receiving treatment with them.    LUNG VOLUMES:  The lung volumes are within normal limits, evidenced by a TLC of 101% ( 4.89 lt)  There is no hyperinflation and borderline air trapping, manifested by an RV of 121% and TLC as above.    DIFFUSION  There is  no impairment of the diffusion capacity in this test manifested by a DLCO of 81%.     FLOW-VOLUME GRAPHICS/LOOPS:  The flow volume curves correlates with information above.    MIP/MEP:   Special testing was not performed.    CONCLUSION:   Normal pulmonary functions test, except there is a borderline air trapping.  Clinical radiographic correlation is recommended.    Lorenzo Wilhelm MD, FACP  Pulmonary/Critical Care  ---------------------------------------------------------------------------------------------------------  I, Lorenzo Wilhelm MD, FACP, is the author of this note (interpretation of PFT)

## 2023-05-02 ENCOUNTER — TELEPHONE (OUTPATIENT)
Dept: HEALTH INFORMATION MANAGEMENT | Facility: OTHER | Age: 83
End: 2023-05-02
Payer: MEDICARE

## 2023-06-02 PROBLEM — D70.9 NEUTROPENIA (HCC): Status: RESOLVED | Noted: 2021-05-26 | Resolved: 2023-06-02

## 2023-06-02 PROBLEM — H35.3230 BILATERAL EXUDATIVE AGE-RELATED MACULAR DEGENERATION (HCC): Status: ACTIVE | Noted: 2023-06-02

## 2023-07-19 ENCOUNTER — OFFICE VISIT (OUTPATIENT)
Dept: MEDICAL GROUP | Facility: PHYSICIAN GROUP | Age: 83
End: 2023-07-19
Payer: MEDICARE

## 2023-07-19 VITALS
HEIGHT: 62 IN | BODY MASS INDEX: 30.66 KG/M2 | DIASTOLIC BLOOD PRESSURE: 74 MMHG | HEART RATE: 71 BPM | OXYGEN SATURATION: 94 % | SYSTOLIC BLOOD PRESSURE: 114 MMHG | WEIGHT: 166.6 LBS | RESPIRATION RATE: 20 BRPM | TEMPERATURE: 98 F

## 2023-07-19 DIAGNOSIS — R05.3 CHRONIC COUGH: ICD-10-CM

## 2023-07-19 DIAGNOSIS — E78.5 DYSLIPIDEMIA: ICD-10-CM

## 2023-07-19 DIAGNOSIS — M15.9 PRIMARY OSTEOARTHRITIS INVOLVING MULTIPLE JOINTS: ICD-10-CM

## 2023-07-19 PROCEDURE — 3078F DIAST BP <80 MM HG: CPT | Performed by: FAMILY MEDICINE

## 2023-07-19 PROCEDURE — 3074F SYST BP LT 130 MM HG: CPT | Performed by: FAMILY MEDICINE

## 2023-07-19 PROCEDURE — 99214 OFFICE O/P EST MOD 30 MIN: CPT | Performed by: FAMILY MEDICINE

## 2023-07-19 ASSESSMENT — ENCOUNTER SYMPTOMS
COUGH: 1
CHILLS: 0
SHORTNESS OF BREATH: 0
FEVER: 0

## 2023-07-19 ASSESSMENT — FIBROSIS 4 INDEX: FIB4 SCORE: 4.46

## 2023-07-19 NOTE — PROGRESS NOTES
Subjective:     CC:   Chief Complaint   Patient presents with    Follow-Up         HPI:   Emilie presents today for a f/u visit.  Last seen by me on 4/4/23.    Since our last appointment, has been seen by:  6/2/23 for AWV      Problem   Chronic Cough    Continues to have a dry cough. Feels like throat is closing up.  Started after her PNA last summer. Denies any SOB. Denies any dysphagia. 94% RA.   PFTs completed 4/25/23- normal but with borderline air trapping.  Continues to have rhinorrhea x years. Denies any watery, itchy eyes. Denies any congestion. No changes to cough with changes in the seasons. Had been on zyrtec in the past which helped.     Dyslipidemia    Chronic medical condition.  Currently taking [id number].    Tolerating the medication well without any side effects.  Patient denies chest pain or lower extremity muscle cramps.    Latest Reference Range & Units 03/30/23 07:21   Cholesterol,Tot 100 - 199 mg/dL 175   Triglycerides 0 - 149 mg/dL 178 (H)   HDL >=40 mg/dL 39 !   LDL <100 mg/dL 100 (H)   (H): Data is abnormally high  !: Data is abnormal     Osteoarthritis    Chronic.  Has been noticing more pain to her fingers.   Taking celebrex 100mg and wondering if she can increase the dose.      Bmi 29.0-29.9,Adult (Resolved)       Current Outpatient Medications Ordered in Epic   Medication Sig Dispense Refill    celecoxib (CELEBREX) 100 MG Cap Take 1 Capsule by mouth every day. 100 Capsule 2    simvastatin (ZOCOR) 20 MG Tab TAKE 1 TABLET BY MOUTH EVERY EVENING 100 Tablet 3    diclofenac sodium (VOLTAREN) 1 % Gel Apply 2 g topically 4 times a day as needed (apply to left shoulder up to four times a day). 150 g 1    artificial tears (EYE LUBRICANT) Ointment ophth ointment Apply 1 Application to both eyes 2 times a day. 3 drops into the right eye, 2 drops in the left.      VITAMIN D PO Take 1,000 mg by mouth every day.      diphenhydrAMINE-APAP, sleep, (TYLENOL PM EXTRA STRENGTH)  MG Tab Take 1 Tablet  "by mouth 4 times a day as needed (pain at night/sleep).      acetaminophen (TYLENOL) 650 MG CR tablet Take 500 mg by mouth every four hours as needed for Fever or Mild Pain.      Biotin 10 MG Cap Take  by mouth.      vitamin E (VITAMIN E) 1000 UNIT Cap Take 1 Cap by mouth every day.      Calcium Carbonate-Vit D-Min (CALCIUM 1200 PO) Take 1 Each by mouth every day at 6 PM.      Multiple Vitamins-Minerals (OCUVITE-LUTEIN) Cap Take 1 Each by mouth every day.      Omega-3 Fatty Acids (OMEGA 3 PO) Take 1 Each by mouth every day.      Multiple Vitamins-Minerals (MULTI COMPLETE PO) Take 1 Tab by mouth every day.      Bioflavonoid Products (VITAMIN C PLUS) 1000 MG TABS Take 1 Tab by mouth every day.       No current [facility]-ordered facility-administered medications on file.       ROS:  Review of Systems   Constitutional:  Negative for chills and fever.   Respiratory:  Positive for cough (dry cough). Negative for shortness of breath.    Cardiovascular:  Negative for chest pain.       Objective:     Exam:  /74 (BP Location: Left arm, Patient Position: Sitting, BP Cuff Size: Adult)   Pulse 71   Temp 36.7 °C (98 °F) (Temporal)   Resp 20   Ht 1.575 m (5' 2\")   Wt 75.6 kg (166 lb 9.6 oz)   SpO2 94%   BMI 30.47 kg/m²  Body mass index is 30.47 kg/m².    Physical Exam  Constitutional:       Appearance: Normal appearance.   Eyes:      Extraocular Movements: Extraocular movements intact.   Cardiovascular:      Rate and Rhythm: Normal rate and regular rhythm.   Pulmonary:      Effort: Pulmonary effort is normal.      Breath sounds: Normal breath sounds.   Musculoskeletal:      Cervical back: Normal range of motion and neck supple.   Neurological:      Mental Status: She is alert.             Labs: see above    Assessment & Plan:     82 y.o. female with the following -     1. Primary osteoarthritis involving multiple joints  Chronic. Not well controlled. Continues to have bilateral arthritic pain. Had been on celebrex 200mg in " the past. This dose was decreased to 100mg.  Overall has been doing well with the lower dose.  Discussed with patient that she can take 2 tablets for 200 mg as needed on days that she experiences more pain.    2. Chronic cough  Chronic medical diagnosis.  Symptoms have been going on over the last year.  Not improving.  Completed PFTs in April.  Results discussed with patient.  States that she does have rhinorrhea and had tried Zyrtec in the past which improved her symptoms.  We will have her try over-the-counter Zyrtec and follow-up with me in 6 to 8 weeks.    3. Dyslipidemia  Chronic medical diagnoses.  March labs were elevated has been working on diet and lifestyle.  .  Continue with Zocor 20 mg daily.  Encouraged patient to schedule follow-up appointment with cardiology as she is overdue        Return in about 6 weeks (around 8/30/2023), or 6-8 weeks, for cough.    Please note that this dictation was created using voice recognition software. I have made every reasonable attempt to correct obvious errors, but I expect that there are errors of grammar and possibly content that I did not discover before finalizing the note.

## 2023-09-12 ENCOUNTER — OFFICE VISIT (OUTPATIENT)
Dept: MEDICAL GROUP | Facility: PHYSICIAN GROUP | Age: 83
End: 2023-09-12
Payer: MEDICARE

## 2023-09-12 VITALS
HEIGHT: 62 IN | WEIGHT: 170 LBS | RESPIRATION RATE: 18 BRPM | BODY MASS INDEX: 31.28 KG/M2 | HEART RATE: 70 BPM | DIASTOLIC BLOOD PRESSURE: 54 MMHG | OXYGEN SATURATION: 95 % | TEMPERATURE: 97.6 F | SYSTOLIC BLOOD PRESSURE: 122 MMHG

## 2023-09-12 DIAGNOSIS — J30.2 SEASONAL ALLERGIES: ICD-10-CM

## 2023-09-12 DIAGNOSIS — E78.5 DYSLIPIDEMIA: ICD-10-CM

## 2023-09-12 DIAGNOSIS — M15.9 PRIMARY OSTEOARTHRITIS INVOLVING MULTIPLE JOINTS: ICD-10-CM

## 2023-09-12 PROCEDURE — 3078F DIAST BP <80 MM HG: CPT | Performed by: FAMILY MEDICINE

## 2023-09-12 PROCEDURE — 3074F SYST BP LT 130 MM HG: CPT | Performed by: FAMILY MEDICINE

## 2023-09-12 PROCEDURE — 99214 OFFICE O/P EST MOD 30 MIN: CPT | Performed by: FAMILY MEDICINE

## 2023-09-12 RX ORDER — CELECOXIB 100 MG/1
100 CAPSULE ORAL DAILY
Qty: 100 CAPSULE | Refills: 3 | Status: SHIPPED | OUTPATIENT
Start: 2023-09-12 | End: 2023-12-20 | Stop reason: SDUPTHER

## 2023-09-12 ASSESSMENT — ENCOUNTER SYMPTOMS
FEVER: 0
CHILLS: 0
DIZZINESS: 0
SHORTNESS OF BREATH: 0
BRUISES/BLEEDS EASILY: 1
FALLS: 0
TREMORS: 0

## 2023-09-12 ASSESSMENT — FIBROSIS 4 INDEX: FIB4 SCORE: 4.46

## 2023-09-12 NOTE — PROGRESS NOTES
Subjective:     CC:   Chief Complaint   Patient presents with    Follow-Up         HPI:   Emilie presents today for a follow-up visit.  Last seen by me on July 19.      Upcoming appointments with:  Cards 10/12  Ophtho-9/20 January- neuro, [facility]      Problem   Seasonal Allergies    Chronic medical diagnosis.  She had been complaining of a dry cough.  It is recommended that she start taking over-the-counter Zyrtec 2 months ago.  Since starting this medication she has noticed an improvement in her symptoms.  Currently taking Zyrtec as needed.       Denies any SOB. Denies any dysphagia.  PFTs completed 4/25/23- normal but with borderline air trapping.       Dyslipidemia    Chronic medical condition.  Currently taking zoco r20mg.    Tolerating the medication well without any side effects.  Last set of labs from March were elevated.   Latest Reference Range & Units 03/30/23 07:21   Cholesterol,Tot 100 - 199 mg/dL 175   Triglycerides 0 - 149 mg/dL 178 (H)   HDL >=40 mg/dL 39 !   LDL <100 mg/dL 100 (H)   (H): Data is abnormally high  !: Data is abnormal     Osteoarthritis    Chronic.  Has been noticing more pain to her fingers.   Taking celebrex 100mg daily.  Over the last 2 months she has had to take an extra 100 mg during the daytime on 3 different occasions.         Current Outpatient Medications Ordered in Epic   Medication Sig Dispense Refill    celecoxib (CELEBREX) 100 MG Cap Take 1 Capsule by mouth every day. 100 Capsule 3    simvastatin (ZOCOR) 20 MG Tab TAKE 1 TABLET BY MOUTH EVERY EVENING 100 Tablet 3    diclofenac sodium (VOLTAREN) 1 % Gel Apply 2 g topically 4 times a day as needed (apply to left shoulder up to four times a day). 150 g 1    artificial tears (EYE LUBRICANT) Ointment ophth ointment Apply 1 Application to both eyes 2 times a day. 3 drops into the right eye, 2 drops in the left.      VITAMIN D PO Take 1,000 mg by mouth every day.      diphenhydrAMINE-APAP, sleep, (TYLENOL PM EXTRA STRENGTH)   "MG Tab Take 1 Tablet by mouth 4 times a day as needed (pain at night/sleep).      acetaminophen (TYLENOL) 650 MG CR tablet Take 500 mg by mouth every four hours as needed for Fever or Mild Pain.      Biotin 10 MG Cap Take  by mouth.      Calcium Carbonate-Vit D-Min (CALCIUM 1200 PO) Take 1 Each by mouth every day at 6 PM.      Multiple Vitamins-Minerals (OCUVITE-LUTEIN) Cap Take 1 Each by mouth every day.      Omega-3 Fatty Acids (OMEGA 3 PO) Take 1 Each by mouth every day.      Multiple Vitamins-Minerals (MULTI COMPLETE PO) Take 1 Tab by mouth every day.      Bioflavonoid Products (VITAMIN C PLUS) 1000 MG TABS Take 1 Tab by mouth every day.      vitamin E (VITAMIN E) 1000 UNIT Cap Take 1 Cap by mouth every day.       No current [facility]-ordered facility-administered medications on file.       Health Maintenance: Vaccines discussed with patient. Encouraged to receive the flu, covid, and shingrix vaccines     ROS:  Review of Systems   Constitutional:  Negative for chills and fever.   Respiratory:  Negative for shortness of breath.    Cardiovascular:  Negative for chest pain.   Musculoskeletal:  Negative for falls.   Neurological:  Negative for dizziness and tremors.   Endo/Heme/Allergies:  Bruises/bleeds easily.       Objective:     Exam:  /54 (BP Location: Left arm, Patient Position: Sitting, BP Cuff Size: Adult)   Pulse 70   Temp 36.4 °C (97.6 °F) (Temporal)   Resp 18   Ht 1.575 m (5' 2\")   Wt 77.1 kg (170 lb)   SpO2 95%   BMI 31.09 kg/m²  Body mass index is 31.09 kg/m².    Physical Exam  Constitutional:       Appearance: Normal appearance.   Eyes:      Extraocular Movements: Extraocular movements intact.   Cardiovascular:      Rate and Rhythm: Normal rate and regular rhythm.   Pulmonary:      Effort: Pulmonary effort is normal.      Breath sounds: Normal breath sounds.   Musculoskeletal:      Cervical back: Normal range of motion and neck supple.   Skin:     General: Skin is warm.   Neurological:      " Mental Status: She is alert.   Psychiatric:         Mood and Affect: Mood normal.         Behavior: Behavior normal.           Labs: see above    Assessment & Plan:     82 y.o. female with the following -     1. Seasonal allergies  Chronic medical diagnoses.  Improving.  Currently taking Zyrtec as needed.    2. Primary osteoarthritis involving multiple joints  Chronic medical diagnosis.  Stable.  Continue with Celebrex 100 mg daily.  - celecoxib (CELEBREX) 100 MG Cap; Take 1 Capsule by mouth every day.  Dispense: 100 Capsule; Refill: 3    3. Dyslipidemia  Chronic medical diagnosis.  Last set of labs from March had elevated lipid panel.  Currently taking simvastatin 20 mg daily.  We will have her repeat these labs and then decide if her dose needs to be increased.  Follow-up with cardiology as scheduled next month  - CBC WITH DIFFERENTIAL; Future  - Comp Metabolic Panel; Future  - Lipid Profile; Future           Return in about 3 months (around 12/12/2023) for hyperlipidemia.    Please note that this dictation was created using voice recognition software. I have made every reasonable attempt to correct obvious errors, but I expect that there are errors of grammar and possibly content that I did not discover before finalizing the note.

## 2023-09-13 ENCOUNTER — HOSPITAL ENCOUNTER (OUTPATIENT)
Dept: LAB | Facility: MEDICAL CENTER | Age: 83
End: 2023-09-13
Attending: FAMILY MEDICINE
Payer: MEDICARE

## 2023-09-13 DIAGNOSIS — E78.5 DYSLIPIDEMIA: ICD-10-CM

## 2023-09-13 LAB
ALBUMIN SERPL BCP-MCNC: 4.2 G/DL (ref 3.2–4.9)
ALBUMIN/GLOB SERPL: 1.6 G/DL
ALP SERPL-CCNC: 60 U/L (ref 30–99)
ALT SERPL-CCNC: 20 U/L (ref 2–50)
ANION GAP SERPL CALC-SCNC: 9 MMOL/L (ref 7–16)
AST SERPL-CCNC: 28 U/L (ref 12–45)
BASOPHILS # BLD AUTO: 0.7 % (ref 0–1.8)
BASOPHILS # BLD: 0.03 K/UL (ref 0–0.12)
BILIRUB SERPL-MCNC: 0.9 MG/DL (ref 0.1–1.5)
BUN SERPL-MCNC: 20 MG/DL (ref 8–22)
CALCIUM ALBUM COR SERPL-MCNC: 9.1 MG/DL (ref 8.5–10.5)
CALCIUM SERPL-MCNC: 9.3 MG/DL (ref 8.5–10.5)
CHLORIDE SERPL-SCNC: 106 MMOL/L (ref 96–112)
CHOLEST SERPL-MCNC: 154 MG/DL (ref 100–199)
CO2 SERPL-SCNC: 26 MMOL/L (ref 20–33)
CREAT SERPL-MCNC: 1 MG/DL (ref 0.5–1.4)
EOSINOPHIL # BLD AUTO: 0.09 K/UL (ref 0–0.51)
EOSINOPHIL NFR BLD: 2 % (ref 0–6.9)
ERYTHROCYTE [DISTWIDTH] IN BLOOD BY AUTOMATED COUNT: 43.8 FL (ref 35.9–50)
FASTING STATUS PATIENT QL REPORTED: NORMAL
GFR SERPLBLD CREATININE-BSD FMLA CKD-EPI: 56 ML/MIN/1.73 M 2
GLOBULIN SER CALC-MCNC: 2.6 G/DL (ref 1.9–3.5)
GLUCOSE SERPL-MCNC: 100 MG/DL (ref 65–99)
HCT VFR BLD AUTO: 42.7 % (ref 37–47)
HDLC SERPL-MCNC: 35 MG/DL
HGB BLD-MCNC: 14.1 G/DL (ref 12–16)
IMM GRANULOCYTES # BLD AUTO: 0.01 K/UL (ref 0–0.11)
IMM GRANULOCYTES NFR BLD AUTO: 0.2 % (ref 0–0.9)
LDLC SERPL CALC-MCNC: 84 MG/DL
LYMPHOCYTES # BLD AUTO: 1.57 K/UL (ref 1–4.8)
LYMPHOCYTES NFR BLD: 34.3 % (ref 22–41)
MCH RBC QN AUTO: 30.3 PG (ref 27–33)
MCHC RBC AUTO-ENTMCNC: 33 G/DL (ref 32.2–35.5)
MCV RBC AUTO: 91.8 FL (ref 81.4–97.8)
MONOCYTES # BLD AUTO: 0.68 K/UL (ref 0–0.85)
MONOCYTES NFR BLD AUTO: 14.8 % (ref 0–13.4)
NEUTROPHILS # BLD AUTO: 2.2 K/UL (ref 1.82–7.42)
NEUTROPHILS NFR BLD: 48 % (ref 44–72)
NRBC # BLD AUTO: 0 K/UL
NRBC BLD-RTO: 0 /100 WBC (ref 0–0.2)
PLATELET # BLD AUTO: 138 K/UL (ref 164–446)
PMV BLD AUTO: 12.2 FL (ref 9–12.9)
POTASSIUM SERPL-SCNC: 4.3 MMOL/L (ref 3.6–5.5)
PROT SERPL-MCNC: 6.8 G/DL (ref 6–8.2)
RBC # BLD AUTO: 4.65 M/UL (ref 4.2–5.4)
SODIUM SERPL-SCNC: 141 MMOL/L (ref 135–145)
TRIGL SERPL-MCNC: 173 MG/DL (ref 0–149)
WBC # BLD AUTO: 4.6 K/UL (ref 4.8–10.8)

## 2023-09-13 PROCEDURE — 36415 COLL VENOUS BLD VENIPUNCTURE: CPT

## 2023-09-13 PROCEDURE — 85025 COMPLETE CBC W/AUTO DIFF WBC: CPT

## 2023-09-13 PROCEDURE — 80053 COMPREHEN METABOLIC PANEL: CPT

## 2023-09-13 PROCEDURE — 80061 LIPID PANEL: CPT

## 2023-09-19 ENCOUNTER — APPOINTMENT (OUTPATIENT)
Dept: MEDICAL GROUP | Facility: PHYSICIAN GROUP | Age: 83
End: 2023-09-19
Payer: MEDICARE

## 2023-10-12 ENCOUNTER — OFFICE VISIT (OUTPATIENT)
Dept: CARDIOLOGY | Facility: MEDICAL CENTER | Age: 83
End: 2023-10-12
Attending: INTERNAL MEDICINE
Payer: MEDICARE

## 2023-10-12 VITALS
DIASTOLIC BLOOD PRESSURE: 60 MMHG | HEART RATE: 67 BPM | SYSTOLIC BLOOD PRESSURE: 122 MMHG | OXYGEN SATURATION: 95 % | WEIGHT: 169 LBS | BODY MASS INDEX: 31.1 KG/M2 | HEIGHT: 62 IN | RESPIRATION RATE: 16 BRPM

## 2023-10-12 DIAGNOSIS — I35.0 AORTIC VALVE STENOSIS, ETIOLOGY OF CARDIAC VALVE DISEASE UNSPECIFIED: ICD-10-CM

## 2023-10-12 DIAGNOSIS — I35.1 AORTIC VALVE INSUFFICIENCY, ETIOLOGY OF CARDIAC VALVE DISEASE UNSPECIFIED: ICD-10-CM

## 2023-10-12 PROBLEM — I77.89 ASCENDING AORTA ENLARGEMENT (HCC): Status: RESOLVED | Noted: 2022-09-06 | Resolved: 2023-10-12

## 2023-10-12 LAB — EKG IMPRESSION: NORMAL

## 2023-10-12 PROCEDURE — 99213 OFFICE O/P EST LOW 20 MIN: CPT | Performed by: INTERNAL MEDICINE

## 2023-10-12 PROCEDURE — 99214 OFFICE O/P EST MOD 30 MIN: CPT | Mod: 25 | Performed by: INTERNAL MEDICINE

## 2023-10-12 PROCEDURE — 3078F DIAST BP <80 MM HG: CPT | Performed by: INTERNAL MEDICINE

## 2023-10-12 PROCEDURE — 3074F SYST BP LT 130 MM HG: CPT | Performed by: INTERNAL MEDICINE

## 2023-10-12 PROCEDURE — 93005 ELECTROCARDIOGRAM TRACING: CPT | Performed by: INTERNAL MEDICINE

## 2023-10-12 PROCEDURE — 93010 ELECTROCARDIOGRAM REPORT: CPT | Performed by: INTERNAL MEDICINE

## 2023-10-12 ASSESSMENT — ENCOUNTER SYMPTOMS
SHORTNESS OF BREATH: 0
DIZZINESS: 0
MYALGIAS: 0
COUGH: 0
LOSS OF CONSCIOUSNESS: 0
PALPITATIONS: 0

## 2023-10-12 ASSESSMENT — FIBROSIS 4 INDEX: FIB4 SCORE: 3.81

## 2023-10-12 NOTE — PROGRESS NOTES
"Chief Complaint   Patient presents with    Aortic Stenosis     F/v Dx: Aortic valve stenosis, etiology of cardiac valve disease unspecified    Dyslipidemia       Subjective     Emilie Gonzalez is a 83 y.o. female who presents today for follow-up cardiac care.    The patient has moderate aortic insufficiency, mild aortic stenosis, hyperlipidemia, abnormal EKG, edema with additional PMH of Parkinson's disease, hypothyroidism, osteoarthritis.    Since 2022 appointment the patient has had no cardiac symptoms including chest pain, palpitations, shortness of breath.  Cares for her 92-year-old sister    Since 2021 appointment the patient was hospitalized at [facility] 2022 for PNA, AFR with hypoxia, sepsis, hyponatremia, required left thoracentesis, pleural fluid negative for malignancy, has had follow-up outpatient chest CTA is unremarkable, followed by pulmonary.  Has had no cardiac symptoms including chest pain, palpitations, shortness of breath including ERICKSON or orthopnea, sleeping well.      L[place] in a [facility].  Has 4 children [facility] locally and have been very helpful and supportive.     Past medical history  Diagnosed with stage II Parkinson's disease. Followed by Dr. Colvin, neurologist     The patient had previously lived in [place], [place].  5 years ago she was discovered to have a \"leaky heart valve.  The patient had been followed by Dr. Garcia, cardiologist [place] for the past 4 years.  The patient is moved to [facility] and is establishing ongoing cardiology care.     History of hyperlipidemia on simvastatin.  No history of hypertension, diabetes mellitus and she is not smoke cigarettes.     Family history  Father  of a heart attack at age 69.  Sister alive at age 90. Had a heart attack at age 75.     Social history.  .  Does not drink alcohol except on rare occasions with dinner.     Other medical problems.  2013 laparoscopic cholecystectomy [place], " "[place].  Appendectomy age 13.  \"Chronic bronchitis\" but no problems times one year    Past Medical History:   Diagnosis Date    Abnormal EKG 4/12/2017    Acute middle ear effusion, right 6/29/2022    Acute pain of left shoulder 6/29/2022    Arthritis of foot, right 06/09/2015    Benign essential tremor 06/09/2015    Benign essential tremor 6/9/2015    Dr Colvin    BMI 27.0-27.9,adult 9/6/2022    BMI 29.0-29.9,adult 9/6/2022    CKD (chronic kidney disease) stage 2, GFR 60-89 ml/min 9/6/2022    Cough     Edema 03/10/2015    Hearing loss sensory, bilateral 03/10/2015    History of shingles 09/26/2014    Hyperlipidemia 09/26/2014    Lung mass 7/6/2022    Macular degeneration of right eye 09/26/2014    Moderate aortic insufficiency 09/26/2014    Osteoarthritis 09/26/2014    Osteoporosis 09/26/2014    Parkinson's disease     Plantar fasciitis, bilateral 03/10/2015    Pneumonia due to infectious organism 6/13/2022    Prolapsed bladder 09/26/2014     Past Surgical History:   Procedure Laterality Date    CHOLECYSTECTOMY  9/2013    APPENDECTOMY  1956    CATARACT PHACO WITH IOL Bilateral      Family History   Problem Relation Age of Onset    Hypertension Father     Hypertension Sister     Arthritis Mother      Social History     Socioeconomic History    Marital status:      Spouse name: Not on file    Number of children: Not on file    Years of education: Not on file    Highest education level: Not on file   Occupational History    Not on file   Tobacco Use    Smoking status: Never    Smokeless tobacco: Never   Vaping Use    Vaping Use: Never used   Substance and Sexual Activity    Alcohol use: Not Currently     Comment: rare    Drug use: No    Sexual activity: Not Currently     Partners: Male     Comment: , bank, 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   Other Topics Concern    Not on file   Social History Narrative    Not on file     Social Determinants of Health     Financial Resource Strain: Not on file   Food Insecurity: Not on file "   Transportation Needs: Not on file   Physical Activity: Not on file   Stress: Not on file   Social Connections: Not on file   Intimate Partner Violence: Not on file   Housing Stability: Not on file     Allergies   Allergen Reactions    Codeine      halucinations     Outpatient Encounter Medications as of 10/12/2023   Medication Sig Dispense Refill    celecoxib (CELEBREX) 100 MG Cap Take 1 Capsule by mouth every day. 100 Capsule 3    simvastatin (ZOCOR) 20 MG Tab TAKE 1 TABLET BY MOUTH EVERY EVENING 100 Tablet 3    diclofenac sodium (VOLTAREN) 1 % Gel Apply 2 g topically 4 times a day as needed (apply to left shoulder up to four times a day). 150 g 1    artificial tears (EYE LUBRICANT) Ointment ophth ointment Apply 1 Application to both eyes 2 times a day. 3 drops into the right eye, 2 drops in the left.      VITAMIN D PO Take 1,000 mg by mouth every day.      diphenhydrAMINE-APAP, sleep, (TYLENOL PM EXTRA STRENGTH)  MG Tab Take 1 Tablet by mouth 4 times a day as needed (pain at night/sleep).      acetaminophen (TYLENOL) 650 MG CR tablet Take 500 mg by mouth every four hours as needed for Fever or Mild Pain.      Biotin 10 MG Cap Take  by mouth.      vitamin E (VITAMIN E) 1000 UNIT Cap Take 1 Cap by mouth every day.      Calcium Carbonate-Vit D-Min (CALCIUM 1200 PO) Take 1 Each by mouth every day at 6 PM.      Multiple Vitamins-Minerals (OCUVITE-LUTEIN) Cap Take 1 Each by mouth every day.      Omega-3 Fatty Acids (OMEGA 3 PO) Take 1 Each by mouth every day.      Multiple Vitamins-Minerals (MULTI COMPLETE PO) Take 1 Tab by mouth every day.      Bioflavonoid Products (VITAMIN C PLUS) 1000 MG TABS Take 1 Tab by mouth every day.       No facility-administered encounter medications on file as of 10/12/2023.     Review of Systems   Respiratory:  Negative for cough and shortness of breath.    Cardiovascular:  Negative for chest pain and palpitations.   Musculoskeletal:  Negative for myalgias.   Neurological:   "Negative for dizziness and loss of consciousness.              Objective     /60 (BP Location: Left arm, Patient Position: Sitting, BP Cuff Size: Adult)   Pulse 67   Resp 16   Ht 1.575 m (5' 2\")   Wt 76.7 kg (169 lb)   SpO2 95%   BMI 30.91 kg/m²     Physical Exam  Vitals reviewed.   Constitutional:       General: She is not in acute distress.     Appearance: She is well-developed.   Neck:      Vascular: No JVD.   Cardiovascular:      Rate and Rhythm: Normal rate and regular rhythm.      Pulses:           Carotid pulses are 2+ on the right side and 2+ on the left side.     Heart sounds: Murmur heard.      Systolic murmur is present with a grade of 1/6.      No friction rub. No gallop.   Pulmonary:      Effort: Pulmonary effort is normal. No accessory muscle usage or respiratory distress.      Breath sounds: Normal breath sounds. No wheezing or rales.   Abdominal:      General: There is no distension.      Palpations: Abdomen is soft. There is no mass.      Tenderness: There is no abdominal tenderness.      Comments: Protuberant.   Musculoskeletal:      Cervical back: Normal range of motion and neck supple.      Right lower leg: No edema.      Left lower leg: No edema.      Comments:     Skin:     General: Skin is warm and dry.      Findings: No rash.      Nails: There is no clubbing.   Neurological:      Mental Status: She is alert and oriented to person, place, and time.   Psychiatric:         Behavior: Behavior normal.            06/07/2010 ECHOCARDIOGRAM  EF 60%.  Mild to moderate aortic regurgitation.     05/05/2011 ECHOCARDIOGRAM  EF 55-60%.  Moderate aortic insufficiency.     05/08/2012 ECHOCARDIOGRAM  EF 70%.  Moderate aortic insufficiency.  Pulmonary pressure 26 mmHg.     03/25/2015 ECHOCARDIOGRAM  Normal left ventricular size, thickness, systolic function, and   diastolic function.  Left ventricular ejection fraction is 65% to 70%.  Aortic sclerosis without stenosis.  Moderately severe aortic " insufficiency.  Right heart pressures are consistent with mild pulmonary hypertension.     10/24/2016 ECHOCARDIOGRAM  Normal left ventricular size, wall thickness, and systolic function.  Left ventricular ejection fraction is visually estimated to be 60%.  Severe eccentric aortic insufficiency.  Mildly thickened mitral valve leaflets with normal leaflet excursion.  Unable to estimate pulmonary artery pressure due to an inadequate   tricuspid regurgitant jet.     05/08/2017 ECHOCARDIOGRAM  Normal left ventricular systolic function.  Left ventricular ejection fraction is visually estimated to be 65%.  Mild concentric left ventricular hypertrophy.  Mild aortic stenosis.  Moderate aortic insufficiency.  Mild mitral regurgitation.  Unable to estimate pulmonary artery pressure due to an inadequate   tricuspid regurgitant jet.     9/4/2018 ECHOCARDIOGRAM  Normal left ventricular size, wall thickness, and systolic function.  Left ventricular ejection fraction is visually estimated to be 65%.  Mild aortic stenosis.  Moderate aortic insufficiency.  Right heart pressures are normal.     ECHOCARDIOGRAM 2/2/2023  Normal left ventricular systolic function.  The left ventricular ejection fraction is visually estimated to be 60%.  Aortic valve sclerosis without stenosis.  Moderate aortic insufficiency.  Mild mitral regurgitation.  The right ventricle is normal in size and systolic function.  The ascending aorta diameter is 3.5 cm.  Compared to the images of the prior study on 9/4/2018 there has been no significant change.     EKG 10/12/2023 sinus rhythm with minor nonspecific ST-T wave abnormalities, unchanged since prior tracing, personally interpreted    Assessment & Plan     1. Aortic valve insufficiency, etiology of cardiac valve disease unspecified  EKG      2. Aortic valve stenosis, etiology of cardiac valve disease unspecified            Medical Decision Making: Today's Assessment/Status/Plan:   Assessment  Aortic  regurgitation  Aortic stenosis, mild  Abnormal EKG  Dyslipidemia  Hypothyroidism diagnosed 2015  Osteoporosis on Celebrex  Parkinson disease followed by neurology Dr. Colvin     Recommendations and Discussion  The patient is stable from a cardiac standpoint concerning her aortic stenosis, aortic insufficiency and dyslipidemia.  BP normal  I reviewed the results and images of her most recent echocardiogram with the patient

## 2023-12-20 ENCOUNTER — OFFICE VISIT (OUTPATIENT)
Dept: MEDICAL GROUP | Facility: PHYSICIAN GROUP | Age: 83
End: 2023-12-20
Payer: MEDICARE

## 2023-12-20 VITALS
DIASTOLIC BLOOD PRESSURE: 64 MMHG | BODY MASS INDEX: 30.36 KG/M2 | WEIGHT: 165 LBS | HEIGHT: 62 IN | TEMPERATURE: 97.2 F | SYSTOLIC BLOOD PRESSURE: 124 MMHG | HEART RATE: 68 BPM | OXYGEN SATURATION: 95 %

## 2023-12-20 DIAGNOSIS — M15.9 PRIMARY OSTEOARTHRITIS INVOLVING MULTIPLE JOINTS: ICD-10-CM

## 2023-12-20 DIAGNOSIS — E78.5 DYSLIPIDEMIA: ICD-10-CM

## 2023-12-20 DIAGNOSIS — N18.31 STAGE 3A CHRONIC KIDNEY DISEASE: ICD-10-CM

## 2023-12-20 PROCEDURE — 3074F SYST BP LT 130 MM HG: CPT | Performed by: FAMILY MEDICINE

## 2023-12-20 PROCEDURE — 99214 OFFICE O/P EST MOD 30 MIN: CPT | Performed by: FAMILY MEDICINE

## 2023-12-20 PROCEDURE — 3078F DIAST BP <80 MM HG: CPT | Performed by: FAMILY MEDICINE

## 2023-12-20 RX ORDER — SIMVASTATIN 20 MG
TABLET ORAL
Qty: 100 TABLET | Refills: 3 | Status: SHIPPED | OUTPATIENT
Start: 2023-12-20

## 2023-12-20 RX ORDER — CELECOXIB 100 MG/1
100 CAPSULE ORAL DAILY
Qty: 100 CAPSULE | Refills: 3 | Status: SHIPPED | OUTPATIENT
Start: 2023-12-20

## 2023-12-20 ASSESSMENT — ENCOUNTER SYMPTOMS
SHORTNESS OF BREATH: 0
FEVER: 0
FALLS: 0

## 2023-12-20 ASSESSMENT — FIBROSIS 4 INDEX: FIB4 SCORE: 3.81

## 2023-12-20 NOTE — PROGRESS NOTES
Subjective:     CC:   Chief Complaint   Patient presents with    Follow-Up         HPI:   Emilie presents today for a f/u visit.  Last seen by me on September 12.    Since our last appointment, has been seen by:  [id number]-[id number] to follow-up in 6 months    Upcoming appointments with:  4/12/24- cards  Neuro- Dr Colvin- January 2024    Lives with son who does the cooking.  Has lost 5 pounds over the last 3 months.   Fasting glucose 100      Problem   Dyslipidemia    Chronic.  Zocor 20mg hs.   Latest Reference Range & Units 09/13/23 07:12   Cholesterol,Tot 100 - 199 mg/dL 154   Triglycerides 0 - 149 mg/dL 173 (H)   HDL >=40 mg/dL 35 !   LDL <100 mg/dL 84   (H): Data is abnormally high  !: Data is abnormal    9/12/23 Chronic medical condition.  Currently taking zoco r20mg.    Tolerating the medication well without any side effects.  Last set of labs from March were elevated.   Latest Reference Range & Units 03/30/23 07:21   Cholesterol,Tot 100 - 199 mg/dL 175   Triglycerides 0 - 149 mg/dL 178 (H)   HDL >=40 mg/dL 39 !   LDL <100 mg/dL 100 (H)   (H): Data is abnormally high  !: Data is abnormal     Ckd (Chronic Kidney Disease) Stage 3, Gfr 30-59 Ml/Min (Hcc)    Chronic. Noted since 2015.  Recent labs with GFR at 56.  Denies any issues dysuria, hematuria.  Nocturia 1-2x night     Osteoarthritis    New issue.  Noticed 2-3 months ago that her 3rd right digit has started having an ulnar deviation at the PIP joint. No pain. .takes celebrex 100mg hs. Has been baking a lot recently. Tylenol has helped.   Has the same thing to her left 3rd digit x 5-6 yrs.    9/12/23 Chronic.  Has been noticing more pain to her fingers.   Taking celebrex 100mg daily.  Over the last 2 months she has had to take an extra 100 mg during the daytime on 3 different occasions.         Current Outpatient Medications Ordered in Epic   Medication Sig Dispense Refill    celecoxib (CELEBREX) 100 MG Cap Take 1 Capsule by mouth every day.  "100 Capsule 3    simvastatin (ZOCOR) 20 MG Tab TAKE 1 TABLET BY MOUTH EVERY EVENING 100 Tablet 3    diclofenac sodium (VOLTAREN) 1 % Gel Apply 2 g topically 4 times a day as needed (apply to left shoulder up to four times a day). 150 g 1    artificial tears (EYE LUBRICANT) Ointment ophth ointment Apply 1 Application to both eyes 2 times a day. 3 drops into the right eye, 2 drops in the left.      VITAMIN D PO Take 1,000 mg by mouth every day.      diphenhydrAMINE-APAP, sleep, (TYLENOL PM EXTRA STRENGTH)  MG Tab Take 1 Tablet by mouth 4 times a day as needed (pain at night/sleep).      acetaminophen (TYLENOL) 650 MG CR tablet Take 500 mg by mouth every four hours as needed for Fever or Mild Pain.      Biotin 10 MG Cap Take  by mouth.      vitamin E (VITAMIN E) 1000 UNIT Cap Take 1 Cap by mouth every day.      Calcium Carbonate-Vit D-Min (CALCIUM 1200 PO) Take 1 Each by mouth every day at 6 PM.      Multiple Vitamins-Minerals (OCUVITE-LUTEIN) Cap Take 1 Each by mouth every day.      Omega-3 Fatty Acids (OMEGA 3 PO) Take 1 Each by mouth every day.      Multiple Vitamins-Minerals (MULTI COMPLETE PO) Take 1 Tab by mouth every day.      Bioflavonoid Products (VITAMIN C PLUS) 1000 MG TABS Take 1 Tab by mouth every day.       No current [facility]-ordered facility-administered medications on file.       Health Maintenance: encouraged to receive the covid vaccine.    ROS:  Review of Systems   Constitutional:  Negative for fever.   Respiratory:  Negative for shortness of breath.    Cardiovascular:  Negative for chest pain.   Musculoskeletal:  Positive for joint pain (multiple joint pain). Negative for falls.       Objective:     Exam:  /64   Pulse 68   Temp 36.2 °C (97.2 °F) (Temporal)   Ht 1.575 m (5' 2\")   Wt 74.8 kg (165 lb)   SpO2 95%   BMI 30.18 kg/m²  Body mass index is 30.18 kg/m².    Physical Exam  Constitutional:       Appearance: Normal appearance.   Eyes:      Extraocular Movements: Extraocular " movements intact.   Cardiovascular:      Rate and Rhythm: Normal rate and regular rhythm.   Pulmonary:      Effort: Pulmonary effort is normal.      Breath sounds: Normal breath sounds.   Musculoskeletal:      Comments: Bilateral 3rd digit with swelling at PIP and ulnar deviation. Left worse than right   Neurological:      Mental Status: She is alert.   Psychiatric:         Mood and Affect: Mood normal.         Behavior: Behavior normal.             Labs: see above    Assessment & Plan:     83 y.o. female with the following -     1. Dyslipidemia  Chronic medical diagnosis.  Stable.  Last set of labs had LDL improved to 84.  Continue with Zocor 20 mg daily.  - simvastatin (ZOCOR) 20 MG Tab; TAKE 1 TABLET BY MOUTH EVERY EVENING  Dispense: 100 Tablet; Refill: 3    2. Stage 3a chronic kidney disease (HCC)  Chronic medical diagnosis  Stable.  Noted since 2015.  Does take Celebrex 100 mg daily.  Recent labs had GFR at 56.    3. Primary osteoarthritis involving multiple joints  Chronic medical diagnosis.  Not well-controlled.  States that she is able to manage pain with Tylenol and Celebrex.  However over the last few months has noticed changes to her right third digit.  - celecoxib (CELEBREX) 100 MG Cap; Take 1 Capsule by mouth every day.  Dispense: 100 Capsule; Refill: 3  - DX-JOINT SURVEY-HANDS SINGLE VIEW; Future         Return in about 3 months (around 3/20/2024), or beginning April, for arthritis.    Please note that this dictation was created using voice recognition software. I have made every reasonable attempt to correct obvious errors, but I expect that there are errors of grammar and possibly content that I did not discover before finalizing the note.

## 2023-12-28 ENCOUNTER — HOSPITAL ENCOUNTER (OUTPATIENT)
Dept: RADIOLOGY | Facility: MEDICAL CENTER | Age: 83
End: 2023-12-28
Attending: FAMILY MEDICINE
Payer: MEDICARE

## 2023-12-28 ENCOUNTER — TELEPHONE (OUTPATIENT)
Dept: MEDICAL GROUP | Facility: PHYSICIAN GROUP | Age: 83
End: 2023-12-28
Payer: MEDICARE

## 2023-12-28 DIAGNOSIS — M15.9 PRIMARY OSTEOARTHRITIS INVOLVING MULTIPLE JOINTS: ICD-10-CM

## 2023-12-28 DIAGNOSIS — M25.542 ARTHRALGIA OF BOTH HANDS: ICD-10-CM

## 2023-12-28 DIAGNOSIS — M25.541 ARTHRALGIA OF BOTH HANDS: ICD-10-CM

## 2023-12-28 PROCEDURE — 77077 JOINT SURVEY SINGLE VIEW: CPT

## 2023-12-29 ENCOUNTER — HOSPITAL ENCOUNTER (OUTPATIENT)
Dept: LAB | Facility: MEDICAL CENTER | Age: 83
End: 2023-12-29
Attending: INTERNAL MEDICINE
Payer: MEDICARE

## 2023-12-29 ENCOUNTER — HOSPITAL ENCOUNTER (OUTPATIENT)
Dept: RADIOLOGY | Facility: MEDICAL CENTER | Age: 83
End: 2023-12-29
Attending: INTERNAL MEDICINE
Payer: MEDICARE

## 2023-12-29 DIAGNOSIS — M25.542 ARTHRALGIA OF BOTH HANDS: ICD-10-CM

## 2023-12-29 DIAGNOSIS — M25.541 ARTHRALGIA OF BOTH HANDS: ICD-10-CM

## 2023-12-29 LAB — RHEUMATOID FACT SER IA-ACNC: <10 IU/ML (ref 0–14)

## 2023-12-29 PROCEDURE — 86431 RHEUMATOID FACTOR QUANT: CPT

## 2023-12-29 PROCEDURE — 36415 COLL VENOUS BLD VENIPUNCTURE: CPT

## 2023-12-29 PROCEDURE — 86200 CCP ANTIBODY: CPT

## 2023-12-29 PROCEDURE — 77077 JOINT SURVEY SINGLE VIEW: CPT

## 2023-12-29 NOTE — TELEPHONE ENCOUNTER
1. Caller Name: Emilie Gonzalez                          Call Back Number: [phone] (home)         How would the patient prefer to be contacted with a response: Phone call OK to leave a detailed message    Called and spoke with patient about results.  Appt scheduled for 1/23/24

## 2023-12-29 NOTE — TELEPHONE ENCOUNTER
Please let patient know I went ahead and signed for the blood work (nonfasting) and foot xray to complete the evaluation for possible rheumatoid arthritis and that way she hopefully isn't too delayed getting into rheumatology if it ends up being needed.

## 2023-12-29 NOTE — TELEPHONE ENCOUNTER
----- Message from Lynnette Segal D.O. sent at 12/28/2023 11:38 AM PST -----  Hand xray findings are concerning for possible autoimmune/inflammatory arthritis (such as rheumatoid), will need additional blood work and likely evaluation by rheumatology to discuss other treatment options if desired. Let me know what questions she has or if she wants to be seen in clinic to discuss further.

## 2023-12-31 LAB — CCP IGA+IGG SERPL IA-ACNC: 4 UNITS (ref 0–19)

## 2024-01-23 ENCOUNTER — OFFICE VISIT (OUTPATIENT)
Dept: MEDICAL GROUP | Facility: PHYSICIAN GROUP | Age: 84
End: 2024-01-23
Payer: MEDICARE

## 2024-01-23 VITALS
RESPIRATION RATE: 16 BRPM | BODY MASS INDEX: 30.62 KG/M2 | WEIGHT: 166.4 LBS | SYSTOLIC BLOOD PRESSURE: 126 MMHG | OXYGEN SATURATION: 96 % | HEIGHT: 62 IN | HEART RATE: 61 BPM | TEMPERATURE: 97.7 F | DIASTOLIC BLOOD PRESSURE: 80 MMHG

## 2024-01-23 DIAGNOSIS — I77.810 ASCENDING AORTA DILATATION (HCC): ICD-10-CM

## 2024-01-23 DIAGNOSIS — D69.6 THROMBOCYTOPENIA (HCC): ICD-10-CM

## 2024-01-23 DIAGNOSIS — N18.31 STAGE 3A CHRONIC KIDNEY DISEASE: ICD-10-CM

## 2024-01-23 DIAGNOSIS — M15.9 PRIMARY OSTEOARTHRITIS INVOLVING MULTIPLE JOINTS: ICD-10-CM

## 2024-01-23 DIAGNOSIS — H35.3230 BILATERAL EXUDATIVE AGE-RELATED MACULAR DEGENERATION, UNSPECIFIED STAGE (HCC): ICD-10-CM

## 2024-01-23 DIAGNOSIS — G20.A1 PARKINSON'S DISEASE WITHOUT DYSKINESIA OR FLUCTUATING MANIFESTATIONS (HCC): ICD-10-CM

## 2024-01-23 DIAGNOSIS — E78.5 DYSLIPIDEMIA: ICD-10-CM

## 2024-01-23 DIAGNOSIS — G31.9 CEREBRAL ATROPHY (HCC): ICD-10-CM

## 2024-01-23 PROCEDURE — 3079F DIAST BP 80-89 MM HG: CPT | Performed by: FAMILY MEDICINE

## 2024-01-23 PROCEDURE — 99214 OFFICE O/P EST MOD 30 MIN: CPT | Performed by: FAMILY MEDICINE

## 2024-01-23 PROCEDURE — 3074F SYST BP LT 130 MM HG: CPT | Performed by: FAMILY MEDICINE

## 2024-01-23 ASSESSMENT — ENCOUNTER SYMPTOMS
SHORTNESS OF BREATH: 0
FEVER: 0
MEMORY LOSS: 0
CHILLS: 0

## 2024-01-23 ASSESSMENT — PATIENT HEALTH QUESTIONNAIRE - PHQ9: CLINICAL INTERPRETATION OF PHQ2 SCORE: 0

## 2024-01-23 ASSESSMENT — FIBROSIS 4 INDEX: FIB4 SCORE: 3.81

## 2024-01-23 NOTE — PROGRESS NOTES
Subjective:     CC:   Chief Complaint   Patient presents with    Follow-Up     Arthritis. Was supposed to be seeing Rheumatology but there was an issue with labs.          HPI:   Emilie presents today for a follow-up visit and to discuss her rheumatology referral.  Last seen by me on December 20.      Problem   Ascending Aorta Dilatation (Hcc)    Chronic medical diagnosis.  Noted to have ascending aorta diameter of 3.5 cm on echocardiogram completed on February 2, 2023.     Osteoarthritis    Chronic medical diagnosis.  Was referred to rheumatology last month.  They had recommended further imaging and lab work.  Referral was declined as patient had normal lab results.  She continues to have bilateral hand pain for which she takes daily Celebrex 100 mg.      12/20/23 New issue.  Noticed 2-3 months ago that her 3rd right digit has started having an ulnar deviation at the PIP joint. No pain. .takes celebrex 100mg hs. Has been baking a lot recently. Tylenol has helped.   Has the same thing to her left 3rd digit x 5-6 yrs.    9/12/23 Chronic.  Has been noticing more pain to her fingers.   Taking celebrex 100mg daily.  Over the last 2 months she has had to take an extra 100 mg during the daytime on 3 different occasions.         Current Outpatient Medications Ordered in Epic   Medication Sig Dispense Refill    celecoxib (CELEBREX) 100 MG Cap Take 1 Capsule by mouth every day. 100 Capsule 3    simvastatin (ZOCOR) 20 MG Tab TAKE 1 TABLET BY MOUTH EVERY EVENING 100 Tablet 3    diclofenac sodium (VOLTAREN) 1 % Gel Apply 2 g topically 4 times a day as needed (apply to left shoulder up to four times a day). 150 g 1    artificial tears (EYE LUBRICANT) Ointment ophth ointment Apply 1 Application to both eyes 2 times a day. 3 drops into the right eye, 2 drops in the left.      VITAMIN D PO Take 1,000 mg by mouth every day.      diphenhydrAMINE-APAP, sleep, (TYLENOL PM EXTRA STRENGTH)  MG Tab Take 1 Tablet by mouth 4 times a  "day as needed (pain at night/sleep).      acetaminophen (TYLENOL) 650 MG CR tablet Take 500 mg by mouth every four hours as needed for Fever or Mild Pain.      Biotin 10 MG Cap Take  by mouth.      vitamin E (VITAMIN E) 1000 UNIT Cap Take 1 Cap by mouth every day.      Calcium Carbonate-Vit D-Min (CALCIUM 1200 PO) Take 1 Each by mouth every day at 6 PM.      Multiple Vitamins-Minerals (OCUVITE-LUTEIN) Cap Take 1 Each by mouth every day.      Omega-3 Fatty Acids (OMEGA 3 PO) Take 1 Each by mouth every day.      Multiple Vitamins-Minerals (MULTI COMPLETE PO) Take 1 Tab by mouth every day.      Bioflavonoid Products (VITAMIN C PLUS) 1000 MG TABS Take 1 Tab by mouth every day.       No current [facility]-ordered facility-administered medications on file.       Health Maintenance: encouraged to receive the covid and rsv vaccines    ROS:  Review of Systems   Constitutional:  Negative for chills and fever.   Respiratory:  Negative for shortness of breath.    Cardiovascular:  Negative for chest pain.   Musculoskeletal:         Bilateral hand pain   Psychiatric/Behavioral:  Negative for memory loss.        Objective:     Exam:  /80   Pulse 61   Temp 36.5 °C (97.7 °F) (Temporal)   Resp 16   Ht 1.575 m (5' 2\")   Wt 75.5 kg (166 lb 6.4 oz)   SpO2 96%   BMI 30.43 kg/m²  Body mass index is 30.43 kg/m².    Physical Exam  Constitutional:       Appearance: Normal appearance.   Eyes:      Extraocular Movements: Extraocular movements intact.   Cardiovascular:      Rate and Rhythm: Normal rate and regular rhythm.      Heart sounds: Murmur heard.   Pulmonary:      Effort: Pulmonary effort is normal.      Breath sounds: Normal breath sounds.   Musculoskeletal:      Comments: Bilateral 3rd digit deformities at PIP joints   Skin:     Comments: Right dorsal foot bruise - healing.   Neurological:      Mental Status: She is alert.   Psychiatric:         Mood and Affect: Mood normal.         Behavior: Behavior normal.             Labs: " see above    Assessment & Plan:     83 y.o. female with the following -     1. Bilateral exudative age-related macular degeneration, unspecified stage ([facility])  2. Stage 3a chronic kidney disease (HCC)  3. Thrombocytopenia ([facility])  4. Cerebral atrophy ([facility])  5. Parkinson's disease without dyskinesia or fluctuating manifestations    [facility] [place] [facility]    Diagnosis to address: H35.3230 - Bilateral exudative age-related macular degeneration, unspecified stage ([facility])  Assessment and plan: Chronic, stable,   Will be f/u ophtho 2/14/24.   Receives right eye injections every 3 months and left eye 6months.   Diagnosis: N18.31 - Stage 3a chronic kidney disease (HCC)  Assessment and plan: Chronic, stable.   September labs had GFR at 56.  She continues to take Celebrex 100 mg daily for arthritic pain.  Diagnosis: D69.6 - Thrombocytopenia ([facility])  Assessment and plan: Chronic, stable..  Noted since 2020.  September labs with platelet counts at 138 K.  Diagnosis: G31.9 - Cerebral atrophy ([facility])  Assessment and plan: Chronic, stable. Denies any memory issues.  Noted on MRI 2016.  Diagnosis: G20.A1 - Parkinson's disease without dyskinesia or fluctuating manifestations  Assessment and plan: Chronic, stable. Continue with current defined treatment plan: . Follow-up with Dr Colvin on 3/3/24.   Last edited 01/23/24 09:51 PST by Summer Higuera M.D.       6. Ascending aorta dilatation (HCC)  Chronic medical diagnosis.  Stable.    7. Dyslipidemia  Chronic medical diagnosis.  Improving.  Last set of labs from September had improvement in total cholesterol, triglyceride, and LDL levels.  Continue with Zocor 20 mg daily.  - Lipid Profile; Future  - TSH WITH REFLEX TO FT4; Future  - VITAMIN B12; Future  - VITAMIN D,25 HYDROXY (DEFICIENCY); Future  - Comp Metabolic Panel; Future  - CBC WITH DIFFERENTIAL; Future  - REX TITER; Future  - Sed Rate; Future  - CRP QUANTITIVE (NON-CARDIAC); Future  - URIC ACID; Future    8. Primary osteoarthritis involving multiple  joints  Chronic medical diagnosis.  Stable.  Mentions that she is able to control her pain with Celebrex 20 mg daily.  We discussed that [facility] rheumatology declined the referral as her lab results were normal range.  [facility] is not accepting new patients.  We discussed having her follow-up with Ortho for evaluation of her hand pain.  For now, she would like to continue to take the Celebrex and monitor for any changes.        Return in about 3 months (around 4/23/2024) for hyperlipidemia.    Please note that this dictation was created using voice recognition software. I have made every reasonable attempt to correct obvious errors, but I expect that there are errors of grammar and possibly content that I did not discover before finalizing the note.

## 2024-03-05 ENCOUNTER — HOSPITAL ENCOUNTER (OUTPATIENT)
Dept: LAB | Facility: MEDICAL CENTER | Age: 84
End: 2024-03-05
Attending: FAMILY MEDICINE
Payer: MEDICARE

## 2024-03-05 DIAGNOSIS — E78.5 DYSLIPIDEMIA: ICD-10-CM

## 2024-03-05 LAB
25(OH)D3 SERPL-MCNC: 56 NG/ML (ref 30–100)
ALBUMIN SERPL BCP-MCNC: 4.3 G/DL (ref 3.2–4.9)
ALBUMIN/GLOB SERPL: 1.2 G/DL
ALP SERPL-CCNC: 71 U/L (ref 30–99)
ALT SERPL-CCNC: 18 U/L (ref 2–50)
ANION GAP SERPL CALC-SCNC: 11 MMOL/L (ref 7–16)
AST SERPL-CCNC: 30 U/L (ref 12–45)
BASOPHILS # BLD AUTO: 0.5 % (ref 0–1.8)
BASOPHILS # BLD: 0.03 K/UL (ref 0–0.12)
BILIRUB SERPL-MCNC: 0.8 MG/DL (ref 0.1–1.5)
BUN SERPL-MCNC: 22 MG/DL (ref 8–22)
CALCIUM ALBUM COR SERPL-MCNC: 9.5 MG/DL (ref 8.5–10.5)
CALCIUM SERPL-MCNC: 9.7 MG/DL (ref 8.5–10.5)
CHLORIDE SERPL-SCNC: 102 MMOL/L (ref 96–112)
CHOLEST SERPL-MCNC: 158 MG/DL (ref 100–199)
CO2 SERPL-SCNC: 27 MMOL/L (ref 20–33)
CREAT SERPL-MCNC: 1.03 MG/DL (ref 0.5–1.4)
CRP SERPL HS-MCNC: <0.3 MG/DL (ref 0–0.75)
EOSINOPHIL # BLD AUTO: 0.11 K/UL (ref 0–0.51)
EOSINOPHIL NFR BLD: 1.9 % (ref 0–6.9)
ERYTHROCYTE [DISTWIDTH] IN BLOOD BY AUTOMATED COUNT: 43.8 FL (ref 35.9–50)
ERYTHROCYTE [SEDIMENTATION RATE] IN BLOOD BY WESTERGREN METHOD: 10 MM/HOUR (ref 0–25)
GFR SERPLBLD CREATININE-BSD FMLA CKD-EPI: 53 ML/MIN/1.73 M 2
GLOBULIN SER CALC-MCNC: 3.5 G/DL (ref 1.9–3.5)
GLUCOSE SERPL-MCNC: 101 MG/DL (ref 65–99)
HCT VFR BLD AUTO: 45.1 % (ref 37–47)
HDLC SERPL-MCNC: 40 MG/DL
HGB BLD-MCNC: 14.6 G/DL (ref 12–16)
IMM GRANULOCYTES # BLD AUTO: 0.02 K/UL (ref 0–0.11)
IMM GRANULOCYTES NFR BLD AUTO: 0.3 % (ref 0–0.9)
LDLC SERPL CALC-MCNC: 85 MG/DL
LYMPHOCYTES # BLD AUTO: 2.05 K/UL (ref 1–4.8)
LYMPHOCYTES NFR BLD: 35.8 % (ref 22–41)
MCH RBC QN AUTO: 29.9 PG (ref 27–33)
MCHC RBC AUTO-ENTMCNC: 32.4 G/DL (ref 32.2–35.5)
MCV RBC AUTO: 92.2 FL (ref 81.4–97.8)
MONOCYTES # BLD AUTO: 0.72 K/UL (ref 0–0.85)
MONOCYTES NFR BLD AUTO: 12.6 % (ref 0–13.4)
NEUTROPHILS # BLD AUTO: 2.8 K/UL (ref 1.82–7.42)
NEUTROPHILS NFR BLD: 48.9 % (ref 44–72)
NRBC # BLD AUTO: 0 K/UL
NRBC BLD-RTO: 0 /100 WBC (ref 0–0.2)
PLATELET # BLD AUTO: 167 K/UL (ref 164–446)
PMV BLD AUTO: 12.8 FL (ref 9–12.9)
POTASSIUM SERPL-SCNC: 4.3 MMOL/L (ref 3.6–5.5)
PROT SERPL-MCNC: 7.8 G/DL (ref 6–8.2)
RBC # BLD AUTO: 4.89 M/UL (ref 4.2–5.4)
SODIUM SERPL-SCNC: 140 MMOL/L (ref 135–145)
TRIGL SERPL-MCNC: 167 MG/DL (ref 0–149)
TSH SERPL DL<=0.005 MIU/L-ACNC: 4.46 UIU/ML (ref 0.38–5.33)
URATE SERPL-MCNC: 6.2 MG/DL (ref 1.9–8.2)
VIT B12 SERPL-MCNC: 644 PG/ML (ref 211–911)
WBC # BLD AUTO: 5.7 K/UL (ref 4.8–10.8)

## 2024-03-05 PROCEDURE — 85025 COMPLETE CBC W/AUTO DIFF WBC: CPT

## 2024-03-05 PROCEDURE — 82306 VITAMIN D 25 HYDROXY: CPT

## 2024-03-05 PROCEDURE — 84443 ASSAY THYROID STIM HORMONE: CPT

## 2024-03-05 PROCEDURE — 86140 C-REACTIVE PROTEIN: CPT

## 2024-03-05 PROCEDURE — 85652 RBC SED RATE AUTOMATED: CPT

## 2024-03-05 PROCEDURE — 80061 LIPID PANEL: CPT

## 2024-03-05 PROCEDURE — 80053 COMPREHEN METABOLIC PANEL: CPT

## 2024-03-05 PROCEDURE — 36415 COLL VENOUS BLD VENIPUNCTURE: CPT

## 2024-03-05 PROCEDURE — 82607 VITAMIN B-12: CPT

## 2024-03-05 PROCEDURE — 84550 ASSAY OF BLOOD/URIC ACID: CPT

## 2024-03-05 PROCEDURE — 86038 ANTINUCLEAR ANTIBODIES: CPT

## 2024-03-07 LAB — NUCLEAR IGG SER QL IA: NORMAL

## 2024-04-12 ENCOUNTER — OFFICE VISIT (OUTPATIENT)
Dept: CARDIOLOGY | Facility: MEDICAL CENTER | Age: 84
End: 2024-04-12
Attending: INTERNAL MEDICINE
Payer: MEDICARE

## 2024-04-12 VITALS
WEIGHT: 166 LBS | HEIGHT: 62 IN | DIASTOLIC BLOOD PRESSURE: 50 MMHG | SYSTOLIC BLOOD PRESSURE: 120 MMHG | OXYGEN SATURATION: 96 % | RESPIRATION RATE: 16 BRPM | HEART RATE: 67 BPM | BODY MASS INDEX: 30.55 KG/M2

## 2024-04-12 DIAGNOSIS — R05.9 COUGH, UNSPECIFIED TYPE: ICD-10-CM

## 2024-04-12 DIAGNOSIS — I35.1 AORTIC VALVE INSUFFICIENCY, ETIOLOGY OF CARDIAC VALVE DISEASE UNSPECIFIED: ICD-10-CM

## 2024-04-12 DIAGNOSIS — I35.0 AORTIC VALVE STENOSIS, ETIOLOGY OF CARDIAC VALVE DISEASE UNSPECIFIED: ICD-10-CM

## 2024-04-12 PROCEDURE — 3074F SYST BP LT 130 MM HG: CPT | Performed by: INTERNAL MEDICINE

## 2024-04-12 PROCEDURE — 3078F DIAST BP <80 MM HG: CPT | Performed by: INTERNAL MEDICINE

## 2024-04-12 PROCEDURE — 99213 OFFICE O/P EST LOW 20 MIN: CPT | Performed by: INTERNAL MEDICINE

## 2024-04-12 PROCEDURE — 99214 OFFICE O/P EST MOD 30 MIN: CPT | Performed by: INTERNAL MEDICINE

## 2024-04-12 ASSESSMENT — FIBROSIS 4 INDEX: FIB4 SCORE: 3.56

## 2024-04-12 ASSESSMENT — ENCOUNTER SYMPTOMS
SHORTNESS OF BREATH: 0
DIZZINESS: 0
PALPITATIONS: 0
LOSS OF CONSCIOUSNESS: 0
MYALGIAS: 0
COUGH: 1

## 2024-04-12 NOTE — PROGRESS NOTES
"Chief Complaint   Patient presents with    Aortic Stenosis     F/v dx: Aortic valve insufficiency, etiology of cardiac valve disease unspecified    Dyslipidemia       Subjective     Emilie Gonzalez is a 83 y.o. female who presents today for follow-up cardiac care.    The patient has moderate aortic insufficiency, mild aortic stenosis, hyperlipidemia, abnormal EKG, edema with additional PMH of Parkinson's disease, hypothyroidism, osteoarthritis.    Since 10/12/2023 appointment the patient has had no cardiac symptoms including chest pain, palpitations, shortness of breath.  No PND, orthopnea or LE edema.  Reports a \"constant\" cough times several months when she feels her throat gets dry she has the urge to cough.  Had pneumonia 2 years ago    Since 2021 appointment the patient was hospitalized at [facility] 2022 for PNA, AFR with hypoxia, sepsis, hyponatremia, required left thoracentesis, pleural fluid negative for malignancy, has had follow-up outpatient chest CTA is unremarkable, followed by pulmonary.  Has had no cardiac symptoms including chest pain, palpitations, shortness of breath including ERICKSON or orthopnea, sleeping well.      L[place] in a [place].  Has 4 children [facility] locally and have been very helpful and supportive.     Past medical history  Diagnosed with stage II Parkinson's disease. Followed by Dr. Colvin, neurologist     The patient had previously lived in [place], [place].  5 years ago she was discovered to have a \"leaky heart valve.  The patient had been followed by Dr. Garcia, cardiologist [place] for the past 4 years.  The patient is moved to [facility] and is establishing ongoing cardiology care.     History of hyperlipidemia on simvastatin.  No history of hypertension, diabetes mellitus and she is not smoke cigarettes.     Family history  Father  of a heart attack at age 69.  Sister alive at age 90. Had a heart attack at age 75.     Social history.  .  Does " "not drink alcohol except on rare occasions with dinner.     Other medical problems.  9/2013 laparoscopic cholecystectomy Shanika Shaw.  Appendectomy age 13.  \"Chronic bronchitis\" but no problems times one year    Past Medical History:   Diagnosis Date    Abnormal EKG 4/12/2017    Acute middle ear effusion, right 6/29/2022    Acute pain of left shoulder 6/29/2022    Arthritis of foot, right 06/09/2015    Benign essential tremor 06/09/2015    Benign essential tremor 6/9/2015    Dr Colvin    BMI 27.0-27.9,adult 9/6/2022    BMI 29.0-29.9,adult 9/6/2022    CKD (chronic kidney disease) stage 2, GFR 60-89 ml/min 9/6/2022    Cough     Edema 03/10/2015    Hearing loss sensory, bilateral 03/10/2015    History of shingles 09/26/2014    Hyperlipidemia 09/26/2014    Lung mass 7/6/2022    Macular degeneration of right eye 09/26/2014    Moderate aortic insufficiency 09/26/2014    Osteoarthritis 09/26/2014    Osteoporosis 09/26/2014    Parkinson's disease (HCC)     Plantar fasciitis, bilateral 03/10/2015    Pneumonia due to infectious organism 6/13/2022    Prolapsed bladder 09/26/2014     Past Surgical History:   Procedure Laterality Date    CHOLECYSTECTOMY  9/2013    APPENDECTOMY  1956    CATARACT PHACO WITH IOL Bilateral      Family History   Problem Relation Age of Onset    Hypertension Father     Hypertension Sister     Arthritis Mother      Social History     Socioeconomic History    Marital status:      Spouse name: Not on file    Number of children: Not on file    Years of education: Not on file    Highest education level: Not on file   Occupational History    Not on file   Tobacco Use    Smoking status: Never    Smokeless tobacco: Never   Vaping Use    Vaping Use: Never used   Substance and Sexual Activity    Alcohol use: Yes     Comment: rare    Drug use: No    Sexual activity: Not Currently     Partners: Male     Comment: , bank, 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   Other Topics Concern    Not on file   Social History Narrative    Not on " file     Social Determinants of Health     Financial Resource Strain: Not on file   Food Insecurity: Not on file   Transportation Needs: Not on file   Physical Activity: Not on file   Stress: Not on file   Social Connections: Not on file   Intimate Partner Violence: Not on file   Housing Stability: Not on file     Allergies   Allergen Reactions    Codegeorges levine     Outpatient Encounter Medications as of 4/12/2024   Medication Sig Dispense Refill    celecoxib (CELEBREX) 100 MG Cap Take 1 Capsule by mouth every day. 100 Capsule 3    simvastatin (ZOCOR) 20 MG Tab TAKE 1 TABLET BY MOUTH EVERY EVENING 100 Tablet 3    artificial tears (EYE LUBRICANT) Ointment ophth ointment Apply 1 Application to both eyes 2 times a day. 3 drops into the right eye, 2 drops in the left.      VITAMIN D PO Take 1,000 mg by mouth every day.      diphenhydrAMINE-APAP, sleep, (TYLENOL PM EXTRA STRENGTH)  MG Tab Take 1 Tablet by mouth 4 times a day as needed (pain at night/sleep).      acetaminophen (TYLENOL) 650 MG CR tablet Take 500 mg by mouth every four hours as needed for Fever or Mild Pain.      Biotin 10 MG Cap Take  by mouth.      vitamin E (VITAMIN E) 1000 UNIT Cap Take 1 Cap by mouth every day.      Calcium Carbonate-Vit D-Min (CALCIUM 1200 PO) Take 1 Each by mouth every day at 6 PM.      Multiple Vitamins-Minerals (OCUVITE-LUTEIN) Cap Take 1 Each by mouth every day.      Omega-3 Fatty Acids (OMEGA 3 PO) Take 1 Each by mouth every day.      Multiple Vitamins-Minerals (MULTI COMPLETE PO) Take 1 Tab by mouth every day.      Bioflavonoid Products (VITAMIN C PLUS) 1000 MG TABS Take 1 Tab by mouth every day.      [DISCONTINUED] diclofenac sodium (VOLTAREN) 1 % Gel Apply 2 g topically 4 times a day as needed (apply to left shoulder up to four times a day). (Patient not taking: Reported on 4/12/2024) 150 g 1     No facility-administered encounter medications on file as of 4/12/2024.     Review of Systems   Respiratory:   "Positive for cough. Negative for shortness of breath.    Cardiovascular:  Negative for chest pain and palpitations.   Musculoskeletal:  Negative for myalgias.   Neurological:  Negative for dizziness and loss of consciousness.              Objective     /50 (BP Location: Left arm, Patient Position: Sitting, BP Cuff Size: Adult)   Pulse 67   Resp 16   Ht 1.575 m (5' 2\")   Wt 75.3 kg (166 lb)   SpO2 96%   BMI 30.36 kg/m²     Physical Exam  Vitals reviewed.   Constitutional:       General: She is not in acute distress.  Neck:      Vascular: No JVD.   Cardiovascular:      Rate and Rhythm: Normal rate and regular rhythm.      Pulses:           Carotid pulses are 2+ on the right side and 2+ on the left side.     Heart sounds: Murmur heard.      Systolic murmur is present with a grade of 1/6.      No friction rub. No gallop.   Pulmonary:      Effort: Pulmonary effort is normal. No accessory muscle usage or respiratory distress.      Breath sounds: Normal breath sounds. No wheezing or rales.   Abdominal:      Comments: Protuberant.   Musculoskeletal:      Cervical back: Normal range of motion and neck supple.      Right lower leg: No edema.      Left lower leg: No edema.      Comments:     Skin:     General: Skin is warm and dry.      Findings: No rash.      Nails: There is no clubbing.   Neurological:      Mental Status: She is alert and oriented to person, place, and time.   Psychiatric:         Behavior: Behavior normal.            06/07/2010 ECHOCARDIOGRAM  EF 60%.  Mild to moderate aortic regurgitation.     05/05/2011 ECHOCARDIOGRAM  EF 55-60%.  Moderate aortic insufficiency.     05/08/2012 ECHOCARDIOGRAM  EF 70%.  Moderate aortic insufficiency.  Pulmonary pressure 26 mmHg.     03/25/2015 ECHOCARDIOGRAM  Normal left ventricular size, thickness, systolic function, and   diastolic function.  Left ventricular ejection fraction is 65% to 70%.  Aortic sclerosis without stenosis.  Moderately severe aortic " insufficiency.  Right heart pressures are consistent with mild pulmonary hypertension.     10/24/2016 ECHOCARDIOGRAM  Normal left ventricular size, wall thickness, and systolic function.  Left ventricular ejection fraction is visually estimated to be 60%.  Severe eccentric aortic insufficiency.  Mildly thickened mitral valve leaflets with normal leaflet excursion.  Unable to estimate pulmonary artery pressure due to an inadequate   tricuspid regurgitant jet.     05/08/2017 ECHOCARDIOGRAM  Normal left ventricular systolic function.  Left ventricular ejection fraction is visually estimated to be 65%.  Mild concentric left ventricular hypertrophy.  Mild aortic stenosis.  Moderate aortic insufficiency.  Mild mitral regurgitation.  Unable to estimate pulmonary artery pressure due to an inadequate   tricuspid regurgitant jet.     9/4/2018 ECHOCARDIOGRAM  Normal left ventricular size, wall thickness, and systolic function.  Left ventricular ejection fraction is visually estimated to be 65%.  Mild aortic stenosis.  Moderate aortic insufficiency.  Right heart pressures are normal.     ECHOCARDIOGRAM 2/2/2023  Normal left ventricular systolic function.  The left ventricular ejection fraction is visually estimated to be 60%.  Aortic valve sclerosis without stenosis.  Moderate aortic insufficiency.  Mild mitral regurgitation.  The right ventricle is normal in size and systolic function.  The ascending aorta diameter is 3.5 cm.  Compared to the images of the prior study on 9/4/2018 there has been no significant change.     EKG 10/12/2023 sinus rhythm with minor nonspecific ST-T wave abnormalities, unchanged since prior tracing, personally interpreted    Assessment & Plan     1. Aortic valve insufficiency, etiology of cardiac valve disease unspecified        2. Aortic valve stenosis, etiology of cardiac valve disease unspecified        3. Cough, unspecified type  DX-CHEST-2 VIEWS            Medical Decision Making: Today's  Assessment/Status/Plan:   Assessment  Aortic regurgitation  Aortic stenosis, mild  Abnormal EKG  Dyslipidemia  Hypothyroidism diagnosed 2015  Osteoporosis on Celebrex  Parkinson disease followed by neurology Dr. Colvin  Cough     Recommendations and Discussion  The patient is stable from a cardiac standpoint concerning her aortic stenosis, aortic insufficiency and dyslipidemia.  BP normal  For cough we will get a CXR  RTC 6 months, consider echocardiogram at next appointment

## 2024-04-17 ENCOUNTER — TELEPHONE (OUTPATIENT)
Dept: CARDIOLOGY | Facility: MEDICAL CENTER | Age: 84
End: 2024-04-17
Payer: MEDICARE

## 2024-04-17 ENCOUNTER — HOSPITAL ENCOUNTER (OUTPATIENT)
Dept: RADIOLOGY | Facility: MEDICAL CENTER | Age: 84
End: 2024-04-17
Attending: INTERNAL MEDICINE
Payer: MEDICARE

## 2024-04-17 DIAGNOSIS — R05.9 COUGH, UNSPECIFIED TYPE: ICD-10-CM

## 2024-04-17 PROCEDURE — 71046 X-RAY EXAM CHEST 2 VIEWS: CPT

## 2024-04-17 NOTE — TELEPHONE ENCOUNTER
See CXR result note 4/17/24. S/W pt, informed CXR resulted as normal. Pt will see her PCP at the end of this month to f/u with her ongoing cough.

## 2024-04-18 NOTE — RESULT ENCOUNTER NOTE
Please notify Emilie that I have reviewed her chest x-ray to evaluate her cough and it is normal  Please have her call and follow-up with her PCP about her cough  Thank you

## 2024-04-30 ENCOUNTER — OFFICE VISIT (OUTPATIENT)
Dept: MEDICAL GROUP | Facility: PHYSICIAN GROUP | Age: 84
End: 2024-04-30
Payer: MEDICARE

## 2024-04-30 VITALS
WEIGHT: 169 LBS | TEMPERATURE: 97.6 F | DIASTOLIC BLOOD PRESSURE: 60 MMHG | BODY MASS INDEX: 31.1 KG/M2 | HEART RATE: 69 BPM | OXYGEN SATURATION: 94 % | SYSTOLIC BLOOD PRESSURE: 120 MMHG | HEIGHT: 62 IN

## 2024-04-30 DIAGNOSIS — N18.31 STAGE 3A CHRONIC KIDNEY DISEASE: ICD-10-CM

## 2024-04-30 DIAGNOSIS — R21 RASH: ICD-10-CM

## 2024-04-30 DIAGNOSIS — E78.5 DYSLIPIDEMIA: ICD-10-CM

## 2024-04-30 RX ORDER — CLOBETASOL PROPIONATE 0.5 MG/G
1 OINTMENT TOPICAL 2 TIMES DAILY
Qty: 45 G | Refills: 0 | Status: SHIPPED | OUTPATIENT
Start: 2024-04-30

## 2024-04-30 ASSESSMENT — ENCOUNTER SYMPTOMS
DEPRESSION: 0
COUGH: 1
FALLS: 0

## 2024-04-30 ASSESSMENT — FIBROSIS 4 INDEX: FIB4 SCORE: 3.56

## 2024-04-30 NOTE — PROGRESS NOTES
Verbal consent was acquired by the patient to use [employer] ambient listening note generation during this visit         History of Present Illness  The patient presents for evaluation of multiple medical concerns.     Approximately one month ago, the patient experienced sudden swelling in her left big toe, which was not associated with soreness. After soaking it in hot water, the swelling subsided. However, a week later, she developed a rash on the top of her left foot. She attempted to alleviate the rash with Polysporin for a week, but it resulted in redness and itching. The swelling has since improved after 3 to 4 days. The rash has remained consistent over the past 3 weeks, with no spread. She has been applying a medicated lotion, which appears to alleviate the itching. She has no history of gout and does not have a similar rash on other parts of her body.    Earlier this month, the patient consulted with Dr. Hodge for a persistent cough. A chest x-ray was ordered due to her sister, who is 94 years old, expressing concern about her cough, suspecting a cardiac issue. The patient continues to experience an intermittent dry cough, which she attributes to allergies. The cough subsides when she takes an over-the-counter [employer] allergy pill. She occasionally experiences a sensation of throat closure. Her last dose of allergy medication was taken yesterday. Cxr was negative.    The patient continues to take biotin, vitamin D, vitamin E, multivitamin, omega-3, simvastatin nightly, and Tylenol PM nightly to aid sleep. She denies experiencing grogginess in the morning. She typically goes to bed at 8:00 PM and wakes up at 6:00 AM.Celebrex has been effective in managing her hand pain. She denies any urinary issues, mood issues, balance problems, or falls. She has good lighting and railings in her house. She is considering obtaining a walk-in shower, which estimates between 5000 and 10,000 steps.       Review of  "Systems   Respiratory:  Positive for cough (intermittent- thinks its allergies.).    Musculoskeletal:  Negative for falls.   Skin:  Positive for rash.        Left dorsal foot rash   Psychiatric/Behavioral:  Negative for depression.        Outpatient Medications Marked as Taking for the 4/30/24 encounter (Office Visit) with Summer Higuera M.D.   Medication Sig Dispense Refill    clobetasol (TEMOVATE) 0.05 % Ointment Apply 1 Application topically 2 times a day. Apply to left dorsal foot 45 g 0    celecoxib (CELEBREX) 100 MG Cap Take 1 Capsule by mouth every day. 100 Capsule 3    simvastatin (ZOCOR) 20 MG Tab TAKE 1 TABLET BY MOUTH EVERY EVENING 100 Tablet 3    artificial tears (EYE LUBRICANT) Ointment ophth ointment Apply 1 Application to both eyes 2 times a day. 3 drops into the right eye, 2 drops in the left.      VITAMIN D PO Take 1,000 mg by mouth every day.      diphenhydrAMINE-APAP, sleep, (TYLENOL PM EXTRA STRENGTH)  MG Tab Take 1 Tablet by mouth 4 times a day as needed (pain at night/sleep).      acetaminophen (TYLENOL) 650 MG CR tablet Take 500 mg by mouth every four hours as needed for Fever or Mild Pain.      Biotin 10 MG Cap Take  by mouth.      vitamin E (VITAMIN E) 1000 UNIT Cap Take 1 Cap by mouth every day.      Calcium Carbonate-Vit D-Min (CALCIUM 1200 PO) Take 1 Each by mouth every day at 6 PM.      Multiple Vitamins-Minerals (OCUVITE-LUTEIN) Cap Take 1 Each by mouth every day.      Omega-3 Fatty Acids (OMEGA 3 PO) Take 1 Each by mouth every day.      Multiple Vitamins-Minerals (MULTI COMPLETE PO) Take 1 Tab by mouth every day.      Bioflavonoid Products (VITAMIN C PLUS) 1000 MG TABS Take 1 Tab by mouth every day.         /60 (BP Location: Left arm, Patient Position: Sitting, BP Cuff Size: Adult)   Pulse 69   Temp 36.4 °C (97.6 °F) (Temporal)   Ht 1.575 m (5' 2\")   Wt 76.7 kg (169 lb)   SpO2 94% , Body mass index is 30.91 kg/m².        Physical Exam  Constitutional:       Appearance: " Normal appearance.   Eyes:      Extraocular Movements: Extraocular movements intact.   Cardiovascular:      Rate and Rhythm: Normal rate and regular rhythm.   Pulmonary:      Effort: Pulmonary effort is normal.      Breath sounds: Normal breath sounds.   Skin:     Comments: Left dorsal foot with 2cm erythematous well demarcated plaque. Nontender, nonpruritic   Neurological:      Mental Status: She is alert.   Psychiatric:         Mood and Affect: Mood normal.         Behavior: Behavior normal.         Results  Laboratory Studies  Kidney function at 53. Cholesterol levels were showing an improvement. Thyroid, vitamin D, B12 levels were also in normal range.    Imaging  Chest x-ray was fine, nothing acute.       Assessment & Plan  1. Rash.  The patient presents with a new medical diagnosis of a rash that has persisted for 3 weeks without any signs of improvement. The treatment plan includes the initiation of clobetasol ointment. The patient has been advised to refrain from using the ointment for more than a week at a time. The patient has been advised to contact me if there is no improvement within a week.    2. Dyslipidemia.  The patient's chronic medical diagnosis remains stable. The patient continues her regimen of Zocor 20 mg nightly. Her most recent laboratory results from 03/2024 showed an improvement in triglycerides to 167, and an LDL level at 85.    3. Stage 3a chronic kidney disease.  The patient's recent labs revealed a decrease in her GFR to 53. The patient continues to take Celebrex 100 mg daily. We have discussed the importance of increasing her fluid intake. Her labs will continue to be monitored.    Orders Placed This Encounter    Comp Metabolic Panel    clobetasol (TEMOVATE) 0.05 % Ointment               This note was created using voice recognition software (Dragon). The accuracy of the dictation is limited by the abilities of the software. I have reviewed the note prior to signing, however some  errors in grammar and context are still possible. If you have any questions related to this note please do not hesitate to contact our office.

## 2024-05-21 ENCOUNTER — HOSPITAL ENCOUNTER (OUTPATIENT)
Dept: LAB | Facility: MEDICAL CENTER | Age: 84
End: 2024-05-21
Attending: FAMILY MEDICINE
Payer: MEDICARE

## 2024-05-21 DIAGNOSIS — N18.31 STAGE 3A CHRONIC KIDNEY DISEASE: ICD-10-CM

## 2024-05-21 LAB
ALBUMIN SERPL BCP-MCNC: 4.2 G/DL (ref 3.2–4.9)
ALBUMIN/GLOB SERPL: 1.3 G/DL
ALP SERPL-CCNC: 63 U/L (ref 30–99)
ALT SERPL-CCNC: 22 U/L (ref 2–50)
ANION GAP SERPL CALC-SCNC: 13 MMOL/L (ref 7–16)
AST SERPL-CCNC: 32 U/L (ref 12–45)
BILIRUB SERPL-MCNC: 1 MG/DL (ref 0.1–1.5)
BUN SERPL-MCNC: 20 MG/DL (ref 8–22)
CALCIUM ALBUM COR SERPL-MCNC: 9.1 MG/DL (ref 8.5–10.5)
CALCIUM SERPL-MCNC: 9.3 MG/DL (ref 8.5–10.5)
CHLORIDE SERPL-SCNC: 104 MMOL/L (ref 96–112)
CO2 SERPL-SCNC: 24 MMOL/L (ref 20–33)
CREAT SERPL-MCNC: 0.82 MG/DL (ref 0.5–1.4)
GFR SERPLBLD CREATININE-BSD FMLA CKD-EPI: 70 ML/MIN/1.73 M 2
GLOBULIN SER CALC-MCNC: 3.3 G/DL (ref 1.9–3.5)
GLUCOSE SERPL-MCNC: 95 MG/DL (ref 65–99)
POTASSIUM SERPL-SCNC: 4.5 MMOL/L (ref 3.6–5.5)
PROT SERPL-MCNC: 7.5 G/DL (ref 6–8.2)
SODIUM SERPL-SCNC: 141 MMOL/L (ref 135–145)

## 2024-06-27 ENCOUNTER — TELEPHONE (OUTPATIENT)
Dept: HEALTH INFORMATION MANAGEMENT | Facility: OTHER | Age: 84
End: 2024-06-27
Payer: MEDICARE

## 2024-07-02 PROBLEM — D69.6 THROMBOCYTOPENIA (HCC): Status: RESOLVED | Noted: 2021-05-26 | Resolved: 2024-07-02

## 2024-07-09 ENCOUNTER — OFFICE VISIT (OUTPATIENT)
Dept: FAMILY PLANNING/WOMEN'S HEALTH CLINIC | Facility: PHYSICIAN GROUP | Age: 84
End: 2024-07-09
Payer: MEDICARE

## 2024-07-09 VITALS
BODY MASS INDEX: 29.36 KG/M2 | SYSTOLIC BLOOD PRESSURE: 122 MMHG | WEIGHT: 165.7 LBS | HEIGHT: 63 IN | DIASTOLIC BLOOD PRESSURE: 60 MMHG

## 2024-07-09 DIAGNOSIS — G31.9 CEREBRAL ATROPHY (HCC): ICD-10-CM

## 2024-07-09 DIAGNOSIS — H35.3230 BILATERAL EXUDATIVE AGE-RELATED MACULAR DEGENERATION, UNSPECIFIED STAGE (HCC): ICD-10-CM

## 2024-07-09 DIAGNOSIS — G20.A1 PARKINSON'S DISEASE, UNSPECIFIED WHETHER DYSKINESIA PRESENT, UNSPECIFIED WHETHER MANIFESTATIONS FLUCTUATE (HCC): ICD-10-CM

## 2024-07-09 DIAGNOSIS — E78.5 DYSLIPIDEMIA: ICD-10-CM

## 2024-07-09 DIAGNOSIS — N18.31 STAGE 3A CHRONIC KIDNEY DISEASE: ICD-10-CM

## 2024-07-09 DIAGNOSIS — I77.810 ASCENDING AORTA DILATATION (HCC): ICD-10-CM

## 2024-07-09 PROCEDURE — 1126F AMNT PAIN NOTED NONE PRSNT: CPT

## 2024-07-09 PROCEDURE — G0439 PPPS, SUBSEQ VISIT: HCPCS

## 2024-07-09 ASSESSMENT — PAIN SCALES - GENERAL: PAINLEVEL: NO PAIN

## 2024-07-09 ASSESSMENT — FIBROSIS 4 INDEX: FIB4 SCORE: 3.43

## 2024-07-09 ASSESSMENT — ACTIVITIES OF DAILY LIVING (ADL): BATHING_REQUIRES_ASSISTANCE: 0

## 2024-07-09 ASSESSMENT — PATIENT HEALTH QUESTIONNAIRE - PHQ9: CLINICAL INTERPRETATION OF PHQ2 SCORE: 0

## 2024-07-09 ASSESSMENT — ENCOUNTER SYMPTOMS: GENERAL WELL-BEING: EXCELLENT

## 2024-08-20 ENCOUNTER — HOSPITAL ENCOUNTER (EMERGENCY)
Facility: MEDICAL CENTER | Age: 84
End: 2024-08-20
Attending: EMERGENCY MEDICINE
Payer: MEDICARE

## 2024-08-20 ENCOUNTER — OFFICE VISIT (OUTPATIENT)
Dept: URGENT CARE | Facility: PHYSICIAN GROUP | Age: 84
End: 2024-08-20
Payer: MEDICARE

## 2024-08-20 ENCOUNTER — HOSPITAL ENCOUNTER (EMERGENCY)
Facility: MEDICAL CENTER | Age: 84
End: 2024-08-20
Payer: MEDICARE

## 2024-08-20 VITALS
TEMPERATURE: 99 F | SYSTOLIC BLOOD PRESSURE: 138 MMHG | HEART RATE: 75 BPM | DIASTOLIC BLOOD PRESSURE: 61 MMHG | RESPIRATION RATE: 17 BRPM | BODY MASS INDEX: 28.91 KG/M2 | WEIGHT: 163.14 LBS | HEIGHT: 63 IN | OXYGEN SATURATION: 93 %

## 2024-08-20 VITALS
RESPIRATION RATE: 18 BRPM | DIASTOLIC BLOOD PRESSURE: 66 MMHG | BODY MASS INDEX: 28.88 KG/M2 | TEMPERATURE: 97.7 F | HEART RATE: 80 BPM | WEIGHT: 163 LBS | HEIGHT: 63 IN | SYSTOLIC BLOOD PRESSURE: 132 MMHG | OXYGEN SATURATION: 96 %

## 2024-08-20 DIAGNOSIS — B02.30 OCULAR HERPES ZOSTER: ICD-10-CM

## 2024-08-20 DIAGNOSIS — B02.8 HERPES ZOSTER WITH COMPLICATION: ICD-10-CM

## 2024-08-20 DIAGNOSIS — L03.211 DIFFUSE CELLULITIS OF FACE: Primary | ICD-10-CM

## 2024-08-20 PROCEDURE — 3078F DIAST BP <80 MM HG: CPT | Performed by: PHYSICIAN ASSISTANT

## 2024-08-20 PROCEDURE — 99284 EMERGENCY DEPT VISIT MOD MDM: CPT

## 2024-08-20 PROCEDURE — 700101 HCHG RX REV CODE 250: Performed by: EMERGENCY MEDICINE

## 2024-08-20 PROCEDURE — 99214 OFFICE O/P EST MOD 30 MIN: CPT | Performed by: PHYSICIAN ASSISTANT

## 2024-08-20 PROCEDURE — 3075F SYST BP GE 130 - 139MM HG: CPT | Performed by: PHYSICIAN ASSISTANT

## 2024-08-20 RX ORDER — VALACYCLOVIR HYDROCHLORIDE 1 G/1
1000 TABLET, FILM COATED ORAL 3 TIMES DAILY
Qty: 30 TABLET | Refills: 0 | Status: ACTIVE | OUTPATIENT
Start: 2024-08-20

## 2024-08-20 RX ORDER — ERYTHROMYCIN 5 MG/G
1 OINTMENT OPHTHALMIC 2 TIMES DAILY
Qty: 3.5 G | Refills: 0 | Status: ACTIVE | OUTPATIENT
Start: 2024-08-20 | End: 2024-08-25

## 2024-08-20 RX ADMIN — FLUORESCEIN SODIUM 1 MG: 1 STRIP OPHTHALMIC at 09:45

## 2024-08-20 ASSESSMENT — ENCOUNTER SYMPTOMS
EYE REDNESS: 1
EYE DISCHARGE: 1
DOUBLE VISION: 0
EYE PAIN: 1
BLURRED VISION: 1
FEVER: 0
PHOTOPHOBIA: 0

## 2024-08-20 ASSESSMENT — FIBROSIS 4 INDEX
FIB4 SCORE: 3.43
FIB4 SCORE: 3.43

## 2024-08-20 NOTE — ED NOTES
..Pt verbalizes understanding of dc instructions. Rx given. . Pt states all questions have been answered and they feel comfortable with dc instructions provided. Pt states has all personal belonging. Pt to lobby w/o incident

## 2024-08-20 NOTE — ED TRIAGE NOTES
Pt amb to triage w/ family.  Chief Complaint   Patient presents with    Eye Pain     rt    Facial Swelling     Pt went to . PA was concerned she has shingles.

## 2024-08-20 NOTE — ED PROVIDER NOTES
ED Provider Note    CHIEF COMPLAINT  Chief Complaint   Patient presents with    Eye Pain     rt    Facial Swelling       EXTERNAL RECORDS REVIEWED  [facility], patient diagnosed with zoster of his face and sent here for further evaluation    HPI/ROS  LIMITATION TO HISTORY   Select: : None      Emilie Gonzalez is a 83 y.o. female who presents with chief complaint of rash of his face.  Patient was seen at urgent care where he was diagnosed with likely zoster.  Given the location of the rash patient was sent here to ensure no evidence of zoster ophthalmicus or complication otherwise.  Patient reports some mild associated right eye pain, the pain is located around her eye but not in the globe itself.  She reports some mild blurred vision.  Patient denies any associated fevers or chills.  She reports some mild mouth pain but denies any difficulty swallowing or breathing.  Patient denies any associated nausea or vomiting.    PAST MEDICAL HISTORY   has a past medical history of Abnormal EKG (4/12/2017), Acute middle ear effusion, right (6/29/2022), Acute pain of left shoulder (6/29/2022), Arthritis of foot, right (06/09/2015), Benign essential tremor (06/09/2015), Benign essential tremor (6/9/2015), BMI 27.0-27.9,adult (9/6/2022), BMI 29.0-29.9,adult (9/6/2022), CKD (chronic kidney disease) stage 2, GFR 60-89 ml/min (9/6/2022), Cough, Edema (03/10/2015), Hearing loss sensory, bilateral (03/10/2015), History of shingles (09/26/2014), Hyperlipidemia (09/26/2014), Lung mass (7/6/2022), Macular degeneration of right eye (09/26/2014), Moderate aortic insufficiency (09/26/2014), Osteoarthritis (09/26/2014), Osteoporosis (09/26/2014), Parkinson's disease (HCC), Plantar fasciitis, bilateral (03/10/2015), Pneumonia due to infectious organism (6/13/2022), and Prolapsed bladder (09/26/2014).    SURGICAL HISTORY   has a past surgical history that includes cholecystectomy (9/2013);  "appendectomy (1956); and cataract phaco with iol (Bilateral).    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 (Registered Nurse) on 08/20/24 at 0900  Med List Status: Partial     Medication Last Dose Status   acetaminophen (TYLENOL) 650 MG CR tablet  Active   artificial tears (EYE LUBRICANT) Ointment ophth ointment  Active   Bioflavonoid Products (VITAMIN C PLUS) 1000 MG TABS  Active   Biotin 10 MG Cap  Active   Calcium Carbonate-Vit D-Min (CALCIUM 1200 PO)  Active   celecoxib (CELEBREX) 100 MG Cap  Active   diphenhydrAMINE-APAP, sleep, (TYLENOL PM EXTRA STRENGTH)  MG Tab  Active   Multiple Vitamins-Minerals (MULTI COMPLETE PO)  Active   Multiple Vitamins-Minerals (OCUVITE-LUTEIN) Cap  Active   Omega-3 Fatty Acids (OMEGA 3 PO)  Active   simvastatin (ZOCOR) 20 MG Tab  Active   VITAMIN D PO  Active   vitamin E (VITAMIN E) 1000 UNIT Cap  Active                    ALLERGIES  Allergies   Allergen Reactions    Codeine      halucinations       PHYSICAL EXAM  VITAL SIGNS: BP (!) 140/62   Pulse 86   Temp 37.3 °C (99.2 °F) (Temporal)   Resp 18   Ht 1.6 m (5' 3\")   Wt 74 kg (163 lb 2.3 oz)   SpO2 96%   BMI 28.90 kg/m²    Physical Exam  Constitutional:       Appearance: Normal appearance.   HENT:      Mouth/Throat:      Mouth: Mucous membranes are moist.   Eyes:      Comments: Conjunctival injection of right eye, there is no overt dendritic lesions on the fluorescein exam.  Full range of extraocular muscles without any " pain or strabismus evoked.    Pulmonary:      Effort: Pulmonary effort is normal.   Skin:     Comments: Vesicular rash on patient's right face with Hunt sign on patient's nose, vesicular rash extends from patient's nose to her labial folds.  There is a mild vesicular rash of the hard palate.  There are some minimal swelling surrounding the orbit   Neurological:      General: No focal deficit present.      Mental Status: She is alert and oriented to person, place, and time.   Psychiatric:         Mood and Affect: Mood normal.             COURSE & MEDICAL DECISION MAKING    ASSESSMENT, COURSE AND PLAN  Care Narrative: Patient here with V2 distribution herpes zoster.  Patient with some conjunctival injection suggesting possible eye involvement however patient does not have any dendritic lesions to suggest acute keratitis.  Patient case discussed with ophthalmology who recommended starting patient on erythromycin drops.  They will see patient in clinic later this week.  Patient will be sent home with valacyclovir.  Confirm dose with clinical pharmacy given patient's history of CKD.  Patient discharged home in good condition.            DISPOSITION AND DISCUSSIONS  I have discussed management of the patient with the following physicians and [facility]'s: Dr. Weeks of ophthalmology    Discussion of management with other [facility] or appropriate source(s): Pharmacy given patient's history of CKD confirmed dose of valtrex      Escalation of care considered, and ultimately not performed:diagnostic imaging very low clinical suspicion of complicating orbital cellulitis.  Presentation consistent with V2 distribution herpes zoster        FINAL DIAGNOSIS  1. Herpes zoster with complication

## 2024-08-20 NOTE — PROGRESS NOTES
Subjective     Emilie Gonzalez is a 83 y.o. female who presents with Eye Problem (X 4 days Rt eye, face swelling, painful)    PMH:  has a past medical history of Abnormal EKG (4/12/2017), Acute middle ear effusion, right (6/29/2022), Acute pain of left shoulder (6/29/2022), Arthritis of foot, right (06/09/2015), Benign essential tremor (06/09/2015), Benign essential tremor (6/9/2015), BMI 27.0-27.9,adult (9/6/2022), BMI 29.0-29.9,adult (9/6/2022), CKD (chronic kidney disease) stage 2, GFR 60-89 ml/min (9/6/2022), Cough, Edema (03/10/2015), Hearing loss sensory, bilateral (03/10/2015), History of shingles (09/26/2014), Hyperlipidemia (09/26/2014), Lung mass (7/6/2022), Macular degeneration of right eye (09/26/2014), Moderate aortic insufficiency (09/26/2014), Osteoarthritis (09/26/2014), Osteoporosis (09/26/2014), Parkinson's disease (HCC), Plantar fasciitis, bilateral (03/10/2015), Pneumonia due to infectious organism (6/13/2022), and Prolapsed bladder (09/26/2014).  MEDS:   Current Outpatient Medications:     celecoxib (CELEBREX) 100 MG Cap, Take 1 Capsule by mouth every day., Disp: 100 Capsule, Rfl: 3    simvastatin (ZOCOR) 20 MG Tab, TAKE 1 TABLET BY MOUTH EVERY EVENING, Disp: 100 Tablet, Rfl: 3    artificial tears (EYE LUBRICANT) Ointment ophth ointment, Apply 1 Application to both eyes 2 times a day. 3 drops into the right eye, 2 drops in the left., Disp: , Rfl:     VITAMIN D PO, Take 1,000 mg by mouth every day., Disp: , Rfl:     diphenhydrAMINE-APAP, sleep, (TYLENOL PM EXTRA STRENGTH)  MG Tab, Take 1 Tablet by mouth 4 times a day as needed (pain at night/sleep)., Disp: , Rfl:     acetaminophen (TYLENOL) 650 MG CR tablet, Take 500 mg by mouth every four hours as needed for Fever or Mild Pain., Disp: , Rfl:     Biotin 10 MG Cap, Take  by mouth., Disp: , Rfl:     vitamin E (VITAMIN E) 1000 UNIT Cap, Take 1 Cap by mouth every day., Disp: , Rfl:     Calcium Carbonate-Vit D-Min (CALCIUM 1200 PO), Take 1  Each by mouth every day at 6 PM., Disp: , Rfl:     Multiple Vitamins-Minerals (OCUVITE-LUTEIN) Cap, Take 1 Each by mouth every day., Disp: , Rfl:     Omega-3 Fatty Acids (OMEGA 3 PO), Take 1 Each by mouth every day., Disp: , Rfl:     Multiple Vitamins-Minerals (MULTI COMPLETE PO), Take 1 Tab by mouth every day., Disp: , Rfl:     Bioflavonoid Products (VITAMIN C PLUS) 1000 MG TABS, Take 1 Tab by mouth every day., Disp: , Rfl:   ALLERGIES:   Allergies   Allergen Reactions    Codeine      halucinations     SURGHX:   Past Surgical History:   Procedure Laterality Date    CHOLECYSTECTOMY  9/2013    APPENDECTOMY  1956    CATARACT PHACO WITH IOL Bilateral      SOCHX:  reports that she has never smoked. She has never used smokeless tobacco. She reports that she does not currently use alcohol. She reports that she does not use drugs.  FH: Reviewed with patient, not pertinent to this visit.           Patient presents with:  Eye Problem: X 4 days Rt eye, face swelling, painful.  Patient states rash  is quite painful  with decreased visual acuity, patient has macular degeneration but states her vision has been worse over the last 4 days.  Patient states rash started with some blisters on the right side of her face a few days ago and have gotten progressively worse.  Patient now has yellow crusting on her face as well as redness swelling that extends to her mouth.  Patient denies any other complaints.  Patient has not tried any over-the-counter medications for her rash.  No other complaints.    Eye Problem   The right eye is affected. This is a new problem. The current episode started in the past 7 days. The problem occurs constantly. The problem has been gradually worsening. There was no injury mechanism. The pain is moderate. Associated symptoms include blurred vision, an eye discharge and eye redness. Pertinent negatives include no double vision, fever or photophobia. She has tried water for the symptoms. The treatment  "provided no relief.       Review of Systems   Constitutional:  Negative for fever.   Eyes:  Positive for blurred vision, pain, discharge and redness. Negative for double vision and photophobia.   Skin:  Positive for rash.   All other systems reviewed and are negative.             Objective     /66   Pulse 80   Temp 36.5 °C (97.7 °F) (Temporal)   Resp 18   Ht 1.6 m (5' 3\")   Wt 73.9 kg (163 lb)   SpO2 96%   BMI 28.87 kg/m²      Physical Exam  Vitals and nursing note reviewed.   Constitutional:       General: She is not in acute distress.     Appearance: Normal appearance. She is well-developed. She is not ill-appearing or toxic-appearing.   HENT:      Head: Normocephalic and atraumatic.        Right Ear: Tympanic membrane normal.      Left Ear: Tympanic membrane normal.      Nose: Nose normal.      Mouth/Throat:      Lips: Pink.      Mouth: Mucous membranes are moist.      Pharynx: Oropharynx is clear. Uvula midline.   Eyes:      Extraocular Movements: Extraocular movements intact.      Conjunctiva/sclera: Conjunctivae normal.      Pupils: Pupils are equal, round, and reactive to light.   Cardiovascular:      Rate and Rhythm: Normal rate and regular rhythm.      Pulses: Normal pulses.      Heart sounds: Normal heart sounds.   Pulmonary:      Effort: Pulmonary effort is normal.      Breath sounds: Normal breath sounds.   Abdominal:      General: Bowel sounds are normal.      Palpations: Abdomen is soft.   Musculoskeletal:         General: Normal range of motion.      Cervical back: Normal range of motion and neck supple.   Skin:     General: Skin is warm and dry.      Capillary Refill: Capillary refill takes less than 2 seconds.   Neurological:      General: No focal deficit present.      Mental Status: She is alert and oriented to person, place, and time.      Cranial Nerves: No cranial nerve deficit.      Motor: Motor function is intact.      Coordination: Coordination is intact.      Gait: Gait normal. "   Psychiatric:         Mood and Affect: Mood normal.                             Assessment & Plan        Assessment & Plan  Diffuse cellulitis of face         Ocular herpes zoster               Patient HPI physical exam consistent with herpes zoster which on exam affects upper and lower eyelids, forehead, ear nose and upper lip, and also appears to appears to include the eye itself.  I discussed this diagnosis with patient, I feel she needs to be seen in a facility that can provide a higher level of care due to the acuity of this complaint and infection.      Patient will go to [facility] emergency department PO.  Her son will drive her there.    I have contacted [facility] , spoken with RN with pt information and pending arrival to Emergency Room.     Differential diagnosis, supportive care, and indications for immediate follow-up discussed with patient.  Instructed to return to clinic or nearest emergency department for any change in condition, further concerns, or worsening of symptoms.    I personally reviewed prior external notes and test results pertinent to today's visit.  I have independently reviewed and interpreted all diagnostics ordered during this urgent care visit.    PT should follow up with PCP in 1-2 days for re-evaluation if symptoms have not improved.      Discussed red flags and reasons to return to  or ED.      Pt and/or family verbalized understanding of diagnosis and follow up instructions and was offered informational handout on diagnosis.  PT discharged.     Please note that this dictation was created using voice recognition software. I have made every reasonable attempt to correct obvious errors, but I expect that there may be errors of grammar and possibly content that I did not discover before finalizing the note.

## 2024-08-20 NOTE — DISCHARGE INSTRUCTIONS
Follow-up closely with ophthalmology.  Their office should reach out with an appointment, if they do not by the mid afternoon please call their office.  If your symptoms worsen significantly return to the emergency department

## 2024-08-23 ENCOUNTER — TELEPHONE (OUTPATIENT)
Dept: MEDICAL GROUP | Facility: PHYSICIAN GROUP | Age: 84
End: 2024-08-23
Payer: MEDICARE

## 2024-08-23 NOTE — TELEPHONE ENCOUNTER
Caller Name: Emilie  Call Back Number: [phone]    How would the patient prefer to be contacted with a response: Phone call OK to leave a detailed message    Patient went to  on 8/20 for Shingles. Called because they wanted to talk to PCP personally about shingles.     Pt was discharged with valacyclovir.

## 2024-08-26 NOTE — TELEPHONE ENCOUNTER
Phone Number Called: [phone]    Call outcome: Spoke to patient regarding message below.    Message: Let her know she should see her ophthalmologist and that she can keep her apt with Dr Higuera on 9/4/24.

## 2024-09-04 ENCOUNTER — OFFICE VISIT (OUTPATIENT)
Dept: MEDICAL GROUP | Facility: PHYSICIAN GROUP | Age: 84
End: 2024-09-04
Payer: MEDICARE

## 2024-09-04 VITALS
DIASTOLIC BLOOD PRESSURE: 60 MMHG | BODY MASS INDEX: 28.44 KG/M2 | WEIGHT: 160.5 LBS | OXYGEN SATURATION: 95 % | HEIGHT: 63 IN | HEART RATE: 77 BPM | TEMPERATURE: 98.5 F | SYSTOLIC BLOOD PRESSURE: 116 MMHG

## 2024-09-04 DIAGNOSIS — B02.30 HERPES ZOSTER WITH OPHTHALMIC COMPLICATION, UNSPECIFIED HERPES ZOSTER EYE DISEASE: ICD-10-CM

## 2024-09-04 PROCEDURE — 3074F SYST BP LT 130 MM HG: CPT | Performed by: FAMILY MEDICINE

## 2024-09-04 PROCEDURE — 3078F DIAST BP <80 MM HG: CPT | Performed by: FAMILY MEDICINE

## 2024-09-04 PROCEDURE — 99214 OFFICE O/P EST MOD 30 MIN: CPT | Performed by: FAMILY MEDICINE

## 2024-09-04 RX ORDER — ERYTHROMYCIN 5 MG/G
OINTMENT OPHTHALMIC
COMMUNITY
Start: 2024-08-28 | End: 2024-09-16

## 2024-09-04 ASSESSMENT — ENCOUNTER SYMPTOMS
EYE REDNESS: 1
EYE DISCHARGE: 1
CHILLS: 0
BLURRED VISION: 0
FEVER: 0

## 2024-09-04 ASSESSMENT — FIBROSIS 4 INDEX: FIB4 SCORE: 3.43

## 2024-09-04 NOTE — PROGRESS NOTES
Verbal consent was acquired by the patient to use [email or web address] listening note generation during this visit         History of Present Illness  The patient is here for a follow-up visit. Her last consultation was on 04/30/2024. She is accompanied by her daughter.    She recently visited the emergency room on 08/20/2024 due to right ophthalmic shingles. She experienced facial flushing on Saturday, initially attributing it to a reaction from consuming shrimp at a restaurant the previous night. By Sunday, she felt unusually warm and sought help at an urgent care facility. On 8/20 The urgent care provider suspected shingles and referred her to the emergency room.     An ophthalmologist, Dr. Weeks, was consulted in the ER.  Patient was discharged on erythromycin ointment and Valtrex   she had a follow-up appointment with him on 08/26/2024, during which he prescribed an eye ointment. However, there was a delay in receiving the ointment due to an oversight in placing the order. She developed scabs on her eyelid, which were alleviated by the ointment. She was also prescribed Valtrex, an antiviral medication, to be taken three times daily for ten days. She completed this course on the following Saturday. She has another appointment scheduled with Dr. Weeks on 09/12/2024.  Will be following up with her regular ophthalmologist, Dr. LOZANO, on September 9.    She reports feeling unwell, with right facial soreness that started two days ago. Her face is tender and her nose is filled with scabs, causing difficulty in breathing. She reports no fevers or chills. Her vision in the right eye is satisfactory when she can keep it open, but she struggles to do so. She is under the care of Dr. LOZANO at [facility] for macular degeneration and is due for an injection in her right eye next week.  She has attempted to contact Dr. LOZANO twice without success. She has been managing her pain with Tylenol, which provides temporary relief. She takes  Tylenol PM to aid sleep and regular Tylenol once or twice daily. She continues to use erythromycin eye drops, prescribed during her ER visit, once daily upon waking. She believes her sinuses have been affected. She has tried  using hot moist cloths to soften the scabs, but these methods have not been effective.  Has not tried over-the-counter Vaseline.    She continues her daily Celebrex and nightly simvastatin regimen. She has lost some weight and has been taking extra vitamin C. She still experiences significant crusting in her right eye in the mornings. She had a rash in her hair, which has mostly cleared but remains slightly bumpy. She has been eating soft foods as she has been unable to wear her dentures.      Review of Systems   Constitutional:  Negative for chills and fever.   Eyes:  Positive for discharge and redness. Negative for blurred vision.        Difficulty keeping right eye open   Skin:  Positive for rash.        Right facial erythema  Right side nose crusted over         Outpatient Medications Marked as Taking for the 9/4/24 encounter (Office Visit) with Summer Higuera M.D.   Medication Sig Dispense Refill    valacyclovir (VALTREX) 1 GM Tab Take 1 Tablet by mouth in the morning, at noon, and at bedtime. 30 Tablet 0    celecoxib (CELEBREX) 100 MG Cap Take 1 Capsule by mouth every day. 100 Capsule 3    simvastatin (ZOCOR) 20 MG Tab TAKE 1 TABLET BY MOUTH EVERY EVENING 100 Tablet 3    artificial tears (EYE LUBRICANT) Ointment ophth ointment Apply 1 Application to both eyes 2 times a day. 3 drops into the right eye, 2 drops in the left.      VITAMIN D PO Take 1,000 mg by mouth every day.      diphenhydrAMINE-APAP, sleep, (TYLENOL PM EXTRA STRENGTH)  MG Tab Take 1 Tablet by mouth 4 times a day as needed (pain at night/sleep).      acetaminophen (TYLENOL) 650 MG CR tablet Take 500 mg by mouth every four hours as needed for Fever or Mild Pain.      Biotin 10 MG Cap Take  by mouth.      vitamin E  "(VITAMIN E) 1000 UNIT Cap Take 1 Cap by mouth every day.      Calcium Carbonate-Vit D-Min (CALCIUM 1200 PO) Take 1 Each by mouth every day at 6 PM.      Multiple Vitamins-Minerals (OCUVITE-LUTEIN) Cap Take 1 Each by mouth every day.      Omega-3 Fatty Acids (OMEGA 3 PO) Take 1 Each by mouth every day.      Multiple Vitamins-Minerals (MULTI COMPLETE PO) Take 1 Tab by mouth every day.      Bioflavonoid Products (VITAMIN C PLUS) 1000 MG TABS Take 1 Tab by mouth every day.         /60 (BP Location: Left arm, Patient Position: Sitting, BP Cuff Size: Adult)   Pulse 77   Temp 36.9 °C (98.5 °F) (Temporal)   Ht 1.6 m (5' 3\")   Wt 72.8 kg (160 lb 8 oz)   SpO2 95% , Body mass index is 28.43 kg/m².        Physical Exam  Constitutional:       Appearance: Normal appearance.   Eyes:      Extraocular Movements: Extraocular movements intact.   Cardiovascular:      Rate and Rhythm: Normal rate and regular rhythm.   Pulmonary:      Effort: Pulmonary effort is normal.      Breath sounds: Normal breath sounds.   Skin:     Findings: Rash present.      Comments: Right cheek/eye erythema  No vesicles    Crusting over right side of nose   Neurological:      Mental Status: She is alert.   Psychiatric:         Mood and Affect: Mood normal.         Behavior: Behavior normal.         Results         Assessment & Plan  1. Right ophthalmic shingles.  New issue. Improving.She was informed about the potential for postherpetic neuralgia following shingles, which could result in prolonged pain at the site of the rash. The importance of immediate treatment initiation was emphasized. She was reassured that she is not contagious once the lesions have scabbed over. She was advised to apply Vaseline or Neosporin to soften the crusts on her nose.     An attempt will be made to expedite her appointment with Dr. LOZANO. She was instructed to request her eye doctors to forward their records. She was also advised to inquire about their recommendations " during her follow-up visits with Dr. LOZANO and Dr. Weeks. Gabapentin was discussed as a potential treatment for pain, but due to concerns about side effects, it will be considered only if necessary after her follow-up appointments.    2. Medication Management.  She is currently taking Tylenol for pain, which she reports helps ease the pain temporarily. She takes Tylenol PM to help her sleep and regular Tylenol once or twice a day. She was advised to continue her current medications, including Celebrex and simvastatin.    Follow-up  She will follow up with me in 2 weeks.  Orders Placed This Encounter    erythromycin 5 MG/GM Ointment             I spent a total of 32 minutes which included time preparing to see the patient (reviewing my last note, records and recent lab results)  I also performed a medically appropriate examination, counseled and educated the patient,  communication with other providers, and documentation of this encounter.     This note was created using voice recognition software (Dragon). The accuracy of the dictation is limited by the abilities of the software. I have reviewed the note prior to signing, however some errors in grammar and context are still possible. If you have any questions related to this note please do not hesitate to contact our office.

## 2024-09-16 ENCOUNTER — APPOINTMENT (OUTPATIENT)
Dept: MEDICAL GROUP | Facility: PHYSICIAN GROUP | Age: 84
End: 2024-09-16
Payer: MEDICARE

## 2024-09-16 ENCOUNTER — OFFICE VISIT (OUTPATIENT)
Dept: MEDICAL GROUP | Facility: PHYSICIAN GROUP | Age: 84
End: 2024-09-16
Payer: MEDICARE

## 2024-09-16 VITALS
DIASTOLIC BLOOD PRESSURE: 60 MMHG | HEIGHT: 63 IN | BODY MASS INDEX: 28.62 KG/M2 | HEART RATE: 80 BPM | TEMPERATURE: 98.5 F | WEIGHT: 161.5 LBS | SYSTOLIC BLOOD PRESSURE: 118 MMHG | OXYGEN SATURATION: 97 %

## 2024-09-16 DIAGNOSIS — B02.30 HERPES ZOSTER WITH OPHTHALMIC COMPLICATION, UNSPECIFIED HERPES ZOSTER EYE DISEASE: ICD-10-CM

## 2024-09-16 DIAGNOSIS — H91.8X1 OTHER SPECIFIED HEARING LOSS OF RIGHT EAR, UNSPECIFIED HEARING STATUS ON CONTRALATERAL SIDE: ICD-10-CM

## 2024-09-16 PROCEDURE — 3078F DIAST BP <80 MM HG: CPT | Performed by: FAMILY MEDICINE

## 2024-09-16 PROCEDURE — 99213 OFFICE O/P EST LOW 20 MIN: CPT | Performed by: FAMILY MEDICINE

## 2024-09-16 PROCEDURE — 3074F SYST BP LT 130 MM HG: CPT | Performed by: FAMILY MEDICINE

## 2024-09-16 ASSESSMENT — FIBROSIS 4 INDEX: FIB4 SCORE: 3.43

## 2024-09-16 NOTE — PROGRESS NOTES
Verbal consent was acquired by the patient to use [employer] ambient listening note generation during this visit         History of Present Illness  The patient presents today for a follow-up visit. She was last seen on 09/04/2024 and is accompanied by her son.    She visited Dr. LOZANO last week but could not receive an eye injection to the right eye due to ongoing symptoms. She plans to return next Monday.    She describes a sensation of congestion in her right nostril, which she attributes to internal coating, and is unsure how to alleviate it. She has attempted to use Vaseline in her nose but found it unhelpful. She has been using a Q-tip to apply CBD oil to her nose and tries to keep it dry. She washes the side of her face with a cloth to avoid getting her nose wet.    She experiences numbness on the right side of her face, extending from her eye to her mouth, but reports no pain. She believes an injection may help with this issue. She notes that her vision in her right eye has always been slightly weaker than her left. During her last visit, her left eye was in good condition, but she was due for an injection in her right eye. She has been applying CBD oil to scabs around her eye, which she believes has helped clear them up. She has also been using Refresh eye drops.    She has completed a course of antibiotics prescribed during her last visit. She has been able to put her teeth back in after the blisters in her mouth subsided. She reports no fevers or chills. She is unsure if her shingles have affected her hearing, as she perceives everything as blurry. She has an appointment scheduled for the first of next month to address this issue.    She will see her heart doctor next month.      Review of Systems   HENT:  Positive for hearing loss (RIGHT).    Skin:  Positive for rash (RIGHT nasal - improving).   Neurological:         Right facial numbness       Outpatient Medications Marked as Taking for the 9/16/24  "encounter (Office Visit) with Summer Higuera M.D.   Medication Sig Dispense Refill    celecoxib (CELEBREX) 100 MG Cap Take 1 Capsule by mouth every day. 100 Capsule 3    simvastatin (ZOCOR) 20 MG Tab TAKE 1 TABLET BY MOUTH EVERY EVENING 100 Tablet 3    artificial tears (EYE LUBRICANT) Ointment ophth ointment Apply 1 Application to both eyes 2 times a day. 3 drops into the right eye, 2 drops in the left.      VITAMIN D PO Take 1,000 mg by mouth every day.      diphenhydrAMINE-APAP, sleep, (TYLENOL PM EXTRA STRENGTH)  MG Tab Take 1 Tablet by mouth 4 times a day as needed (pain at night/sleep).      acetaminophen (TYLENOL) 650 MG CR tablet Take 500 mg by mouth every four hours as needed for Fever or Mild Pain.      Biotin 10 MG Cap Take  by mouth.      vitamin E (VITAMIN E) 1000 UNIT Cap Take 1 Cap by mouth every day.      Calcium Carbonate-Vit D-Min (CALCIUM 1200 PO) Take 1 Each by mouth every day at 6 PM.      Multiple Vitamins-Minerals (OCUVITE-LUTEIN) Cap Take 1 Each by mouth every day.      Omega-3 Fatty Acids (OMEGA 3 PO) Take 1 Each by mouth every day.      Multiple Vitamins-Minerals (MULTI COMPLETE PO) Take 1 Tab by mouth every day.      Bioflavonoid Products (VITAMIN C PLUS) 1000 MG TABS Take 1 Tab by mouth every day.         /60 (BP Location: Left arm, Patient Position: Sitting, BP Cuff Size: Adult)   Pulse 80   Temp 36.9 °C (98.5 °F) (Temporal)   Ht 1.6 m (5' 3\")   Wt 73.3 kg (161 lb 8 oz)   SpO2 97% , Body mass index is 28.61 kg/m².        Physical Exam  Constitutional:       Appearance: Normal appearance.   HENT:      Ears:      Comments: Slight bilateral cerumen  Bilateral hearing aides  Eyes:      Extraocular Movements: Extraocular movements intact.   Cardiovascular:      Rate and Rhythm: Normal rate and regular rhythm.   Pulmonary:      Effort: Pulmonary effort is normal.      Breath sounds: Normal breath sounds.   Skin:     Findings: Rash (right nasal from shingles) present. "   Neurological:      Mental Status: She is alert.   Psychiatric:         Mood and Affect: Mood normal.         Behavior: Behavior normal.         Results         Assessment & Plan  1. Shingles.  Her condition has shown significant improvement, with an estimated 75% recovery since the last visit. She reports numbness on the right side of her face but no pain. Vision in the right eye remains weaker than the left, and she has been using CBD oil on the scabs, which seems to help.  She is also advised to follow up with Dr. LOZANO on September 23, 2024, for her regular follow-up.    2. Hearing loss.  She reports blurry hearing on the right side since having shingles. An appointment with a hearing specialist is scheduled for the beginning of next month. Over-the-counter eardrops for wax removal are recommended.      Follow-up  The patient will follow up in 3 months.  No orders of the defined types were placed in this encounter.            This note was created using voice recognition software (Dragon). The accuracy of the dictation is limited by the abilities of the software. I have reviewed the note prior to signing, however some errors in grammar and context are still possible. If you have any questions related to this note please do not hesitate to contact our office.

## 2024-10-14 ENCOUNTER — OFFICE VISIT (OUTPATIENT)
Dept: CARDIOLOGY | Facility: MEDICAL CENTER | Age: 84
End: 2024-10-14
Attending: INTERNAL MEDICINE
Payer: MEDICARE

## 2024-10-14 VITALS
RESPIRATION RATE: 14 BRPM | WEIGHT: 160 LBS | HEIGHT: 63 IN | BODY MASS INDEX: 28.35 KG/M2 | OXYGEN SATURATION: 96 % | DIASTOLIC BLOOD PRESSURE: 50 MMHG | HEART RATE: 86 BPM | SYSTOLIC BLOOD PRESSURE: 110 MMHG

## 2024-10-14 DIAGNOSIS — E78.5 DYSLIPIDEMIA: ICD-10-CM

## 2024-10-14 DIAGNOSIS — I35.1 AORTIC VALVE INSUFFICIENCY, ETIOLOGY OF CARDIAC VALVE DISEASE UNSPECIFIED: ICD-10-CM

## 2024-10-14 DIAGNOSIS — I35.0 AORTIC VALVE STENOSIS, ETIOLOGY OF CARDIAC VALVE DISEASE UNSPECIFIED: ICD-10-CM

## 2024-10-14 LAB — EKG IMPRESSION: NORMAL

## 2024-10-14 PROCEDURE — 99214 OFFICE O/P EST MOD 30 MIN: CPT | Performed by: INTERNAL MEDICINE

## 2024-10-14 PROCEDURE — 99213 OFFICE O/P EST LOW 20 MIN: CPT | Performed by: INTERNAL MEDICINE

## 2024-10-14 PROCEDURE — 3074F SYST BP LT 130 MM HG: CPT | Performed by: INTERNAL MEDICINE

## 2024-10-14 PROCEDURE — 93005 ELECTROCARDIOGRAM TRACING: CPT | Performed by: INTERNAL MEDICINE

## 2024-10-14 PROCEDURE — 3078F DIAST BP <80 MM HG: CPT | Performed by: INTERNAL MEDICINE

## 2024-10-14 PROCEDURE — 93010 ELECTROCARDIOGRAM REPORT: CPT | Performed by: INTERNAL MEDICINE

## 2024-10-14 ASSESSMENT — FIBROSIS 4 INDEX: FIB4 SCORE: 3.47

## 2024-10-14 ASSESSMENT — ENCOUNTER SYMPTOMS
DIZZINESS: 0
COUGH: 0
LOSS OF CONSCIOUSNESS: 0
PALPITATIONS: 0
MYALGIAS: 0
SHORTNESS OF BREATH: 0

## 2024-10-30 ENCOUNTER — NON-PROVIDER VISIT (OUTPATIENT)
Dept: MEDICAL GROUP | Facility: PHYSICIAN GROUP | Age: 84
End: 2024-10-30
Payer: MEDICARE

## 2024-10-30 DIAGNOSIS — Z23 NEED FOR VACCINATION: ICD-10-CM

## 2024-10-31 DIAGNOSIS — B02.30 HERPES ZOSTER WITH OPHTHALMIC COMPLICATION, UNSPECIFIED HERPES ZOSTER EYE DISEASE: ICD-10-CM

## 2024-11-07 ENCOUNTER — HOSPITAL ENCOUNTER (OUTPATIENT)
Dept: CARDIOLOGY | Facility: MEDICAL CENTER | Age: 84
End: 2024-11-07
Attending: INTERNAL MEDICINE
Payer: MEDICARE

## 2024-11-07 DIAGNOSIS — I35.0 AORTIC VALVE STENOSIS, ETIOLOGY OF CARDIAC VALVE DISEASE UNSPECIFIED: ICD-10-CM

## 2024-11-07 DIAGNOSIS — I35.1 AORTIC VALVE INSUFFICIENCY, ETIOLOGY OF CARDIAC VALVE DISEASE UNSPECIFIED: ICD-10-CM

## 2024-11-07 PROCEDURE — 93306 TTE W/DOPPLER COMPLETE: CPT

## 2024-11-08 LAB
LV EJECT FRACT MOD 2C 99903: 50.41
LV EJECT FRACT MOD 4C 99902: 54.38
LV EJECT FRACT MOD BP 99901: 52.78

## 2024-11-08 PROCEDURE — 93306 TTE W/DOPPLER COMPLETE: CPT | Mod: 26 | Performed by: INTERNAL MEDICINE

## 2024-11-13 NOTE — RESULT ENCOUNTER NOTE
Call the patient about the results of her echocardiogram showing normal LVEF and moderate aortic regurgitation and mild aortic stenosis.  Clinically she continues to be asymptomatic for any shortness of breath or other cardiac symptoms.  I reviewed with the patient concerning the development of any worsening symptoms including but not limited to chest pain, dizziness, shorting of breath, fainting or near fainting, excessive fatigue and unable to do activities with the same energy level and if any of these occur to contact the clinic for earlier evaluation.

## 2024-12-17 ENCOUNTER — OFFICE VISIT (OUTPATIENT)
Dept: MEDICAL GROUP | Facility: PHYSICIAN GROUP | Age: 84
End: 2024-12-17
Payer: MEDICARE

## 2024-12-17 VITALS
BODY MASS INDEX: 28.23 KG/M2 | DIASTOLIC BLOOD PRESSURE: 64 MMHG | HEIGHT: 63 IN | SYSTOLIC BLOOD PRESSURE: 120 MMHG | HEART RATE: 64 BPM | OXYGEN SATURATION: 97 % | WEIGHT: 159.3 LBS | TEMPERATURE: 98.3 F

## 2024-12-17 DIAGNOSIS — M15.0 PRIMARY OSTEOARTHRITIS INVOLVING MULTIPLE JOINTS: ICD-10-CM

## 2024-12-17 DIAGNOSIS — I35.0 AORTIC VALVE STENOSIS, ETIOLOGY OF CARDIAC VALVE DISEASE UNSPECIFIED: ICD-10-CM

## 2024-12-17 DIAGNOSIS — E78.5 DYSLIPIDEMIA: ICD-10-CM

## 2024-12-17 DIAGNOSIS — B02.30 HERPES ZOSTER WITH OPHTHALMIC COMPLICATION, UNSPECIFIED HERPES ZOSTER EYE DISEASE: ICD-10-CM

## 2024-12-17 PROCEDURE — 3078F DIAST BP <80 MM HG: CPT | Performed by: FAMILY MEDICINE

## 2024-12-17 PROCEDURE — 3074F SYST BP LT 130 MM HG: CPT | Performed by: FAMILY MEDICINE

## 2024-12-17 PROCEDURE — 99214 OFFICE O/P EST MOD 30 MIN: CPT | Performed by: FAMILY MEDICINE

## 2024-12-17 RX ORDER — CELECOXIB 100 MG/1
100 CAPSULE ORAL DAILY
Qty: 100 CAPSULE | Refills: 3 | Status: SHIPPED | OUTPATIENT
Start: 2024-12-17

## 2024-12-17 RX ORDER — SIMVASTATIN 20 MG
TABLET ORAL
Qty: 100 TABLET | Refills: 3 | Status: SHIPPED | OUTPATIENT
Start: 2024-12-17

## 2024-12-17 ASSESSMENT — ENCOUNTER SYMPTOMS
FALLS: 0
PALPITATIONS: 0

## 2024-12-17 ASSESSMENT — FIBROSIS 4 INDEX: FIB4 SCORE: 3.47

## 2024-12-17 NOTE — PROGRESS NOTES
Verbal consent was acquired by the patient to use [employer] ambient listening note generation during this visit         History of Present Illness  The patient presents today for a follow-up visit. She was last seen in September.    She has been experiencing droopiness in her right eyelid, which she attributes to a previous shingles infection. She reports no sensation of coldness in her hands. She has been under the care of an optometrist, Dr. Hay, who has referred her to an ophthalmologist, Dr. Richie Darby, for potential surgical intervention. Her vision in the right eye remains satisfactory, as confirmed by the optometrist. She received injections in both eyes yesterday and is scheduled for a follow-up visit in 3 months. She has been adhering to a quarterly follow-up schedule with the optometrist, receiving injections in her right eye every 3 months and in her left eye every 5 to 6 months. She reports no balance issues or falls.    She has been experiencing nasal discomfort, which she believes is due to allergies. She has been taking an allergy medication, which has been effective in alleviating her symptoms. Additionally, she purchased a nasal spray from [employer] on Saturday, which has helped clear any nasal congestion.    She has been applying CBD oil to the affected area 2 to 3 times daily and uses a moisturizer. She reports no significant discomfort from the area, apart from the numbness. She reports no difficulty breathing, chest pain, or rapid heartbeat.    She continues to take Celebrex, which effectively manages her lower back and hip pain. She typically requires an epidural injection every 7 to 8 years but has not needed one since relocating approximately 8 to 9 years ago.    She is currently taking simvastatin 20 mg nightly for dyslipidemia.    She recently underwent an echocardiogram in November, which showed moderate aortic insufficiency and mild aortic stenosis. She saw Dr. Hodge in October, who  "is retiring, and will see Dr. White on 04/10/2025.    She is also taking vitamin D, biotin, vitamin E, calcium, and a multivitamin. She does not need refills on any of her medications.    MEDICATIONS  Simvastatin, Celebrex, vitamin D, biotin, vitamin E, calcium, multiple vitamin      Review of Systems   Eyes:         Right lower eyelid droop   Cardiovascular:  Negative for chest pain and palpitations.   Musculoskeletal:  Negative for falls.       Outpatient Medications Marked as Taking for the 12/17/24 encounter (Office Visit) with Summer Higuera M.D.   Medication Sig Dispense Refill    celecoxib (CELEBREX) 100 MG Cap Take 1 Capsule by mouth every day. 100 Capsule 3    simvastatin (ZOCOR) 20 MG Tab TAKE 1 TABLET BY MOUTH EVERY EVENING 100 Tablet 3    VITAMIN D PO Take 1,000 mg by mouth every day.      diphenhydrAMINE-APAP, sleep, (TYLENOL PM EXTRA STRENGTH)  MG Tab Take 1 Tablet by mouth 4 times a day as needed (pain at night/sleep).      acetaminophen (TYLENOL) 650 MG CR tablet Take 500 mg by mouth every four hours as needed for Fever or Mild Pain.      Biotin 10 MG Cap Take  by mouth.      vitamin E (VITAMIN E) 1000 UNIT Cap Take 1 Cap by mouth every day.      Calcium Carbonate-Vit D-Min (CALCIUM 1200 PO) Take 1 Each by mouth every day at 6 PM.      Multiple Vitamins-Minerals (OCUVITE-LUTEIN) Cap Take 1 Each by mouth every day.      Omega-3 Fatty Acids (OMEGA 3 PO) Take 1 Each by mouth every day.      Multiple Vitamins-Minerals (MULTI COMPLETE PO) Take 1 Tab by mouth every day.      Bioflavonoid Products (VITAMIN C PLUS) 1000 MG TABS Take 1 Tab by mouth every day.         /64   Pulse 64   Temp 36.8 °C (98.3 °F) (Temporal)   Ht 1.6 m (5' 3\")   Wt 72.3 kg (159 lb 4.8 oz)   SpO2 97% , Body mass index is 28.22 kg/m².        Physical Exam  Constitutional:       Appearance: Normal appearance.   Eyes:      Extraocular Movements: Extraocular movements intact.      Comments: Right lower drooping eyelid "   Cardiovascular:      Rate and Rhythm: Normal rate and regular rhythm.   Pulmonary:      Effort: Pulmonary effort is normal.      Breath sounds: Normal breath sounds.   Neurological:      Mental Status: She is alert.   Psychiatric:         Mood and Affect: Mood normal.         Behavior: Behavior normal.       Results  Imaging  Echocardiogram in November showed moderate AI, mild aortic stenosis.       Assessment & Plan  1. Dyslipidemia.  This is a chronic medical diagnosis.  Stable she is currently taking simvastatin 20 mg nightly.    2. Aortic stenosis.  Chronic medical diagnosis.  Stable.  She recently underwent an echocardiogram in November, which showed moderate aortic insufficiency and mild aortic stenosis.  Will be following up with cardiology in April.      3. Shingles.  She reports numbness and irritation on her right eyelid and nose, likely due to previous shingles. She has been using CBD oil applied by her daughter, which may have left some residue. No current treatment is being applied other than moisture cream.    She has been referred to Dr. Richie Darby for potential surgical correction. .    4. Allergic rhinitis.  She started taking an allergy pill and using a nasal spray, which has helped dry up and open her nasal passages.    5. Chronic pain.  She continues to take Celebrex for lower back and hip pain, which has been effective. She has not required an epidural in the past 8-9 years.    6. Medication management.  She is also taking vitamin D, biotin, vitamin E, calcium, and a multivitamin. She does not need refills on any of her medications.    Orders Placed This Encounter    Lipid Profile    TSH WITH REFLEX TO FT4    VITAMIN B12    VITAMIN D,25 HYDROXY (DEFICIENCY)    Comp Metabolic Panel    CBC WITH DIFFERENTIAL    celecoxib (CELEBREX) 100 MG Cap    simvastatin (ZOCOR) 20 MG Tab               This note was created using voice recognition software (Dragon). The accuracy of the dictation is limited by  the abilities of the software. I have reviewed the note prior to signing, however some errors in grammar and context are still possible. If you have any questions related to this note please do not hesitate to contact our office.

## 2025-01-28 ENCOUNTER — HOSPITAL ENCOUNTER (OUTPATIENT)
Dept: LAB | Facility: MEDICAL CENTER | Age: 85
End: 2025-01-28
Attending: OPHTHALMOLOGY
Payer: MEDICARE

## 2025-01-28 LAB
ANION GAP SERPL CALC-SCNC: 11 MMOL/L (ref 7–16)
BUN SERPL-MCNC: 17 MG/DL (ref 8–22)
CALCIUM SERPL-MCNC: 9.5 MG/DL (ref 8.5–10.5)
CHLORIDE SERPL-SCNC: 105 MMOL/L (ref 96–112)
CO2 SERPL-SCNC: 25 MMOL/L (ref 20–33)
CREAT SERPL-MCNC: 0.97 MG/DL (ref 0.5–1.4)
GFR SERPLBLD CREATININE-BSD FMLA CKD-EPI: 57 ML/MIN/1.73 M 2
GLUCOSE SERPL-MCNC: 96 MG/DL (ref 65–99)
POTASSIUM SERPL-SCNC: 4.4 MMOL/L (ref 3.6–5.5)
SODIUM SERPL-SCNC: 141 MMOL/L (ref 135–145)

## 2025-01-28 PROCEDURE — 36415 COLL VENOUS BLD VENIPUNCTURE: CPT

## 2025-01-28 PROCEDURE — 80048 BASIC METABOLIC PNL TOTAL CA: CPT

## 2025-02-20 ENCOUNTER — HOSPITAL ENCOUNTER (OUTPATIENT)
Dept: LAB | Facility: MEDICAL CENTER | Age: 85
End: 2025-02-20
Attending: FAMILY MEDICINE
Payer: MEDICARE

## 2025-02-20 DIAGNOSIS — E78.5 DYSLIPIDEMIA: ICD-10-CM

## 2025-02-20 LAB
BASOPHILS # BLD AUTO: 1 % (ref 0–1.8)
BASOPHILS # BLD: 0.04 K/UL (ref 0–0.12)
EOSINOPHIL # BLD AUTO: 0.13 K/UL (ref 0–0.51)
EOSINOPHIL NFR BLD: 3.2 % (ref 0–6.9)
ERYTHROCYTE [DISTWIDTH] IN BLOOD BY AUTOMATED COUNT: 44.2 FL (ref 35.9–50)
HCT VFR BLD AUTO: 43 % (ref 37–47)
HGB BLD-MCNC: 13.9 G/DL (ref 12–16)
IMM GRANULOCYTES # BLD AUTO: 0.02 K/UL (ref 0–0.11)
IMM GRANULOCYTES NFR BLD AUTO: 0.5 % (ref 0–0.9)
LYMPHOCYTES # BLD AUTO: 1.73 K/UL (ref 1–4.8)
LYMPHOCYTES NFR BLD: 42.2 % (ref 22–41)
MCH RBC QN AUTO: 30.2 PG (ref 27–33)
MCHC RBC AUTO-ENTMCNC: 32.3 G/DL (ref 32.2–35.5)
MCV RBC AUTO: 93.5 FL (ref 81.4–97.8)
MONOCYTES # BLD AUTO: 0.56 K/UL (ref 0–0.85)
MONOCYTES NFR BLD AUTO: 13.7 % (ref 0–13.4)
NEUTROPHILS # BLD AUTO: 1.62 K/UL (ref 1.82–7.42)
NEUTROPHILS NFR BLD: 39.4 % (ref 44–72)
NRBC # BLD AUTO: 0 K/UL
NRBC BLD-RTO: 0 /100 WBC (ref 0–0.2)
PLATELET # BLD AUTO: 156 K/UL (ref 164–446)
PMV BLD AUTO: 12.3 FL (ref 9–12.9)
RBC # BLD AUTO: 4.6 M/UL (ref 4.2–5.4)
WBC # BLD AUTO: 4.1 K/UL (ref 4.8–10.8)

## 2025-02-20 PROCEDURE — 84443 ASSAY THYROID STIM HORMONE: CPT

## 2025-02-20 PROCEDURE — 85025 COMPLETE CBC W/AUTO DIFF WBC: CPT

## 2025-02-20 PROCEDURE — 82607 VITAMIN B-12: CPT

## 2025-02-20 PROCEDURE — 80061 LIPID PANEL: CPT

## 2025-02-20 PROCEDURE — 80053 COMPREHEN METABOLIC PANEL: CPT

## 2025-02-20 PROCEDURE — 36415 COLL VENOUS BLD VENIPUNCTURE: CPT

## 2025-02-20 PROCEDURE — 82306 VITAMIN D 25 HYDROXY: CPT

## 2025-02-21 ENCOUNTER — RESULTS FOLLOW-UP (OUTPATIENT)
Dept: MEDICAL GROUP | Facility: PHYSICIAN GROUP | Age: 85
End: 2025-02-21

## 2025-02-21 LAB
25(OH)D3 SERPL-MCNC: 43 NG/ML (ref 30–100)
ALBUMIN SERPL BCP-MCNC: 4.1 G/DL (ref 3.2–4.9)
ALBUMIN/GLOB SERPL: 1.3 G/DL
ALP SERPL-CCNC: 61 U/L (ref 30–99)
ALT SERPL-CCNC: 23 U/L (ref 2–50)
ANION GAP SERPL CALC-SCNC: 8 MMOL/L (ref 7–16)
AST SERPL-CCNC: 32 U/L (ref 12–45)
BILIRUB SERPL-MCNC: 0.7 MG/DL (ref 0.1–1.5)
BUN SERPL-MCNC: 21 MG/DL (ref 8–22)
CALCIUM ALBUM COR SERPL-MCNC: 8.9 MG/DL (ref 8.5–10.5)
CALCIUM SERPL-MCNC: 9 MG/DL (ref 8.5–10.5)
CHLORIDE SERPL-SCNC: 105 MMOL/L (ref 96–112)
CHOLEST SERPL-MCNC: 158 MG/DL (ref 100–199)
CO2 SERPL-SCNC: 27 MMOL/L (ref 20–33)
CREAT SERPL-MCNC: 1.03 MG/DL (ref 0.5–1.4)
FASTING STATUS PATIENT QL REPORTED: NORMAL
GFR SERPLBLD CREATININE-BSD FMLA CKD-EPI: 53 ML/MIN/1.73 M 2
GLOBULIN SER CALC-MCNC: 3.1 G/DL (ref 1.9–3.5)
GLUCOSE SERPL-MCNC: 101 MG/DL (ref 65–99)
HDLC SERPL-MCNC: 39 MG/DL
LDLC SERPL CALC-MCNC: 89 MG/DL
POTASSIUM SERPL-SCNC: 4.5 MMOL/L (ref 3.6–5.5)
PROT SERPL-MCNC: 7.2 G/DL (ref 6–8.2)
SODIUM SERPL-SCNC: 140 MMOL/L (ref 135–145)
TRIGL SERPL-MCNC: 152 MG/DL (ref 0–149)
TSH SERPL DL<=0.005 MIU/L-ACNC: 3.11 UIU/ML (ref 0.38–5.33)
VIT B12 SERPL-MCNC: 623 PG/ML (ref 211–911)

## 2025-02-27 DIAGNOSIS — M15.0 PRIMARY OSTEOARTHRITIS INVOLVING MULTIPLE JOINTS: ICD-10-CM

## 2025-02-27 RX ORDER — CELECOXIB 100 MG/1
100 CAPSULE ORAL DAILY
Qty: 100 CAPSULE | Refills: 3 | Status: SHIPPED | OUTPATIENT
Start: 2025-02-27

## 2025-03-08 DIAGNOSIS — E78.5 DYSLIPIDEMIA: ICD-10-CM

## 2025-03-10 RX ORDER — SIMVASTATIN 20 MG
20 TABLET ORAL EVERY EVENING
Qty: 100 TABLET | Refills: 3 | Status: SHIPPED | OUTPATIENT
Start: 2025-03-10

## 2025-03-19 ENCOUNTER — OFFICE VISIT (OUTPATIENT)
Dept: MEDICAL GROUP | Facility: PHYSICIAN GROUP | Age: 85
End: 2025-03-19
Payer: MEDICARE

## 2025-03-19 VITALS
OXYGEN SATURATION: 97 % | BODY MASS INDEX: 28.65 KG/M2 | HEART RATE: 63 BPM | HEIGHT: 63 IN | TEMPERATURE: 97.6 F | SYSTOLIC BLOOD PRESSURE: 124 MMHG | WEIGHT: 161.7 LBS | DIASTOLIC BLOOD PRESSURE: 60 MMHG

## 2025-03-19 DIAGNOSIS — G47.09 OTHER INSOMNIA: ICD-10-CM

## 2025-03-19 DIAGNOSIS — N81.10 PROLAPSE OF FEMALE BLADDER, ACQUIRED: ICD-10-CM

## 2025-03-19 DIAGNOSIS — D70.9 NEUTROPENIA, UNSPECIFIED TYPE (HCC): ICD-10-CM

## 2025-03-19 DIAGNOSIS — E78.5 DYSLIPIDEMIA: ICD-10-CM

## 2025-03-19 DIAGNOSIS — Z78.0 POSTMENOPAUSAL: ICD-10-CM

## 2025-03-19 DIAGNOSIS — G20.A1 PARKINSON'S DISEASE, UNSPECIFIED WHETHER DYSKINESIA PRESENT, UNSPECIFIED WHETHER MANIFESTATIONS FLUCTUATE (HCC): ICD-10-CM

## 2025-03-19 DIAGNOSIS — D69.6 THROMBOCYTOPENIA (HCC): ICD-10-CM

## 2025-03-19 DIAGNOSIS — N18.31 STAGE 3A CHRONIC KIDNEY DISEASE: ICD-10-CM

## 2025-03-19 DIAGNOSIS — H35.3230 BILATERAL EXUDATIVE AGE-RELATED MACULAR DEGENERATION, UNSPECIFIED STAGE (HCC): ICD-10-CM

## 2025-03-19 PROCEDURE — 3074F SYST BP LT 130 MM HG: CPT | Performed by: FAMILY MEDICINE

## 2025-03-19 PROCEDURE — 3078F DIAST BP <80 MM HG: CPT | Performed by: FAMILY MEDICINE

## 2025-03-19 PROCEDURE — 99215 OFFICE O/P EST HI 40 MIN: CPT | Performed by: FAMILY MEDICINE

## 2025-03-19 RX ORDER — ACETAMINOPHEN/DIPHENHYDRAMINE 500MG-25MG
1 TABLET ORAL NIGHTLY PRN
Qty: 30 TABLET | Refills: 5
Start: 2025-03-19

## 2025-03-19 ASSESSMENT — PATIENT HEALTH QUESTIONNAIRE - PHQ9: CLINICAL INTERPRETATION OF PHQ2 SCORE: 0

## 2025-03-19 ASSESSMENT — FIBROSIS 4 INDEX: FIB4 SCORE: 3.64

## 2025-03-19 ASSESSMENT — ENCOUNTER SYMPTOMS
TREMORS: 0
INSOMNIA: 1

## 2025-03-19 NOTE — PROGRESS NOTES
Verbal consent was acquired by the patient to use [employer] listening note generation during this visit         History of Present Illness  The patient presents today for a follow-up visit. She was last seen on 12/17/2024. She has had some blood work done since her last appointment.    She has a history of dyslipidemia, which is currently stable. Recent labs show a total cholesterol level of 158 and an LDL level of 89. She is currently taking simvastatin 20 mg daily.    She has a history of chronic kidney disease (CKD), stage 3. Recent labs show a GFR of 53.    She underwent reconstructive surgery on her right eye on 02/04/2025 by Dr Darby, following a shingles outbreak. The procedure involved a graft from her collar bone. She reports improved vision post-surgery, although she notes persistent swelling on the affected side of her face. She was advised to avoid touching the graft site for two weeks post-surgery. She has a follow-up appointment scheduled with her surgeon next month. She reports no pain or tingling in the area where she had shingles in the right eye. She also reports congestion on one side of her nose since the shingles outbreak, with difficulty breathing through that nostril. She suspects a sinus issue but reports no associated pain or shortness of breath. She received her first shingles vaccine and experienced illness the following day. She is due for her second dose.    She saw Dr. Colvin two weeks ago for her annual visit. He diagnosed her with stage 2 Parkinson's disease. She does not experience tremors unless she is extremely tired or carrying heavy objects. She has not been prescribed any medications for this condition.    She received an injection in her right eye from Dr. LOZANO last week and is scheduled for a follow-up in 12 weeks.    She takes Tylenol PM at night to aid sleep, as she has difficulty falling asleep. She reports no daytime napping or evening consumption of coffee or tea. She  has not tried melatonin.    She has a history of bladder prolapse and uses pads for management. She consulted a gynecologist 20 years ago who recommended major surgery, but she has not pursued this option. She typically wakes up once during the night to urinate.    She saw a hematologist in 2023 for low white blood cell and platelet counts, but no concerns were raised. Recommendations to f/u prn.     FAMILY HISTORY  Her nephew has Parkinson's disease.    MEDICATIONS  Simvastatin, Celebrex, calcium, vitamin C, Tylenol PM, omega-3, vitamin E, biotin.    IMMUNIZATIONS  She has received her first shingles shot and is due for her second.      Review of Systems   HENT:          Right nasal congestion since Shingles   Eyes:         S/p right lower eyelid surgery 2/4/25   Genitourinary:         Bladder prolapse  Nocturia x 1   Neurological:  Negative for tremors.   Psychiatric/Behavioral:  The patient has insomnia.        Outpatient Medications Marked as Taking for the 3/19/25 encounter (Office Visit) with Summer Higuera M.D.   Medication Sig Dispense Refill    diphenhydrAMINE-APAP, sleep, (TYLENOL PM EXTRA STRENGTH)  MG Tab Take 1 Tablet by mouth at bedtime as needed (pain at night/sleep). 30 Tablet 5    simvastatin (ZOCOR) 20 MG Tab TAKE 1 TABLET BY MOUTH EVERY EVENING 100 Tablet 3    celecoxib (CELEBREX) 100 MG Cap TAKE 1 CAPSULE BY MOUTH EVERY  Capsule 3    VITAMIN D PO Take 1,000 mg by mouth every day.      acetaminophen (TYLENOL) 650 MG CR tablet Take 500 mg by mouth every four hours as needed for Fever or Mild Pain.      Biotin 10 MG Cap Take  by mouth.      vitamin E (VITAMIN E) 1000 UNIT Cap Take 1 Cap by mouth every day.      Calcium Carbonate-Vit D-Min (CALCIUM 1200 PO) Take 1 Each by mouth every day at 6 PM.      Multiple Vitamins-Minerals (OCUVITE-LUTEIN) Cap Take 1 Each by mouth every day.      Omega-3 Fatty Acids (OMEGA 3 PO) Take 1 Each by mouth every day.      Bioflavonoid Products (VITAMIN C  "PLUS) 1000 MG TABS Take 1 Tab by mouth every day.         /60   Pulse 63   Temp 36.4 °C (97.6 °F) (Temporal)   Ht 1.6 m (5' 3\")   Wt 73.3 kg (161 lb 11.2 oz)   SpO2 97% , Body mass index is 28.64 kg/m².        Physical Exam  Constitutional:       Appearance: Normal appearance.   Eyes:      Extraocular Movements: Extraocular movements intact.   Cardiovascular:      Rate and Rhythm: Normal rate and regular rhythm.      Heart sounds: Murmur heard.   Pulmonary:      Effort: Pulmonary effort is normal.      Breath sounds: Normal breath sounds.   Neurological:      Mental Status: She is alert.   Psychiatric:         Mood and Affect: Mood normal.         Behavior: Behavior normal.         Results  Laboratory Studies  Total cholesterol 158, LDL 89. GFR 53. Blood sugar 101. Vitamin D, B12, thyroid levels normal. Electrolytes normal. Liver function labs normal. White blood cell count and platelet counts slightly lower than normal.       Assessment & Plan  1. Dyslipidemia.  Chronic medical diagnosis.  Stable.  Her dyslipidemia is currently stable with recent labs showing a total cholesterol of 158 and LDL of 89. She will continue her current regimen of simvastatin 20 mg daily.    2. Chronic Kidney Disease (CKD), Stage 3.  Chronic medical diagnosis.  Stable.  Her CKD is stable with a recent GFR of 53. She is advised to avoid NSAIDs such as Advil, Aleve, and naproxen, and to maintain adequate hydration    3 Parkinson's Disease, Stage 2.   She has been diagnosed with stage 2 Parkinson's disease but is not currently on medication. She will continue to monitor her symptoms and follow up with her neurologist as needed.    4 Macular Degeneration.  She received a shot in her right eye last week and will follow up with her eye specialist in 12 weeks.    [facility] [place] [facility]    Diagnosis to address: G20.A1 - Parkinson's disease, unspecified whether dyskinesia present, unspecified whether manifestations fluctuate ([facility])  Assessment and " plan: Chronic, stable. Had f/u with neuro, Dr Colvin, two weeks ago. Has never been on any meds for this.  Will f/u with neuro in 1 year.   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 Receives injections to the right eye. Will f/u with her in 12 weeks.   Diagnosis: N18.31 - Stage 3a chronic kidney disease  Assessment and plan: Chronic, stable. Noted since 2015.  GFR 53  Last edited 03/19/25 12:57 PDT by Summer Higuera M.D.          5 Insomnia.  Chronic medical diagnosis.  Not well-controlled.  She takes Tylenol PM at night to aid sleep, as she has difficulty falling asleep. She is advised to switch to over-the-counter melatonin for sleep to avoid potential side effects of Tylenol PM, including dryness, urinary retention, constipation, dry mouth, balance issues, falls, and memory issues.    6 Bladder Prolapse.  Chronic medical diagnosis.  Stable.  She has a history of bladder prolapse and currently manages it with pads. She is informed about the advancements in treatment options over the past 20 years, including the use of slings and minimally invasive surgeries. A referral to a gynecologist can be made if she wishes to explore these options further.    7 Thrombocytopenia.  8.  Neutropenia  Chronic medical diagnoses.  Stable.  Her white blood cell count and platelet counts are slightly lower than normal, a condition that has persisted for at least 5 years. She will follow up with a new hematologist on 04/10/2025. If she starts noticing easy bruising, nosebleeds, or frequent illnesses, she should inform the provider immediately.    9.  Postmenopausal   A bone density test will be ordered as her last one was approximately 4.5 years ago.      She reports improved swellingon the side of her face following righteye surgery on 02/04/2025 by Dr Darby. She also experiences congestion and bumps on  the right side of her nose, which may be related to sinus issues. She will follow up with her eye surgeon next month.    PROCEDURE  The patient underwent reconstructive surgery on her right eye on 02/04/2025, which involved a graft from her collar bone.    Orders Placed This Encounter    DS-BONE DENSITY STUDY (DEXA)    diphenhydrAMINE-APAP, sleep, (TYLENOL PM EXTRA STRENGTH)  MG Tab       I spent a total of 40 minutes which included time preparing to see the patient (reviewing my last note, records and recent lab results)  I also performed a medically appropriate examination, counseled and educated the patient, ordered tests.  and documentation of this encounter.     This note was created using voice recognition software (Dragon) and [employer]. The accuracy of the dictation is limited by the abilities of the software.  . Occasional transcription errors may have escaped proof reading. I have made every reasonable attempt to correct obvious errors, but I expect that there are errors of grammar and possibly content that I did not discover before finalizing the note. ~

## 2025-04-01 ENCOUNTER — HOSPITAL ENCOUNTER (OUTPATIENT)
Dept: RADIOLOGY | Facility: MEDICAL CENTER | Age: 85
End: 2025-04-01
Attending: FAMILY MEDICINE
Payer: MEDICARE

## 2025-04-01 DIAGNOSIS — Z78.0 POSTMENOPAUSAL: ICD-10-CM

## 2025-04-01 PROCEDURE — 77080 DXA BONE DENSITY AXIAL: CPT

## 2025-04-02 ENCOUNTER — RESULTS FOLLOW-UP (OUTPATIENT)
Dept: MEDICAL GROUP | Facility: PHYSICIAN GROUP | Age: 85
End: 2025-04-02

## 2025-04-10 ENCOUNTER — OFFICE VISIT (OUTPATIENT)
Dept: CARDIOLOGY | Facility: MEDICAL CENTER | Age: 85
End: 2025-04-10
Attending: INTERNAL MEDICINE
Payer: MEDICARE

## 2025-04-10 VITALS
SYSTOLIC BLOOD PRESSURE: 128 MMHG | HEART RATE: 64 BPM | OXYGEN SATURATION: 95 % | DIASTOLIC BLOOD PRESSURE: 58 MMHG | BODY MASS INDEX: 28.17 KG/M2 | HEIGHT: 63 IN | WEIGHT: 159 LBS

## 2025-04-10 DIAGNOSIS — I35.1 AORTIC VALVE INSUFFICIENCY, ETIOLOGY OF CARDIAC VALVE DISEASE UNSPECIFIED: ICD-10-CM

## 2025-04-10 DIAGNOSIS — I77.810 ASCENDING AORTA DILATATION (HCC): ICD-10-CM

## 2025-04-10 PROCEDURE — 99214 OFFICE O/P EST MOD 30 MIN: CPT | Performed by: INTERNAL MEDICINE

## 2025-04-10 PROCEDURE — 99213 OFFICE O/P EST LOW 20 MIN: CPT | Performed by: INTERNAL MEDICINE

## 2025-04-10 ASSESSMENT — ENCOUNTER SYMPTOMS
FLANK PAIN: 0
DIARRHEA: 0
HEARTBURN: 0
SYNCOPE: 0
FEVER: 0
COUGH: 0
NEAR-SYNCOPE: 0
CONSTIPATION: 0
SHORTNESS OF BREATH: 0
PALPITATIONS: 0
IRREGULAR HEARTBEAT: 0
WEIGHT GAIN: 0
DIZZINESS: 0
ALTERED MENTAL STATUS: 0
PND: 0
ORTHOPNEA: 0
CLAUDICATION: 0
NAUSEA: 0
VOMITING: 0
DEPRESSION: 0
BACK PAIN: 0
BLURRED VISION: 0
WEIGHT LOSS: 0
DYSPNEA ON EXERTION: 0
DECREASED APPETITE: 0
ABDOMINAL PAIN: 0

## 2025-04-10 ASSESSMENT — FIBROSIS 4 INDEX: FIB4 SCORE: 3.64

## 2025-04-10 NOTE — PROGRESS NOTES
Cardiology Note    Chief Complaint   Patient presents with    Aortic Stenosis     Fv dx: Aortic valve stenosis, etiology of cardiac valve disease unspecified       History of Present Illness: Emilie Gonzalez is a 84 y.o. female PMH hypothyroid, parkinson's, shingles right side of face who presents for follow up. Previously seen by Dr Olivera for AF, AS, HLD.    Feels well today. No cardiac complaints. Walks without cardiovascular limitations. Compliant with medications and denies adverse effects.    Review of Systems   Constitutional: Negative for decreased appetite, fever, malaise/fatigue, weight gain and weight loss.   HENT:  Negative for congestion and nosebleeds.    Eyes:  Negative for blurred vision.   Cardiovascular:  Negative for chest pain, claudication, dyspnea on exertion, irregular heartbeat, leg swelling, near-syncope, orthopnea, palpitations, paroxysmal nocturnal dyspnea and syncope.   Respiratory:  Negative for cough and shortness of breath.    Endocrine: Negative for cold intolerance and heat intolerance.   Skin:  Negative for rash.   Musculoskeletal:  Negative for back pain.   Gastrointestinal:  Negative for abdominal pain, constipation, diarrhea, heartburn, melena, nausea and vomiting.   Genitourinary:  Negative for dysuria, flank pain and hematuria.   Neurological:  Negative for dizziness.   Psychiatric/Behavioral:  Negative for altered mental status and depression.          Past Medical History:   Diagnosis Date    Abnormal EKG 4/12/2017    Acute middle ear effusion, right 6/29/2022    Acute pain of left shoulder 6/29/2022    Arthritis of foot, right 06/09/2015    Benign essential tremor 06/09/2015    Benign essential tremor 6/9/2015    Dr Colvin    BMI 27.0-27.9,adult 9/6/2022    BMI 29.0-29.9,adult 9/6/2022    CKD (chronic kidney disease) stage 2, GFR 60-89 ml/min 9/6/2022    Cough     Edema 03/10/2015    Hearing loss sensory, bilateral 03/10/2015    History of shingles 09/26/2014     Hyperlipidemia 09/26/2014    Lung mass 7/6/2022    Macular degeneration of right eye 09/26/2014    Moderate aortic insufficiency 09/26/2014    Osteoarthritis 09/26/2014    Osteoporosis 09/26/2014    Parkinson's disease (HCC)     Plantar fasciitis, bilateral 03/10/2015    Pneumonia due to infectious organism 6/13/2022    Prolapsed bladder 09/26/2014         Past Surgical History:   Procedure Laterality Date    CHOLECYSTECTOMY  9/2013    APPENDECTOMY  1956    CATARACT PHACO WITH IOL Bilateral          Current Outpatient Medications   Medication Sig Dispense Refill    diphenhydrAMINE-APAP, sleep, (TYLENOL PM EXTRA STRENGTH)  MG Tab Take 1 Tablet by mouth at bedtime as needed (pain at night/sleep). 30 Tablet 5    simvastatin (ZOCOR) 20 MG Tab TAKE 1 TABLET BY MOUTH EVERY EVENING 100 Tablet 3    celecoxib (CELEBREX) 100 MG Cap TAKE 1 CAPSULE BY MOUTH EVERY  Capsule 3    VITAMIN D PO Take 1,000 mg by mouth every day.      acetaminophen (TYLENOL) 650 MG CR tablet Take 500 mg by mouth every four hours as needed for Fever or Mild Pain.      Biotin 10 MG Cap Take  by mouth.      vitamin E (VITAMIN E) 1000 UNIT Cap Take 1 Cap by mouth every day.      Calcium Carbonate-Vit D-Min (CALCIUM 1200 PO) Take 1 Each by mouth every day at 6 PM.      Multiple Vitamins-Minerals (OCUVITE-LUTEIN) Cap Take 1 Each by mouth every day.      Omega-3 Fatty Acids (OMEGA 3 PO) Take 1 Each by mouth every day.      Bioflavonoid Products (VITAMIN C PLUS) 1000 MG TABS Take 1 Tab by mouth every day.       No current facility-administered medications for this visit.         Allergies   Allergen Reactions    Codeine      halucinations         Family History   Problem Relation Age of Onset    Hypertension Father     Hypertension Sister     Arthritis Mother          Social History     Socioeconomic History    Marital status:      Spouse name: Not on file    Number of children: Not on file    Years of education: Not on file    Highest  "education level: Not on file   Occupational History    Not on file   Tobacco Use    Smoking status: Never    Smokeless tobacco: Never   Vaping Use    Vaping status: Never Used   Substance and Sexual Activity    Alcohol use: Not Currently     Comment: rare    Drug use: No    Sexual activity: Not Currently     Partners: Male     Comment: , bank, 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   Other Topics Concern    Not on file   Social History Narrative    Not on file     Social Drivers of Health     Financial Resource Strain: Not on file   Food Insecurity: Not on file   Transportation Needs: Not on file   Physical Activity: Not on file   Stress: Not on file   Social Connections: Not on file   Intimate Partner Violence: Not on file   Housing Stability: Not on file         Physical Exam:  Ambulatory Vitals  /58 (BP Location: Left arm, Patient Position: Sitting, BP Cuff Size: Adult)   Pulse 64   Ht 1.6 m (5' 3\")   Wt 72.1 kg (159 lb)   SpO2 95%    BP Readings from Last 4 Encounters:   04/10/25 128/58   03/19/25 124/60   12/17/24 120/64   10/14/24 110/50     Weight/BMI:   Vitals:    04/10/25 0755   BP: 128/58   Weight: 72.1 kg (159 lb)   Height: 1.6 m (5' 3\")    Body mass index is 28.17 kg/m².  Wt Readings from Last 4 Encounters:   04/10/25 72.1 kg (159 lb)   03/19/25 73.3 kg (161 lb 11.2 oz)   12/17/24 72.3 kg (159 lb 4.8 oz)   10/14/24 72.6 kg (160 lb)       Physical Exam  Constitutional:       General: She is not in acute distress.  HENT:      Head: Normocephalic and atraumatic.   Eyes:      Conjunctiva/sclera: Conjunctivae normal.      Pupils: Pupils are equal, round, and reactive to light.   Neck:      Vascular: No JVD.   Cardiovascular:      Rate and Rhythm: Normal rate and regular rhythm.      Heart sounds: Normal heart sounds. No murmur heard.     No friction rub. No gallop.   Pulmonary:      Effort: Pulmonary effort is normal. No respiratory distress.      Breath sounds: Normal breath sounds. No wheezing or rales.   Chest:      " "Chest wall: No tenderness.   Abdominal:      General: Bowel sounds are normal. There is no distension.      Palpations: Abdomen is soft.   Musculoskeletal:      Cervical back: Normal range of motion and neck supple.   Skin:     General: Skin is warm and dry.   Neurological:      Mental Status: She is alert and oriented to person, place, and time.   Psychiatric:         Mood and Affect: Affect normal.         Judgment: Judgment normal.         Lab Data Review:  Lab Results   Component Value Date/Time    CHOLSTRLTOT 158 02/20/2025 07:25 AM    LDL 89 02/20/2025 07:25 AM    HDL 39 (A) 02/20/2025 07:25 AM    TRIGLYCERIDE 152 (H) 02/20/2025 07:25 AM       Lab Results   Component Value Date/Time    SODIUM 140 02/20/2025 07:25 AM    POTASSIUM 4.5 02/20/2025 07:25 AM    CHLORIDE 105 02/20/2025 07:25 AM    CO2 27 02/20/2025 07:25 AM    GLUCOSE 101 (H) 02/20/2025 07:25 AM    BUN 21 02/20/2025 07:25 AM    CREATININE 1.03 02/20/2025 07:25 AM     CrCl cannot be calculated (Patient's most recent lab result is older than the maximum 7 days allowed.).  Lab Results   Component Value Date/Time    ALKPHOSPHAT 61 02/20/2025 07:25 AM    ASTSGOT 32 02/20/2025 07:25 AM    ALTSGPT 23 02/20/2025 07:25 AM    TBILIRUBIN 0.7 02/20/2025 07:25 AM      Lab Results   Component Value Date/Time    WBC 4.1 (L) 02/20/2025 07:25 AM     Lab Results   Component Value Date/Time    HBA1C 5.3 01/04/2021 09:57 AM     No components found for: \"TROP\"      Cardiac Imaging and Procedures Review:      TTE 11/2024  CONCLUSIONS  Normal left ventricular systolic function.  The left ventricular ejection fraction is visually estimated to be 55-  60%.  Mild aortic valve stenosis.  Moderate aortic insufficiency, eccentric.  Mild mitral regurgitation.  Normal right ventricular size and systolic function.  Unable to estimate right ventricular systolic pressure due to an   inadequate tricuspid regurgitant jet.  Compared to the prior study on 02/02/2023 there is now mild " aortic   stenosis otherwise there is no significant change    Medical Decision Making:  Problem List Items Addressed This Visit       Aortic regurgitation    Relevant Orders    EC-ECHOCARDIOGRAM COMPLETE W/O CONT    Ascending aorta dilatation (HCC)    Relevant Orders    EC-ECHOCARDIOGRAM COMPLETE W/O CONT       AR/AS / ascending aorta dilation - asymptomatic. Serial imaging.     It was my pleasure to meet with Ms. Gonzalez.

## 2025-07-23 ENCOUNTER — OFFICE VISIT (OUTPATIENT)
Dept: MEDICAL GROUP | Facility: PHYSICIAN GROUP | Age: 85
End: 2025-07-23
Payer: MEDICARE

## 2025-07-23 VITALS
SYSTOLIC BLOOD PRESSURE: 120 MMHG | OXYGEN SATURATION: 94 % | TEMPERATURE: 98 F | WEIGHT: 160.7 LBS | HEIGHT: 63 IN | HEART RATE: 74 BPM | DIASTOLIC BLOOD PRESSURE: 70 MMHG | BODY MASS INDEX: 28.47 KG/M2

## 2025-07-23 DIAGNOSIS — Z91.89 FRACTURE RISK ASSESSMENT SCORE (FRAX) INDICATING GREATER THAN 3% RISK FOR HIP FRACTURE: ICD-10-CM

## 2025-07-23 DIAGNOSIS — I35.1 AORTIC VALVE INSUFFICIENCY, ETIOLOGY OF CARDIAC VALVE DISEASE UNSPECIFIED: ICD-10-CM

## 2025-07-23 DIAGNOSIS — Z00.00 MEDICARE ANNUAL WELLNESS VISIT, SUBSEQUENT: Primary | ICD-10-CM

## 2025-07-23 DIAGNOSIS — N18.31 STAGE 3A CHRONIC KIDNEY DISEASE: ICD-10-CM

## 2025-07-23 DIAGNOSIS — D70.9 NEUTROPENIA, UNSPECIFIED TYPE (HCC): ICD-10-CM

## 2025-07-23 DIAGNOSIS — G47.09 OTHER INSOMNIA: ICD-10-CM

## 2025-07-23 DIAGNOSIS — G20.A1 PARKINSON'S DISEASE, UNSPECIFIED WHETHER DYSKINESIA PRESENT, UNSPECIFIED WHETHER MANIFESTATIONS FLUCTUATE (HCC): ICD-10-CM

## 2025-07-23 DIAGNOSIS — H35.3230 BILATERAL EXUDATIVE AGE-RELATED MACULAR DEGENERATION, UNSPECIFIED STAGE (HCC): ICD-10-CM

## 2025-07-23 DIAGNOSIS — D69.6 THROMBOCYTOPENIA (HCC): ICD-10-CM

## 2025-07-23 DIAGNOSIS — M15.0 PRIMARY OSTEOARTHRITIS INVOLVING MULTIPLE JOINTS: ICD-10-CM

## 2025-07-23 DIAGNOSIS — I35.0 AORTIC VALVE STENOSIS, ETIOLOGY OF CARDIAC VALVE DISEASE UNSPECIFIED: ICD-10-CM

## 2025-07-23 DIAGNOSIS — E78.5 DYSLIPIDEMIA: ICD-10-CM

## 2025-07-23 PROBLEM — N18.30 CKD (CHRONIC KIDNEY DISEASE) STAGE 3, GFR 30-59 ML/MIN: Status: RESOLVED | Noted: 2017-01-30 | Resolved: 2025-07-23

## 2025-07-23 PROCEDURE — G0439 PPPS, SUBSEQ VISIT: HCPCS | Performed by: FAMILY MEDICINE

## 2025-07-23 PROCEDURE — 3078F DIAST BP <80 MM HG: CPT | Performed by: FAMILY MEDICINE

## 2025-07-23 PROCEDURE — 3074F SYST BP LT 130 MM HG: CPT | Performed by: FAMILY MEDICINE

## 2025-07-23 RX ORDER — LORAZEPAM 2 MG/ML
0.5 INJECTION INTRAMUSCULAR PRN
OUTPATIENT
Start: 2025-07-23

## 2025-07-23 RX ORDER — SODIUM CHLORIDE 9 MG/ML
1000 INJECTION, SOLUTION INTRAVENOUS PRN
OUTPATIENT
Start: 2025-07-23

## 2025-07-23 RX ORDER — ALBUTEROL SULFATE 5 MG/ML
2.5 SOLUTION RESPIRATORY (INHALATION) PRN
OUTPATIENT
Start: 2025-07-23

## 2025-07-23 RX ORDER — 0.9 % SODIUM CHLORIDE 0.9 %
VIAL (ML) INJECTION PRN
OUTPATIENT
Start: 2025-07-23

## 2025-07-23 RX ORDER — 0.9 % SODIUM CHLORIDE 0.9 %
10 VIAL (ML) INJECTION PRN
OUTPATIENT
Start: 2025-07-23

## 2025-07-23 RX ORDER — ONDANSETRON 2 MG/ML
8 INJECTION INTRAMUSCULAR; INTRAVENOUS PRN
OUTPATIENT
Start: 2025-07-23

## 2025-07-23 RX ORDER — 0.9 % SODIUM CHLORIDE 0.9 %
3 VIAL (ML) INJECTION PRN
OUTPATIENT
Start: 2025-07-23

## 2025-07-23 RX ORDER — ACETAMINOPHEN 325 MG/1
650 TABLET ORAL ONCE
OUTPATIENT
Start: 2025-07-23 | End: 2025-07-23

## 2025-07-23 RX ORDER — ZOLEDRONIC ACID 0.05 MG/ML
5 INJECTION, SOLUTION INTRAVENOUS ONCE
OUTPATIENT
Start: 2025-07-23 | End: 2025-07-23

## 2025-07-23 RX ORDER — SODIUM CHLORIDE 9 MG/ML
INJECTION, SOLUTION INTRAVENOUS CONTINUOUS
OUTPATIENT
Start: 2025-07-23

## 2025-07-23 RX ORDER — ACETAMINOPHEN 325 MG/1
650 TABLET ORAL PRN
OUTPATIENT
Start: 2025-07-23

## 2025-07-23 RX ORDER — ONDANSETRON 8 MG/1
8 TABLET, ORALLY DISINTEGRATING ORAL PRN
OUTPATIENT
Start: 2025-07-23

## 2025-07-23 RX ORDER — METHYLPREDNISOLONE SODIUM SUCCINATE 125 MG/2ML
125 INJECTION, POWDER, LYOPHILIZED, FOR SOLUTION INTRAMUSCULAR; INTRAVENOUS PRN
OUTPATIENT
Start: 2025-07-23

## 2025-07-23 RX ORDER — MEPERIDINE HYDROCHLORIDE 25 MG/ML
25 INJECTION INTRAMUSCULAR; INTRAVENOUS; SUBCUTANEOUS PRN
OUTPATIENT
Start: 2025-07-23

## 2025-07-23 RX ORDER — LORAZEPAM 0.5 MG/1
0.5 TABLET ORAL PRN
OUTPATIENT
Start: 2025-07-23

## 2025-07-23 RX ORDER — EPINEPHRINE 1 MG/ML(1)
0.5 AMPUL (ML) INJECTION PRN
OUTPATIENT
Start: 2025-07-23

## 2025-07-23 RX ORDER — DIPHENHYDRAMINE HYDROCHLORIDE 50 MG/ML
25 INJECTION, SOLUTION INTRAMUSCULAR; INTRAVENOUS PRN
OUTPATIENT
Start: 2025-07-23

## 2025-07-23 ASSESSMENT — PATIENT HEALTH QUESTIONNAIRE - PHQ9: CLINICAL INTERPRETATION OF PHQ2 SCORE: 0

## 2025-07-23 ASSESSMENT — FIBROSIS 4 INDEX: FIB4 SCORE: 3.64

## 2025-07-23 ASSESSMENT — ACTIVITIES OF DAILY LIVING (ADL): BATHING_REQUIRES_ASSISTANCE: 0

## 2025-07-23 ASSESSMENT — ENCOUNTER SYMPTOMS: GENERAL WELL-BEING: GOOD

## 2025-07-23 NOTE — PROGRESS NOTES
Chief Complaint   Patient presents with    Annual Exam       HPI:  Emilie Gonzalez is a 84 y.o. here for Medicare Annual Wellness Visit     History of Present Illness  The patient presents for an annual wellness visit. Last seen on 03/19/2025, with a cardiology appointment on 04/10/2025.    Macular Degeneration  - Under Dr. LOZANO's care for macular degeneration  - Receiving injections in the right eye every 10 weeks and left eye every 12-14 weeks  - Needs a new optometrist due to relocation  - Believes shingles around the right eye caused nerve damage, but Dr. LOZANO confirmed the eye is unaffected    Parkinson's Disease  - Diagnosed with Parkinson's disease  - Sees a neurologist annually, last visit in April 2025  - No medication for this condition  -Dr Colvin    Hearing  - Uses hearing aids for both ears, checked every 3 months  - Reports good hearing with aids, some echoing    Dental Health  - Has upper and lower dental plates  - Does not see a dentist regularly    Balance and Falls  - No falls or balance issues  - Uses handrail on stairs  - Home well-lit  - Never taken medication for bones    Urinary Health  - No urinary leakage in the past 6 months  - Detached bladder retains urine    Sleep  - Takes melatonin nightly  - Sleeps about 6 hours  - Wakes at 5:30 AM due to long-standing work schedule    Hematology  - No frequent infections or easy bruising  - Saw hematologist Dr. Snyder in January 2023 for neutropenia    Medications  - Currently taking simvastatin 20 mg nightly for dyslipidemia  - Celebrex 100 mg daily for arthritis pain, occasionally twice daily  - Also takes multivitamins, vitamin E, vitamin D, calcium, and vitamin C    Hobbies  - Playing dominoes on Thursdays  - Playing cards on Saturdays    Living Condition  - Lives with her son    FAMILY HISTORY  - Sister had a hip replacement       Patient Active Problem List    Diagnosis Date Noted    Fracture Risk Assessment Score (FRAX) indicating greater than 3%  risk for hip fracture 07/23/2025    Other insomnia 03/19/2025    Stage 3a chronic kidney disease 03/19/2025    Herpes zoster with ophthalmic complication 09/04/2024    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) 06/02/2023    Ascending aorta dilatation ([facility]) 09/06/2022    Cerebral atrophy ([facility]) 06/29/2022    History of pneumonia 06/13/2022    Thrombocytopenia ([facility]) 05/26/2021    Neutropenia ([facility]) 05/26/2021    Elevated fasting glucose 09/21/2020    Aortic stenosis 03/06/2019    Seasonal allergies 10/17/2018    Dyslipidemia 04/12/2017    Aortic regurgitation 03/14/2016    Parkinson disease ([facility]) 02/05/2016    Hearing loss sensory, bilateral 03/10/2015    Osteoarthritis 09/26/2014    Prolapse of female bladder, acquired 09/26/2014    Branch retinal vein occlusion with macular edema of both eyes 09/26/2014       Current Medications[1]       Current supplements as per medication list.     Allergies: Codeine    Current social contact/activities: son lives with her, raeann billy     She  reports that she has never smoked. She has never used smokeless tobacco. She reports that she does not currently use alcohol. She reports that she does not use drugs.  Counseling given: Not Answered      ROS:    Gait: Uses no assistive device  Ostomy: No  Other tubes: No  Amputations: No  Chronic oxygen use: No  Last eye exam: 3 months ago  Wears hearing aids: Yes   : Denies any urinary leakage during the last 6 months    Screening:    Depression Screening  Little interest or pleasure in doing things?  0 - not at all  Feeling down, depressed , or hopeless? 0 - not at all  Trouble falling or staying asleep, or sleeping too much?     Feeling tired or having little energy?     Poor appetite or overeating?     Feeling bad about yourself - or that you are a failure or have let yourself or your family down?    Trouble concentrating on things, such as reading the newspaper or watching television?    Moving or speaking so slowly  that other people could have noticed.  Or the opposite - being so fidgety or restless that you have been moving around a lot more than usual?     Thoughts that you would be better off dead, or of hurting yourself?     Patient Health Questionnaire Score:      If depressive symptoms identified deferred to follow up visit unless specifically addressed in assessment and plan.    Interpretation of PHQ-9 Total Score   Score Severity   1-4 No Depression   5-9 Mild Depression   10-14 Moderate Depression   15-19 Moderately Severe Depression   20-27 Severe Depression    Screening for Cognitive Impairment  Do you or any of your friends or family members have any concern about your memory? No  Three Minute Recall (Village, Kitchen, Baby) 3/3    Kyree clock face with all 12 numbers and set the hands to show 10 minutes past 11.  Yes    Cognitive concerns identified deferred for follow up unless specifically addressed in assessment and plan.    Fall Risk Assessment  Has the patient had two or more falls in the last year or any fall with injury in the last year?  No    Safety Assessment  Do you always wear your seatbelt?  Yes  Any changes to home needed to function safely? No  Difficulty hearing.  No  Patient counseled about all safety risks that were identified.    Functional Assessment ADLs  Are there any barriers preventing you from cooking for yourself or meeting nutritional needs?  No.    Are there any barriers preventing you from driving safely or obtaining transportation?  No.    Are there any barriers preventing you from using a telephone or calling for help?  No    Are there any barriers preventing you from shopping?  No.    Are there any barriers preventing you from taking care of your own finances?  No    Are there any barriers preventing you from managing your medications?  No    Are there any barriers preventing you from showering, bathing or dressing yourself? No    Are there any barriers preventing you from doing  housework or laundry? No    Are there any barriers preventing you from using the toilet?No    Are you currently engaging in any exercise or physical activity?  Yes.      Self-Assessment of Health  What is your perception of your health? Good    Do you sleep more than six hours a night? No    In the past 7 days, how much did pain keep you from doing your normal work? None    Do you spend quality time with family or friends (virtually or in person)? Yes    Do you usually eat a heart healthy diet that constists of a variety of fruits, vegetables, whole grains and fiber? Yes    Do you eat foods high in fat and/or Fast Food more than three times per week? No    How concerned are you that your medical conditions are not being well managed? Not at all    Are you worried that in the next 2 months, you may not have stable housing that you own, rent, or stay in as part of a household? No        Advance Care Planning  Do you have an Advance Directive, Living Will, Durable Power of , or POLST? Yes        POLST is on file- completed June 2022, full code      Health Maintenance Summary            Current Care Gaps       COVID-19 Vaccine (5 - 2024-25 season) Overdue since 9/1/2024 04/18/2024  Imm Admin: Covid-19 Mrna (Spikevax) Moderna 12+ Years    12/01/2022  Imm Admin: MODERNA BIVALENT BOOSTER SARS-COV-2 VACCINE (6+)    09/06/2022  Imm Admin: MODERNA BIVALENT BOOSTER SARS-COV-2 VACCINE (6+)    10/01/2021  Imm Admin: PFIZER PURPLE CAP SARS-COV-2 VACCINATION (12+)    03/03/2021  Imm Admin: PFIZER PURPLE CAP SARS-COV-2 VACCINATION (12+)     Only the first 5 history entries have been loaded, but more history exists.                    Upcoming       Influenza Vaccine (1) Next due on 9/1/2025      10/30/2024  Imm Admin: Influenza high-dose trivalent (PF)    09/14/2023  Imm Admin: Influenza Vaccine Adult HD    11/22/2022  Imm Admin: Influenza Vaccine Adult HD    12/07/2021  Imm Admin: Influenza Vaccine Adult HD     09/21/2020  Imm Admin: Influenza Vaccine Adult HD      Only the first 5 history entries have been loaded, but more history exists.              Annual Wellness Visit (Yearly) Next due on 7/23/2026 07/23/2025  Visit Dx: Medicare annual wellness visit, subsequent    07/09/2024  Level of Service: [place] ANNUAL WELLNESS VISIT-INCLUDES PPPS SUBSEQUE*    06/02/2023  Level of Service: [place] ANNUAL WELLNESS VISIT-INCLUDES PPPS SUBSEQUE*    11/22/2022  Visit Dx: Medicare annual wellness visit, subsequent    09/06/2022  Level of Service: [place] ANNUAL WELLNESS VISIT-INCLUDES PPPS SUBSEQUE*      Only the first 5 history entries have been loaded, but more history exists.              Bone Density Scan (Every 5 Years) Next due on 4/1/2030 04/01/2025  DS-BONE DENSITY STUDY (DEXA)    11/04/2020  DS-BONE DENSITY STUDY (DEXA)    03/20/2015  DS-BONE DENSITY STUDY (DEXA)    04/16/2014  Done              IMM DTaP/Tdap/Td Vaccine (2 - Td or Tdap) Next due on 9/21/2030 09/21/2020  Imm Admin: Tdap Vaccine                      Completed or No Longer Recommended       Zoster (Shingles) Vaccines (Series Information) Completed      03/20/2025  Outside Immunization: Zoster William (Shingrix)    01/17/2025  Imm Admin: Zoster Vaccine Recombinant (RZV) (SHINGRIX)              Pneumococcal Vaccine: 50+ Years (Series Information) Completed      10/18/2017  Imm Admin: Pneumococcal polysaccharide vaccine (PPSV-23)    11/04/2015  Imm Admin: Pneumococcal Conjugate Vaccine (Prevnar/PCV-13)              Hepatitis A Vaccine (Hep A) (Series Information) Aged Out      No completion history exists for this topic.              Hepatitis B Vaccine (Hep B) (Series Information) Aged Out     No completion history exists for this topic.              HPV Vaccines (Series Information) Aged Out     No completion history exists for this topic.              Polio Vaccine (Inactivated Polio) (Series Information) Aged Out     No completion history exists for this topic.               Meningococcal Immunization (Series Information) Aged Out     No completion history exists for this topic.              Meningococcal B Vaccine (Series Information) Aged Out     No completion history exists for this topic.              Mammogram  Discontinued        Frequency changed to Never automatically (Topic No Longer Applies)    01/14/2022  MA-SCREENING MAMMO BILAT W/TOMOSYNTHESIS W/CAD    03/20/2015  MA-SCREENING MAMMOGRAM W/ CAD    04/10/2013  Done                            Patient Care Team:  Summer Higuera M.D. as PCP - General (Family Medicine)  Magan Colvin M.D. (Neurology)  Hans Olivera M.D. as Consulting Physician (Cardiovascular Disease (Cardiology))  Latha Chan M.D. (Ophthalmology)  [facility]  preferred home care as Respiratory Therapist (DME Services)      Social History[2]  Family History   Problem Relation Age of Onset    Hypertension Father     Hypertension Sister     Arthritis Mother      She  has a past medical history of Abnormal EKG (04/12/2017), Acute middle ear effusion, right (06/29/2022), Acute pain of left shoulder (06/29/2022), Arthritis of foot, right (06/09/2015), Benign essential tremor (06/09/2015), Benign essential tremor (06/09/2015), BMI 27.0-27.9,adult (09/06/2022), BMI 29.0-29.9,adult (09/06/2022), CKD (chronic kidney disease) stage 2, GFR 60-89 ml/min (09/06/2022), CKD (chronic kidney disease) stage 3, GFR 30-59 ml/min (01/30/2017), Cough, Edema (03/10/2015), Hearing loss sensory, bilateral (03/10/2015), History of shingles (09/26/2014), Hyperlipidemia (09/26/2014), Lung mass (07/06/2022), Macular degeneration of right eye (09/26/2014), Moderate aortic insufficiency (09/26/2014), Osteoarthritis (09/26/2014), Osteoporosis (09/26/2014), Parkinson's disease (HCC), Plantar fasciitis, bilateral (03/10/2015), Pneumonia due to infectious organism (06/13/2022), and Prolapsed bladder (09/26/2014).   Past Surgical History[3]    Exam:   /70    "Pulse 74   Temp 36.7 °C (98 °F) (Temporal)   Ht 1.6 m (5' 3\")   Wt 72.9 kg (160 lb 11.2 oz)   SpO2 94%  Body mass index is 28.47 kg/m².    Hearing good.  with bilateral hearing aides  Dentition upper and lower dental plates  Alert, oriented in no acute distress.  Eye contact is good, speech goal directed, affect calm    Assessment and Plan. The following treatment and monitoring plan is recommended:    Assessment & Plan  1. Annual wellness visit.  -normal minicog  - Last eye exam 3 months ago.    2. FRAX indicating greater than 3% risk for hip fracture   Bone density test in April 2025 normal but slightly decreased, common post-menopause, increasing fracture risk.  - Advised physical activity, calcium and vitamin D supplements.  - Ordered annual zoledronic acid.  Therapy plan ordered  - Repeat bone density test in 2 years.    3 Aortic stenosis:   4 Aortic regurgitation:   Chronic. Stable.  Had a follow-up with cardiology in April.  - Cardiologist visit on 04/10/2025, echocardiogram scheduled for 10/06/2025.  - Monitoring through regular visits and echocardiograms.    5  Dyslipidemia.  Chronic medical diagnosis. Stable   - Taking simvastatin 20 mg nightly.  - Medication effectiveness evaluated through regular blood work.    6. Arthritis.  Chronic. Stable.   - Taking Celebrex 100 mg daily, occasionally twice daily for finger pain.  - Monitoring through symptoms and medication use.    7. Chronic kidney disease:   Chronic. Stable.  - GFR 53 during last blood work.  - Monitoring through regular tests.  - Evaluating Celebrex impact on kidney function.    8. Macular degeneration.  Chronic. Managed by ophtho  - Receiving injections in right eye every 10 weeks, left eye every 12-14 weeks from Dr. LOZANO.  - Monitoring through regular visits and injections.    9. Parkinson's disease.  Chronic medical diagnosis. Stable   - Annual neurology visits, last in 04/2025.  -Dr Colvin    10. Neutropenia:   11. Thrombocytoepnia  Chronic. " stable  - Platelet counts below normal in 02/2025, persistent since 2020.  - Monitoring through regular blood work.  - Hematologist follow-up in 01/2023. Recommendations to f/u prn    12. Insomnia.  Chronic. Stable. - Takes melatonin nightly, sleeps about 6 hours.  - Monitoring through sleep patterns.    Follow-up  - Scheduled in 3 months.     Orders Placed This Encounter    MELATONIN ER PO      Services suggested: No services needed at this time  Health Care Screening: Age-appropriate preventive services recommended by USPTF and ACIP covered by Medicare were discussed today. Services ordered if indicated and agreed upon by the patient.  Referrals offered: Community-based lifestyle interventions to reduce health risks and promote self-management and wellness, fall prevention, nutrition, physical activity, tobacco-use cessation, weight loss, and mental health services as per orders if indicated.    Discussion today about general wellness and lifestyle habits:    Prevent falls and reduce trip hazards; Cautioned about securing or removing rugs.  Have a working fire alarm and carbon monoxide detector;   Engage in regular physical activity and social activities     Follow-up: Return in about 3 months (around 10/23/2025).         [1]   Current Outpatient Medications   Medication Sig Dispense Refill    MELATONIN ER PO Take  by mouth.      simvastatin (ZOCOR) 20 MG Tab TAKE 1 TABLET BY MOUTH EVERY EVENING 100 Tablet 3    celecoxib (CELEBREX) 100 MG Cap TAKE 1 CAPSULE BY MOUTH EVERY  Capsule 3    VITAMIN D PO Take 1,000 mg by mouth every day.      acetaminophen (TYLENOL) 650 MG CR tablet Take 500 mg by mouth every four hours as needed for Fever or Mild Pain.      Biotin 10 MG Cap Take  by mouth.      vitamin E (VITAMIN E) 1000 UNIT Cap Take 1 Cap by mouth every day.      Calcium Carbonate-Vit D-Min (CALCIUM 1200 PO) Take 1 Each by mouth every day at 6 PM.      Multiple Vitamins-Minerals (OCUVITE-LUTEIN) Cap Take 1  Each by mouth every day.      Omega-3 Fatty Acids (OMEGA 3 PO) Take 1 Each by mouth every day.      Bioflavonoid Products (VITAMIN C PLUS) 1000 MG TABS Take 1 Tab by mouth every day.       No current facility-administered medications for this visit.   [2]   Social History  Tobacco Use    Smoking status: Never    Smokeless tobacco: Never   Vaping Use    Vaping status: Never Used   Substance Use Topics    Alcohol use: Not Currently     Comment: rare    Drug use: No   [3]   Past Surgical History:  Procedure Laterality Date    CHOLECYSTECTOMY  9/2013    APPENDECTOMY  1956    CATARACT PHACO WITH IOL Bilateral

## 2025-08-12 ENCOUNTER — OUTPATIENT INFUSION SERVICES (OUTPATIENT)
Dept: ONCOLOGY | Facility: MEDICAL CENTER | Age: 85
End: 2025-08-12
Attending: FAMILY MEDICINE
Payer: MEDICARE

## 2025-08-12 VITALS
BODY MASS INDEX: 29.49 KG/M2 | HEIGHT: 62 IN | SYSTOLIC BLOOD PRESSURE: 138 MMHG | TEMPERATURE: 97.6 F | HEART RATE: 71 BPM | DIASTOLIC BLOOD PRESSURE: 51 MMHG | RESPIRATION RATE: 17 BRPM | WEIGHT: 160.27 LBS | OXYGEN SATURATION: 94 %

## 2025-08-12 DIAGNOSIS — Z91.89 FRACTURE RISK ASSESSMENT SCORE (FRAX) INDICATING GREATER THAN 3% RISK FOR HIP FRACTURE: Primary | ICD-10-CM

## 2025-08-12 LAB
CA-I BLD ISE-SCNC: 1.18 MMOL/L (ref 1.1–1.3)
CREAT BLD-MCNC: 1.2 MG/DL (ref 0.5–1.4)

## 2025-08-12 PROCEDURE — 700102 HCHG RX REV CODE 250 W/ 637 OVERRIDE(OP): Performed by: FAMILY MEDICINE

## 2025-08-12 PROCEDURE — 36415 COLL VENOUS BLD VENIPUNCTURE: CPT

## 2025-08-12 PROCEDURE — 82565 ASSAY OF CREATININE: CPT

## 2025-08-12 PROCEDURE — A9270 NON-COVERED ITEM OR SERVICE: HCPCS | Performed by: FAMILY MEDICINE

## 2025-08-12 PROCEDURE — 96365 THER/PROPH/DIAG IV INF INIT: CPT

## 2025-08-12 PROCEDURE — 82330 ASSAY OF CALCIUM: CPT

## 2025-08-12 PROCEDURE — 700111 HCHG RX REV CODE 636 W/ 250 OVERRIDE (IP): Performed by: FAMILY MEDICINE

## 2025-08-12 RX ORDER — 0.9 % SODIUM CHLORIDE 0.9 %
10 VIAL (ML) INJECTION PRN
OUTPATIENT
Start: 2026-08-13

## 2025-08-12 RX ORDER — ONDANSETRON 8 MG/1
8 TABLET, ORALLY DISINTEGRATING ORAL PRN
OUTPATIENT
Start: 2026-08-13

## 2025-08-12 RX ORDER — LORAZEPAM 1 MG/1
0.5 TABLET ORAL PRN
OUTPATIENT
Start: 2026-08-13

## 2025-08-12 RX ORDER — LORAZEPAM 2 MG/ML
0.5 INJECTION INTRAMUSCULAR PRN
OUTPATIENT
Start: 2026-08-13

## 2025-08-12 RX ORDER — ZOLEDRONIC ACID 0.05 MG/ML
5 INJECTION, SOLUTION INTRAVENOUS ONCE
Status: COMPLETED | OUTPATIENT
Start: 2025-08-12 | End: 2025-08-12

## 2025-08-12 RX ORDER — ALBUTEROL SULFATE 5 MG/ML
2.5 SOLUTION RESPIRATORY (INHALATION) PRN
OUTPATIENT
Start: 2026-08-13

## 2025-08-12 RX ORDER — ACETAMINOPHEN 325 MG/1
650 TABLET ORAL PRN
OUTPATIENT
Start: 2026-08-13

## 2025-08-12 RX ORDER — ACETAMINOPHEN 325 MG/1
650 TABLET ORAL ONCE
Status: COMPLETED | OUTPATIENT
Start: 2025-08-12 | End: 2025-08-12

## 2025-08-12 RX ORDER — EPINEPHRINE 1 MG/ML(1)
0.5 AMPUL (ML) INJECTION PRN
OUTPATIENT
Start: 2026-08-13

## 2025-08-12 RX ORDER — METHYLPREDNISOLONE SODIUM SUCCINATE 125 MG/2ML
125 INJECTION, POWDER, LYOPHILIZED, FOR SOLUTION INTRAMUSCULAR; INTRAVENOUS PRN
OUTPATIENT
Start: 2026-08-13

## 2025-08-12 RX ORDER — ACETAMINOPHEN 325 MG/1
650 TABLET ORAL ONCE
OUTPATIENT
Start: 2026-08-13 | End: 2026-08-13

## 2025-08-12 RX ORDER — MEPERIDINE HYDROCHLORIDE 25 MG/ML
25 INJECTION INTRAMUSCULAR; INTRAVENOUS; SUBCUTANEOUS PRN
OUTPATIENT
Start: 2026-08-13

## 2025-08-12 RX ORDER — 0.9 % SODIUM CHLORIDE 0.9 %
VIAL (ML) INJECTION PRN
OUTPATIENT
Start: 2026-08-13

## 2025-08-12 RX ORDER — ONDANSETRON 2 MG/ML
8 INJECTION INTRAMUSCULAR; INTRAVENOUS PRN
OUTPATIENT
Start: 2026-08-13

## 2025-08-12 RX ORDER — 0.9 % SODIUM CHLORIDE 0.9 %
3 VIAL (ML) INJECTION PRN
OUTPATIENT
Start: 2026-08-13

## 2025-08-12 RX ORDER — SODIUM CHLORIDE 9 MG/ML
1000 INJECTION, SOLUTION INTRAVENOUS PRN
OUTPATIENT
Start: 2026-08-13

## 2025-08-12 RX ORDER — ZOLEDRONIC ACID 0.05 MG/ML
5 INJECTION, SOLUTION INTRAVENOUS ONCE
OUTPATIENT
Start: 2026-08-13 | End: 2026-08-13

## 2025-08-12 RX ORDER — DIPHENHYDRAMINE HYDROCHLORIDE 50 MG/ML
25 INJECTION, SOLUTION INTRAMUSCULAR; INTRAVENOUS PRN
OUTPATIENT
Start: 2026-08-13

## 2025-08-12 RX ORDER — SODIUM CHLORIDE 9 MG/ML
INJECTION, SOLUTION INTRAVENOUS CONTINUOUS
OUTPATIENT
Start: 2026-08-13

## 2025-08-12 RX ADMIN — ACETAMINOPHEN 650 MG: 325 TABLET ORAL at 14:21

## 2025-08-12 RX ADMIN — ZOLEDRONIC ACID 5 MG: 5 INJECTION, SOLUTION INTRAVENOUS at 14:21

## 2025-08-12 ASSESSMENT — FIBROSIS 4 INDEX: FIB4 SCORE: 3.64
